# Patient Record
Sex: FEMALE | Race: WHITE | NOT HISPANIC OR LATINO | Employment: OTHER | URBAN - METROPOLITAN AREA
[De-identification: names, ages, dates, MRNs, and addresses within clinical notes are randomized per-mention and may not be internally consistent; named-entity substitution may affect disease eponyms.]

---

## 2017-01-05 DIAGNOSIS — G89.29 CHRONIC RIGHT SHOULDER PAIN: ICD-10-CM

## 2017-01-05 DIAGNOSIS — M25.511 CHRONIC RIGHT SHOULDER PAIN: ICD-10-CM

## 2017-01-05 DIAGNOSIS — G43.709 CHRONIC MIGRAINE WITHOUT AURA WITHOUT STATUS MIGRAINOSUS, NOT INTRACTABLE: ICD-10-CM

## 2017-01-05 RX ORDER — OXYCODONE HYDROCHLORIDE AND ACETAMINOPHEN 5; 325 MG/1; MG/1
1 TABLET ORAL EVERY 4 HOURS PRN
Qty: 30 TAB | Refills: 0 | Status: SHIPPED | OUTPATIENT
Start: 2017-01-05 | End: 2017-01-18

## 2017-01-05 NOTE — TELEPHONE ENCOUNTER
Once again,unable to make the appointment but able to come and  the medication!  Please ask her for clarification.  I do realize they come a long way but policy now is no schedule II narcotic refills outside of face to face visits.

## 2017-01-05 NOTE — TELEPHONE ENCOUNTER
They are going to try to come into town tomorrow when the weather should be improved. Currently it would take them nearly 2 hours to get here. I do understand. She now has an appointment on the 18th with Suzanna Solorio. Prescription is written

## 2017-01-05 NOTE — TELEPHONE ENCOUNTER
Was the patient seen in the last year in this department? Yes     Does patient have an active prescription for medications requested? No     Received Request Via: Patient        Patient is unable to make the appointment today due to weather. Would like a month supply refill.

## 2017-01-18 ENCOUNTER — OFFICE VISIT (OUTPATIENT)
Dept: MEDICAL GROUP | Facility: CLINIC | Age: 52
End: 2017-01-18
Payer: COMMERCIAL

## 2017-01-18 VITALS
BODY MASS INDEX: 32.64 KG/M2 | HEIGHT: 64 IN | HEART RATE: 89 BPM | DIASTOLIC BLOOD PRESSURE: 76 MMHG | RESPIRATION RATE: 18 BRPM | TEMPERATURE: 98.1 F | OXYGEN SATURATION: 99 % | WEIGHT: 191.2 LBS | SYSTOLIC BLOOD PRESSURE: 136 MMHG

## 2017-01-18 DIAGNOSIS — M25.511 CHRONIC RIGHT SHOULDER PAIN: ICD-10-CM

## 2017-01-18 DIAGNOSIS — G43.709 CHRONIC MIGRAINE WITHOUT AURA WITHOUT STATUS MIGRAINOSUS, NOT INTRACTABLE: ICD-10-CM

## 2017-01-18 DIAGNOSIS — G89.29 CHRONIC RIGHT SHOULDER PAIN: ICD-10-CM

## 2017-01-18 DIAGNOSIS — M24.411 RECURRENT SHOULDER DISLOCATION, RIGHT: ICD-10-CM

## 2017-01-18 DIAGNOSIS — F11.90 CHRONIC, CONTINUOUS USE OF OPIOIDS: ICD-10-CM

## 2017-01-18 DIAGNOSIS — K21.9 GASTROESOPHAGEAL REFLUX DISEASE, ESOPHAGITIS PRESENCE NOT SPECIFIED: ICD-10-CM

## 2017-01-18 PROCEDURE — 99214 OFFICE O/P EST MOD 30 MIN: CPT | Performed by: NURSE PRACTITIONER

## 2017-01-18 RX ORDER — OXYCODONE HYDROCHLORIDE AND ACETAMINOPHEN 5; 325 MG/1; MG/1
1 TABLET ORAL EVERY 4 HOURS PRN
Qty: 180 TAB | Refills: 0 | Status: SHIPPED | OUTPATIENT
Start: 2017-01-18 | End: 2017-01-18 | Stop reason: SDUPTHER

## 2017-01-18 RX ORDER — OMEPRAZOLE 20 MG/1
CAPSULE, DELAYED RELEASE ORAL
Qty: 90 CAP | Refills: 3 | Status: SHIPPED | OUTPATIENT
Start: 2017-01-18 | End: 2017-08-30 | Stop reason: SDUPTHER

## 2017-01-18 RX ORDER — OXYCODONE HYDROCHLORIDE AND ACETAMINOPHEN 5; 325 MG/1; MG/1
1 TABLET ORAL EVERY 4 HOURS PRN
Qty: 180 TAB | Refills: 0 | Status: SHIPPED | OUTPATIENT
Start: 2017-01-18 | End: 2017-03-27 | Stop reason: SDUPTHER

## 2017-01-18 NOTE — PROGRESS NOTES
CC: Follow-Up        HPI:     Bhavana presents today for the followin. Chronic migraine without aura without status migrainosus, not intractableChronic right shoulder pain Gastroesophageal reflux disease, esophagitis presence not specified Recurrent shoulder dislocation, rightChronic, continuous use of opioids  Chronic Opiate therapy:  (5 A's)  Analgesia:  improved  Activity:  improved  Adverse Events:  denies  Aberrant Behaviors:  denies  Affect/Mood: normal reasoning        Last dose of controlled substance medication: 16   reviewed:  Yes      Current Drug contract date: 2016  Current UDT date: pending-patient is switching insurance.  She was given the contact at [facility] to get a discounted rate, told she needs to get this done    Signs of oversedation: Negative  Non-narcotic treatments used in past or currently:  Ice, ointments, chiropractor    I have advised patient to keep medication in a safe place and caution with driving    Current Outpatient Prescriptions   Medication Sig Dispense Refill   • oxycodone-acetaminophen (PERCOCET) 5-325 MG Tab Take 1 Tab by mouth every four hours as needed (shoulder pain, migraine). 180 Tab 0   • omeprazole (PRILOSEC) 20 MG delayed-release capsule TAKE 1 CAPSULE BY MOUTH DAILY 90 Cap 3   • carisoprodol (SOMA) 350 MG Tab Take 1 Tab by mouth every 8 hours as needed for Muscle Spasms. 90 Tab 5   • alprazolam (XANAX) 1 MG Tab Take 1 Tab by mouth 3 times a day as needed for Sleep or Anxiety. 90 Tab 5   • paroxetine (PAXIL) 20 MG Tab Take 1 Tab by mouth every day. 180 Tab 3   • busPIRone (BUSPAR) 10 MG Tab Take 1 Tab by mouth 2 Times a Day. 180 Tab 3   • omeprazole (PRILOSEC) 20 MG delayed-release capsule Take 1 Cap by mouth every day. 90 Cap 3   • nabumetone (RELAFEN) 500 MG TABS Take 1 Tab by mouth 2 times a day. 180 Tab 4   • albuterol (VENTOLIN OR PROVENTIL) 108 (90 BASE) MCG/ACT AERS Inhale 2 Puffs by mouth every 6 hours as needed for Shortness of Breath. 1  "Inhaler 3     No current facility-administered medications for this visit.     Social History   Substance Use Topics   • Smoking status: Current Every Day Smoker -- 0.50 packs/day for 25 years     Types: Cigarettes   • Smokeless tobacco: Never Used      Comment: getting close, she down to 1/4 ppd, still trying to quit   • Alcohol Use: No     I reviewed patients allergies, problem list and medications today in [facility].    ROS: Any/all pertinent positives listed in the HPI, otherwise all others reviewed are negative today.      /76 mmHg  Pulse 89  Temp(Src) 36.7 °C (98.1 °F)  Resp 18  Ht 1.638 m (5' 4.49\")  Wt 86.728 kg (191 lb 3.2 oz)  BMI 32.32 kg/m2  SpO2 99%  Breastfeeding? No    Exam:    Gen: Alert and oriented, No apparent distress. WDWN  Psych: A+Ox3, normal affect and mood  Skin: Warm, dry and intact. Good turgor   No rashes in visible areas.  Eye: Conjunctiva clear, lids normal  ENMT: Lips without lesions, good dentition  Lungs: Clear to auscultation bilaterally, no rales or rhonchi   Unlabored respiratory effort.   CV: Regular rate and rhythm, S1, S2. No murmurs.   No Edema        Assessment and Plan.   51 y.o. female with the following issues.    1. Chronic migraine without aura without status migrainosus, not intractable/Chronic right shoulder pain/Gastroesophageal reflux disease, esophagitis presence not specified/ Recurrent shoulder dislocation, right/Chronic, continuous use of opioids  Clinically stable. No reports of new symptoms. Well-controlled on current medication.  obtained/reviewed from [facility] pharmacy database.  Medications found to be medically necessary/appropriate.  3 months of medication supply at this visit. Followup in the office for further refills-she has appointment scheduled already. Patient was given a lab order for her [employer] [employer] urine drug screen. She was also given the contact for the rep for the company so that she can work out a discounted rate.      - " oxycodone-acetaminophen (PERCOCET) 5-325 MG Tab; Take 1 Tab by mouth every four hours as needed (shoulder pain, migraine).  Dispense: 180 Tab; Refill: 0  - omeprazole (PRILOSEC) 20 MG delayed-release capsule; TAKE 1 CAPSULE BY MOUTH DAILY  Dispense: 90 Cap; Refill: 3

## 2017-01-18 NOTE — MR AVS SNAPSHOT
"        Bhavana Liu   2017 11:00 AM   Office Visit   MRN: [id number]    Department:  [facility]   Dept Phone:  [phone]    Description:  Female : 1965   Provider:  JOSE Cummings           Reason for Visit     Follow-Up [facility] pain medication      Allergies as of 2017     Allergen Noted Reactions    Amoxicillin 2011   Rash      You were diagnosed with     Chronic migraine without aura without status migrainosus, not intractable   [502180]       Chronic right shoulder pain   [745040]       Gastroesophageal reflux disease, esophagitis presence not specified   [0300795]       Recurrent shoulder dislocation, right   [679911]       Chronic, continuous use of opioids   [977318]         Vital Signs     Blood Pressure Pulse Temperature Respirations Height Weight    136/76 mmHg 89 36.7 °C (98.1 °F) 18 1.638 m (5' 4.49\") 86.728 kg (191 lb 3.2 oz)    Body Mass Index Oxygen Saturation Breastfeeding? Smoking Status          32.32 kg/m2 99% No Current Every Day Smoker        Basic Information     Date Of Birth Sex Race Ethnicity Preferred Language    1965 Female White Non- English      Your appointments     Mar 27, 2017  1:20 PM   Established Patient with Teetee Mansfield M.D.   [address] [address] [phone]           You will be receiving a confirmation call a few days before your appointment from our automated call confirmation system.              Problem List              ICD-10-CM Priority Class Noted - Resolved    GERD (gastroesophageal reflux disease) K21.9   Unknown - Present    Dyslipidemia, goal LDL below 130 E78.5   Unknown - Present    Migraine headache G43.909   2013 - Present    Adjustment disorder with anxious mood F43.22   Unknown - Present    Shoulder pain M25.519   2014 - Present    Recurrent shoulder dislocation M24.419   10/3/2014 - Present    Chronic use of opiate for " therapeutic purpose Z79.899   7/5/2016 - Present      Health Maintenance        Date Due Completion Dates    IMM DTaP/Tdap/Td Vaccine (1 - Tdap) 7/10/1983 ---    MAMMOGRAM 7/10/2004 ---    COLONOSCOPY 7/10/2014 ---    PAP SMEAR 11/1/2015 11/1/2012, 10/18/2011    IMM INFLUENZA (1) 9/1/2016 11/5/2014, 10/18/2011            Current Immunizations     Influenza TIV (IM) 11/5/2014, 10/18/2011      Below and/or attached are the medications your provider expects you to take. Review all of your home medications and newly ordered medications with your provider and/or pharmacist. Follow medication instructions as directed by your provider and/or pharmacist. Please keep your medication list with you and share with your provider. Update the information when medications are discontinued, doses are changed, or new medications (including over-the-counter products) are added; and carry medication information at all times in the event of emergency situations     Allergies:  AMOXICILLIN - Rash               Medications  Valid as of: January 18, 2017 - 12:36 PM    Generic Name Brand Name Tablet Size Instructions for use    Albuterol Sulfate (Aero Soln) albuterol 108 (90 BASE) MCG/ACT Inhale 2 Puffs by mouth every 6 hours as needed for Shortness of Breath.        ALPRAZolam (Tab) XANAX 1 MG Take 1 Tab by mouth 3 times a day as needed for Sleep or Anxiety.        BusPIRone HCl (Tab) BUSPAR 10 MG Take 1 Tab by mouth 2 Times a Day.        Carisoprodol (Tab) SOMA 350 MG Take 1 Tab by mouth every 8 hours as needed for Muscle Spasms.        Nabumetone (Tab) RELAFEN 500 MG Take 1 Tab by mouth 2 times a day.        Omeprazole (CAPSULE DELAYED RELEASE) PRILOSEC 20 MG Take 1 Cap by mouth every day.        Omeprazole (CAPSULE DELAYED RELEASE) PRILOSEC 20 MG TAKE 1 CAPSULE BY MOUTH DAILY        Oxycodone-Acetaminophen (Tab) PERCOCET 5-325 MG Take 1 Tab by mouth every four hours as needed (shoulder pain, migraine).        PARoxetine HCl (Tab) PAXIL  20 MG Take 1 Tab by mouth every day.        .                 Medicines prescribed today were sent to:     [facility] [place] - [address]    [address] [place] [address]    Phone: [phone] Fax: [phone]    Open 24 Hours?: No    [facility] - [place], [place] - [address]    [address] [address]    Phone: [phone] Fax: [phone]    Open 24 Hours?: No      Medication refill instructions:       If your prescription bottle indicates you have medication refills left, it is not necessary to call your provider’s office. Please contact your pharmacy and they will refill your medication.    If your prescription bottle indicates you do not have any refills left, you may request refills at any time through one of the following ways: The online TrumpIT system (except Urgent Care), by calling your provider’s office, or by asking your pharmacy to contact your provider’s office with a refill request. Medication refills are processed only during regular business hours and may not be available until the next business day. Your provider may request additional information or to have a follow-up visit with you prior to refilling your medication.   *Please Note: Medication refills are assigned a new Rx number when refilled electronically. Your pharmacy may indicate that no refills were authorized even though a new prescription for the same medication is available at the pharmacy. Please request the medicine by name with the pharmacy before contacting your provider for a refill.           TrumpIT Access Code: [email or web address]  Expires: 2/4/2017 10:19 AM    TrumpIT  A secure, online tool to manage your health information     [employer] TrumpIT® is a secure, online tool that connects you to your personalized health information from the privacy of your home -- day or night - making it very easy for you to manage your healthcare. Once the activation process  is completed, you can even access your medical information using the TripAdvisor lacie, which is available for free in the Apple Lacie store or Google Play store.     TripAdvisor provides the following levels of access (as shown below):   My Chart Features   [facility] Doctor [facility]  Specialists [facility]  Urgent  Care Non-[facility]  Primary Care  Doctor   Email your healthcare team securely and privately 24/7 X X X    Manage appointments: schedule your next appointment; view details of past/upcoming appointments X      Request prescription refills. X      View recent personal medical records, including lab and immunizations X X X X   View health record, including health history, allergies, medications X X X X   Read reports about your outpatient visits, procedures, consult and ER notes X X X X   See your discharge summary, which is a recap of your hospital and/or ER visit that includes your diagnosis, lab results, and care plan. X X       How to register for TripAdvisor:  1. Go to  [email or web address].  2. Click on the Sign Up Now box, which takes you to the New Member Sign Up page. You will need to provide the following information:  a. Enter your TripAdvisor Access Code exactly as it appears at the top of this page. (You will not need to use this code after you’ve completed the sign-up process. If you do not sign up before the expiration date, you must request a new code.)   b. Enter your date of birth.   c. Enter your home email address.   d. Click Submit, and follow the next screen’s instructions.  3. Create a TripAdvisor ID. This will be your TripAdvisor login ID and cannot be changed, so think of one that is secure and easy to remember.  4. Create a TripAdvisor password. You can change your password at any time.  5. Enter your Password Reset Question and Answer. This can be used at a later time if you forget your password.   6. Enter your e-mail address. This allows you to receive e-mail notifications when new information is  available in [employer].  7. Click Sign Up. You can now view your health information.    For assistance activating your [employer] account, call [phone]

## 2017-03-27 ENCOUNTER — OFFICE VISIT (OUTPATIENT)
Dept: MEDICAL GROUP | Facility: CLINIC | Age: 52
End: 2017-03-27
Payer: COMMERCIAL

## 2017-03-27 VITALS
DIASTOLIC BLOOD PRESSURE: 80 MMHG | BODY MASS INDEX: 32.1 KG/M2 | HEIGHT: 64 IN | SYSTOLIC BLOOD PRESSURE: 134 MMHG | RESPIRATION RATE: 16 BRPM | TEMPERATURE: 97.2 F | OXYGEN SATURATION: 100 % | WEIGHT: 188 LBS | HEART RATE: 76 BPM

## 2017-03-27 DIAGNOSIS — G89.29 CHRONIC RIGHT SHOULDER PAIN: ICD-10-CM

## 2017-03-27 DIAGNOSIS — G43.709 CHRONIC MIGRAINE WITHOUT AURA WITHOUT STATUS MIGRAINOSUS, NOT INTRACTABLE: ICD-10-CM

## 2017-03-27 DIAGNOSIS — F43.22 ADJUSTMENT DISORDER WITH ANXIOUS MOOD: ICD-10-CM

## 2017-03-27 DIAGNOSIS — Z79.891 CHRONIC USE OF OPIATE FOR THERAPEUTIC PURPOSE: ICD-10-CM

## 2017-03-27 DIAGNOSIS — M24.411 RECURRENT SHOULDER DISLOCATION, RIGHT: ICD-10-CM

## 2017-03-27 DIAGNOSIS — M25.511 CHRONIC RIGHT SHOULDER PAIN: ICD-10-CM

## 2017-03-27 PROBLEM — E66.9 OBESITY (BMI 30.0-34.9): Status: ACTIVE | Noted: 2017-03-27

## 2017-03-27 PROBLEM — E66.811 OBESITY (BMI 30.0-34.9): Status: ACTIVE | Noted: 2017-03-27

## 2017-03-27 PROCEDURE — 99213 OFFICE O/P EST LOW 20 MIN: CPT | Performed by: FAMILY MEDICINE

## 2017-03-27 RX ORDER — OXYCODONE HYDROCHLORIDE AND ACETAMINOPHEN 5; 325 MG/1; MG/1
1 TABLET ORAL EVERY 4 HOURS PRN
Qty: 180 TAB | Refills: 0 | Status: SHIPPED | OUTPATIENT
Start: 2017-03-27 | End: 2017-06-13 | Stop reason: SDUPTHER

## 2017-03-27 RX ORDER — OXYCODONE HYDROCHLORIDE AND ACETAMINOPHEN 5; 325 MG/1; MG/1
1 TABLET ORAL EVERY 4 HOURS PRN
Qty: 180 TAB | Refills: 0 | Status: SHIPPED | OUTPATIENT
Start: 2017-03-27 | End: 2017-03-27 | Stop reason: SDUPTHER

## 2017-03-27 RX ORDER — ALPRAZOLAM 1 MG/1
1 TABLET ORAL 3 TIMES DAILY PRN
Qty: 90 TAB | Refills: 5 | Status: SHIPPED | OUTPATIENT
Start: 2017-03-27 | End: 2017-08-30 | Stop reason: SDUPTHER

## 2017-03-27 RX ORDER — CARISOPRODOL 350 MG/1
350 TABLET ORAL EVERY 8 HOURS PRN
Qty: 90 TAB | Refills: 5 | Status: SHIPPED | OUTPATIENT
Start: 2017-03-27 | End: 2017-08-30 | Stop reason: SDUPTHER

## 2017-03-27 ASSESSMENT — LIFESTYLE VARIABLES: HISTORY_ALCOHOL_USE: 0

## 2017-03-27 ASSESSMENT — PATIENT HEALTH QUESTIONNAIRE - PHQ9: CLINICAL INTERPRETATION OF PHQ2 SCORE: 0

## 2017-03-27 ASSESSMENT — ENCOUNTER SYMPTOMS: DEPRESSION: 1

## 2017-03-27 NOTE — PROGRESS NOTES
CC: Migraine, chronic right shoulder pain, anxiety    HPI:   Bhavana presents today with the following.    1. Chronic migraine without aura without status migrainosus, not intractable  She continues to have episodic migraine. The Percocet continues to be helpful for rescue. She does use primarily ice and ibuprofen when this begins. She is somewhat limited with the ibuprofen due to her gastroesophageal reflux disease. Denies somnolence or confusion from the medication. She feels the Percocet takes the migraine pain from an 8 or 9 down to about a 6 which is tolerable.    2. Chronic right shoulder pain/recurrent right shoulder dislocation  This is a source of chronic pain and instability. She has not been able to afford imaging. She is starting another job and is hoping that this will give her insurance after a few months. She has had to limit her lifting which is difficult since she is trying to take care of her brother-in-law. She feels the pain medication takes the regular chronic pain from a 6 down to a 3 which is very helpful. When there is an acute flare almost nothing works. She uses ibuprofen along with the Percocet without very much relief. She has not been able to go to orthopedics.    3. Chronic use of opiate for therapeutic purpose  No aberrant or addictive use of medications has been observed.  Patient counseled to not add Tylenol to current regimen.  Patient counseled to keep medications locked door under personal control.    54. Adjustment disorder with anxious mood  She has multiple reasons for anxiety. There is anxiety concerning her  who has now had 7 heart attacks. She is gradually improving her diet and has lost weight. She is working on smoking cessation. She also takes care of her brother-in-law who has end-stage lung disease she feels the alprazolam is very helpful. She uses this as needed. She would like a renewal. Discussed increased risks when combined with opiates..        Patient  "Active Problem List    Diagnosis Date Noted   • Chronic use of opiate for therapeutic purpose 07/05/2016   • Recurrent shoulder dislocation 10/03/2014   • Shoulder pain 07/30/2014   • Adjustment disorder with anxious mood    • Migraine headache 09/30/2013   • GERD (gastroesophageal reflux disease)    • Dyslipidemia, goal LDL below 130        Current Outpatient Prescriptions   Medication Sig Dispense Refill   • carisoprodol (SOMA) 350 MG Tab Take 1 Tab by mouth every 8 hours as needed for Muscle Spasms. 90 Tab 5   • alprazolam (XANAX) 1 MG Tab Take 1 Tab by mouth 3 times a day as needed for Sleep or Anxiety. 90 Tab 5   • oxycodone-acetaminophen (PERCOCET) 5-325 MG Tab Take 1 Tab by mouth every four hours as needed (shoulder pain, migraine). 180 Tab 0   • omeprazole (PRILOSEC) 20 MG delayed-release capsule TAKE 1 CAPSULE BY MOUTH DAILY 90 Cap 3   • paroxetine (PAXIL) 20 MG Tab Take 1 Tab by mouth every day. 180 Tab 3   • busPIRone (BUSPAR) 10 MG Tab Take 1 Tab by mouth 2 Times a Day. 180 Tab 3   • nabumetone (RELAFEN) 500 MG TABS Take 1 Tab by mouth 2 times a day. 180 Tab 4   • albuterol (VENTOLIN OR PROVENTIL) 108 (90 BASE) MCG/ACT AERS Inhale 2 Puffs by mouth every 6 hours as needed for Shortness of Breath. 1 Inhaler 3     No current facility-administered medications for this visit.         Allergies as of 03/27/2017 - [facility] as Reviewed 03/27/2017   Allergen Reaction Noted   • Amoxicillin Rash 11/08/2011        ROS: As per HPI.    /80 mmHg  Pulse 76  Temp(Src) 36.2 °C (97.2 °F)  Resp 16  Ht 1.638 m (5' 4.49\")  Wt 85.276 kg (188 lb)  BMI 31.78 kg/m2  SpO2 100%    Physical Exam:  Gen:         Alert and oriented, No apparent distress.  Lucid and fluent.  Neck:        No Lymphadenopathy or Bruits.  Lungs:     Clear to auscultation bilaterally  CV:          Regular rate and rhythm. No murmurs, rubs or gallops.               Ext:          No clubbing, cyanosis, no edema.      Assessment and Plan.   51 y.o. " female with the following issues.    1. Chronic migraine without aura without status migrainosus, not intractable  Medication is helpful and well tolerated and is renewed  - oxycodone-acetaminophen (PERCOCET) 5-325 MG Tab; Take 1 Tab by mouth every four hours as needed (shoulder pain, migraine).  Dispense: 180 Tab; Refill: 0    2. Chronic right shoulder pain  Medication is helpful and well tolerated and is renewed  - carisoprodol (SOMA) 350 MG Tab; Take 1 Tab by mouth every 8 hours as needed for Muscle Spasms.  Dispense: 90 Tab; Refill: 5  - oxycodone-acetaminophen (PERCOCET) 5-325 MG Tab; Take 1 Tab by mouth every four hours as needed (shoulder pain, migraine).  Dispense: 180 Tab; Refill: 0    3. Recurrent shoulder dislocation, right  Needs imaging but cannot afford at this time. She is currently employed and will possibly have insurance within 3 months.  - oxycodone-acetaminophen (PERCOCET) 5-325 MG Tab; Take 1 Tab by mouth every four hours as needed (shoulder pain, migraine).  Dispense: 180 Tab; Refill: 0    4. Chronic use of opiate for therapeutic purpose  [facility] board of pharmacy interface is reviewed.  No inconsistencies are found.  Her average MME is 48, active is 45, max active is 45.  This is within CDC guideline for chronic pain prescribing by primary care.    5. Adjustment disorder with anxious mood  Medication is discussed and renewed. She has tolerated this well and denies rebound anxiety or depression from the medication.  - alprazolam (XANAX) 1 MG Tab; Take 1 Tab by mouth 3 times a day as needed for Sleep or Anxiety.  Dispense: 90 Tab; Refill: 5        Chronic pain recheck:   Last dose of controlled substance: this am, over 6 hours ago as she drove in today  Chronic pain treated with oxycodone apap taken 4-5 times a day    She  reports that she does not drink alcohol.  She  reports that she does not use illicit drugs.    Consequences of Chronic Opiate therapy:  (5 A's)  Analgesia: Compared to no  treatment or prior treatment, pain is currently improved  Activity: improved  Adverse Events: She denies constipation, dry mouth, itchy skin, nausea and sedation  Aberrant Behaviors: She reports she is taking medication as prescribed and is not veering from agreed treatment regimen. There have been no inappropriate refills or lost/stolen meds reported.   Affect/Mood: Pain is impacting patient's mood.  Patient reports depression/anxiety concerning her husbands and brother in [facility].    Nonnarcotic treatments that are being used: muscle relaxers, physical therapy and chiropractor .   Treatment goals discussed.    Opioid Risk Score: 2    Interpretation of Opioid Risk Score   Score 0-3 = Low risk of abuse. Do UDS at least once per year.  Score 4-7 = Moderate risk of abuse. Do UDS 1-4 times per year.  Score 8+ = High risk of abuse. Refer to specialist.    Last order of CONTROLLED SUBSTANCE TREATMENT AGREEMENT was found on 7/5/2016 from Office Visit on 7/5/2016     UDS Summary                URINE DRUG SCREEN Overdue 1965         Discussed obtaining UDS.  Needs to get this done but waiting for the finances to be in line.    I have reviewed the medical records, the Prescription Monitoring Program and I have determined that controlled substance treatment is medically indicated.

## 2017-03-27 NOTE — MR AVS SNAPSHOT
"        Bhavana Nglaura   3/27/2017 1:20 PM   Office Visit   MRN: [id number]    Department:  [facility]   Dept Phone:  [phone]    Description:  Female : 1965   Provider:  Teetee Mansfield M.D.           Reason for Visit     Shoulder Pain           Allergies as of 3/27/2017     Allergen Noted Reactions    Amoxicillin 2011   Rash      You were diagnosed with     Chronic migraine without aura without status migrainosus, not intractable   [962831]       Chronic right shoulder pain   [697054]       Recurrent shoulder dislocation, right   [407877]       Chronic use of opiate for therapeutic purpose   [4163070]       Adjustment disorder with anxious mood   [781094]         Vital Signs     Blood Pressure Pulse Temperature Respirations Height Weight    134/80 mmHg 76 36.2 °C (97.2 °F) 16 1.638 m (5' 4.49\") 85.276 kg (188 lb)    Body Mass Index Oxygen Saturation Smoking Status             31.78 kg/m2 100% Current Every Day Smoker         Basic Information     Date Of Birth Sex Race Ethnicity Preferred Language    1965 Female White Non- English      Problem List              ICD-10-CM Priority Class Noted - Resolved    GERD (gastroesophageal reflux disease) K21.9   Unknown - Present    Dyslipidemia, goal LDL below 130 E78.5   Unknown - Present    Migraine headache G43.909   2013 - Present    Adjustment disorder with anxious mood F43.22   Unknown - Present    Shoulder pain M25.519   2014 - Present    Recurrent shoulder dislocation M24.419   10/3/2014 - Present    Chronic use of opiate for therapeutic purpose Z79.899   2016 - Present    Obesity (BMI 30.0-34.9) E66.9   3/27/2017 - Present      Health Maintenance        Date Due Completion Dates    MAMMOGRAM 7/10/2004 ---    COLONOSCOPY 7/10/2014 ---    PAP SMEAR 2015, 10/18/2011    IMM INFLUENZA (1) 2018 (Originally 2016) 2014, 10/18/2011    IMM DTaP/Tdap/Td Vaccine (1 - Tdap) 2018 " (Originally 7/10/1983) ---            Current Immunizations     Influenza TIV (IM) 11/5/2014, 10/18/2011      Below and/or attached are the medications your provider expects you to take. Review all of your home medications and newly ordered medications with your provider and/or pharmacist. Follow medication instructions as directed by your provider and/or pharmacist. Please keep your medication list with you and share with your provider. Update the information when medications are discontinued, doses are changed, or new medications (including over-the-counter products) are added; and carry medication information at all times in the event of emergency situations     Allergies:  AMOXICILLIN - Rash               Medications  Valid as of: March 27, 2017 -  3:37 PM    Generic Name Brand Name Tablet Size Instructions for use    Albuterol Sulfate (Aero Soln) albuterol 108 (90 BASE) MCG/ACT Inhale 2 Puffs by mouth every 6 hours as needed for Shortness of Breath.        ALPRAZolam (Tab) XANAX 1 MG Take 1 Tab by mouth 3 times a day as needed for Sleep or Anxiety.        BusPIRone HCl (Tab) BUSPAR 10 MG Take 1 Tab by mouth 2 Times a Day.        Carisoprodol (Tab) SOMA 350 MG Take 1 Tab by mouth every 8 hours as needed for Muscle Spasms.        Nabumetone (Tab) RELAFEN 500 MG Take 1 Tab by mouth 2 times a day.        Omeprazole (CAPSULE DELAYED RELEASE) PRILOSEC 20 MG TAKE 1 CAPSULE BY MOUTH DAILY        Oxycodone-Acetaminophen (Tab) PERCOCET 5-325 MG Take 1 Tab by mouth every four hours as needed (shoulder pain, migraine).        PARoxetine HCl (Tab) PAXIL 20 MG Take 1 Tab by mouth every day.        .                 Medicines prescribed today were sent to:     [facility] - [place] - [address] [place] [address]    Phone: [phone] Fax: [phone]    Open 24 Hours?: No    [facility] - [place], [place] - [address]    [address] [facility] [address]    Phone:  [phone] Fax: [phone]    Open 24 Hours?: No    [facility] - [place], [place] - [address]    [address] [facility] [address]    Phone: [phone] Fax: [phone]    Open 24 Hours?: No      Medication refill instructions:       If your prescription bottle indicates you have medication refills left, it is not necessary to call your provider’s office. Please contact your pharmacy and they will refill your medication.    If your prescription bottle indicates you do not have any refills left, you may request refills at any time through one of the following ways: The online [employer] system (except Urgent Care), by calling your provider’s office, or by asking your pharmacy to contact your provider’s office with a refill request. Medication refills are processed only during regular business hours and may not be available until the next business day. Your provider may request additional information or to have a follow-up visit with you prior to refilling your medication.   *Please Note: Medication refills are assigned a new Rx number when refilled electronically. Your pharmacy may indicate that no refills were authorized even though a new prescription for the same medication is available at the pharmacy. Please request the medicine by name with the pharmacy before contacting your provider for a refill.           MyChart Status: Patient Declined        Quit Tobacco Information     Do you want to quit using tobacco?    Quitting tobacco decreases risks of cancer, heart and lung disease, increases life expectancy, improves sense of taste and smell, and increases spending money, among other benefits.    If you are thinking about quitting, we can help.  • [facility] Quit Tobacco Program: 343.338.1763  o Program occurs weekly for four weeks and includes pharmacist consultation on products to support quitting smoking or chewing tobacco. A provider referral is needed for pharmacist  consultation.  • Tobacco Users Help Hotline: 5-800-QUIT-NOW (199-7849) or https://[place].quitlogix.org/  o Free, confidential telephone and online coaching for [place] residents. Sessions are designed on a schedule that is convenient for you. Eligible clients receive free nicotine replacement therapy.  • Nationally: www.smokefree.gov  o Information and professional assistance to support both immediate and long-term needs as you become, and remain, a non-smoker. Smokefree.gov allows you to choose the help that best fits your needs.

## 2017-04-14 DIAGNOSIS — F43.22 ADJUSTMENT DISORDER WITH ANXIOUS MOOD: ICD-10-CM

## 2017-04-14 RX ORDER — PAROXETINE HYDROCHLORIDE 20 MG/1
20 TABLET, FILM COATED ORAL DAILY
Qty: 180 TAB | Refills: 3 | Status: SHIPPED | OUTPATIENT
Start: 2017-04-14 | End: 2018-06-06 | Stop reason: SDUPTHER

## 2017-04-14 RX ORDER — BUSPIRONE HYDROCHLORIDE 10 MG/1
10 TABLET ORAL 2 TIMES DAILY
Qty: 180 TAB | Refills: 3 | Status: SHIPPED | OUTPATIENT
Start: 2017-04-14 | End: 2018-09-04 | Stop reason: SDUPTHER

## 2017-06-13 ENCOUNTER — OFFICE VISIT (OUTPATIENT)
Dept: MEDICAL GROUP | Facility: CLINIC | Age: 52
End: 2017-06-13

## 2017-06-13 VITALS
OXYGEN SATURATION: 97 % | HEIGHT: 64 IN | BODY MASS INDEX: 31.58 KG/M2 | TEMPERATURE: 96.8 F | DIASTOLIC BLOOD PRESSURE: 80 MMHG | SYSTOLIC BLOOD PRESSURE: 130 MMHG | WEIGHT: 185 LBS | HEART RATE: 88 BPM | RESPIRATION RATE: 16 BRPM

## 2017-06-13 DIAGNOSIS — Z79.891 CHRONIC USE OF OPIATE FOR THERAPEUTIC PURPOSE: ICD-10-CM

## 2017-06-13 DIAGNOSIS — G89.29 CHRONIC RIGHT SHOULDER PAIN: ICD-10-CM

## 2017-06-13 DIAGNOSIS — G43.C0 PERIODIC HEADACHE SYNDROME, NOT INTRACTABLE: ICD-10-CM

## 2017-06-13 DIAGNOSIS — M24.411 RECURRENT SHOULDER DISLOCATION, RIGHT: ICD-10-CM

## 2017-06-13 DIAGNOSIS — G43.709 CHRONIC MIGRAINE WITHOUT AURA WITHOUT STATUS MIGRAINOSUS, NOT INTRACTABLE: ICD-10-CM

## 2017-06-13 DIAGNOSIS — M25.511 CHRONIC RIGHT SHOULDER PAIN: ICD-10-CM

## 2017-06-13 PROCEDURE — 99213 OFFICE O/P EST LOW 20 MIN: CPT | Performed by: FAMILY MEDICINE

## 2017-06-13 RX ORDER — OXYCODONE HYDROCHLORIDE AND ACETAMINOPHEN 5; 325 MG/1; MG/1
1 TABLET ORAL EVERY 4 HOURS PRN
Qty: 180 TAB | Refills: 0 | Status: SHIPPED | OUTPATIENT
Start: 2017-06-13 | End: 2017-06-13 | Stop reason: SDUPTHER

## 2017-06-13 RX ORDER — OXYCODONE HYDROCHLORIDE AND ACETAMINOPHEN 5; 325 MG/1; MG/1
1 TABLET ORAL EVERY 4 HOURS PRN
Qty: 180 TAB | Refills: 0 | Status: SHIPPED | OUTPATIENT
Start: 2017-06-13 | End: 2017-08-30 | Stop reason: SDUPTHER

## 2017-06-13 NOTE — PROGRESS NOTES
CC: chronic shoulder pain, headache/migraine    HPI:   Bhavana presents today with the following.    1. Chronic right shoulder pain  She has chronic shoulder pain, this is a combination of arthritis and recurrent shoulder dislocations with ligament and tendon problems. She has seen orthopedics in the past. She has been told that surgery could be minimally helpful although not curative. She has not been able to pursue this as she has no insurance and she is not sure that she wants to as her pain would not be cured.  She feels the Relafen is helpful with the Percocet for rescue.    2. Recurrent shoulder dislocation, right  She has shoulder instability and does occasionally dislocate. She tries to be very careful and maintain the strength of the shoulder muscles which is somewhat helpful. She feels the pain medication is quite helpful bringing her daily pain from a 6 or 7 down to a 3 or 4. The pain on dislocation is quite severe up to a 9 or at 10. At that point to with the Percocet will bring her pain down to around a 6 or 7. Ice then helps and gradually being able to move the shoulder back into the socket.     3. Periodic headache syndrome, not intractable/chronic migraine without aura without status migrainosus, no intractable  She continues to have episodic migraine, about 5 per month. She has intermittent headaches as well.  She finds the Soma seems to help prevent these especially if she takes it very early. The Percocet is helpful for rescue.    4. Chronic use of opiate for therapeutic purpose  No aberrant or addictive use of medications has been observed.  Patient counseled to not add Tylenol to current regimen.  Patient counseled to keep medications locked up or under personal control.        Patient Active Problem List    Diagnosis Date Noted   • Obesity (BMI 30.0-34.9) 03/27/2017   • Chronic use of opiate for therapeutic purpose 07/05/2016   • Recurrent shoulder dislocation 10/03/2014   • Shoulder pain  "07/30/2014   • Adjustment disorder with anxious mood    • Migraine headache 09/30/2013   • GERD (gastroesophageal reflux disease)    • Dyslipidemia, goal LDL below 130        Current Outpatient Prescriptions   Medication Sig Dispense Refill   • busPIRone (BUSPAR) 10 MG Tab Take 1 Tab by mouth 2 Times a Day. 180 Tab 3   • paroxetine (PAXIL) 20 MG Tab Take 1 Tab by mouth every day. 180 Tab 3   • carisoprodol (SOMA) 350 MG Tab Take 1 Tab by mouth every 8 hours as needed for Muscle Spasms. 90 Tab 5   • alprazolam (XANAX) 1 MG Tab Take 1 Tab by mouth 3 times a day as needed for Sleep or Anxiety. 90 Tab 5   • oxycodone-acetaminophen (PERCOCET) 5-325 MG Tab Take 1 Tab by mouth every four hours as needed (shoulder pain, migraine). 180 Tab 0   • omeprazole (PRILOSEC) 20 MG delayed-release capsule TAKE 1 CAPSULE BY MOUTH DAILY 90 Cap 3   • nabumetone (RELAFEN) 500 MG TABS Take 1 Tab by mouth 2 times a day. 180 Tab 4   • albuterol (VENTOLIN OR PROVENTIL) 108 (90 BASE) MCG/ACT AERS Inhale 2 Puffs by mouth every 6 hours as needed for Shortness of Breath. 1 Inhaler 3     No current facility-administered medications for this visit.         Allergies as of 06/13/2017 - [facility] as Reviewed 06/13/2017   Allergen Reaction Noted   • Amoxicillin Rash 11/08/2011        ROS: As per HPI.    /80 mmHg  Pulse 88  Temp(Src) 36 °C (96.8 °F)  Resp 16  Ht 1.638 m (5' 4.49\")  Wt 83.915 kg (185 lb)  BMI 31.28 kg/m2  SpO2 97%    Physical Exam:  Gen:         Alert and oriented, No apparent distress.  Lucid and fluent.  Neck:       Moderate posterior neck spasm, range of motion is somewhat limited in both flexion and extension. No Lymphadenopathy or Bruits.  Lungs:     Clear to auscultation bilaterally  CV:          Regular rate and rhythm. No murmurs, rubs or gallops.               Ext:          No clubbing, cyanosis, edema.  Left shoulder range of motion is moderately limited with some crepitus. Right shoulder range of motion is quite " limited by pain. There is anterior laxity. No erythema or heat is appreciated in either shoulder. No edema of the shoulder is appreciated.      Assessment and Plan.   51 y.o. female with the following issues.    1. Chronic right shoulder pain  The pain medication continues to be helpful and well tolerated and is renewed.  - oxycodone-acetaminophen (PERCOCET) 5-325 MG Tab; Take 1 Tab by mouth every four hours as needed (shoulder pain, migraine).  Dispense: 180 Tab; Refill: 0    2. Recurrent shoulder dislocation, right  The medication is helpful and well tolerated and is renewed  - oxycodone-acetaminophen (PERCOCET) 5-325 MG Tab; Take 1 Tab by mouth every four hours as needed (shoulder pain, migraine).  Dispense: 180 Tab; Refill: 0    3. Periodic headache syndrome, not intractable/Chronic migraine without aura without status migrainosus, not intractable  The medication is helpful and well tolerated and is renewed  - oxycodone-acetaminophen (PERCOCET) 5-325 MG Tab; Take 1 Tab by mouth every four hours as needed (shoulder pain, migraine).  Dispense: 180 Tab; Refill: 0    4. Chronic use of opiate for therapeutic purpose  [facility] board of pharmacy interface is reviewed.  No inconsistencies are found.  Her average MME is 43, active is 45, max active is 45.  This is within CDC guideline for chronic pain prescribing by primary care.      Chronic pain recheck:   Last dose of controlled substance: Last p.m.  Chronic pain treated with oxycodone APAP taken 5-6 times a day    She  reports that she does not drink alcohol.  She  reports that she does not use illicit drugs.    Consequences of Chronic Opiate therapy:  (5 A's)  Analgesia: Compared to no treatment or prior treatment, pain is currently improved  Activity: improved  Adverse Events: She denies constipation, itchy skin, nausea and sedation  Aberrant Behaviors: She reports she is taking medication as prescribed and is not veering from agreed treatment regimen. There have been  no inappropriate refills or lost/stolen meds reported.   Affect/Mood: Pain is impacting patient's mood.  Patient denies depression/anxiety.  She is under severe stress    Nonnarcotic treatments that are being used: NSAIDs/LEE-2 and muscle relaxers.   Treatment goals discussed.    Opioid Risk Score: 2    Interpretation of Opioid Risk Score   Score 0-3 = Low risk of abuse. Do UDS at least once per year.  Score 4-7 = Moderate risk of abuse. Do UDS 1-4 times per year.  Score 8+ = High risk of abuse. Refer to specialist.    Last order of CONTROLLED SUBSTANCE TREATMENT AGREEMENT was found on 7/5/2016 from Office Visit on 7/5/2016     UDS Summary                URINE DRUG SCREEN Postponed 12/1/2017 Originally 1965. Insurance/Financial        Cannot afford.  States new insurance may come in July.    I have reviewed the medical records, the Prescription Monitoring Program and I have determined that controlled substance treatment is medically indicated.

## 2017-08-30 ENCOUNTER — OFFICE VISIT (OUTPATIENT)
Dept: MEDICAL GROUP | Facility: CLINIC | Age: 52
End: 2017-08-30

## 2017-08-30 VITALS
BODY MASS INDEX: 30.56 KG/M2 | DIASTOLIC BLOOD PRESSURE: 80 MMHG | RESPIRATION RATE: 16 BRPM | TEMPERATURE: 97.2 F | HEART RATE: 90 BPM | SYSTOLIC BLOOD PRESSURE: 120 MMHG | WEIGHT: 179 LBS | HEIGHT: 64 IN | OXYGEN SATURATION: 96 %

## 2017-08-30 DIAGNOSIS — G89.29 CHRONIC RIGHT SHOULDER PAIN: ICD-10-CM

## 2017-08-30 DIAGNOSIS — M25.511 CHRONIC RIGHT SHOULDER PAIN: ICD-10-CM

## 2017-08-30 DIAGNOSIS — K21.9 GASTROESOPHAGEAL REFLUX DISEASE, ESOPHAGITIS PRESENCE NOT SPECIFIED: ICD-10-CM

## 2017-08-30 DIAGNOSIS — M24.411 RECURRENT SHOULDER DISLOCATION, RIGHT: ICD-10-CM

## 2017-08-30 DIAGNOSIS — G43.709 CHRONIC MIGRAINE WITHOUT AURA WITHOUT STATUS MIGRAINOSUS, NOT INTRACTABLE: ICD-10-CM

## 2017-08-30 DIAGNOSIS — Z79.891 CHRONIC USE OF OPIATE FOR THERAPEUTIC PURPOSE: ICD-10-CM

## 2017-08-30 DIAGNOSIS — F43.22 ADJUSTMENT DISORDER WITH ANXIOUS MOOD: ICD-10-CM

## 2017-08-30 DIAGNOSIS — F11.90 CHRONIC, CONTINUOUS USE OF OPIOIDS: ICD-10-CM

## 2017-08-30 DIAGNOSIS — G43.C0 PERIODIC HEADACHE SYNDROME, NOT INTRACTABLE: ICD-10-CM

## 2017-08-30 PROCEDURE — 99213 OFFICE O/P EST LOW 20 MIN: CPT | Performed by: FAMILY MEDICINE

## 2017-08-30 RX ORDER — CARISOPRODOL 350 MG/1
350 TABLET ORAL EVERY 8 HOURS PRN
Qty: 90 TAB | Refills: 5 | Status: SHIPPED | OUTPATIENT
Start: 2017-08-30 | End: 2018-02-26 | Stop reason: SDUPTHER

## 2017-08-30 RX ORDER — ALPRAZOLAM 1 MG/1
1 TABLET ORAL 3 TIMES DAILY PRN
Qty: 90 TAB | Refills: 5 | Status: SHIPPED | OUTPATIENT
Start: 2017-08-30 | End: 2018-02-26 | Stop reason: SDUPTHER

## 2017-08-30 RX ORDER — OXYCODONE HYDROCHLORIDE AND ACETAMINOPHEN 5; 325 MG/1; MG/1
1 TABLET ORAL EVERY 4 HOURS PRN
Qty: 180 TAB | Refills: 0 | Status: SHIPPED | OUTPATIENT
Start: 2017-08-30 | End: 2017-11-30 | Stop reason: SDUPTHER

## 2017-08-30 RX ORDER — OXYCODONE HYDROCHLORIDE AND ACETAMINOPHEN 5; 325 MG/1; MG/1
1 TABLET ORAL EVERY 4 HOURS PRN
Qty: 180 TAB | Refills: 0 | Status: SHIPPED | OUTPATIENT
Start: 2017-08-30 | End: 2017-08-30 | Stop reason: SDUPTHER

## 2017-08-30 RX ORDER — OMEPRAZOLE 20 MG/1
CAPSULE, DELAYED RELEASE ORAL
Qty: 90 CAP | Refills: 3 | Status: SHIPPED | OUTPATIENT
Start: 2017-08-30 | End: 2019-10-31 | Stop reason: SDUPTHER

## 2017-08-30 NOTE — PROGRESS NOTES
"Subjective:      Bhavana Liu is a 52 y.o. female who presents with Shoulder Pain        She is here today to follow up on right shoulder pain and chronic migraine pain.  She has chronic anxiety.    HPI    Review of Systems   Constitutional: Negative for chills, fever, malaise/fatigue and weight loss.   HENT: Negative for congestion, hearing loss, sore throat and tinnitus.         She continues to have persistent headaches. She now has a chronic daily headache with severe exacerbation to migraine 4-6 times per month.   Eyes: Negative for blurred vision and double vision.   Respiratory: Negative for cough, sputum production and shortness of breath.    Cardiovascular: Negative for chest pain, palpitations, orthopnea and leg swelling.   Gastrointestinal: Negative for abdominal pain, blood in stool, constipation, diarrhea, heartburn, nausea and vomiting.   Genitourinary: Negative for dysuria, frequency and urgency.   Musculoskeletal: Positive for joint pain and neck pain. Negative for myalgias.   Skin: Negative for rash.   Neurological: Positive for headaches. Negative for dizziness, sensory change and speech change.   Endo/Heme/Allergies: Positive for environmental allergies. Does not bruise/bleed easily.   Psychiatric/Behavioral: Negative for depression. The patient is not nervous/anxious.           Objective:     /80   Pulse 90   Temp 36.2 °C (97.2 °F)   Resp 16   Ht 1.638 m (5' 4.49\")   Wt 81.2 kg (179 lb)   SpO2 96%   BMI 30.26 kg/m²      Physical Exam   Constitutional: She is oriented to person, place, and time. She appears well-developed and well-nourished.   HENT:   Head: Normocephalic and atraumatic.   Mouth/Throat: Oropharynx is clear and moist.   Eyes: EOM are normal. Pupils are equal, round, and reactive to light. Left eye exhibits no discharge.   Neck: Normal range of motion. Neck supple. No JVD present. Spinous process tenderness and muscular tenderness present. No thyromegaly present. "   Cardiovascular: Normal rate, regular rhythm and normal heart sounds.    No murmur heard.  Pulmonary/Chest: Effort normal and breath sounds normal. No respiratory distress. She has no wheezes. She has no rales.   Abdominal: Soft. Bowel sounds are normal. She exhibits no distension and no mass. There is no tenderness.   Musculoskeletal: She exhibits no edema.        Right shoulder: She exhibits decreased range of motion, tenderness, bony tenderness, crepitus, deformity, pain and spasm. She exhibits no swelling, no effusion, no laceration and normal pulse.   Lymphadenopathy:     She has no cervical adenopathy.   Neurological: She is alert and oriented to person, place, and time. Coordination normal.   Skin: Skin is warm and dry. No rash noted. No erythema.   Psychiatric: She has a normal mood and affect. Her behavior is normal.   Vitals reviewed.              Assessment/Plan:     1. Recurrent shoulder dislocation, right  This is a chronic problem. She does not have insurance at this time. She has discussed this with orthopedics in the past and she is not currently a good candidate for shoulder replacement. Ultimately she will probably need this. She states the pain regimen currently takes her pain from a 7 or an 8 down to around a 4 which allows her to complete her ADLs with pacing.  - oxycodone-acetaminophen (PERCOCET) 5-325 MG Tab; Take 1 Tab by mouth every four hours as needed (shoulder pain, migraine).  Dispense: 180 Tab; Refill: 0    2. Chronic right shoulder pain  She has chronic right shoulder pain from degenerative problems including tendon and labral problems that resulted in instability and chronic dislocation.  - oxycodone-acetaminophen (PERCOCET) 5-325 MG Tab; Take 1 Tab by mouth every four hours as needed (shoulder pain, migraine).  Dispense: 180 Tab; Refill: 0  - carisoprodol (SOMA) 350 MG Tab; Take 1 Tab by mouth every 8 hours as needed for Muscle Spasms.  Dispense: 90 Tab; Refill: 5    3. Chronic  migraine without aura without status migrainosus, not intractable/Periodic headache syndrome not intractable  She is still getting 5 or more migraines per month. She is having a chronic daily headache. Triggers are often light and noise. Stress or lack of sleep also triggers this. She is getting very little sleep. She has a lot of responsibilities in the household and worries a great deal about her 's health and her brother-in-law's health. She states the Percocet is still helpful for rescue along with sleep in a dark room.  - oxycodone-acetaminophen (PERCOCET) 5-325 MG Tab; Take 1 Tab by mouth every four hours as needed (shoulder pain, migraine).  Dispense: 180 Tab; Refill: 0    4. Chronic use of opiate for therapeutic purpose  No aberrant or addictive use of medications has been observed.  Patient counseled to not add Tylenol to current regimen.  Patient counseled to keep medications locked up or under personal control.  [facility] board of pharmacy interface is reviewed.  No inconsistencies are found.  Her average MME is 48, active is 45, max active is 45.  This is within CDC guideline for chronic pain prescribing by primary care.  - CONTROLLED SUBSTANCE TREATMENT AGREEMENT    5. Adjustment disorder with anxious mood  She has a great deal of situational anxiety. Discussed the risks of combining benzodiazepines and narcotics. She is aware that they both cause respiratory depression. She has used this regimen for quite a while with no adverse effects. She does not drink alcohol at all.  - alprazolam (XANAX) 1 MG Tab; Take 1 Tab by mouth 3 times a day as needed for Sleep or Anxiety.  Dispense: 90 Tab; Refill: 5    Chronic pain recheck:   Last dose of controlled substance: This a.m.  Chronic pain treated with oxycodone APAP taken 4-5 times a day    She  reports that she does not drink alcohol.  She  reports that she does not use drugs.    Consequences of Chronic Opiate therapy:  (5 A's)  Analgesia: Compared to no  treatment or prior treatment, pain is currently improved  Activity: improved  Adverse Events: She denies constipation, itchy skin, nausea and sedation  Aberrant Behaviors: She reports she is taking medication as prescribed and is not veering from agreed treatment regimen. There have been no inappropriate refills or lost/stolen meds reported.   Affect/Mood: Pain is impacting patient's mood.  Patient reports stable depression/anxiety.    Nonnarcotic treatments that are being used: NSAIDs/LEE-2, muscle relaxers and SSRI/SNRI.   Treatment goals discussed.    Opioid Risk Score: 2    Interpretation of Opioid Risk Score   Score 0-3 = Low risk of abuse. Do UDS at least once per year.  Score 4-7 = Moderate risk of abuse. Do UDS 1-4 times per year.  Score 8+ = High risk of abuse. Refer to specialist.    Last order of CONTROLLED SUBSTANCE TREATMENT AGREEMENT was found on 8/30/2017 from Office Visit on 8/30/2017     UDS Summary                URINE DRUG SCREEN Postponed 12/1/2017 Originally 1965. Insurance/Financial        I have given a deadline of December. Patient will call different labs and find out which would be least expensive to her.    I have reviewed the medical records, the Prescription Monitoring Program and I have determined that controlled substance treatment is medically indicated.

## 2017-08-31 ASSESSMENT — ENCOUNTER SYMPTOMS
PALPITATIONS: 0
SENSORY CHANGE: 0
NECK PAIN: 1
CHILLS: 0
SPEECH CHANGE: 0
FEVER: 0
DIZZINESS: 0
MYALGIAS: 0
ABDOMINAL PAIN: 0
HEARTBURN: 0
WEIGHT LOSS: 0
SPUTUM PRODUCTION: 0
CONSTIPATION: 0
SHORTNESS OF BREATH: 0
COUGH: 0
ORTHOPNEA: 0
DOUBLE VISION: 0
NERVOUS/ANXIOUS: 0
HEADACHES: 1
DEPRESSION: 0
BLOOD IN STOOL: 0
BRUISES/BLEEDS EASILY: 0
NAUSEA: 0
BLURRED VISION: 0
SORE THROAT: 0
DIARRHEA: 0
VOMITING: 0

## 2017-11-21 ENCOUNTER — TELEPHONE (OUTPATIENT)
Dept: MEDICAL GROUP | Facility: CLINIC | Age: 52
End: 2017-11-21

## 2017-11-30 ENCOUNTER — OFFICE VISIT (OUTPATIENT)
Dept: MEDICAL GROUP | Facility: CLINIC | Age: 52
End: 2017-11-30

## 2017-11-30 VITALS
DIASTOLIC BLOOD PRESSURE: 78 MMHG | HEIGHT: 69 IN | HEART RATE: 97 BPM | WEIGHT: 174 LBS | BODY MASS INDEX: 25.77 KG/M2 | OXYGEN SATURATION: 94 % | SYSTOLIC BLOOD PRESSURE: 134 MMHG | TEMPERATURE: 97.5 F

## 2017-11-30 DIAGNOSIS — M25.511 CHRONIC RIGHT SHOULDER PAIN: ICD-10-CM

## 2017-11-30 DIAGNOSIS — Z79.891 CHRONIC USE OF OPIATE FOR THERAPEUTIC PURPOSE: ICD-10-CM

## 2017-11-30 DIAGNOSIS — G43.709 CHRONIC MIGRAINE WITHOUT AURA WITHOUT STATUS MIGRAINOSUS, NOT INTRACTABLE: ICD-10-CM

## 2017-11-30 DIAGNOSIS — G89.29 CHRONIC RIGHT SHOULDER PAIN: ICD-10-CM

## 2017-11-30 DIAGNOSIS — M24.411 RECURRENT DISLOCATION OF RIGHT SHOULDER: ICD-10-CM

## 2017-11-30 PROBLEM — E66.9 OBESITY (BMI 30.0-34.9): Status: RESOLVED | Noted: 2017-03-27 | Resolved: 2017-11-30

## 2017-11-30 PROBLEM — E66.811 OBESITY (BMI 30.0-34.9): Status: RESOLVED | Noted: 2017-03-27 | Resolved: 2017-11-30

## 2017-11-30 PROCEDURE — 99213 OFFICE O/P EST LOW 20 MIN: CPT | Performed by: FAMILY MEDICINE

## 2017-11-30 RX ORDER — OXYCODONE HYDROCHLORIDE AND ACETAMINOPHEN 5; 325 MG/1; MG/1
1 TABLET ORAL EVERY 4 HOURS PRN
Qty: 180 TAB | Refills: 0 | Status: SHIPPED | OUTPATIENT
Start: 2017-11-30 | End: 2017-11-30 | Stop reason: SDUPTHER

## 2017-11-30 RX ORDER — OXYCODONE HYDROCHLORIDE AND ACETAMINOPHEN 5; 325 MG/1; MG/1
1 TABLET ORAL EVERY 4 HOURS PRN
Qty: 180 TAB | Refills: 0 | Status: SHIPPED | OUTPATIENT
Start: 2017-11-30 | End: 2018-02-26 | Stop reason: SDUPTHER

## 2017-11-30 NOTE — PROGRESS NOTES
Chief Complaint   Patient presents with   • Shoulder Pain   • Dislocation       Subjective:     HPI:   Bhavana Liu presents today with the followin. Recurrent dislocation of right shoulder/chronic right shoulder pain  She has a history of recurring dislocation of the right shoulder over several years. At one point she had an orthopedic evaluation and was told that extensive surgery would be needed.  She is unable to contemplate this at this time and since she is still functional is unwilling to do this. She was told that it might not assist her pain. It would primarily stabilize and reduced dislocations. The shoulder is held at a different angle than the left. She is very careful with her movements to try to avoid dislocation.    2. Chronic migraine without aura without status migrainosus, not intractable  She continues to use the Percocet as needed for rescue for her migraines. She feels the use of the Relafen has been helpful. She feels the use of the carisoprodol especially at nighttime helps reduce waking up with a migraine the next day. Denies somnolence or confusion from the regimen.    3. Chronic use of opiate for therapeutic purpose  No aberrant or addictive use of medications has been observed.  Patient counseled to not add Tylenol to current regimen.  Patient counseled to keep medications locked up or under personal control. [facility] board of pharmacy interface is reviewed.  No inconsistencies are found.  Her average MME is 45, active is 0, max active is 45.  This is within CDC guideline for chronic pain prescribing by primary care.      Health maintenance is discussed. She cannot afford these at this time.    Patient Active Problem List    Diagnosis Date Noted   • Chronic use of opiate for therapeutic purpose 2016   • Recurrent shoulder dislocation 10/03/2014   • Right shoulder pain 2014   • Adjustment disorder with anxious mood    • Migraine headache 2013   • GERD  "(gastroesophageal reflux disease)    • Dyslipidemia, goal LDL below 130        Current medicines (including changes today)  Current Outpatient Prescriptions   Medication Sig Dispense Refill   • oxycodone-acetaminophen (PERCOCET) 5-325 MG Tab Take 1 Tab by mouth every four hours as needed (shoulder pain, migraine). 180 Tab 0   • carisoprodol (SOMA) 350 MG Tab Take 1 Tab by mouth every 8 hours as needed for Muscle Spasms. 90 Tab 5   • alprazolam (XANAX) 1 MG Tab Take 1 Tab by mouth 3 times a day as needed for Sleep or Anxiety. 90 Tab 5   • omeprazole (PRILOSEC) 20 MG delayed-release capsule TAKE 1 CAPSULE BY MOUTH DAILY 90 Cap 3   • busPIRone (BUSPAR) 10 MG Tab Take 1 Tab by mouth 2 Times a Day. 180 Tab 3   • paroxetine (PAXIL) 20 MG Tab Take 1 Tab by mouth every day. 180 Tab 3   • nabumetone (RELAFEN) 500 MG TABS Take 1 Tab by mouth 2 times a day. 180 Tab 4   • albuterol (VENTOLIN OR PROVENTIL) 108 (90 BASE) MCG/ACT AERS Inhale 2 Puffs by mouth every 6 hours as needed for Shortness of Breath. 1 Inhaler 3     No current facility-administered medications for this visit.        Allergies   Allergen Reactions   • Amoxicillin Rash       ROS: As per HPI       Objective:     Blood pressure 134/78, pulse 97, temperature 36.4 °C (97.5 °F), height 1.753 m (5' 9\"), weight 78.9 kg (174 lb), SpO2 94 %, not currently breastfeeding. Body mass index is 25.7 kg/m².    Physical Exam:  Constitutional: Well-developed and well-nourished. Not diaphoretic. No distress. Lucid and fluent.  Skin: Skin is warm and dry. No rash noted.  Head: Atraumatic without lesions.  Eyes: Conjunctivae and extraocular motions are normal. Pupils are equal, round, and reactive to light. No scleral icterus.   Nose: Nares patent. Mucosa without edema or erythema. No discharge. No facial tenderness.  Mouth/Throat: Tongue normal. Oropharynx is clear and moist. Posterior pharynx without erythema or exudates.  Neck: Supple, trachea midline. No thyromegaly present. " No cervical or supraclavicular lymphadenopathy. No JVD or carotid bruits appreciated  Cardiovascular: Regular rate and rhythm.  Normal S1, S2 without murmur appreciated.  Chest: Effort normal. Clear to auscultation throughout. No adventitious sounds.   Extremities: No cyanosis, clubbing, erythema, nor edema. Right shoulder range of motion is limited particularly reaching behind. The shoulder is at least 2 cm forward and is lower than the left.  Neurological: Alert and oriented x 3.   Psychiatric:  Behavior, mood, and affect are appropriate.       Assessment and Plan:     52 y.o. female with the following issues:    1. Recurrent dislocation of right shoulder  oxycodone-acetaminophen (PERCOCET) 5-325 MG Tab    DISCONTINUED: oxycodone-acetaminophen (PERCOCET) 5-325 MG Tab    DISCONTINUED: oxycodone-acetaminophen (PERCOCET) 5-325 MG Tab   2. Chronic right shoulder pain  oxycodone-acetaminophen (PERCOCET) 5-325 MG Tab    DISCONTINUED: oxycodone-acetaminophen (PERCOCET) 5-325 MG Tab    DISCONTINUED: oxycodone-acetaminophen (PERCOCET) 5-325 MG Tab   3. Chronic migraine without aura without status migrainosus, not intractable  oxycodone-acetaminophen (PERCOCET) 5-325 MG Tab    DISCONTINUED: oxycodone-acetaminophen (PERCOCET) 5-325 MG Tab    DISCONTINUED: oxycodone-acetaminophen (PERCOCET) 5-325 MG Tab   4. Chronic use of opiate for therapeutic purpose  COMP METABOLIC PANEL    [facility] PAIN MANAGEMENT SCREEN     Chronic pain recheck:   Last dose of controlled substance:This morning as she had to drive and does not take the medication within 4 hours of driving.  Chronic pain treated with oxycodone APAP taken 4-5 times a day    She  reports that she does not drink alcohol.  She  reports that she does not use drugs.    Consequences of Chronic Opiate therapy:  (5 A's)  Analgesia: Compared to no treatment or prior treatment, pain is currently improved  Activity: improved  Adverse Events: She denies constipation, itchy skin,  nausea and sedation  Aberrant Behaviors: She reports she is taking medication as prescribed and is not veering from agreed treatment regimen. There have been no inappropriate refills or lost/stolen meds reported.   Affect/Mood: Pain is impacting patient's mood.  Patient denies depression/anxiety.    Nonnarcotic treatments that are being used: NSAIDs/LEE-2 and muscle relaxers.   Treatment goals discussed.    Opioid Risk Score: 2    Interpretation of Opioid Risk Score   Score 0-3 = Low risk of abuse. Do UDS at least once per year.  Score 4-7 = Moderate risk of abuse. Do UDS 1-4 times per year.  Score 8+ = High risk of abuse. Refer to specialist.    Last order of CONTROLLED SUBSTANCE TREATMENT AGREEMENT was found on 8/30/2017 from Office Visit on 8/30/2017     UDS Summary                URINE DRUG SCREEN Postponed 12/1/2017 Originally 1965. Insurance/Financial        UDS is Reprinted and she will complete this today    I have reviewed the medical records, the Prescription Monitoring Program and I have determined that controlled substance treatment is medically indicated.        Followup: No Follow-up on file.

## 2017-12-08 PROBLEM — R89.2 ABNORMAL DRUG SCREEN: Status: ACTIVE | Noted: 2017-12-08

## 2017-12-08 PROBLEM — F15.10 METHAMPHETAMINE USE (HCC): Status: ACTIVE | Noted: 2017-12-08

## 2018-02-26 ENCOUNTER — OFFICE VISIT (OUTPATIENT)
Dept: MEDICAL GROUP | Facility: CLINIC | Age: 53
End: 2018-02-26

## 2018-02-26 VITALS
HEART RATE: 98 BPM | TEMPERATURE: 97.9 F | HEIGHT: 69 IN | WEIGHT: 187 LBS | RESPIRATION RATE: 14 BRPM | BODY MASS INDEX: 27.7 KG/M2 | OXYGEN SATURATION: 98 % | DIASTOLIC BLOOD PRESSURE: 70 MMHG | SYSTOLIC BLOOD PRESSURE: 110 MMHG

## 2018-02-26 DIAGNOSIS — Z79.891 CHRONIC USE OF OPIATE FOR THERAPEUTIC PURPOSE: ICD-10-CM

## 2018-02-26 DIAGNOSIS — G43.709 CHRONIC MIGRAINE WITHOUT AURA WITHOUT STATUS MIGRAINOSUS, NOT INTRACTABLE: ICD-10-CM

## 2018-02-26 DIAGNOSIS — G89.29 CHRONIC RIGHT SHOULDER PAIN: ICD-10-CM

## 2018-02-26 DIAGNOSIS — M25.511 CHRONIC RIGHT SHOULDER PAIN: ICD-10-CM

## 2018-02-26 DIAGNOSIS — F43.22 ADJUSTMENT DISORDER WITH ANXIOUS MOOD: ICD-10-CM

## 2018-02-26 DIAGNOSIS — M24.411 RECURRENT DISLOCATION OF RIGHT SHOULDER: ICD-10-CM

## 2018-02-26 DIAGNOSIS — G43.009 MIGRAINE WITHOUT AURA AND WITHOUT STATUS MIGRAINOSUS, NOT INTRACTABLE: ICD-10-CM

## 2018-02-26 DIAGNOSIS — Z12.11 COLON CANCER SCREENING: ICD-10-CM

## 2018-02-26 PROCEDURE — 99213 OFFICE O/P EST LOW 20 MIN: CPT | Performed by: FAMILY MEDICINE

## 2018-02-26 RX ORDER — CARISOPRODOL 350 MG/1
350 TABLET ORAL EVERY 8 HOURS PRN
Qty: 90 TAB | Refills: 5 | Status: SHIPPED | OUTPATIENT
Start: 2018-02-26 | End: 2018-08-30 | Stop reason: SDUPTHER

## 2018-02-26 RX ORDER — OXYCODONE HYDROCHLORIDE AND ACETAMINOPHEN 5; 325 MG/1; MG/1
1 TABLET ORAL EVERY 4 HOURS PRN
Qty: 180 TAB | Refills: 0 | Status: SHIPPED | OUTPATIENT
Start: 2018-05-01 | End: 2018-05-24

## 2018-02-26 RX ORDER — OXYCODONE HYDROCHLORIDE AND ACETAMINOPHEN 5; 325 MG/1; MG/1
1 TABLET ORAL EVERY 4 HOURS PRN
Qty: 180 TAB | Refills: 0 | Status: SHIPPED | OUTPATIENT
Start: 2018-04-01 | End: 2018-02-26 | Stop reason: SDUPTHER

## 2018-02-26 RX ORDER — OXYCODONE HYDROCHLORIDE AND ACETAMINOPHEN 5; 325 MG/1; MG/1
1 TABLET ORAL EVERY 4 HOURS PRN
Qty: 180 TAB | Refills: 0 | Status: SHIPPED | OUTPATIENT
Start: 2018-03-02 | End: 2018-02-26 | Stop reason: SDUPTHER

## 2018-02-26 RX ORDER — CYCLOBENZAPRINE HCL 10 MG
10 TABLET ORAL 3 TIMES DAILY PRN
Qty: 30 TAB | Refills: 5 | Status: SHIPPED | OUTPATIENT
Start: 2018-02-26 | End: 2018-08-25

## 2018-02-26 RX ORDER — ALPRAZOLAM 1 MG/1
1 TABLET ORAL 3 TIMES DAILY PRN
Qty: 90 TAB | Refills: 5 | Status: SHIPPED | OUTPATIENT
Start: 2018-02-26 | End: 2018-08-30 | Stop reason: SDUPTHER

## 2018-02-26 ASSESSMENT — PATIENT HEALTH QUESTIONNAIRE - PHQ9: CLINICAL INTERPRETATION OF PHQ2 SCORE: 0

## 2018-02-26 ASSESSMENT — PAIN SCALES - GENERAL: PAINLEVEL: 4=SLIGHT-MODERATE PAIN

## 2018-02-26 NOTE — PROGRESS NOTES
Chief Complaint   Patient presents with   • Shoulder Pain     3 months   • Migraine       Subjective:     HPI:   Bhavana Liu presents today with the followin. Recurrent dislocation of right shoulder/Chronic right shoulder pain  History of multiple dislocations the shoulder and chronic pain. She is now having limitations to lifting the shoulder consistent with rotator cuff syndrome. Patient still has significant limitations in access to orthopedic care or imaging. Patient is working on access to insurance.    2. Chronic migraine without aura without status migrainosus, not intractable  Reports  frontal headaches described as: pounding, throbbing with nausea and photophobia, without radiation  Present since age teens Usual frequency is 2-3 per month.  Timing from onset to full blown 20 minutes and lasts two to three hours.  Headaches are relieved by Excedrin, ibuprofen, Percocet, darkening the room, rest, sleeping  Previous treatment and prevention include: getting enough sleep.  Tried beta blockers, (depression)  Known triggers include: sun exposure and stress weather changes, poor sleep.   Current stressors include: work, finance, family. Usually sleeps 4-5 hours per night.   Denies difficulty with speech or swallowing, facial numbness or tingling, focal weakness. Denies sore throat or cough, fever, sinus congestion, ear pain, tooth clenching or grinding.  Family Hx: migraine headaches in child  Prior imaging: yes    3. Chronic use of opiate for therapeutic purpose  No aberrant or addictive use of medications has been observed.  Patient counseled to not add Tylenol to current regimen.  Patient counseled to keep medications locked up or under personal control. [facility] board of pharmacy interface is reviewed.  No inconsistencies are found.  Her average MME is 46, active is 45, max active is 45.  This is within CDC guideline for chronic pain prescribing by primary care.  Last test was positive for  abnormal substance.  She is aware that this is her second chance.  She states she is not using at all.    4. Colon cancer screening  Stool test for blood    5. Adjustment disorder with anxious mood  The alprazolam continues to be helpful for anxiety.  Denies rebound or depression from this.        Patient Active Problem List    Diagnosis Date Noted   • Abnormal drug screen 12/08/2017   • Methamphetamine use 12/08/2017   • Chronic use of opiate for therapeutic purpose 07/05/2016   • Recurrent shoulder dislocation 10/03/2014   • Right shoulder pain 07/30/2014   • Adjustment disorder with anxious mood    • Migraine headache 09/30/2013   • GERD (gastroesophageal reflux disease)    • Dyslipidemia, goal LDL below 130        Current medicines (including changes today)  Current Outpatient Prescriptions   Medication Sig Dispense Refill   • carisoprodol (SOMA) 350 MG Tab Take 1 Tab by mouth every 8 hours as needed for Muscle Spasms for up to 180 days. 90 Tab 5   • ALPRAZolam (XANAX) 1 MG Tab Take 1 Tab by mouth 3 times a day as needed for Sleep or Anxiety for up to 180 days. 90 Tab 5   • [START ON 5/1/2018] oxyCODONE-acetaminophen (PERCOCET) 5-325 MG Tab Take 1 Tab by mouth every four hours as needed for Moderate Pain or Severe Pain (shoulder pain, migraine) for up to 30 days. 180 Tab 0   • cyclobenzaprine (FLEXERIL) 10 MG Tab Take 1 Tab by mouth 3 times a day as needed for up to 180 days. 30 Tab 5   • omeprazole (PRILOSEC) 20 MG delayed-release capsule TAKE 1 CAPSULE BY MOUTH DAILY 90 Cap 3   • busPIRone (BUSPAR) 10 MG Tab Take 1 Tab by mouth 2 Times a Day. 180 Tab 3   • paroxetine (PAXIL) 20 MG Tab Take 1 Tab by mouth every day. 180 Tab 3   • albuterol (VENTOLIN OR PROVENTIL) 108 (90 BASE) MCG/ACT AERS Inhale 2 Puffs by mouth every 6 hours as needed for Shortness of Breath. 1 Inhaler 3     No current facility-administered medications for this visit.        Allergies   Allergen Reactions   • Amoxicillin Rash       ROS: As  "per HPI       Objective:     Blood pressure 110/70, pulse 98, temperature 36.6 °C (97.9 °F), resp. rate 14, height 1.753 m (5' 9\"), weight 84.8 kg (187 lb), SpO2 98 %, not currently breastfeeding. Body mass index is 27.62 kg/m².    Physical Exam:  Constitutional: Well-developed and well-nourished. Not diaphoretic. No distress. Lucid and fluent.  Skin: Skin is warm and dry. No rash noted.  Head: Atraumatic without lesions.  Eyes: Conjunctivae and extraocular motions are normal. Pupils are equal, round, and reactive to light. No scleral icterus.   Ears:  External ears unremarkable. Tympanic membranes clear and intact.  Nose: Nares patent. Mucosa without edema or erythema. No discharge. No facial tenderness.  Mouth/Throat: Tongue normal. Oropharynx is clear and moist. Posterior pharynx without erythema or exudates.  Neck: Supple, trachea midline. No thyromegaly present. No cervical or supraclavicular lymphadenopathy. No JVD or carotid bruits appreciated  Cardiovascular: Regular rate and rhythm.  Normal S1, S2 without murmur appreciated.  Chest: Effort normal. Clear to auscultation throughout. No adventitious sounds.   Abdomen: Soft, non tender, and without distention. Active bowel sounds in all four quadrants. No rebound, guarding, masses or hepatosplenomegaly.  Extremities: No cyanosis, clubbing, erythema, nor edema.  Right shoulder stiff, limitation in overhead ROM  Neurological: Alert and oriented x 3.  Psychiatric:  Behavior, mood, and affect are appropriate.       Assessment and Plan:     52 y.o. female with the following issues:    1. Recurrent dislocation of right shoulder  oxyCODONE-acetaminophen (PERCOCET) 5-325 MG Tab    cyclobenzaprine (FLEXERIL) 10 MG Tab    DISCONTINUED: oxyCODONE-acetaminophen (PERCOCET) 5-325 MG Tab    DISCONTINUED: oxyCODONE-acetaminophen (PERCOCET) 5-325 MG Tab   2. Chronic right shoulder pain  carisoprodol (SOMA) 350 MG Tab    oxyCODONE-acetaminophen (PERCOCET) 5-325 MG Tab    " DISCONTINUED: oxyCODONE-acetaminophen (PERCOCET) 5-325 MG Tab    DISCONTINUED: oxyCODONE-acetaminophen (PERCOCET) 5-325 MG Tab   3. Migraine without aura and without status migrainosus, not intractable     4. Chronic use of opiate for therapeutic purpose  CONSENT FOR OPIATE PRESCRIPTION   5. Colon cancer screening  OCCULT BLOOD FECES IMMUNOASSAY (FIT)   6. Chronic migraine without aura without status migrainosus, not intractable  oxyCODONE-acetaminophen (PERCOCET) 5-325 MG Tab    DISCONTINUED: oxyCODONE-acetaminophen (PERCOCET) 5-325 MG Tab    DISCONTINUED: oxyCODONE-acetaminophen (PERCOCET) 5-325 MG Tab   7. Adjustment disorder with anxious mood  ALPRAZolam (XANAX) 1 MG Tab     Chronic pain recheck:   Last dose of controlled substance: yesterday  Chronic pain treated with oxycodone/apap taken 3-4 times a day    She  reports that she does not drink alcohol.  She  reports that she does not use drugs.    Consequences of Chronic Opiate therapy:  (5 A's)  Analgesia: Compared to no treatment or prior treatment, pain is currently improved  Activity: improved  Adverse Events: She denies constipation, itchy skin, nausea and sedation  Aberrant Behaviors: She reports she is taking medication as prescribed and is not veering from agreed treatment regimen. There have been no inappropriate refills or lost/stolen meds reported.   Affect/Mood: Pain is impacting patient's mood.  Patient reports stable depression/anxiety.    Nonnarcotic treatments that are being used: NSAIDs/LEE-2, muscle relaxers and SSRI/SNRI.   Treatment goals discussed.    Opioid Risk Score: 2    Interpretation of Opioid Risk Score   Score 0-3 = Low risk of abuse. Do UDS at least once per year.  Score 4-7 = Moderate risk of abuse. Do UDS 1-4 times per year.  Score 8+ = High risk of abuse. Refer to specialist.    Last order of CONTROLLED SUBSTANCE TREATMENT AGREEMENT was found on 8/30/2017 from Office Visit on 8/30/2017     UDS Summary                URINE DRUG  SCREEN Next Due 11/25/2018      Done 11/30/2017 [facility] PAIN MANAGEMENT SCREEN     Patient has more history with this topic...        Most recent UDS reviewed today and is consistent with prescribed medications.    I have reviewed the medical records, the Prescription Monitoring Program and I have determined that controlled substance treatment is medically indicated.        Followup: No Follow-up on file.

## 2018-04-19 ENCOUNTER — TELEPHONE (OUTPATIENT)
Dept: MEDICAL GROUP | Facility: CLINIC | Age: 53
End: 2018-04-19

## 2018-04-19 NOTE — TELEPHONE ENCOUNTER
Discussed, patient states she has been clean off the methamphetamine for over three months.  We are both surprised by the results.  Will do one month urine drug test.  If still not clean we will not be able to continue the medications.  She will be off all supplements at least 5 days prior to her appointment.

## 2018-05-24 ENCOUNTER — OFFICE VISIT (OUTPATIENT)
Dept: MEDICAL GROUP | Facility: CLINIC | Age: 53
End: 2018-05-24

## 2018-05-24 VITALS
HEIGHT: 69 IN | TEMPERATURE: 97.2 F | OXYGEN SATURATION: 95 % | RESPIRATION RATE: 13 BRPM | HEART RATE: 99 BPM | WEIGHT: 194 LBS | SYSTOLIC BLOOD PRESSURE: 122 MMHG | DIASTOLIC BLOOD PRESSURE: 74 MMHG | BODY MASS INDEX: 28.73 KG/M2

## 2018-05-24 DIAGNOSIS — M24.419 RECURRENT DISLOCATION OF SHOULDER, UNSPECIFIED LATERALITY: ICD-10-CM

## 2018-05-24 DIAGNOSIS — M25.511 CHRONIC RIGHT SHOULDER PAIN: ICD-10-CM

## 2018-05-24 DIAGNOSIS — Z79.891 CHRONIC USE OF OPIATE FOR THERAPEUTIC PURPOSE: ICD-10-CM

## 2018-05-24 DIAGNOSIS — R89.2 ABNORMAL DRUG SCREEN: ICD-10-CM

## 2018-05-24 DIAGNOSIS — G89.29 CHRONIC RIGHT SHOULDER PAIN: ICD-10-CM

## 2018-05-24 DIAGNOSIS — F15.10 METHAMPHETAMINE USE (HCC): ICD-10-CM

## 2018-05-24 PROCEDURE — 99213 OFFICE O/P EST LOW 20 MIN: CPT | Performed by: FAMILY MEDICINE

## 2018-05-24 RX ORDER — TRAMADOL HYDROCHLORIDE 50 MG/1
50 TABLET ORAL EVERY 4 HOURS PRN
Qty: 180 TAB | Refills: 5 | Status: SHIPPED | OUTPATIENT
Start: 2018-05-24 | End: 2018-11-15 | Stop reason: SDUPTHER

## 2018-06-06 DIAGNOSIS — F43.22 ADJUSTMENT DISORDER WITH ANXIOUS MOOD: ICD-10-CM

## 2018-06-06 RX ORDER — PAROXETINE HYDROCHLORIDE 20 MG/1
20 TABLET, FILM COATED ORAL DAILY
Qty: 90 TAB | Refills: 2 | Status: SHIPPED | OUTPATIENT
Start: 2018-06-06 | End: 2019-01-30 | Stop reason: SDUPTHER

## 2018-08-30 DIAGNOSIS — M25.511 CHRONIC RIGHT SHOULDER PAIN: ICD-10-CM

## 2018-08-30 DIAGNOSIS — G89.29 CHRONIC RIGHT SHOULDER PAIN: ICD-10-CM

## 2018-08-30 DIAGNOSIS — F43.22 ADJUSTMENT DISORDER WITH ANXIOUS MOOD: ICD-10-CM

## 2018-08-30 RX ORDER — ALPRAZOLAM 1 MG/1
1 TABLET ORAL 3 TIMES DAILY PRN
Qty: 90 TAB | Refills: 2 | Status: SHIPPED
Start: 2018-08-30 | End: 2018-11-28

## 2018-08-30 RX ORDER — ALPRAZOLAM 1 MG/1
1 TABLET ORAL 3 TIMES DAILY PRN
Qty: 90 TAB | Refills: 5 | Status: CANCELLED | OUTPATIENT
Start: 2018-08-30 | End: 2019-02-26

## 2018-08-30 RX ORDER — CARISOPRODOL 350 MG/1
350 TABLET ORAL EVERY 8 HOURS PRN
Qty: 90 TAB | Refills: 5 | Status: CANCELLED | OUTPATIENT
Start: 2018-08-30 | End: 2019-02-26

## 2018-08-30 RX ORDER — CARISOPRODOL 350 MG/1
350 TABLET ORAL EVERY 8 HOURS PRN
Qty: 90 TAB | Refills: 2 | Status: SHIPPED
Start: 2018-08-30 | End: 2018-11-28

## 2018-09-04 DIAGNOSIS — F43.22 ADJUSTMENT DISORDER WITH ANXIOUS MOOD: ICD-10-CM

## 2018-09-04 RX ORDER — BUSPIRONE HYDROCHLORIDE 10 MG/1
10 TABLET ORAL 2 TIMES DAILY
Qty: 180 TAB | Refills: 3 | Status: SHIPPED | OUTPATIENT
Start: 2018-09-04 | End: 2019-10-31 | Stop reason: SDUPTHER

## 2018-11-15 DIAGNOSIS — M25.511 CHRONIC RIGHT SHOULDER PAIN: ICD-10-CM

## 2018-11-15 DIAGNOSIS — M24.419 RECURRENT DISLOCATION OF SHOULDER, UNSPECIFIED LATERALITY: ICD-10-CM

## 2018-11-15 DIAGNOSIS — G89.29 CHRONIC RIGHT SHOULDER PAIN: ICD-10-CM

## 2018-11-15 RX ORDER — TRAMADOL HYDROCHLORIDE 50 MG/1
50 TABLET ORAL EVERY 4 HOURS PRN
Qty: 180 TAB | Refills: 0 | Status: SHIPPED
Start: 2018-11-15 | End: 2018-12-15

## 2018-11-15 NOTE — TELEPHONE ENCOUNTER
Requesting Tramadol. She's acquiring a new physician and won't have access to any meds for 2 months. She's requesting 2 months of Tramadol. Her new Dr will not be prescribing any pain meds

## 2018-11-16 NOTE — TELEPHONE ENCOUNTER
I last saw the patient for an examination slightly less than 6 months ago.  I can write one prescription of tramadol.  Unfortunately, to continue the tramadol with me beyond this 1 prescription she would need to see me face-to-face.

## 2018-11-27 ENCOUNTER — OFFICE VISIT (OUTPATIENT)
Dept: MEDICAL GROUP | Facility: CLINIC | Age: 53
End: 2018-11-27
Payer: MEDICAID

## 2018-11-27 VITALS
SYSTOLIC BLOOD PRESSURE: 118 MMHG | OXYGEN SATURATION: 98 % | HEART RATE: 100 BPM | RESPIRATION RATE: 16 BRPM | WEIGHT: 195 LBS | DIASTOLIC BLOOD PRESSURE: 84 MMHG | BODY MASS INDEX: 28.88 KG/M2 | HEIGHT: 69 IN | TEMPERATURE: 98 F

## 2018-11-27 DIAGNOSIS — M24.419 RECURRENT DISLOCATION OF SHOULDER, UNSPECIFIED LATERALITY: ICD-10-CM

## 2018-11-27 DIAGNOSIS — K59.00 CONSTIPATION, UNSPECIFIED CONSTIPATION TYPE: ICD-10-CM

## 2018-11-27 DIAGNOSIS — G89.29 CHRONIC RIGHT SHOULDER PAIN: ICD-10-CM

## 2018-11-27 DIAGNOSIS — M25.511 CHRONIC RIGHT SHOULDER PAIN: ICD-10-CM

## 2018-11-27 DIAGNOSIS — F17.200 TOBACCO DEPENDENCE: ICD-10-CM

## 2018-11-27 DIAGNOSIS — F15.10 METHAMPHETAMINE USE (HCC): ICD-10-CM

## 2018-11-27 DIAGNOSIS — Z79.891 CHRONIC USE OF OPIATE FOR THERAPEUTIC PURPOSE: ICD-10-CM

## 2018-11-27 DIAGNOSIS — Z00.00 ENCOUNTER FOR MEDICAL EXAMINATION TO ESTABLISH CARE: ICD-10-CM

## 2018-11-27 PROCEDURE — 99214 OFFICE O/P EST MOD 30 MIN: CPT | Performed by: PHYSICIAN ASSISTANT

## 2018-11-28 PROBLEM — Z00.00 ENCOUNTER FOR MEDICAL EXAMINATION TO ESTABLISH CARE: Status: ACTIVE | Noted: 2018-11-28

## 2018-11-28 PROBLEM — F17.200 TOBACCO DEPENDENCE: Status: ACTIVE | Noted: 2018-11-28

## 2018-11-28 NOTE — ASSESSMENT & PLAN NOTE
"Previously treated with tramadol and flexeril this patient states she has had severe shoulder pain and been advised that she needs a shoulder replacement but doesn't present today with any evidence of this and none is able to be obtained at this time although this is attempted. She is requesting flexeril refill stating that this helps to alleviate her pain and when offered a pain management referral as well as an orthopedic referral she states she would like to see orthopedics but \"if [she's] going to pain management [she] wants her percocet back because the tramadol doesn't do anything\".  "

## 2018-11-28 NOTE — ASSESSMENT & PLAN NOTE
This patient has been using a combination of xanax, flexeril, tramadol and soma. While tramadol is not technically an opiate, it is an opiate similar and is being used chronically.

## 2018-11-28 NOTE — ASSESSMENT & PLAN NOTE
Patient states she is nearly always constipated and states that she does not eat many vegetables or fibrous foods. She states she will go for 2-3 days without a bowel movement and then have a solid stool followed by diarrhea. She does not use medications to alleviate her constipation and states that it often causes cramping and sweats and discomfort just before she is able to pass a stool.

## 2018-11-28 NOTE — ASSESSMENT & PLAN NOTE
Patient has a supposed recurrent dislocation of the right shoulder with no orthopedic consult, no imaging and no previous physical therapy to attempt to resolve this issue. It is not unstable today.

## 2018-11-28 NOTE — PROGRESS NOTES
"Chief Complaint   Patient presents with   • New Patient     Medication refill for Flexeril/      HISTORY OF PRESENT ILLNESS: Patient is a 53 y.o. female established patient who presents today for evaluation and management of:    Constipation  Patient states she is nearly always constipated and states that she does not eat many vegetables or fibrous foods. She states she will go for 2-3 days without a bowel movement and then have a solid stool followed by diarrhea. She does not use medications to alleviate her constipation and states that it often causes cramping and sweats and discomfort just before she is able to pass a stool.     Recurrent shoulder dislocation  Patient has a supposed recurrent dislocation of the right shoulder with no orthopedic consult, no imaging and no previous physical therapy to attempt to resolve this issue. It is not unstable today.     Right shoulder pain  Previously treated with tramadol and flexeril this patient states she has had severe shoulder pain and been advised that she needs a shoulder replacement but doesn't present today with any evidence of this and none is able to be obtained at this time although this is attempted. She is requesting flexeril refill stating that this helps to alleviate her pain and when offered a pain management referral as well as an orthopedic referral she states she would like to see orthopedics but \"if [she's] going to pain management [she] wants her percocet back because the tramadol doesn't do anything\".    Methamphetamine use  Has had multiple positive urine drug tests for methamphetamine despite denying use of this substance. She has no teeth and does have some unusual behavior today.     Chronic use of opiate for therapeutic purpose  This patient has been using a combination of xanax, flexeril, tramadol and soma. While tramadol is not technically an opiate, it is an opiate similar and is being used chronically.     Tobacco dependence  Patient states " she currently smokes about 0.5ppd with no intention of quitting.     Encounter for medical examination to establish care  Patient recently gained medicaid insurance so presents today to establish care with a provider who accepts her insurance.        Patient Active Problem List    Diagnosis Date Noted   • Tobacco dependence 11/28/2018   • Encounter for medical examination to establish care 11/28/2018   • Constipation 11/27/2018   • Abnormal drug screen 12/08/2017   • Methamphetamine use (HCC) 12/08/2017   • Chronic use of opiate for therapeutic purpose 07/05/2016   • Recurrent shoulder dislocation 10/03/2014   • Right shoulder pain 07/30/2014   • Adjustment disorder with anxious mood    • Migraine headache 09/30/2013   • GERD (gastroesophageal reflux disease)    • Dyslipidemia, goal LDL below 130        Allergies:Amoxicillin    Current Outpatient Prescriptions   Medication Sig Dispense Refill   • tramadol (ULTRAM) 50 MG Tab Take 1 Tab by mouth every four hours as needed for Moderate Pain or Severe Pain for up to 30 days. 180 Tab 0   • cyclobenzaprine (FLEXERIL) 10 MG Tab TAKE ONE TABLET BY MOUTH THREE TIMES A DAY AS NEEDED 30 Tab 2   • busPIRone (BUSPAR) 10 MG Tab Take 1 Tab by mouth 2 Times a Day. 180 Tab 3   • carisoprodol (SOMA) 350 MG Tab Take 1 Tab by mouth every 8 hours as needed for Muscle Spasms for up to 90 days. 90 Tab 2   • ALPRAZolam (XANAX) 1 MG Tab Take 1 Tab by mouth 3 times a day as needed for Sleep or Anxiety for up to 90 days. 90 Tab 2   • PARoxetine (PAXIL) 20 MG Tab Take 1 Tab by mouth every day. 90 Tab 2   • omeprazole (PRILOSEC) 20 MG delayed-release capsule TAKE 1 CAPSULE BY MOUTH DAILY 90 Cap 3   • albuterol (VENTOLIN OR PROVENTIL) 108 (90 BASE) MCG/ACT AERS Inhale 2 Puffs by mouth every 6 hours as needed for Shortness of Breath. (Patient not taking: Reported on 11/27/2018) 1 Inhaler 3     No current facility-administered medications for this visit.        Social History   Substance Use  Topics   • Smoking status: Current Every Day Smoker     Packs/day: 0.50     Years: 25.00     Types: Cigarettes   • Smokeless tobacco: Never Used      Comment: getting close, she down to 1/4 ppd, still trying to quit   • Alcohol use No      Comment: none at all       Family Status   Relation Status   • Mo (Not Specified)   • MGMo (Not Specified)   • Sis (Not Specified)   • Fa (Not Specified)   • Bro (Not Specified)   • Sis (Not Specified)     Family History   Problem Relation Age of Onset   • Diabetes Mother         type 2, diet controlled   • Heart Disease Mother         pacemaker   • Cancer Maternal Grandmother         uterine   • Genitourinary () Sister         tumor, benign   • Heart Disease Father         CHF   • Non-contributory Father         emphysema   • Arthritis Brother         hip replacement   • Non-contributory Sister         encephalitis       Review of Systems: See HPI above.   Constitutional: Negative for fever, chills, weight loss and malaise.   HENT: Negative for ear pain, nosebleeds, congestion, sore throat and neck pain.    Eyes: Negative for blurred vision.   Respiratory: Negative for shortness of breath, cough, sputum production and wheezing.    Cardiovascular: Negative for chest pain, palpitations, orthopnea and leg swelling.   Gastrointestinal: Negative for heartburn, nausea, vomiting and abdominal pain.   Genitourinary: Negative for dysuria, urgency and frequency.   Musculoskeletal: Negative for myalgias, back pain and joint pain.   Skin: Negative for rash and itching.   Neurological: Negative for dizziness, tingling, tremors, sensory change, focal weakness and headaches.   Endo/Heme/Allergies: Does not bruise/bleed easily.   Psychiatric/Behavioral: Negative for depression, suicidal ideas and memory loss.  The patient is not nervous/anxious and does not have insomnia.      Exam:  Blood pressure 118/84, pulse 100, temperature 36.7 °C (98 °F), temperature source Temporal, resp. rate 16,  "height 1.753 m (5' 9\"), weight 88.5 kg (195 lb), SpO2 98 %, not currently breastfeeding.  Body mass index is 28.8 kg/m².  General:  Overweight female in NAD  Head: is grossly normal.  Neck: Supple without masses. Thyroid is not visibly enlarged.  Pulmonary: Clear to ausculation. Normal effort. No rales, ronchi, or wheezing.  Cardiovascular: Regular rate and rhythm without murmur. Carotid pulses are intact and equal bilaterally.  Extremities: no clubbing, cyanosis, or edema.  Musculoskeletal: ROM intact to observation with forward motions, such as touching face and hair and top of head. Lift off test abnormal with right arm, normal in left. Empty can abnormal bilaterally when tested both at once, abnormal with right arm only and normal with left arm only tested.     Medical decision-making and discussion:  1. Constipation, unspecified constipation type  Advised to increase vegetable and fiber intake as well as water intake and to walk 15-20 minutes daily to alleviate this. Advised that metamucil is an acceptable form of fiber if needed.     2. Chronic use of opiate for therapeutic purpose   Based on her use of her arm as observed throughout history taking today which is very different from her reposrted ability to use her arm in addition to her combination of xanax, flexeril, tramadol and soma, I am not inclined to fill her flexeril for fear of suppressing this patient's breathing during sleep in combination with the rest of her medications.  - REFERRAL TO PAIN CLINIC    3. Recurrent dislocation of shoulder, unspecified laterality  - DX-SHOULDER 2+ RIGHT; Future    4. Chronic right shoulder pain    - REFERRAL TO ORTHOPEDICS  - DX-SHOULDER 2+ RIGHT; Future  - REFERRAL TO PAIN CLINIC    5. Methamphetamine use (HCC)  No controlled substances will be able to be prescribed to this patient due to her history of Methamphetamine abuse, and her denial of this when a urine drug screen showed she was positive. Advised of this. "     6. Encounter for medical examination to establish care  I am happy to participate in the care of this 53 year old Woman. She has been made aware that I will treat her without controlled substances.        7. Tobacco dependence  Advised that bone healing and joint pain can be made worse with tobacco use. Advised to quit.         Please note that this dictation was created using voice recognition software. I have made every reasonable attempt to correct obvious errors, but I expect that there are errors of grammar and possibly content that I did not discover before finalizing the note.      Return if symptoms worsen or fail to improve.

## 2018-11-28 NOTE — ASSESSMENT & PLAN NOTE
Patient recently gained medicaid insurance so presents today to establish care with a provider who accepts her insurance.

## 2018-11-28 NOTE — ASSESSMENT & PLAN NOTE
Has had multiple positive urine drug tests for methamphetamine despite denying use of this substance. She has no teeth and does have some unusual behavior today.

## 2018-12-05 ENCOUNTER — TELEPHONE (OUTPATIENT)
Dept: MEDICAL GROUP | Facility: CLINIC | Age: 53
End: 2018-12-05

## 2018-12-05 NOTE — TELEPHONE ENCOUNTER
----- Message from Amanda Merlino sent at 11/14/2018  2:10 PM PST -----  Regarding: NEW PATIENT  Patient has an upcoming appointment and records need to be obtained prior to the visit. Please reference appointment notes for the name of the entity.

## 2018-12-10 ENCOUNTER — TELEPHONE (OUTPATIENT)
Dept: MEDICAL GROUP | Facility: MEDICAL CENTER | Age: 53
End: 2018-12-10

## 2018-12-10 NOTE — TELEPHONE ENCOUNTER
Was the patient seen in the last year in this department? No     Does patient have an active prescription for medications requested? No     Received Request Via: Pharmacy     Pharmacy is requesting a refill on Alprazolam

## 2019-01-30 DIAGNOSIS — F43.22 ADJUSTMENT DISORDER WITH ANXIOUS MOOD: ICD-10-CM

## 2019-01-30 RX ORDER — PAROXETINE HYDROCHLORIDE 20 MG/1
20 TABLET, FILM COATED ORAL DAILY
Qty: 90 TAB | Refills: 3 | Status: SHIPPED | OUTPATIENT
Start: 2019-01-30 | End: 2020-06-29

## 2019-01-30 RX ORDER — HYDROCHLOROTHIAZIDE 25 MG/1
25 TABLET ORAL DAILY
Qty: 90 TAB | Refills: 3 | Status: SHIPPED | OUTPATIENT
Start: 2019-01-30 | End: 2020-02-04

## 2019-04-13 ENCOUNTER — APPOINTMENT (OUTPATIENT)
Dept: RADIOLOGY | Facility: MEDICAL CENTER | Age: 54
End: 2019-04-13
Attending: EMERGENCY MEDICINE
Payer: MEDICAID

## 2019-04-13 ENCOUNTER — HOSPITAL ENCOUNTER (EMERGENCY)
Facility: MEDICAL CENTER | Age: 54
End: 2019-04-14
Attending: EMERGENCY MEDICINE
Payer: MEDICAID

## 2019-04-13 DIAGNOSIS — M79.605 PAIN OF LEFT LOWER EXTREMITY: ICD-10-CM

## 2019-04-13 DIAGNOSIS — M79.662 PAIN OF LEFT CALF: ICD-10-CM

## 2019-04-13 DIAGNOSIS — R00.0 TACHYCARDIA: ICD-10-CM

## 2019-04-13 LAB
ANION GAP SERPL CALC-SCNC: 7 MMOL/L (ref 0–11.9)
APTT PPP: 26.4 SEC (ref 24.7–36)
BASOPHILS # BLD AUTO: 1.1 % (ref 0–1.8)
BASOPHILS # BLD: 0.1 K/UL (ref 0–0.12)
BUN SERPL-MCNC: 13 MG/DL (ref 8–22)
CALCIUM SERPL-MCNC: 9.4 MG/DL (ref 8.5–10.5)
CHLORIDE SERPL-SCNC: 101 MMOL/L (ref 96–112)
CO2 SERPL-SCNC: 30 MMOL/L (ref 20–33)
CREAT SERPL-MCNC: 0.76 MG/DL (ref 0.5–1.4)
EOSINOPHIL # BLD AUTO: 0.18 K/UL (ref 0–0.51)
EOSINOPHIL NFR BLD: 2 % (ref 0–6.9)
ERYTHROCYTE [DISTWIDTH] IN BLOOD BY AUTOMATED COUNT: 41.4 FL (ref 35.9–50)
GLUCOSE SERPL-MCNC: 96 MG/DL (ref 65–99)
HCT VFR BLD AUTO: 44.5 % (ref 37–47)
HGB BLD-MCNC: 15 G/DL (ref 12–16)
IMM GRANULOCYTES # BLD AUTO: 0.01 K/UL (ref 0–0.11)
IMM GRANULOCYTES NFR BLD AUTO: 0.1 % (ref 0–0.9)
INR PPP: 1.01 (ref 0.87–1.13)
LYMPHOCYTES # BLD AUTO: 3.24 K/UL (ref 1–4.8)
LYMPHOCYTES NFR BLD: 36.6 % (ref 22–41)
MCH RBC QN AUTO: 30.1 PG (ref 27–33)
MCHC RBC AUTO-ENTMCNC: 33.7 G/DL (ref 33.6–35)
MCV RBC AUTO: 89.2 FL (ref 81.4–97.8)
MONOCYTES # BLD AUTO: 0.71 K/UL (ref 0–0.85)
MONOCYTES NFR BLD AUTO: 8 % (ref 0–13.4)
NEUTROPHILS # BLD AUTO: 4.62 K/UL (ref 2–7.15)
NEUTROPHILS NFR BLD: 52.2 % (ref 44–72)
NRBC # BLD AUTO: 0 K/UL
NRBC BLD-RTO: 0 /100 WBC
PLATELET # BLD AUTO: 369 K/UL (ref 164–446)
PMV BLD AUTO: 9 FL (ref 9–12.9)
POTASSIUM SERPL-SCNC: 3.6 MMOL/L (ref 3.6–5.5)
PROTHROMBIN TIME: 13.4 SEC (ref 12–14.6)
RBC # BLD AUTO: 4.99 M/UL (ref 4.2–5.4)
SODIUM SERPL-SCNC: 138 MMOL/L (ref 135–145)
WBC # BLD AUTO: 8.9 K/UL (ref 4.8–10.8)

## 2019-04-13 PROCEDURE — 93971 EXTREMITY STUDY: CPT | Mod: LT

## 2019-04-13 PROCEDURE — 700117 HCHG RX CONTRAST REV CODE 255: Performed by: EMERGENCY MEDICINE

## 2019-04-13 PROCEDURE — 71275 CT ANGIOGRAPHY CHEST: CPT

## 2019-04-13 PROCEDURE — 80048 BASIC METABOLIC PNL TOTAL CA: CPT

## 2019-04-13 PROCEDURE — 85730 THROMBOPLASTIN TIME PARTIAL: CPT

## 2019-04-13 PROCEDURE — 85610 PROTHROMBIN TIME: CPT

## 2019-04-13 PROCEDURE — 85025 COMPLETE CBC W/AUTO DIFF WBC: CPT

## 2019-04-13 PROCEDURE — 99284 EMERGENCY DEPT VISIT MOD MDM: CPT

## 2019-04-13 PROCEDURE — 700105 HCHG RX REV CODE 258: Performed by: EMERGENCY MEDICINE

## 2019-04-13 RX ORDER — SODIUM CHLORIDE 9 MG/ML
1000 INJECTION, SOLUTION INTRAVENOUS ONCE
Status: COMPLETED | OUTPATIENT
Start: 2019-04-13 | End: 2019-04-13

## 2019-04-13 RX ADMIN — SODIUM CHLORIDE 1000 ML: 9 INJECTION, SOLUTION INTRAVENOUS at 20:27

## 2019-04-13 RX ADMIN — IOHEXOL 60 ML: 350 INJECTION, SOLUTION INTRAVENOUS at 22:16

## 2019-04-14 VITALS
RESPIRATION RATE: 16 BRPM | WEIGHT: 200.62 LBS | DIASTOLIC BLOOD PRESSURE: 61 MMHG | OXYGEN SATURATION: 96 % | HEART RATE: 86 BPM | SYSTOLIC BLOOD PRESSURE: 120 MMHG | TEMPERATURE: 97.1 F | BODY MASS INDEX: 28.72 KG/M2 | HEIGHT: 70 IN

## 2019-04-14 NOTE — ED PROVIDER NOTES
"ED Provider Note    Scribed for Stephane Kirkpatrick M.D. by Avril Rosenberg. 4/13/2019  8:15 PM    Primary care provider: Martha Gtz P.A.-C.  Means of arrival: Walk in  History obtained from: Patient  History limited by: None    CHIEF COMPLAINT  Chief Complaint   Patient presents with   • Leg Pain     Pt reports left calf pain/cramp. pt states feeling like a mild cramp for 6 days, pt states when stepping down today leg with sudden onset/increased pain.   • Leg Cramps       HPI  Bhavana Liu is a 53 y.o. female who presents to the Emergency Department for evaluation of left calf pain with associated swelling onset 4-5 days ago. She states it initially started as a discomfort and was similar to a muscle cramp at that time, but was prompted to come to the ED when the pain acutely worsened in severity last night. The patient states the pain worsened after she tried to crush her pile of garbage with her left foot and \"felt something explode in the back of my calf\" at that time. No alleviating or exacerbating factors are identified. She denies associated chest pain or shortness of breath.     REVIEW OF SYSTEMS  Pertinent positives include left calf pain and swelling.   Pertinent negatives include no chest pain or shortness of breath.    All other systems reviewed and negative. See HPI for further details.     PAST MEDICAL HISTORY   has a past medical history of Abnormal drug screen (12/8/2017); Adjustment disorder with anxious mood; Arthritis; Dyslipidemia, goal LDL below 130; GERD (gastroesophageal reflux disease); Menopausal symptoms; and Methamphetamine use (HCC) (12/8/2017).    SURGICAL HISTORY  patient denies any surgical history    SOCIAL HISTORY  Social History   Substance Use Topics   • Smoking status: Current Every Day Smoker     Packs/day: 0.50     Years: 25.00     Types: Cigarettes   • Smokeless tobacco: Never Used      Comment: getting close, she down to 1/4 ppd, still trying to quit   • Alcohol use No " "     Comment: none at all      History   Drug Use No       FAMILY HISTORY  Family History   Problem Relation Age of Onset   • Diabetes Mother         type 2, diet controlled   • Heart Disease Mother         pacemaker   • Cancer Maternal Grandmother         uterine   • Genitourinary () Sister         tumor, benign   • Heart Disease Father         CHF   • Non-contributory Father         emphysema   • Arthritis Brother         hip replacement   • Non-contributory Sister         encephalitis       CURRENT MEDICATIONS  Home Medications    **Home medications have not yet been reviewed for this encounter**         ALLERGIES  No Active Allergies    PHYSICAL EXAM  VITAL SIGNS: /90   Pulse (!) 115   Temp 36.2 °C (97.1 °F)   Resp 14   Ht 1.765 m (5' 9.5\")   Wt 91 kg (200 lb 9.9 oz)   SpO2 100%   BMI 29.20 kg/m²     Nursing note and vitals reviewed.  Constitutional: Well-developed and well-nourished. No distress.   HENT: Head is normocephalic and atraumatic. Oropharynx is clear and moist without exudate or erythema.   Eyes: Pupils are equal, round, and reactive to light. Conjunctiva are normal.   Cardiovascular: Tachycardic rate and regular rhythm. No murmur heard. Normal radial pulses.  Pulmonary/Chest: Breath sounds normal. No wheezes or rales.   Abdominal: Soft and non-tender. No distention    Musculoskeletal: Extremities exhibit normal range of motion. Tenderness at the midpoint of the left calf with small amount of associated swelling. Achilles tendon is intact.   Neurological: Awake, alert and oriented to person, place, and time. No focal deficits noted.  Skin: Skin is warm and dry. No rash.   Psychiatric: Normal mood and affect. Appropriate for clinical situation.    DIAGNOSTIC STUDIES / PROCEDURES    LABS  Results for orders placed or performed during the hospital encounter of 04/13/19   CBC WITH DIFFERENTIAL   Result Value Ref Range    WBC 8.9 4.8 - 10.8 K/uL    RBC 4.99 4.20 - 5.40 M/uL    Hemoglobin " 15.0 12.0 - 16.0 g/dL    Hematocrit 44.5 37.0 - 47.0 %    MCV 89.2 81.4 - 97.8 fL    MCH 30.1 27.0 - 33.0 pg    MCHC 33.7 33.6 - 35.0 g/dL    RDW 41.4 35.9 - 50.0 fL    Platelet Count 369 164 - 446 K/uL    MPV 9.0 9.0 - 12.9 fL    Neutrophils-Polys 52.20 44.00 - 72.00 %    Lymphocytes 36.60 22.00 - 41.00 %    Monocytes 8.00 0.00 - 13.40 %    Eosinophils 2.00 0.00 - 6.90 %    Basophils 1.10 0.00 - 1.80 %    Immature Granulocytes 0.10 0.00 - 0.90 %    Nucleated RBC 0.00 /100 WBC    Neutrophils (Absolute) 4.62 2.00 - 7.15 K/uL    Lymphs (Absolute) 3.24 1.00 - 4.80 K/uL    Monos (Absolute) 0.71 0.00 - 0.85 K/uL    Eos (Absolute) 0.18 0.00 - 0.51 K/uL    Baso (Absolute) 0.10 0.00 - 0.12 K/uL    Immature Granulocytes (abs) 0.01 0.00 - 0.11 K/uL    NRBC (Absolute) 0.00 K/uL     All labs reviewed by me.    RADIOLOGY  US-EXTREMITY VENOUS LOWER UNILAT LEFT    (Results Pending)   CT-CTA CHEST PULMONARY ARTERY W/ RECONS    (Results Pending)     The radiologist's interpretation of all radiological studies have been reviewed by me.    COURSE & MEDICAL DECISION MAKING  Nursing notes, VS, PMSFHx reviewed in chart.     8:15 PM - Patient seen and examined at bedside. Intravenous fluids were administered for tachycardia.  Ordered CT-CTA Chest Pulmonary Artery, US-Extremity venous lower unilteral, CBC with differential, BMP, PTT, APTT to evaluate her symptoms. The differential diagnoses include but are not limited to: DVT, PE, calf muscle tear.     9:30 PM  On repeat evaluation, continues to await her ultrasound and CT scan.  Laboratory studies are all within normal limits.  At this time my partner Dr. Ja Monaco will assume care pending imaging.  Further treatment and disposition as indicated based on imaging results.    Patient was treated with IV fluids that she will receive a CT scan with IV contrast.      FINAL IMPRESSION  1. Pain of left calf    2. Tachycardia          I, Avril Rosenberg (Scribe), am scribing for, and in the  presence of, Stephane Kirkpatrick M.D..    Electronically signed by: Avril Rosenberg (Scribe), 4/13/2019    I, Stephane Kirkpatrick M.D. personally performed the services described in this documentation, as scribed by Avril Rosenberg in my presence, and it is both accurate and complete.    C.    The note accurately reflects work and decisions made by me.  Stephane Kirkpatrick  4/13/2019  9:31 PM

## 2019-04-14 NOTE — ED PROVIDER NOTES
ED Provider Note    This is an addendum to the note on this patient.  For further details and full chart information, see the previously signed note.      Patient presenting with calf pain.  Please see Dr. Kirkpatrick's note for complete H&P.  The time of sign out CT a for PE rule out as well as ultrasound were pending.  Both of the studies are negative.  Both of the studies as well as hematology was reviewed with the patient by myself.  At this point I do not believe that there is any acute need for ongoing emergent evaluation or inpatient stay.  She will continue with ongoing hydration and monitoring of her symptoms.  She is understanding return precautions and outpatient follow-up needs with her PCP.  New para    Acute calf pain of unclear etiology

## 2019-04-14 NOTE — ED NOTES
52 y/o female presents to ED with complaint of ~6 days of left calf pain and cramping.  This pain was exacerbated today with dorsi flexion when she stepped down to open a trash can.  Pain worse with ambulation, antalgic gait noted. Describes pain as sharp ache. Denies SOB and chest pain. No prior hx DVT/PE.

## 2019-04-14 NOTE — ED NOTES
Discharge instructions provided to pt at this time, verbalized understanding. Pt ambulatory to lobby in stable condition, steady gait noted.

## 2019-10-31 ENCOUNTER — OFFICE VISIT (OUTPATIENT)
Dept: MEDICAL GROUP | Facility: CLINIC | Age: 54
End: 2019-10-31
Payer: MEDICAID

## 2019-10-31 VITALS
WEIGHT: 199 LBS | TEMPERATURE: 97.9 F | SYSTOLIC BLOOD PRESSURE: 110 MMHG | DIASTOLIC BLOOD PRESSURE: 72 MMHG | OXYGEN SATURATION: 98 % | HEART RATE: 95 BPM | BODY MASS INDEX: 29.47 KG/M2 | RESPIRATION RATE: 18 BRPM | HEIGHT: 69 IN

## 2019-10-31 DIAGNOSIS — Z23 NEED FOR VACCINATION: ICD-10-CM

## 2019-10-31 DIAGNOSIS — F15.10 METHAMPHETAMINE USE (HCC): ICD-10-CM

## 2019-10-31 DIAGNOSIS — K21.9 GASTROESOPHAGEAL REFLUX DISEASE, ESOPHAGITIS PRESENCE NOT SPECIFIED: ICD-10-CM

## 2019-10-31 DIAGNOSIS — Z12.31 VISIT FOR SCREENING MAMMOGRAM: ICD-10-CM

## 2019-10-31 DIAGNOSIS — Z12.11 SCREENING FOR COLORECTAL CANCER: ICD-10-CM

## 2019-10-31 DIAGNOSIS — L30.9 ECZEMA, UNSPECIFIED TYPE: ICD-10-CM

## 2019-10-31 DIAGNOSIS — R53.83 LETHARGY: ICD-10-CM

## 2019-10-31 DIAGNOSIS — G43.709 CHRONIC MIGRAINE WITHOUT AURA WITHOUT STATUS MIGRAINOSUS, NOT INTRACTABLE: ICD-10-CM

## 2019-10-31 DIAGNOSIS — Z12.12 SCREENING FOR COLORECTAL CANCER: ICD-10-CM

## 2019-10-31 DIAGNOSIS — M25.511 CHRONIC RIGHT SHOULDER PAIN: ICD-10-CM

## 2019-10-31 DIAGNOSIS — G89.29 CHRONIC RIGHT SHOULDER PAIN: ICD-10-CM

## 2019-10-31 DIAGNOSIS — F17.200 TOBACCO DEPENDENCE: ICD-10-CM

## 2019-10-31 DIAGNOSIS — Z13.6 SCREENING FOR CARDIOVASCULAR CONDITION: ICD-10-CM

## 2019-10-31 DIAGNOSIS — F43.22 ADJUSTMENT DISORDER WITH ANXIOUS MOOD: ICD-10-CM

## 2019-10-31 PROBLEM — Z00.00 ENCOUNTER FOR MEDICAL EXAMINATION TO ESTABLISH CARE: Status: RESOLVED | Noted: 2018-11-28 | Resolved: 2019-10-31

## 2019-10-31 PROCEDURE — 99214 OFFICE O/P EST MOD 30 MIN: CPT | Mod: 25 | Performed by: PHYSICIAN ASSISTANT

## 2019-10-31 PROCEDURE — 90686 IIV4 VACC NO PRSV 0.5 ML IM: CPT | Performed by: PHYSICIAN ASSISTANT

## 2019-10-31 PROCEDURE — 90472 IMMUNIZATION ADMIN EACH ADD: CPT | Performed by: PHYSICIAN ASSISTANT

## 2019-10-31 PROCEDURE — 90732 PPSV23 VACC 2 YRS+ SUBQ/IM: CPT | Performed by: PHYSICIAN ASSISTANT

## 2019-10-31 PROCEDURE — 90471 IMMUNIZATION ADMIN: CPT | Performed by: PHYSICIAN ASSISTANT

## 2019-10-31 RX ORDER — TRIAMCINOLONE ACETONIDE 1 MG/G
1 CREAM TOPICAL 2 TIMES DAILY
Qty: 1 TUBE | Refills: 0 | Status: SHIPPED | OUTPATIENT
Start: 2019-10-31 | End: 2020-03-24 | Stop reason: SDUPTHER

## 2019-10-31 RX ORDER — BUSPIRONE HYDROCHLORIDE 10 MG/1
10 TABLET ORAL 2 TIMES DAILY
Qty: 180 TAB | Refills: 3 | Status: SHIPPED | OUTPATIENT
Start: 2019-10-31 | End: 2020-11-24

## 2019-10-31 RX ORDER — NICOTINE 21 MG/24HR
1 PATCH, TRANSDERMAL 24 HOURS TRANSDERMAL EVERY 24 HOURS
Qty: 14 PATCH | Refills: 0 | Status: SHIPPED | OUTPATIENT
Start: 2019-10-31 | End: 2020-05-21

## 2019-10-31 RX ORDER — OMEPRAZOLE 20 MG/1
CAPSULE, DELAYED RELEASE ORAL
Qty: 90 CAP | Refills: 3 | Status: SHIPPED | OUTPATIENT
Start: 2019-10-31 | End: 2019-11-11

## 2019-10-31 RX ORDER — CYCLOBENZAPRINE HCL 5 MG
5-10 TABLET ORAL 3 TIMES DAILY PRN
Qty: 30 TAB | Refills: 0 | Status: SHIPPED
Start: 2019-10-31 | End: 2020-04-10

## 2019-10-31 NOTE — ASSESSMENT & PLAN NOTE
Currently treated with Flexeril this patient states she has had shoulder pain which has changed from right to left over the past 14 months.  At her last visit in November 2018 she stated she had been advised that she needed a shoulder replacement but never had this and her right shoulder feels perfectly fine today. She is requesting flexeril refill. She has refused pain management referrals in the past and has not been prescribed prescription opiates since Nov 2018.

## 2019-10-31 NOTE — ASSESSMENT & PLAN NOTE
This patient states she is still using meth relatively frequently. She complains of tooth loss, and hand swelling which are likely related to this. She does not wish to quit this habit.

## 2019-10-31 NOTE — ASSESSMENT & PLAN NOTE
This patient has noticed a rash over the past year or so that presents on her right foot as blisters which become very painful, red, pop and then results in patches of cracking dry skin.  She has tried athlete's foot cream on this and states this has not worked to resolve the problem.

## 2019-10-31 NOTE — ASSESSMENT & PLAN NOTE
Patient states she currently smokes about 0.5ppd with intention of quitting as she has recently had a few family members pass away from complications of COPD.

## 2019-10-31 NOTE — PROGRESS NOTES
Chief Complaint   Patient presents with   • Blister     R foot - Per pt. years       HISTORY OF PRESENT ILLNESS: Patient is a 54 y.o. female established patient who presents today for evaluation and management of:    Methamphetamine use  This patient states she is still using meth relatively frequently. She complains of tooth loss, and hand swelling which are likely related to this. She does not wish to quit this habit.      GERD (gastroesophageal reflux disease)  This is a chronic condition previously well controlled on omeprazole daily.  This patient is a cigarette smoker and is somewhat obese.  She is requesting medication refills at this time.  She has never had endoscopy to assess for Yan's esophagus.    Right shoulder pain  Currently treated with Flexeril this patient states she has had shoulder pain which has changed from right to left over the past 14 months.  At her last visit in November 2018 she stated she had been advised that she needed a shoulder replacement but never had this and her right shoulder feels perfectly fine today. She is requesting flexeril refill. She has refused pain management referrals in the past and has not been prescribed prescription opiates since Nov 2018.      Tobacco dependence  Patient states she currently smokes about 0.5ppd with intention of quitting as she has recently had a few family members pass away from complications of COPD.     Eczema  This patient has noticed a rash over the past year or so that presents on her right foot as blisters which become very painful, red, pop and then results in patches of cracking dry skin.  She has tried athlete's foot cream on this and states this has not worked to resolve the problem.       Patient Active Problem List    Diagnosis Date Noted   • Eczema 10/31/2019   • Tobacco dependence 11/28/2018   • Constipation 11/27/2018   • Abnormal drug screen 12/08/2017   • Methamphetamine use ([facility]) 12/08/2017   • Chronic use of opiate for  therapeutic purpose 07/05/2016   • Recurrent shoulder dislocation 10/03/2014   • Right shoulder pain 07/30/2014   • Adjustment disorder with anxious mood    • Migraine headache 09/30/2013   • GERD (gastroesophageal reflux disease)    • Dyslipidemia, goal LDL below 130        Allergies:Pcn [penicillins]    Current Outpatient Medications   Medication Sig Dispense Refill   • triamcinolone acetonide (KENALOG) 0.1 % Cream Apply 1 g to affected area(s) 2 times a day. 1 Tube 0   • omeprazole (PRILOSEC) 20 MG delayed-release capsule TAKE 1 CAPSULE BY MOUTH DAILY 90 Cap 3   • busPIRone (BUSPAR) 10 MG Tab tablet Take 1 Tab by mouth 2 Times a Day. 180 Tab 3   • cyclobenzaprine (FLEXERIL) 5 MG tablet Take 1-2 Tabs by mouth 3 times a day as needed. 30 Tab 0   • nicotine (NICODERM) 7 MG/24HR PATCH 24 HR Apply 1 Patch to skin as directed every 24 hours. 14 Patch 0   • nicotine (NICODERM) 14 MG/24HR PATCH 24 HR Apply 1 Patch to skin as directed every 24 hours. 14 Patch 0   • nicotine (NICODERM) 21 MG/24HR PATCH 24 HR Apply 1 Patch to skin as directed every 24 hours. 14 Patch 0   • PARoxetine (PAXIL) 20 MG Tab Take 1 Tab by mouth every day. 90 Tab 3   • hydroCHLOROthiazide (HYDRODIURIL) 25 MG Tab Take 1 Tab by mouth every day. 90 Tab 3   • albuterol (VENTOLIN OR PROVENTIL) 108 (90 BASE) MCG/ACT AERS Inhale 2 Puffs by mouth every 6 hours as needed for Shortness of Breath. 1 Inhaler 3     No current facility-administered medications for this visit.        Social History     Tobacco Use   • Smoking status: Current Every Day Smoker     Packs/day: 0.50     Years: 25.00     Pack years: 12.50     Types: Cigarettes   • Smokeless tobacco: Never Used   • Tobacco comment: getting close, she down to 1/4 ppd, still trying to quit   Substance Use Topics   • Alcohol use: No     Alcohol/week: 0.0 oz     Comment: none at all   • Drug use: No       Family Status   Relation Name Status   • Mo  (Not Specified)   • MGMo  (Not Specified)   • Sis  (Not  "Specified)   • Fa  (Not Specified)   • Bro  (Not Specified)   • Sis  (Not Specified)     Family History   Problem Relation Age of Onset   • Diabetes Mother         type 2, diet controlled   • Heart Disease Mother         pacemaker   • Cancer Maternal Grandmother         uterine   • Genitourinary () Problems Sister         tumor, benign   • Heart Disease Father         CHF   • Non-contributory Father         emphysema   • Arthritis Brother         hip replacement   • Non-contributory Sister         encephalitis       Review of Systems: See HPI above.   Constitutional: Negative for fever, chills, weight loss and malaise.   HENT: Negative for ear pain, nosebleeds, congestion, sore throat and neck pain.    Eyes: Negative for blurred vision.   Respiratory: Negative for shortness of breath, cough, sputum production and wheezing.    Cardiovascular: Negative for chest pain, palpitations, orthopnea and leg swelling.   Gastrointestinal: Negative for heartburn, nausea, vomiting and abdominal pain.   Genitourinary: Negative for dysuria, urgency and frequency.   Musculoskeletal: Negative for myalgias positive for back pain and joint pain.   Skin: Negative for rash and itching.   Neurological: Negative for dizziness, tingling, tremors, sensory change, focal weakness and headaches.   Endo/Heme/Allergies: Does not bruise/bleed easily.   Psychiatric/Behavioral: Negative for depression, suicidal ideas and memory loss.  The patient is not nervous/anxious and does not have insomnia.  patient does use illicit substances and does not drink alcohol.     Exam:  /72 (BP Location: Left arm, Patient Position: Sitting, BP Cuff Size: Adult)   Pulse 95   Temp 36.6 °C (97.9 °F) (Temporal)   Resp 18   Ht 1.753 m (5' 9\")   Wt 90.3 kg (199 lb)   SpO2 98%   Body mass index is 29.39 kg/m².  General:  Overweight female in NAD  Head: is grossly normal.  Neck: Supple without masses. Thyroid is not visibly enlarged.  Pulmonary: Clear to " ausculation. Normal effort. No rales, ronchi, or wheezing.  Cardiovascular: Regular rate and rhythm without murmur. Carotid pulses are intact and equal bilaterally.  Extremities: no clubbing, cyanosis. Positive for hand edema  Skin: mutiple dry, cracking lesions along the right foot.       Medical decision-making and discussion:  1. Tobacco dependence  Advised to quit smoking for about 5 minutes today.   Patient was advised regarding cravings. Advised regarding cigarette allowance method of quitting. Offered medication and any other support needed including frequent follow-up visits to help with smoking cessation. This  took approximately 5 minutes to discuss.       2. Gastroesophageal reflux disease, esophagitis presence not specified    - omeprazole (PRILOSEC) 20 MG delayed-release capsule; TAKE 1 CAPSULE BY MOUTH DAILY  Dispense: 90 Cap; Refill: 3    3. Chronic migraine without aura without status migrainosus, not intractable      4. Chronic right shoulder pain    - cyclobenzaprine (FLEXERIL) 5 MG tablet; Take 1-2 Tabs by mouth 3 times a day as needed.  Dispense: 30 Tab; Refill: 0    5. Adjustment disorder with anxious mood    - busPIRone (BUSPAR) 10 MG Tab tablet; Take 1 Tab by mouth 2 Times a Day.  Dispense: 180 Tab; Refill: 3    6. Lethargy    - TSH; Future    7. Screening for cardiovascular condition    - Lipid Profile; Future    8. Eczema, unspecified type    - triamcinolone acetonide (KENALOG) 0.1 % Cream; Apply 1 g to affected area(s) 2 times a day.  Dispense: 1 Tube; Refill: 0    9. Screening for colorectal cancer    - OCCULT BLOOD FECES IMMUNOASSAY (FIT); Future    10. Need for vaccination    - Influenza Vaccine Quad Injection (PF)  - PneumoVax PPV23 =>1yo    11. Visit for screening mammogram    - MA-SCREEN MAMMO W/CAD-BILAT; Future    12. Methamphetamine use (HCC)  Advised that her hand swelling s made worse by continued methamphetamine use and that stopping this may halt the progression of her  uncomfortable hand swelling.       Please note that this dictation was created using voice recognition software. I have made every reasonable attempt to correct obvious errors, but I expect that there are errors of grammar and possibly content that I did not discover before finalizing the note.      Return for smoking cessation.

## 2019-10-31 NOTE — ASSESSMENT & PLAN NOTE
This is a chronic condition previously well controlled on omeprazole daily.  This patient is a cigarette smoker and is somewhat obese.  She is requesting medication refills at this time.  She has never had endoscopy to assess for Yan's esophagus.

## 2019-11-08 ENCOUNTER — TELEPHONE (OUTPATIENT)
Dept: MEDICAL GROUP | Facility: CLINIC | Age: 54
End: 2019-11-08

## 2019-11-08 ENCOUNTER — TELEPHONE (OUTPATIENT)
Dept: MEDICAL GROUP | Facility: PHYSICIAN GROUP | Age: 54
End: 2019-11-08

## 2019-11-08 NOTE — TELEPHONE ENCOUNTER
Medicaid will not cover Omeprazole because this is something she can get OTC. Can we switch it no Nexium?

## 2019-11-08 NOTE — TELEPHONE ENCOUNTER
DOCUMENTATION OF PAR STATUS:    1. Name of Medication & Dose: omeprazole (PRILOSEC) 20 MG delayed-release capsule     2. Name of Prescription Coverage Company & phone #: Medicaid [employer] SEE MEDIA  3. Date Prior Auth Submitted: 11/08/19    4. What information was given to obtain insurance decision? Dx code    5. Prior Auth Status? Pending     6. Patient Notified: N\A    [place] Proton Pump Inhibitors Form[place] Authorization for Proton Pump Inhibitors [phone]

## 2019-11-11 RX ORDER — PANTOPRAZOLE SODIUM 40 MG/1
40 TABLET, DELAYED RELEASE ORAL
Qty: 30 TAB | Refills: 1 | Status: SHIPPED | OUTPATIENT
Start: 2019-11-11 | End: 2020-03-24 | Stop reason: SDUPTHER

## 2019-11-11 NOTE — TELEPHONE ENCOUNTER
DOCUMENTATION OF PAR STATUS:     1. Name of Medication & Dose: omeprazole (PRILOSEC) 20 MG delayed-release capsule      2. Name of Prescription Coverage Company & phone #: Medicaid [employer] SEE MEDIA  3. Date Prior Auth Submitted: 11/08/19     4. What information was given to obtain insurance decision? Dx code     5. Prior Auth Status? Denied 11/8/2019     6. Patient Notified: N\A     [place] Proton Pump Inhibitors FormPrior Authorization for Proton Pump Inhibitors [phone]

## 2020-02-04 RX ORDER — HYDROCHLOROTHIAZIDE 25 MG/1
25 TABLET ORAL DAILY
Qty: 90 TAB | Refills: 3 | Status: SHIPPED
Start: 2020-02-04 | End: 2020-03-24

## 2020-03-24 ENCOUNTER — OFFICE VISIT (OUTPATIENT)
Dept: MEDICAL GROUP | Facility: CLINIC | Age: 55
End: 2020-03-24
Payer: MEDICAID

## 2020-03-24 VITALS
HEIGHT: 69 IN | RESPIRATION RATE: 18 BRPM | SYSTOLIC BLOOD PRESSURE: 120 MMHG | BODY MASS INDEX: 30.13 KG/M2 | TEMPERATURE: 98.4 F | HEART RATE: 94 BPM | OXYGEN SATURATION: 99 % | WEIGHT: 203.4 LBS | DIASTOLIC BLOOD PRESSURE: 86 MMHG

## 2020-03-24 DIAGNOSIS — M25.511 CHRONIC RIGHT SHOULDER PAIN: ICD-10-CM

## 2020-03-24 DIAGNOSIS — K21.9 GASTROESOPHAGEAL REFLUX DISEASE, ESOPHAGITIS PRESENCE NOT SPECIFIED: ICD-10-CM

## 2020-03-24 DIAGNOSIS — L30.9 ECZEMA, UNSPECIFIED TYPE: ICD-10-CM

## 2020-03-24 DIAGNOSIS — Z13.6 SCREENING FOR CARDIOVASCULAR CONDITION: ICD-10-CM

## 2020-03-24 DIAGNOSIS — R60.0 BILATERAL LOWER EXTREMITY EDEMA: ICD-10-CM

## 2020-03-24 DIAGNOSIS — G89.29 CHRONIC RIGHT SHOULDER PAIN: ICD-10-CM

## 2020-03-24 DIAGNOSIS — E66.9 OBESITY (BMI 30-39.9): ICD-10-CM

## 2020-03-24 PROCEDURE — 99214 OFFICE O/P EST MOD 30 MIN: CPT | Performed by: PHYSICIAN ASSISTANT

## 2020-03-24 RX ORDER — FUROSEMIDE 20 MG/1
20 TABLET ORAL 2 TIMES DAILY
Qty: 60 TAB | Refills: 2 | Status: SHIPPED | OUTPATIENT
Start: 2020-03-24 | End: 2020-04-23 | Stop reason: SDUPTHER

## 2020-03-24 RX ORDER — TRIAMCINOLONE ACETONIDE 1 MG/G
1 CREAM TOPICAL 2 TIMES DAILY
Qty: 1 TUBE | Refills: 3 | Status: SHIPPED | OUTPATIENT
Start: 2020-03-24 | End: 2020-06-29

## 2020-03-24 RX ORDER — POTASSIUM CHLORIDE 750 MG/1
10 TABLET, FILM COATED, EXTENDED RELEASE ORAL 2 TIMES DAILY
Qty: 60 TAB | Refills: 3 | Status: SHIPPED | OUTPATIENT
Start: 2020-03-24 | End: 2020-09-29 | Stop reason: SDUPTHER

## 2020-03-24 RX ORDER — MELOXICAM 15 MG/1
15 TABLET ORAL DAILY
Qty: 30 TAB | Refills: 2 | Status: SHIPPED | OUTPATIENT
Start: 2020-03-24 | End: 2020-05-21

## 2020-03-24 RX ORDER — PANTOPRAZOLE SODIUM 40 MG/1
40 TABLET, DELAYED RELEASE ORAL
Qty: 45 TAB | Refills: 1 | Status: SHIPPED | OUTPATIENT
Start: 2020-03-24 | End: 2020-06-26

## 2020-03-24 NOTE — ASSESSMENT & PLAN NOTE
This is a chronic condition currently well controlled on pantoprazole 40mg daily.  This patient is a cigarette smoker and is somewhat obese.  She is requesting medication refills at this time.  She has never had endoscopy to assess for Yan's esophagus. She is aware that long term use of this medication can result in kidney disease.

## 2020-03-24 NOTE — ASSESSMENT & PLAN NOTE
This patient has noticed a rash over the past two years or so that presents on her right foot as blisters which become very painful, red, pop and then results in patches of cracking dry skin.  She has tried athlete's foot cream on this and states this has not worked to resolve the problem. She has also tried triamcinolone cream which seems to work well while she uses it but she has run out and is requesting refills.

## 2020-03-24 NOTE — ASSESSMENT & PLAN NOTE
Currently treated with Flexeril, this patient states she has had shoulder pain worse in right than left. She is requesting flexeril refill although today she states that it isn't really working and that 800mg ibuprofen works better for her. She has refused pain management referrals in the past and has not been prescribed prescription opiates since Nov 2018.  She is OK with stopping flexeril and switching to an antiinflammatory instead.

## 2020-03-24 NOTE — PROGRESS NOTES
Chief Complaint   Patient presents with   • Eczema     on feet    • Medication Refill       HISTORY OF PRESENT ILLNESS: Patient is a 54 y.o. female established patient who presents today for evaluation and management of:    GERD (gastroesophageal reflux disease)  This is a chronic condition currently well controlled on pantoprazole 40mg daily.  This patient is a cigarette smoker and is somewhat obese.  She is requesting medication refills at this time.  She has never had endoscopy to assess for Yan's esophagus. She is aware that long term use of this medication can result in kidney disease.     Eczema  This patient has noticed a rash over the past two years or so that presents on her right foot as blisters which become very painful, red, pop and then results in patches of cracking dry skin.  She has tried athlete's foot cream on this and states this has not worked to resolve the problem. She has also tried triamcinolone cream which seems to work well while she uses it but she has run out and is requesting refills.     Right shoulder pain  Currently treated with Flexeril, this patient states she has had shoulder pain worse in right than left. She is requesting flexeril refill although today she states that it isn't really working and that 800mg ibuprofen works better for her. She has refused pain management referrals in the past and has not been prescribed prescription opiates since Nov 2018.  She is OK with stopping flexeril and switching to an antiinflammatory instead.    Bilateral lower extremity edema  This is a chronic condition, currently treated with hctz 25mg daily. The patient states this is no longer working to keep her lower leg edema under control. She is requesting a new medication. She does continue using methamphetamine and does have family history of heart disease. She is due for lab recheck and has never had an ECHO. ECHO will be delayed for now due to need to reserve testing staff and equipment  for COVID-19 cases.        Patient Active Problem List    Diagnosis Date Noted   • Bilateral lower extremity edema 03/24/2020   • Obesity (BMI 30-39.9) 03/24/2020   • Eczema 10/31/2019   • Tobacco dependence 11/28/2018   • Constipation 11/27/2018   • Abnormal drug screen 12/08/2017   • Methamphetamine use ([facility]CC) 12/08/2017   • Chronic use of opiate for therapeutic purpose 07/05/2016   • Recurrent shoulder dislocation 10/03/2014   • Right shoulder pain 07/30/2014   • Adjustment disorder with anxious mood    • Migraine headache 09/30/2013   • GERD (gastroesophageal reflux disease)    • Dyslipidemia, goal LDL below 130        Allergies:Pcn [penicillins]    Current Outpatient Medications   Medication Sig Dispense Refill   • furosemide (LASIX) 20 MG Tab Take 1 Tab by mouth 2 times a day. 60 Tab 2   • potassium chloride ER (KLOR-CON) 10 MEQ tablet Take 1 Tab by mouth 2 times a day. 60 Tab 3   • triamcinolone acetonide (KENALOG) 0.1 % Cream Apply 1 g to affected area(s) 2 times a day. 1 Tube 3   • meloxicam (MOBIC) 15 MG tablet Take 1 Tab by mouth every day. 30 Tab 2   • pantoprazole (PROTONIX) 40 MG Tablet Delayed Response Take 1 Tab by mouth every 48 hours. 45 Tab 1   • busPIRone (BUSPAR) 10 MG Tab tablet Take 1 Tab by mouth 2 Times a Day. 180 Tab 3   • cyclobenzaprine (FLEXERIL) 5 MG tablet Take 1-2 Tabs by mouth 3 times a day as needed. 30 Tab 0   • nicotine (NICODERM) 7 MG/24HR PATCH 24 HR Apply 1 Patch to skin as directed every 24 hours. 14 Patch 0   • nicotine (NICODERM) 14 MG/24HR PATCH 24 HR Apply 1 Patch to skin as directed every 24 hours. 14 Patch 0   • nicotine (NICODERM) 21 MG/24HR PATCH 24 HR Apply 1 Patch to skin as directed every 24 hours. 14 Patch 0   • PARoxetine (PAXIL) 20 MG Tab Take 1 Tab by mouth every day. 90 Tab 3   • albuterol (VENTOLIN OR PROVENTIL) 108 (90 BASE) MCG/ACT AERS Inhale 2 Puffs by mouth every 6 hours as needed for Shortness of Breath. 1 Inhaler 3     No current facility-administered  medications for this visit.        Social History     Tobacco Use   • Smoking status: Current Every Day Smoker     Packs/day: 0.50     Years: 25.00     Pack years: 12.50     Types: Cigarettes   • Smokeless tobacco: Never Used   • Tobacco comment: getting close, she down to 1/4 ppd, still trying to quit   Substance Use Topics   • Alcohol use: No     Alcohol/week: 0.0 oz     Comment: none at all   • Drug use: No       Family Status   Relation Name Status   • Mo  (Not Specified)   • MGMo  (Not Specified)   • Sis  (Not Specified)   • Fa  (Not Specified)   • Bro  (Not Specified)   • Sis  (Not Specified)     Family History   Problem Relation Age of Onset   • Diabetes Mother         type 2, diet controlled   • Heart Disease Mother         pacemaker   • Cancer Maternal Grandmother         uterine   • Genitourinary () Problems Sister         tumor, benign   • Heart Disease Father         CHF   • Non-contributory Father         emphysema   • Arthritis Brother         hip replacement   • Non-contributory Sister         encephalitis       Review of Systems: See HPI above.   Constitutional: Negative for fever, chills, weight loss and malaise.   HENT: Negative for ear pain, nosebleeds, congestion, sore throat and neck pain.    Eyes: Negative for blurred vision.   Respiratory: Negative for shortness of breath, cough, sputum production and wheezing.    Cardiovascular: Negative for chest pain, palpitations, orthopnea and positive for bilateral lower leg swelling.   Gastrointestinal: positive for heartburn, Negative for  nausea, vomiting and abdominal pain.   Genitourinary: Negative for dysuria, urgency and frequency.   Musculoskeletal: Negative for myalgias, back pain and positive for bilateral shoulder and right knee pain.   Skin: positive for rash and itching.       Exam:  /86 (BP Location: Right arm, Patient Position: Sitting, BP Cuff Size: Adult)   Pulse 94   Temp 36.9 °C (98.4 °F) (Temporal)   Resp 18   Ht 1.753 m  "(5' 9\")   Wt 92.3 kg (203 lb 6.4 oz)   SpO2 99%   Body mass index is 30.04 kg/m².  General:  Obese female in NAD  Head: is grossly normal. Poor dentition.   Neck: Supple without masses. Thyroid is not visibly enlarged.  Pulmonary: Clear to ausculation. Normal effort. No rales, ronchi, or wheezing.  Cardiovascular: Regular rate and rhythm without murmur. Carotid pulses are intact and equal bilaterally.  Extremities: no clubbing, cyanosis, or edema.    Medical decision-making and discussion:  1. Bilateral lower extremity edema    - proBrain Natriuretic Peptide, NT; Future  - furosemide (LASIX) 20 MG Tab; Take 1 Tab by mouth 2 times a day.  Dispense: 60 Tab; Refill: 2  - potassium chloride ER (KLOR-CON) 10 MEQ tablet; Take 1 Tab by mouth 2 times a day.  Dispense: 60 Tab; Refill: 3  - Comp Metabolic Panel; Future  - ESTIMATED GFR; Future    2. Eczema, unspecified type    - triamcinolone acetonide (KENALOG) 0.1 % Cream; Apply 1 g to affected area(s) 2 times a day.  Dispense: 1 Tube; Refill: 3  - CBC WITHOUT DIFFERENTIAL; Future    3. Gastroesophageal reflux disease, esophagitis presence not specified    - pantoprazole (PROTONIX) 40 MG Tablet Delayed Response; Take 1 Tab by mouth every 48 hours.  Dispense: 45 Tab; Refill: 1  - Comp Metabolic Panel; Future  - CBC WITHOUT DIFFERENTIAL; Future    4. Chronic right shoulder pain    - meloxicam (MOBIC) 15 MG tablet; Take 1 Tab by mouth every day.  Dispense: 30 Tab; Refill: 2    5. Screening for cardiovascular condition    - Lipid Profile; Future    6. Obesity (BMI 30-39.9)    - Patient identified as having weight management issue.  Appropriate orders and counseling given.      Please note that this dictation was created using voice recognition software. I have made every reasonable attempt to correct obvious errors, but I expect that there are errors of grammar and possibly content that I did not discover before finalizing the note.      Return in about 4 weeks (around " 4/21/2020) for leg swelling, shoulder pain.

## 2020-03-24 NOTE — PATIENT INSTRUCTIONS
Walk in for labs 7-9am Mon-Fri in [place]. Fasting = don't eat or drink ANYTHING except water and medications for 12 hours prior.

## 2020-03-24 NOTE — ASSESSMENT & PLAN NOTE
This is a chronic condition, currently treated with hctz 25mg daily. The patient states this is no longer working to keep her lower leg edema under control. She is requesting a new medication. She does continue using methamphetamine and does have family history of heart disease. She is due for lab recheck and has never had an ECHO. ECHO will be delayed for now due to need to reserve testing staff and equipment for COVID-19 cases.

## 2020-04-10 ENCOUNTER — HOSPITAL ENCOUNTER (OUTPATIENT)
Facility: MEDICAL CENTER | Age: 55
End: 2020-04-10
Attending: FAMILY MEDICINE
Payer: MEDICAID

## 2020-04-10 ENCOUNTER — HOSPITAL ENCOUNTER (OUTPATIENT)
Facility: MEDICAL CENTER | Age: 55
End: 2020-04-10
Attending: PHYSICIAN ASSISTANT
Payer: MEDICAID

## 2020-04-10 ENCOUNTER — OFFICE VISIT (OUTPATIENT)
Dept: MEDICAL GROUP | Facility: CLINIC | Age: 55
End: 2020-04-10
Payer: MEDICAID

## 2020-04-10 VITALS
TEMPERATURE: 96.7 F | HEART RATE: 100 BPM | SYSTOLIC BLOOD PRESSURE: 116 MMHG | RESPIRATION RATE: 12 BRPM | BODY MASS INDEX: 30.81 KG/M2 | HEIGHT: 69 IN | WEIGHT: 208 LBS | DIASTOLIC BLOOD PRESSURE: 82 MMHG | OXYGEN SATURATION: 96 %

## 2020-04-10 DIAGNOSIS — K21.9 GASTROESOPHAGEAL REFLUX DISEASE, ESOPHAGITIS PRESENCE NOT SPECIFIED: ICD-10-CM

## 2020-04-10 DIAGNOSIS — R60.9 EDEMA, UNSPECIFIED TYPE: ICD-10-CM

## 2020-04-10 DIAGNOSIS — L30.9 ECZEMA, UNSPECIFIED TYPE: ICD-10-CM

## 2020-04-10 DIAGNOSIS — Z13.6 SCREENING FOR CARDIOVASCULAR CONDITION: ICD-10-CM

## 2020-04-10 DIAGNOSIS — R60.0 BILATERAL LOWER EXTREMITY EDEMA: ICD-10-CM

## 2020-04-10 LAB
ALBUMIN SERPL BCP-MCNC: 4 G/DL (ref 3.2–4.9)
ALBUMIN/GLOB SERPL: 1.6 G/DL
ALP SERPL-CCNC: 69 U/L (ref 30–99)
ALT SERPL-CCNC: 17 U/L (ref 2–50)
ANION GAP SERPL CALC-SCNC: 10 MMOL/L (ref 7–16)
AST SERPL-CCNC: 12 U/L (ref 12–45)
BILIRUB SERPL-MCNC: 0.4 MG/DL (ref 0.1–1.5)
BUN SERPL-MCNC: 20 MG/DL (ref 8–22)
CALCIUM SERPL-MCNC: 9.3 MG/DL (ref 8.5–10.5)
CHLORIDE SERPL-SCNC: 104 MMOL/L (ref 96–112)
CHOLEST SERPL-MCNC: 163 MG/DL (ref 100–199)
CO2 SERPL-SCNC: 27 MMOL/L (ref 20–33)
CREAT SERPL-MCNC: 0.85 MG/DL (ref 0.5–1.4)
ERYTHROCYTE [DISTWIDTH] IN BLOOD BY AUTOMATED COUNT: 44 FL (ref 35.9–50)
GLOBULIN SER CALC-MCNC: 2.5 G/DL (ref 1.9–3.5)
GLUCOSE SERPL-MCNC: 82 MG/DL (ref 65–99)
HCT VFR BLD AUTO: 40.7 % (ref 37–47)
HDLC SERPL-MCNC: 51 MG/DL
HGB BLD-MCNC: 13.6 G/DL (ref 12–16)
LDLC SERPL CALC-MCNC: 98 MG/DL
MCH RBC QN AUTO: 30.5 PG (ref 27–33)
MCHC RBC AUTO-ENTMCNC: 33.4 G/DL (ref 33.6–35)
MCV RBC AUTO: 91.3 FL (ref 81.4–97.8)
NT-PROBNP SERPL IA-MCNC: 36 PG/ML (ref 0–125)
PLATELET # BLD AUTO: 332 K/UL (ref 164–446)
PMV BLD AUTO: 9.3 FL (ref 9–12.9)
POTASSIUM SERPL-SCNC: 3.8 MMOL/L (ref 3.6–5.5)
PROT SERPL-MCNC: 6.5 G/DL (ref 6–8.2)
RBC # BLD AUTO: 4.46 M/UL (ref 4.2–5.4)
SODIUM SERPL-SCNC: 141 MMOL/L (ref 135–145)
TRIGL SERPL-MCNC: 70 MG/DL (ref 0–149)
TSH SERPL DL<=0.005 MIU/L-ACNC: 4 UIU/ML (ref 0.38–5.33)
WBC # BLD AUTO: 7.2 K/UL (ref 4.8–10.8)

## 2020-04-10 PROCEDURE — 80061 LIPID PANEL: CPT

## 2020-04-10 PROCEDURE — 80053 COMPREHEN METABOLIC PANEL: CPT

## 2020-04-10 PROCEDURE — 99213 OFFICE O/P EST LOW 20 MIN: CPT | Performed by: FAMILY MEDICINE

## 2020-04-10 PROCEDURE — 83880 ASSAY OF NATRIURETIC PEPTIDE: CPT

## 2020-04-10 PROCEDURE — 85027 COMPLETE CBC AUTOMATED: CPT

## 2020-04-10 PROCEDURE — 84443 ASSAY THYROID STIM HORMONE: CPT

## 2020-04-10 NOTE — PROGRESS NOTES
Complaint: f/u leg swelling     Subjective:     Bhavana Liu is a 54 y.o. female here today for f/u.     Bilateral lower extremity edema  States swelling has not gone down with the Lasix. Putting on weight. Denies any SOBOE, palpitations. Hands swell as well. Still use methamphetamines.     No other concerns or complaints today. Wants to push off health maintenance until a later time. Wants glucometer, told no indication for it at present time.    Current medicines (including changes today)  Current Outpatient Medications   Medication Sig Dispense Refill   • furosemide (LASIX) 20 MG Tab Take 1 Tab by mouth 2 times a day. 60 Tab 2   • potassium chloride ER (KLOR-CON) 10 MEQ tablet Take 1 Tab by mouth 2 times a day. 60 Tab 3   • triamcinolone acetonide (KENALOG) 0.1 % Cream Apply 1 g to affected area(s) 2 times a day. 1 Tube 3   • meloxicam (MOBIC) 15 MG tablet Take 1 Tab by mouth every day. 30 Tab 2   • pantoprazole (PROTONIX) 40 MG Tablet Delayed Response Take 1 Tab by mouth every 48 hours. 45 Tab 1   • busPIRone (BUSPAR) 10 MG Tab tablet Take 1 Tab by mouth 2 Times a Day. 180 Tab 3   • nicotine (NICODERM) 7 MG/24HR PATCH 24 HR Apply 1 Patch to skin as directed every 24 hours. 14 Patch 0   • nicotine (NICODERM) 14 MG/24HR PATCH 24 HR Apply 1 Patch to skin as directed every 24 hours. 14 Patch 0   • nicotine (NICODERM) 21 MG/24HR PATCH 24 HR Apply 1 Patch to skin as directed every 24 hours. 14 Patch 0   • PARoxetine (PAXIL) 20 MG Tab Take 1 Tab by mouth every day. 90 Tab 3   • albuterol (VENTOLIN OR PROVENTIL) 108 (90 BASE) MCG/ACT AERS Inhale 2 Puffs by mouth every 6 hours as needed for Shortness of Breath. 1 Inhaler 3     No current facility-administered medications for this visit.      She  has a past medical history of Abnormal drug screen (12/8/2017), Adjustment disorder with anxious mood, Arthritis, Bilateral leg edema, Dyslipidemia, goal LDL below 130, GERD (gastroesophageal reflux disease), Menopausal  symptoms, and Methamphetamine use (HCC) (12/8/2017).    Health Maintenance: to be addressed at future appointment      Allergies: Pcn [penicillins]    Current Outpatient Medications Ordered in Epic   Medication Sig Dispense Refill   • furosemide (LASIX) 20 MG Tab Take 1 Tab by mouth 2 times a day. 60 Tab 2   • potassium chloride ER (KLOR-CON) 10 MEQ tablet Take 1 Tab by mouth 2 times a day. 60 Tab 3   • triamcinolone acetonide (KENALOG) 0.1 % Cream Apply 1 g to affected area(s) 2 times a day. 1 Tube 3   • meloxicam (MOBIC) 15 MG tablet Take 1 Tab by mouth every day. 30 Tab 2   • pantoprazole (PROTONIX) 40 MG Tablet Delayed Response Take 1 Tab by mouth every 48 hours. 45 Tab 1   • busPIRone (BUSPAR) 10 MG Tab tablet Take 1 Tab by mouth 2 Times a Day. 180 Tab 3   • nicotine (NICODERM) 7 MG/24HR PATCH 24 HR Apply 1 Patch to skin as directed every 24 hours. 14 Patch 0   • nicotine (NICODERM) 14 MG/24HR PATCH 24 HR Apply 1 Patch to skin as directed every 24 hours. 14 Patch 0   • nicotine (NICODERM) 21 MG/24HR PATCH 24 HR Apply 1 Patch to skin as directed every 24 hours. 14 Patch 0   • PARoxetine (PAXIL) 20 MG Tab Take 1 Tab by mouth every day. 90 Tab 3   • albuterol (VENTOLIN OR PROVENTIL) 108 (90 BASE) MCG/ACT AERS Inhale 2 Puffs by mouth every 6 hours as needed for Shortness of Breath. 1 Inhaler 3     No current Epic-ordered facility-administered medications on file.        Past Medical History:   Diagnosis Date   • Abnormal drug screen 12/8/2017   • Adjustment disorder with anxious mood    • Arthritis    • Bilateral leg edema    • Dyslipidemia, goal LDL below 130    • GERD (gastroesophageal reflux disease)    • Menopausal symptoms    • Methamphetamine use (HCC) 12/8/2017       History reviewed. No pertinent surgical history.    Social History     Tobacco Use   • Smoking status: Current Every Day Smoker     Packs/day: 0.50     Years: 25.00     Pack years: 12.50     Types: Cigarettes   • Smokeless tobacco: Never Used  "  • Tobacco comment: getting close, she down to 1/4 ppd, still trying to quit   Substance Use Topics   • Alcohol use: No     Alcohol/week: 0.0 oz     Comment: none at all   • Drug use: No       Social History     Social History Narrative   • Not on file       Family History   Problem Relation Age of Onset   • Diabetes Mother         type 2, diet controlled   • Heart Disease Mother         pacemaker   • Cancer Maternal Grandmother         uterine   • Genitourinary () Problems Sister         tumor, benign   • Heart Disease Father         CHF   • Non-contributory Father         emphysema   • Arthritis Brother         hip replacement   • Non-contributory Sister         encephalitis         ROS Positive for swollen legs and hands.  Patient denies any fever, chills, unintentional weight gain/loss, fatigue, stroke symptoms, dizziness, headache, nasal congestion, sore-throat, cough, heartburn, chest pain, difficulty breathing, abdominal discomfort, diarrhea/constipation, burning with urination or frequency, joint or back pain, skin rashes, depression or anxiety.       Objective:     /82 (BP Location: Left arm, Patient Position: Sitting, BP Cuff Size: Adult)   Pulse 100   Temp 35.9 °C (96.7 °F) (Temporal)   Resp 12   Ht 1.74 m (5' 8.5\")   Wt 94.3 kg (208 lb)   SpO2 96%  Body mass index is 31.17 kg/m².   Physical Exam:  Constitutional: Alert, no distress.  Legs bilateral lower leg and ankle edema 1-2+, pitting, no skin changes, redness or discoloration.  Psych: Alert and oriented x3, appropriate affect and mood.        Assessment and Plan:   The following treatment plan was discussed    1. Bilateral lower extremity edema  Uncontrolled. Explained to patient how fluid accumulation develops in legs, causes. Labs ordered last week plus the one today to be drawn this AM, will contact with result. Patient instructed to elevate her legs as much as possible. Recommended she purchase compression stockings, states she " can't afford (smokes 1/2 ppd). Instructed to increase AM dose Lasix to 40 mg. Encouraged to quit use drugs, might affect her heart.  - TSH; Future    Followup: Return in about 1 week (around 4/17/2020) for Virtual visit.    Please note that this dictation was created using voice recognition software. I have made every reasonable attempt to correct obvious errors, but I expect that there are errors of grammar and possibly content that I did not discover before finalizing the note.

## 2020-04-10 NOTE — ASSESSMENT & PLAN NOTE
States swelling has not gone down with the Lasix. Putting on weight. Denies any SOBOE, palpitations. Hands swell as well. Still use methamphetamines.

## 2020-04-14 ENCOUNTER — TELEPHONE (OUTPATIENT)
Dept: MEDICAL GROUP | Facility: CLINIC | Age: 55
End: 2020-04-14

## 2020-04-14 NOTE — TELEPHONE ENCOUNTER
Phone Number Called: [phone] (home)       Call outcome: Spoke to patient regarding message below.    Message:   ----- Message from Marlon Victoria M.D. sent at 4/14/2020  8:42 AM PDT -----  Regarding: Lab test results  Please notify patient that all her lab tests came back normal, swollen legs due to a drainage problem, not due to anything else. Recommend continuing her furosemide, elevated her legs when sitting long periods, purchase and wear knee-high compression stockings (cheaper than cigarettes!).    [place]

## 2020-04-17 ENCOUNTER — TELEPHONE (OUTPATIENT)
Dept: MEDICAL GROUP | Facility: CLINIC | Age: 55
End: 2020-04-17

## 2020-04-17 NOTE — TELEPHONE ENCOUNTER
VOICEMAIL  1. Caller Name: Bhavana Liu                        Call Back Number: [phone] (home)       2. Message: Pt stated since she say Dr Victoria he increased the times she is supposed to take her Furosemide. Would like to know if another script can be sent in so she doesn't run out     3. Patient approves office to leave a detailed voicemail/MyChart message: N\A

## 2020-04-23 ENCOUNTER — OFFICE VISIT (OUTPATIENT)
Dept: MEDICAL GROUP | Facility: CLINIC | Age: 55
End: 2020-04-23
Payer: MEDICAID

## 2020-04-23 VITALS
BODY MASS INDEX: 29.35 KG/M2 | SYSTOLIC BLOOD PRESSURE: 145 MMHG | HEIGHT: 70 IN | HEART RATE: 97 BPM | WEIGHT: 205 LBS | DIASTOLIC BLOOD PRESSURE: 86 MMHG

## 2020-04-23 DIAGNOSIS — M25.50 ARTHRALGIA, UNSPECIFIED JOINT: ICD-10-CM

## 2020-04-23 DIAGNOSIS — R60.0 BILATERAL LOWER EXTREMITY EDEMA: ICD-10-CM

## 2020-04-23 DIAGNOSIS — L29.9 ITCHING: ICD-10-CM

## 2020-04-23 PROCEDURE — 99213 OFFICE O/P EST LOW 20 MIN: CPT | Mod: CR | Performed by: PHYSICIAN ASSISTANT

## 2020-04-23 RX ORDER — FUROSEMIDE 20 MG/1
20 TABLET ORAL 3 TIMES DAILY
Qty: 90 TAB | Refills: 3 | Status: SHIPPED | OUTPATIENT
Start: 2020-04-23 | End: 2020-10-21

## 2020-04-23 RX ORDER — LORATADINE 10 MG/1
10 TABLET ORAL DAILY
Qty: 30 TAB | Refills: 3 | Status: SHIPPED | OUTPATIENT
Start: 2020-04-23 | End: 2020-08-20

## 2020-04-23 ASSESSMENT — FIBROSIS 4 INDEX: FIB4 SCORE: 0.48

## 2020-04-23 NOTE — PROGRESS NOTES
Telephone Appointment Visit   As a means of avoiding spread of COVID-19, this visit is being conducted by telephone. This telephone visit was initiated by the patient and they verbally consented.    Time at start of call: 13:06    Reason for Call:  leg swelling     Patient states that she has been having leg swelling.  She states that this was going on for a few months.  She did have medication prescribed which helped some but not a lot.  She then saw Dr. Victoria who did further testing.  She states that she was told testing was normal.  She was asked to follow up.  She states that her medication doses were increased which helped.  However, as she has been doubling the medication and does need an increase in the dose.  She has been wearing compression stockings as well.  She does have pain from the top of the stocking.  She is concerned that this is lifelong.  She states that she also has burning in her feet that has started in the last year.  She states that this started after an injury about a year ago.  She states that she has arthritis in her hands elbows and knees.  .  She is currently taking care of her  also.      Patient states that she has been having itching to the point that she has excoriation marks.  She has washed all her clothes and bedding and has washed them all and has not found anything.      Patient Comments / History:   documented     Labs / Images Reviewed   documented     Assessment and Plan:     1. Bilateral lower extremity edema  - furosemide (LASIX) 20 MG Tab; Take 1 Tab by mouth 3 times a day.  Dispense: 90 Tab; Refill: 3    Furosemide adjusted to what patient is currently taking to help with edema.  Tensional component of neuropathy or autoimmune issue cannot be excluded.  Also potential for Baker's cyst.  We will continue to monitor.    2. Itching  - loratadine (CLARITIN) 10 MG Tab; Take 1 Tab by mouth every day.  Dispense: 30 Tab; Refill: 3    Sounds as though patient is having  a pruritic issue at this time.  This will need to be monitored but in the meantime we will try her on a daily Claritin to see if we can control symptoms.    3. Arthralgia, unspecified joint  - URINALYSIS,CULTURE IF INDICATED; Future  - CBC WITH DIFFERENTIAL; Future  - RHEUMATOID ARTHRITIS FACTOR; Future  - KRIS COMPREHENSIVE PANEL  Labs have been ordered to evaluate further.  We will follow-up in 3 weeks, sooner if needed.    Follow-up: Return if symptoms worsen or fail to improve, for 3 weeks in office.    Time at end of call: 13:29  Total Time Spent: 21-30 minutes    Diamond Moreno P.A.-C.

## 2020-04-30 ENCOUNTER — HOSPITAL ENCOUNTER (OUTPATIENT)
Dept: LAB | Facility: MEDICAL CENTER | Age: 55
End: 2020-04-30
Attending: PHYSICIAN ASSISTANT
Payer: MEDICAID

## 2020-04-30 DIAGNOSIS — M25.50 ARTHRALGIA, UNSPECIFIED JOINT: ICD-10-CM

## 2020-04-30 LAB
APPEARANCE UR: ABNORMAL
BACTERIA #/AREA URNS HPF: NEGATIVE /HPF
BASOPHILS # BLD AUTO: 1.7 % (ref 0–1.8)
BASOPHILS # BLD: 0.11 K/UL (ref 0–0.12)
BILIRUB UR QL STRIP.AUTO: NEGATIVE
CAOX CRY #/AREA URNS HPF: NORMAL /HPF
COLOR UR: YELLOW
EOSINOPHIL # BLD AUTO: 0.17 K/UL (ref 0–0.51)
EOSINOPHIL NFR BLD: 2.6 % (ref 0–6.9)
EPI CELLS #/AREA URNS HPF: NEGATIVE /HPF
ERYTHROCYTE [DISTWIDTH] IN BLOOD BY AUTOMATED COUNT: 44 FL (ref 35.9–50)
GLUCOSE UR STRIP.AUTO-MCNC: NEGATIVE MG/DL
HCT VFR BLD AUTO: 41.6 % (ref 37–47)
HGB BLD-MCNC: 13.5 G/DL (ref 12–16)
HYALINE CASTS #/AREA URNS LPF: NORMAL /LPF
IMM GRANULOCYTES # BLD AUTO: 0.02 K/UL (ref 0–0.11)
IMM GRANULOCYTES NFR BLD AUTO: 0.3 % (ref 0–0.9)
KETONES UR STRIP.AUTO-MCNC: NEGATIVE MG/DL
LEUKOCYTE ESTERASE UR QL STRIP.AUTO: NEGATIVE
LYMPHOCYTES # BLD AUTO: 2.69 K/UL (ref 1–4.8)
LYMPHOCYTES NFR BLD: 40.6 % (ref 22–41)
MCH RBC QN AUTO: 30.1 PG (ref 27–33)
MCHC RBC AUTO-ENTMCNC: 32.5 G/DL (ref 33.6–35)
MCV RBC AUTO: 92.9 FL (ref 81.4–97.8)
MICRO URNS: ABNORMAL
MONOCYTES # BLD AUTO: 0.75 K/UL (ref 0–0.85)
MONOCYTES NFR BLD AUTO: 11.3 % (ref 0–13.4)
NEUTROPHILS # BLD AUTO: 2.89 K/UL (ref 2–7.15)
NEUTROPHILS NFR BLD: 43.5 % (ref 44–72)
NITRITE UR QL STRIP.AUTO: NEGATIVE
NRBC # BLD AUTO: 0 K/UL
NRBC BLD-RTO: 0 /100 WBC
PH UR STRIP.AUTO: 6.5 [PH] (ref 5–8)
PLATELET # BLD AUTO: 318 K/UL (ref 164–446)
PMV BLD AUTO: 9.7 FL (ref 9–12.9)
PROT UR QL STRIP: NEGATIVE MG/DL
RBC # BLD AUTO: 4.48 M/UL (ref 4.2–5.4)
RBC # URNS HPF: NORMAL /HPF
RBC UR QL AUTO: NEGATIVE
RHEUMATOID FACT SER IA-ACNC: <10 IU/ML (ref 0–14)
SP GR UR STRIP.AUTO: 1.02
UROBILINOGEN UR STRIP.AUTO-MCNC: 0.2 MG/DL
WBC # BLD AUTO: 6.6 K/UL (ref 4.8–10.8)
WBC #/AREA URNS HPF: NORMAL /HPF

## 2020-04-30 PROCEDURE — 81001 URINALYSIS AUTO W/SCOPE: CPT

## 2020-04-30 PROCEDURE — 86431 RHEUMATOID FACTOR QUANT: CPT

## 2020-04-30 PROCEDURE — 85025 COMPLETE CBC W/AUTO DIFF WBC: CPT

## 2020-04-30 PROCEDURE — 36415 COLL VENOUS BLD VENIPUNCTURE: CPT

## 2020-05-08 ENCOUNTER — TELEPHONE (OUTPATIENT)
Dept: MEDICAL GROUP | Facility: CLINIC | Age: 55
End: 2020-05-08

## 2020-05-08 NOTE — TELEPHONE ENCOUNTER
Phone Number Called: [phone] (home)       Call outcome: Spoke to patient regarding message below.    Message: Patient stated that she will go back into [place] to get this drawn as soon as she is able to

## 2020-05-08 NOTE — TELEPHONE ENCOUNTER
----- Message from Diamond Moreno P.A.-C. sent at 5/7/2020  8:44 AM PDT -----  Is there a way to find out why KRIS comprehensive panel was not drawn?  We still need this.  Please have patient come in to have drawn.

## 2020-05-13 ENCOUNTER — HOSPITAL ENCOUNTER (OUTPATIENT)
Dept: LAB | Facility: MEDICAL CENTER | Age: 55
End: 2020-05-13
Attending: PHYSICIAN ASSISTANT
Payer: MEDICAID

## 2020-05-13 PROCEDURE — 86038 ANTINUCLEAR ANTIBODIES: CPT

## 2020-05-13 PROCEDURE — 36415 COLL VENOUS BLD VENIPUNCTURE: CPT

## 2020-05-15 LAB — NUCLEAR IGG SER QL IA: NORMAL

## 2020-05-21 ENCOUNTER — OFFICE VISIT (OUTPATIENT)
Dept: MEDICAL GROUP | Facility: CLINIC | Age: 55
End: 2020-05-21
Payer: MEDICAID

## 2020-05-21 VITALS
WEIGHT: 206.6 LBS | DIASTOLIC BLOOD PRESSURE: 78 MMHG | OXYGEN SATURATION: 99 % | TEMPERATURE: 97.7 F | HEART RATE: 96 BPM | SYSTOLIC BLOOD PRESSURE: 112 MMHG | BODY MASS INDEX: 30.6 KG/M2 | HEIGHT: 69 IN | RESPIRATION RATE: 16 BRPM

## 2020-05-21 DIAGNOSIS — I83.893 VARICOSE VEINS OF LEG WITH EDEMA, BILATERAL: ICD-10-CM

## 2020-05-21 DIAGNOSIS — R60.0 BILATERAL LOWER EXTREMITY EDEMA: ICD-10-CM

## 2020-05-21 DIAGNOSIS — L29.9 ITCHING: ICD-10-CM

## 2020-05-21 DIAGNOSIS — R21 RASH: ICD-10-CM

## 2020-05-21 PROCEDURE — 99214 OFFICE O/P EST MOD 30 MIN: CPT | Performed by: PHYSICIAN ASSISTANT

## 2020-05-21 RX ORDER — KETOCONAZOLE 20 MG/G
CREAM TOPICAL
Qty: 1 TUBE | Refills: 0 | Status: SHIPPED | OUTPATIENT
Start: 2020-05-21 | End: 2020-06-26

## 2020-05-21 ASSESSMENT — FIBROSIS 4 INDEX: FIB4 SCORE: 0.5

## 2020-05-21 NOTE — PROGRESS NOTES
"cc:  Multiple issues     Subjective:     Bhavana Liu is a 54 y.o. female presenting for multiple issues      Patient presents to the office for  Multiple issues.  Her biggest issues are swelling of the legs and bumps on her legs.  She states that the bumps on her leg are \"horrible\".  She states that she will have blistering and then they pop and scab.  She has them on her back and arms as well.  She states that it has been a problem for 1 month.  She has been taking Meloxicam for 1 month. claritin has helped the symptoms.      Patient states that her legs swelling is another big issue for her and has had swelling for 4-5 months.  She has been taking the water pill for 3-4 months.  She states that this does help.  She states that it is painful when it swells.  Patient states that she has had a tachycardic heart rate in the past.  She states it has been as high as 200.  Patient states that she has had compression stockings and has tried them but they have been tight and states she has not been consistent with them.     Patient states that she has had burning in her feet for a year to year and a half. She states that she has had blistering of her feet.   Patient states that she essentially itches all over.      Review of systems:  See above.   Denies any symptoms unless previously indicated.        Current Outpatient Medications:   •  ketoconazole (NIZORAL) 2 % Cream, Apply a small amount to affected area BID for 2-3 weeks., Disp: 1 Tube, Rfl: 0  •  loratadine (CLARITIN) 10 MG Tab, Take 1 Tab by mouth every day., Disp: 30 Tab, Rfl: 3  •  furosemide (LASIX) 20 MG Tab, Take 1 Tab by mouth 3 times a day., Disp: 90 Tab, Rfl: 3  •  potassium chloride ER (KLOR-CON) 10 MEQ tablet, Take 1 Tab by mouth 2 times a day., Disp: 60 Tab, Rfl: 3  •  triamcinolone acetonide (KENALOG) 0.1 % Cream, Apply 1 g to affected area(s) 2 times a day., Disp: 1 Tube, Rfl: 3  •  pantoprazole (PROTONIX) 40 MG Tablet Delayed Response, Take 1 " "Tab by mouth every 48 hours., Disp: 45 Tab, Rfl: 1  •  busPIRone (BUSPAR) 10 MG Tab tablet, Take 1 Tab by mouth 2 Times a Day., Disp: 180 Tab, Rfl: 3  •  PARoxetine (PAXIL) 20 MG Tab, Take 1 Tab by mouth every day., Disp: 90 Tab, Rfl: 3  •  albuterol (VENTOLIN OR PROVENTIL) 108 (90 BASE) MCG/ACT AERS, Inhale 2 Puffs by mouth every 6 hours as needed for Shortness of Breath., Disp: 1 Inhaler, Rfl: 3    Allergies, past medical history, past surgical history, family history, social history reviewed and updated    Objective:     Vitals: /78 (BP Location: Left arm, Patient Position: Sitting, BP Cuff Size: Adult)   Pulse 96   Temp 36.5 °C (97.7 °F) (Temporal)   Resp 16   Ht 1.74 m (5' 8.5\")   Wt 93.7 kg (206 lb 9.6 oz)   LMP 10/28/2012   SpO2 99%   BMI 30.96 kg/m²   General: Alert, pleasant, NAD  EYES:   PERRL, EOMI, no icterus or pallor.  Conjunctivae and lids normal.   HENT:  Normocephalic.  External ears normal.   Neck supple.    Abdomen: obese  Musculoskeletal: Gait is normal.  Moves all extremities well.    Extremities: Swelling of legs and ankles bilaterally.  Prominent varicosities present.  More prominent when patient is standing.  Significantly prominent behind right knee.  Skin: Maculopapular rash all extremities including abdomen.  Dry flaky rash bottom of left foot.  Neurological: No tremors, sensation grossly intact, patellar reflexes 2+ symmetric, tone/strength normal, gait is normal, CN2-12 intact.  Psych:  Affect/mood is normal, judgement is good, memory is intact, grooming is appropriate.    Assessment/Plan:     Bhavana was seen today for rash, foot burn, leg swelling and other.    Diagnoses and all orders for this visit:    Varicose veins of leg with edema, bilateral  -     proBrain Natriuretic Peptide, NT; Future  Bilateral lower extremity edema  -     proBrain Natriuretic Peptide, NT; Future    We will have patient wear compression stockings daily 12 hours on 12 hours off.  If stockings " are too tight, recommend an increase in size.  Recommend we follow-up in 4 weeks for further evaluation.  We will also obtain a BNP.  I do believe that the swelling may be due to varicosities.  However if symptoms do not improve with compression stockings, recommend a duplex ultrasound and potentially a referral to a vascular surgeon.  If BNP is elevated, recommend echo.    Itching  -     ketoconazole (NIZORAL) 2 % Cream; Apply a small amount to affected area BID for 2-3 weeks.  -     REFERRAL TO DERMATOLOGY  -     Allergy Profile 1; Future    Will refer patient to dermatology.  In the meantime we will try patient on ketoconazole.    Rash  -     ketoconazole (NIZORAL) 2 % Cream; Apply a small amount to affected area BID for 2-3 weeks.  -     REFERRAL TO DERMATOLOGY  -     Allergy Profile 1; Future      Allergy labs ordered..  Follow-up in 4 weeks.  We will stop patient's meloxicam to see if this may be a potential trigger for her rash.      Return in about 4 weeks (around 6/18/2020), or if symptoms worsen or fail to improve.    Please note that this dictation was created using voice recognition software. I have made every reasonable attempt to correct obvious errors, but expect that there are errors of grammar and possible content that I did not discover before finalizing note.

## 2020-06-26 DIAGNOSIS — L29.9 ITCHING: ICD-10-CM

## 2020-06-26 DIAGNOSIS — R21 RASH: ICD-10-CM

## 2020-06-26 RX ORDER — KETOCONAZOLE 20 MG/G
CREAM TOPICAL
Qty: 30 G | Refills: 0 | Status: SHIPPED | OUTPATIENT
Start: 2020-06-26 | End: 2020-06-29

## 2020-06-26 NOTE — TELEPHONE ENCOUNTER
Was the patient seen in the last year in this department? Yes    Does patient have an active prescription for medications requested? Yes    Received Request Via: Pharmacy    Hospital Outpatient Visit on 05/13/2020   Component Date Value   • Antinuclear Antibody 05/13/2020 None Detected    Hospital Outpatient Visit on 04/30/2020   Component Date Value   • Rheumatoid Factor -Neph- 04/30/2020 <10    • WBC 04/30/2020 6.6    • RBC 04/30/2020 4.48    • Hemoglobin 04/30/2020 13.5    • Hematocrit 04/30/2020 41.6    • MCV 04/30/2020 92.9    • MCH 04/30/2020 30.1    • MCHC 04/30/2020 32.5*   • RDW 04/30/2020 44.0    • Platelet Count 04/30/2020 318    • MPV 04/30/2020 9.7    • Neutrophils-Polys 04/30/2020 43.50*   • Lymphocytes 04/30/2020 40.60    • Monocytes 04/30/2020 11.30    • Eosinophils 04/30/2020 2.60    • Basophils 04/30/2020 1.70    • Immature Granulocytes 04/30/2020 0.30    • Nucleated RBC 04/30/2020 0.00    • Neutrophils (Absolute) 04/30/2020 2.89    • Lymphs (Absolute) 04/30/2020 2.69    • Monos (Absolute) 04/30/2020 0.75    • Eos (Absolute) 04/30/2020 0.17    • Baso (Absolute) 04/30/2020 0.11    • Immature Granulocytes (a* 04/30/2020 0.02    • NRBC (Absolute) 04/30/2020 0.00    • Color 04/30/2020 Yellow    • Character 04/30/2020 Cloudy*   • Specific Gravity 04/30/2020 1.025    • Ph 04/30/2020 6.5    • Glucose 04/30/2020 Negative    • Ketones 04/30/2020 Negative    • Protein 04/30/2020 Negative    • Bilirubin 04/30/2020 Negative    • Urobilinogen, Urine 04/30/2020 0.2    • Nitrite 04/30/2020 Negative    • Leukocyte Esterase 04/30/2020 Negative    • Occult Blood 04/30/2020 Negative    • Micro Urine Req 04/30/2020 Microscopic    • WBC 04/30/2020 0-2    • RBC 04/30/2020 0-2    • Bacteria 04/30/2020 Negative    • Epithelial Cells 04/30/2020 Negative    • Ca Oxalate Crystal 04/30/2020 Moderate    • Hyaline Cast 04/30/2020 0-2    Hospital Outpatient Visit on 04/10/2020   Component Date Value   • TSH 04/10/2020 4.000     Hospital Outpatient Visit on 04/10/2020   Component Date Value   • Cholesterol,Tot 04/10/2020 163    • Triglycerides 04/10/2020 70    • HDL 04/10/2020 51    • LDL 04/10/2020 98    • WBC 04/10/2020 7.2    • RBC 04/10/2020 4.46    • Hemoglobin 04/10/2020 13.6    • Hematocrit 04/10/2020 40.7    • MCV 04/10/2020 91.3    • MCH 04/10/2020 30.5    • MCHC 04/10/2020 33.4*   • RDW 04/10/2020 44.0    • Platelet Count 04/10/2020 332    • MPV 04/10/2020 9.3    • GFR If  04/10/2020 >60    • GFR If Non  Ameri* 04/10/2020 >60    • Sodium 04/10/2020 141    • Potassium 04/10/2020 3.8    • Chloride 04/10/2020 104    • Co2 04/10/2020 27    • Anion Gap 04/10/2020 10.0    • Glucose 04/10/2020 82    • Bun 04/10/2020 20    • Creatinine 04/10/2020 0.85    • Calcium 04/10/2020 9.3    • AST(SGOT) 04/10/2020 12    • ALT(SGPT) 04/10/2020 17    • Alkaline Phosphatase 04/10/2020 69    • Total Bilirubin 04/10/2020 0.4    • Albumin 04/10/2020 4.0    • Total Protein 04/10/2020 6.5    • Globulin 04/10/2020 2.5    • A-G Ratio 04/10/2020 1.6    • NT-proBNP 04/10/2020 36    ]

## 2020-06-29 ENCOUNTER — OFFICE VISIT (OUTPATIENT)
Dept: MEDICAL GROUP | Facility: CLINIC | Age: 55
End: 2020-06-29
Payer: MEDICAID

## 2020-06-29 VITALS
WEIGHT: 206 LBS | HEIGHT: 69 IN | HEART RATE: 92 BPM | RESPIRATION RATE: 16 BRPM | TEMPERATURE: 99.1 F | SYSTOLIC BLOOD PRESSURE: 116 MMHG | DIASTOLIC BLOOD PRESSURE: 72 MMHG | BODY MASS INDEX: 30.51 KG/M2 | OXYGEN SATURATION: 96 %

## 2020-06-29 DIAGNOSIS — R21 RASH: ICD-10-CM

## 2020-06-29 DIAGNOSIS — F43.22 ADJUSTMENT DISORDER WITH ANXIOUS MOOD: ICD-10-CM

## 2020-06-29 DIAGNOSIS — M54.2 NECK PAIN: ICD-10-CM

## 2020-06-29 PROCEDURE — 99214 OFFICE O/P EST MOD 30 MIN: CPT | Performed by: PHYSICIAN ASSISTANT

## 2020-06-29 RX ORDER — PAROXETINE 30 MG/1
30 TABLET, FILM COATED ORAL DAILY
Qty: 30 TAB | Refills: 3 | Status: SHIPPED | OUTPATIENT
Start: 2020-06-29 | End: 2020-12-23

## 2020-06-29 RX ORDER — ACYCLOVIR 800 MG/1
800 TABLET ORAL
Qty: 35 TAB | Refills: 0 | Status: SHIPPED | OUTPATIENT
Start: 2020-06-29 | End: 2020-06-29 | Stop reason: SDUPTHER

## 2020-06-29 RX ORDER — ACYCLOVIR 800 MG/1
800 TABLET ORAL
Qty: 35 TAB | Refills: 0 | Status: SHIPPED | OUTPATIENT
Start: 2020-06-29 | End: 2020-07-06

## 2020-06-29 RX ORDER — BACLOFEN 20 MG/1
20 TABLET ORAL 2 TIMES DAILY
Qty: 60 TAB | Refills: 0 | Status: SHIPPED | OUTPATIENT
Start: 2020-06-29 | End: 2020-07-27

## 2020-06-29 ASSESSMENT — FIBROSIS 4 INDEX: FIB4 SCORE: 0.5

## 2020-06-29 NOTE — PROGRESS NOTES
cc:  foot    Subjective:     Bhavana Liu is a 54 y.o. female presenting for foot      Patient presents to the office for foot.  Patient states that she has blisters on her feet.  She is wanting us to draw fluid out of the blisters of her foot. She has had these blisters for years.  She has tried multiple medications with no significant improvement.  We did submit a referral to dermatology.  However based on her insurance, she can only see dermatology located in [place].  She is here so that we can try to biopsy or find a way to identify and culture what this rash may be.    In the meantime patient is also complaining of worsening neck pain.  She states that coming off of the meloxicam did help with an lower extremity upper rash.  But states that her neck is suffered as a result.  If she is unable to do an anti-inflammatory at this time, she is requesting a muscle relaxer.  In the past she has been on cyclobenzaprine which is worked for her only minimally.    Patient indicates that she feels the Paxil and BuSpar are no longer working for her anxiety.  She is requesting that we adjust the medications for her.  She states that she has been on these doses for years and feels that her sister has just become accustomed to.    Review of systems:  See above.   Denies any symptoms unless previously indicated.        Current Outpatient Medications:   •  baclofen (LIORESAL) 20 MG tablet, Take 1 Tab by mouth 2 times a day., Disp: 60 Tab, Rfl: 0  •  PARoxetine (PAXIL) 30 MG Tab, Take 1 Tab by mouth every day., Disp: 30 Tab, Rfl: 3  •  acyclovir (ZOVIRAX) 800 MG Tab, Take 1 Tab by mouth 5 Times a Day for 7 days., Disp: 35 Tab, Rfl: 0  •  pantoprazole (PROTONIX) 40 MG Tablet Delayed Response, TAKE ONE TABLET BY MOUTH EVERY 48 HOURS, Disp: 45 Tab, Rfl: 0  •  loratadine (CLARITIN) 10 MG Tab, Take 1 Tab by mouth every day., Disp: 30 Tab, Rfl: 3  •  furosemide (LASIX) 20 MG Tab, Take 1 Tab by mouth 3 times a day., Disp: 90  "Tab, Rfl: 3  •  potassium chloride ER (KLOR-CON) 10 MEQ tablet, Take 1 Tab by mouth 2 times a day., Disp: 60 Tab, Rfl: 3  •  busPIRone (BUSPAR) 10 MG Tab tablet, Take 1 Tab by mouth 2 Times a Day., Disp: 180 Tab, Rfl: 3  •  albuterol (VENTOLIN OR PROVENTIL) 108 (90 BASE) MCG/ACT AERS, Inhale 2 Puffs by mouth every 6 hours as needed for Shortness of Breath., Disp: 1 Inhaler, Rfl: 3    Allergies, past medical history, past surgical history, family history, social history reviewed and updated    Objective:     Vitals: /72   Pulse 92   Temp 37.3 °C (99.1 °F) (Temporal)   Resp 16   Ht 1.753 m (5' 9\")   Wt 93.4 kg (206 lb)   LMP 10/28/2012   SpO2 96%   BMI 30.42 kg/m²   General: Alert, pleasant, NAD  EYES:   PERRL, EOMI, no icterus or pallor.  Conjunctivae and lids normal.   HENT:  Normocephalic.  External ears normal.  Neck supple.     Respiratory: Normal respiratory effort.   Abdomen: obese  Skin: Warm, dry,   Musculoskeletal: Gait is normal.  Moves all extremities well.    Extremities: Vesicular type rash on the plantar surface of patient's right foot.  Appearance is similar to herpes or shingles.  .   Neurological: No tremors, sensation grossly intact,  gait is normal, CN2-12 intact.  Psych:  Affect/mood is normal, judgement is good, memory is intact, grooming is appropriate.    Assessment/Plan:     Bhavana was seen today for blister and depression.    Diagnoses and all orders for this visit:    Rash  -     Discontinue: acyclovir (ZOVIRAX) 800 MG Tab; Take 1 Tab by mouth 5 Times a Day for 7 days.  -     acyclovir (ZOVIRAX) 800 MG Tab; Take 1 Tab by mouth 5 Times a Day for 7 days.    Today patient's rash appears more of a herpetic/shingles type rash.  We will try treating this with acyclovir 5 times a day for 1 week.  If patient has an improvement on the medication but symptoms not completely gone, we may treat this as a herpetic outbreak and begin twice a day treatment ongoing to see if symptoms improve. "  Patient is unable to get to dermatology as she was referred to dermatology in [place].  She will contact us in 1 week to let us know how she is doing.    Neck pain  -     baclofen (LIORESAL) 20 MG tablet; Take 1 Tab by mouth 2 times a day.    We will try patient on baclofen.  Side effects discussed with medication.  If she is having difficulty, she will notify us.    Adjustment disorder with anxious mood  -     PARoxetine (PAXIL) 30 MG Tab; Take 1 Tab by mouth every day.    Patient feels that the BuSpar and paroxetine are no longer working for her.  She was on Paxil 20 mg and is still on BuSpar 10 mg twice a day.  We will increase the Paxil to 30 mg.  If needed, at next visit, we will adjust the BuSpar.  We will follow-up in 4 weeks, sooner if needed.        Return in about 4 weeks (around 7/27/2020).    Please note that this dictation was created using voice recognition software. I have made every reasonable attempt to correct obvious errors, but expect that there are errors of grammar and possible content that I did not discover before finalizing note.

## 2020-07-28 ENCOUNTER — OFFICE VISIT (OUTPATIENT)
Dept: MEDICAL GROUP | Facility: CLINIC | Age: 55
End: 2020-07-28
Payer: MEDICAID

## 2020-07-28 VITALS
TEMPERATURE: 97.9 F | BODY MASS INDEX: 30.92 KG/M2 | HEART RATE: 80 BPM | RESPIRATION RATE: 14 BRPM | SYSTOLIC BLOOD PRESSURE: 118 MMHG | DIASTOLIC BLOOD PRESSURE: 74 MMHG | OXYGEN SATURATION: 100 % | WEIGHT: 209.4 LBS

## 2020-07-28 DIAGNOSIS — F41.9 ANXIETY: ICD-10-CM

## 2020-07-28 DIAGNOSIS — G60.9 IDIOPATHIC PERIPHERAL NEUROPATHY: ICD-10-CM

## 2020-07-28 DIAGNOSIS — R21 RASH: ICD-10-CM

## 2020-07-28 DIAGNOSIS — R20.2 PARESTHESIA OF BOTH FEET: ICD-10-CM

## 2020-07-28 PROCEDURE — 99213 OFFICE O/P EST LOW 20 MIN: CPT | Performed by: PHYSICIAN ASSISTANT

## 2020-07-28 RX ORDER — GABAPENTIN 100 MG/1
100 CAPSULE ORAL 3 TIMES DAILY
Qty: 90 CAP | Refills: 1 | Status: SHIPPED | OUTPATIENT
Start: 2020-07-28 | End: 2020-09-02

## 2020-07-28 RX ORDER — HYDROXYZINE HYDROCHLORIDE 25 MG/1
25 TABLET, FILM COATED ORAL 3 TIMES DAILY PRN
Qty: 60 TAB | Refills: 1 | Status: SHIPPED | OUTPATIENT
Start: 2020-07-28 | End: 2020-11-24

## 2020-07-28 ASSESSMENT — PATIENT HEALTH QUESTIONNAIRE - PHQ9: CLINICAL INTERPRETATION OF PHQ2 SCORE: 5

## 2020-07-28 ASSESSMENT — FIBROSIS 4 INDEX: FIB4 SCORE: 0.51

## 2020-07-28 NOTE — PROGRESS NOTES
cc: Follow-up    Subjective:     Bhavana Liu is a 55 y.o. female presenting for follow-up    Patient presents to the office for follow-up.  She states that the blisters on her feet were better, but then it became worse.  She states that it has been hot and so has not been wearing her compression stockings and so has been having swelling of the feet.  She states that she has been cooking with salt which is not helping.  She states that has been trying to keep her feet up as much as possible which does not help.      She indicates that the paxil increase dose has helped.  Patient states that she is having increased anxiety.  She states that her son is leaving for college in [facility]. She is requesting help and is requesting medications.    Review of systems:  See above.   Denies any symptoms unless previously indicated.        Current Outpatient Medications:   •  gabapentin (NEURONTIN) 100 MG Cap, Take 1 Cap by mouth 3 times a day., Disp: 90 Cap, Rfl: 1  •  hydrOXYzine HCl (ATARAX) 25 MG Tab, Take 1 Tab by mouth 3 times a day as needed for Anxiety., Disp: 60 Tab, Rfl: 1  •  baclofen (LIORESAL) 20 MG tablet, TAKE ONE TABLET BY MOUTH TWO TIMES A DAY, Disp: 60 Tab, Rfl: 0  •  PARoxetine (PAXIL) 30 MG Tab, Take 1 Tab by mouth every day., Disp: 30 Tab, Rfl: 3  •  pantoprazole (PROTONIX) 40 MG Tablet Delayed Response, TAKE ONE TABLET BY MOUTH EVERY 48 HOURS, Disp: 45 Tab, Rfl: 0  •  loratadine (CLARITIN) 10 MG Tab, Take 1 Tab by mouth every day., Disp: 30 Tab, Rfl: 3  •  furosemide (LASIX) 20 MG Tab, Take 1 Tab by mouth 3 times a day., Disp: 90 Tab, Rfl: 3  •  potassium chloride ER (KLOR-CON) 10 MEQ tablet, Take 1 Tab by mouth 2 times a day., Disp: 60 Tab, Rfl: 3  •  busPIRone (BUSPAR) 10 MG Tab tablet, Take 1 Tab by mouth 2 Times a Day., Disp: 180 Tab, Rfl: 3  •  albuterol (VENTOLIN OR PROVENTIL) 108 (90 BASE) MCG/ACT AERS, Inhale 2 Puffs by mouth every 6 hours as needed for Shortness of Breath., Disp: 1  Inhaler, Rfl: 3    Allergies, past medical history, past surgical history, family history, social history reviewed and updated    Objective:     Vitals: /74 (BP Location: Left arm, Patient Position: Sitting, BP Cuff Size: Adult)   Pulse 80   Temp 36.6 °C (97.9 °F) (Temporal)   Resp 14   Wt 95 kg (209 lb 6.4 oz)   LMP 10/28/2012   SpO2 100%   BMI 30.92 kg/m²   General: Alert, pleasant, NAD  EYES:   PERRL, EOMI, no icterus or pallor.  Conjunctivae and lids normal.   HENT:  Normocephalic.  External ears normal. Neck supple.    Abdomen: Not obese  Skin: Warm, dry, no rashes.  Musculoskeletal: Gait is normal.  Moves all extremities well.    Extremities: normal range of motion all extremities.   Neurological: No tremors, sensation grossly intact,  CN2-12 intact.  Psych:  Affect/mood is normal, judgement is good, memory is intact, grooming is appropriate.    Assessment/Plan:     Bhavana was seen today for follow-up, leg swelling and foot burn.    Diagnoses and all orders for this visit:    Rash  -     REFERRAL TO PODIATRY    Not improved and patient is not able to travel to [place] for dermatology.  As she continues to have symptoms of her feet, will refer to podiatry    Paresthesia of both feet  -     REFERRAL TO PODIATRY  -     gabapentin (NEURONTIN) 100 MG Cap; Take 1 Cap by mouth 3 times a day.  Idiopathic peripheral neuropathy  -     REFERRAL TO PODIATRY  -     gabapentin (NEURONTIN) 100 MG Cap; Take 1 Cap by mouth 3 times a day.    Patient has a sock type presentation.  Will try patient on gabapentin and will increase the dose slowly to 3 times a day over the next several weeks.  Will follow up in 4-6 weeks, sooner if needed.  Side effects discussed with patient.     Anxiety  -     hydrOXYzine HCl (ATARAX) 25 MG Tab; Take 1 Tab by mouth 3 times a day as needed for Anxiety.    Discussed medication with patient and she would like to try at this time.  Side effects of medication also discussed.  Will  follow up in 4-6 weeks.         Return in about 4 weeks (around 8/25/2020), or if symptoms worsen or fail to improve, for 4-6 weeks.    Please note that this dictation was created using voice recognition software. I have made every reasonable attempt to correct obvious errors, but expect that there are errors of grammar and possible content that I did not discover before finalizing note.

## 2020-09-02 ENCOUNTER — OFFICE VISIT (OUTPATIENT)
Dept: MEDICAL GROUP | Facility: CLINIC | Age: 55
End: 2020-09-02
Payer: MEDICAID

## 2020-09-02 VITALS
HEIGHT: 69 IN | WEIGHT: 209 LBS | DIASTOLIC BLOOD PRESSURE: 78 MMHG | OXYGEN SATURATION: 98 % | RESPIRATION RATE: 12 BRPM | SYSTOLIC BLOOD PRESSURE: 112 MMHG | TEMPERATURE: 97.1 F | HEART RATE: 94 BPM | BODY MASS INDEX: 30.96 KG/M2

## 2020-09-02 DIAGNOSIS — M25.50 MULTIPLE JOINT PAIN: ICD-10-CM

## 2020-09-02 DIAGNOSIS — R20.2 PARESTHESIA OF BOTH FEET: ICD-10-CM

## 2020-09-02 DIAGNOSIS — I83.893 VARICOSE VEINS OF LEG WITH EDEMA, BILATERAL: ICD-10-CM

## 2020-09-02 DIAGNOSIS — M79.641 PAIN IN BOTH HANDS: ICD-10-CM

## 2020-09-02 DIAGNOSIS — M79.642 PAIN IN BOTH HANDS: ICD-10-CM

## 2020-09-02 DIAGNOSIS — F41.9 ANXIETY: ICD-10-CM

## 2020-09-02 PROCEDURE — 99214 OFFICE O/P EST MOD 30 MIN: CPT | Performed by: PHYSICIAN ASSISTANT

## 2020-09-02 RX ORDER — GABAPENTIN 300 MG/1
300 CAPSULE ORAL 2 TIMES DAILY
Qty: 180 CAP | Refills: 1 | Status: SHIPPED | OUTPATIENT
Start: 2020-09-02 | End: 2021-01-18 | Stop reason: SDUPTHER

## 2020-09-02 ASSESSMENT — FIBROSIS 4 INDEX: FIB4 SCORE: 0.51

## 2020-09-02 NOTE — PROGRESS NOTES
cc:  Follow up    Subjective:     Bhavana Liu is a 55 y.o. female presenting for follow up      Patient presents to the office for follow up with neuropathy of her her feet.  She states that she has increased the dose of her gabapentin to 2 pills twice a day.  She states that her feet still get hot and burn but it is not as bad as it was.  She still has blistering on her feet.  She is willing to increase the gabapentin to 330 mg bid.      Patient is doing well with the paxil and she did well on the hydroxyzine.  She would like to continue with her medications.      Patient is still having varicose veins.  She states that if her feet get hot and she puts cold water on her legs that her legs will turn purple.  She states that there are larger veins and she is still swelling but not as bad.  Pistachios was making this worse so she has cut back.  She is still using her compression stockings.      Patient states that she took an online test and feels that she has psoriatic arthritis.  Patient states that she has pain in her hands and wakes up with her hands feeling stiff.  She did have lab work in April which was negative but would like to repeat.     Review of systems:  See above.   Denies any symptoms unless previously indicated.        Current Outpatient Medications:   •  gabapentin (NEURONTIN) 300 MG Cap, Take 1 Cap by mouth 2 Times a Day., Disp: 180 Cap, Rfl: 1  •  baclofen (LIORESAL) 20 MG tablet, TAKE ONE TABLET BY MOUTH TWO TIMES A DAY, Disp: 60 Tab, Rfl: 5  •  pantoprazole (PROTONIX) 40 MG Tablet Delayed Response, TAKE ONE TABLET BY MOUTH EVERY 48 HOURS, Disp: 45 Tab, Rfl: 5  •  hydrOXYzine HCl (ATARAX) 25 MG Tab, Take 1 Tab by mouth 3 times a day as needed for Anxiety., Disp: 60 Tab, Rfl: 1  •  PARoxetine (PAXIL) 30 MG Tab, Take 1 Tab by mouth every day., Disp: 30 Tab, Rfl: 3  •  furosemide (LASIX) 20 MG Tab, Take 1 Tab by mouth 3 times a day., Disp: 90 Tab, Rfl: 3  •  potassium chloride ER (KLOR-CON)  "10 MEQ tablet, Take 1 Tab by mouth 2 times a day., Disp: 60 Tab, Rfl: 3  •  busPIRone (BUSPAR) 10 MG Tab tablet, Take 1 Tab by mouth 2 Times a Day., Disp: 180 Tab, Rfl: 3  •  albuterol (VENTOLIN OR PROVENTIL) 108 (90 BASE) MCG/ACT AERS, Inhale 2 Puffs by mouth every 6 hours as needed for Shortness of Breath., Disp: 1 Inhaler, Rfl: 3    Allergies, past medical history, past surgical history, family history, social history reviewed and updated    Objective:     Vitals: /78 (BP Location: Left arm, Patient Position: Sitting, BP Cuff Size: Adult)   Pulse 94   Temp 36.2 °C (97.1 °F) (Temporal)   Resp 12   Ht 1.753 m (5' 9\")   Wt 94.8 kg (209 lb)   LMP 10/28/2012   SpO2 98%   BMI 30.86 kg/m²   General: Alert, pleasant, NAD  EYES:   PERRL, EOMI, no icterus or pallor.  Conjunctivae and lids normal.   HENT:  Normocephalic.  External ears normal.   Neck supple.    Respiratory: Normal respiratory effort.   Abdomen: obese.  Skin: Warm, dry, no rashes.  Musculoskeletal: Gait is normal.  Moves all extremities well.    Extremities: varicosities lower extremities bilaterally.  Swelling hands bilaterally.   Neurological: No tremors, sensation grossly intact, CN2-12 intact.  Psych:  Affect/mood is normal, judgement is good, memory is intact, grooming is appropriate.    Assessment/Plan:     Bhavana was seen today for foot burn.    Diagnoses and all orders for this visit:    Paresthesia of both feet  -     gabapentin (NEURONTIN) 300 MG Cap; Take 1 Cap by mouth 2 Times a Day.    We will increase the gabapentin from 200 mg twice a day to 300 mg twice a day.  The plan will be to follow-up in the next 4 to 8 weeks.    Varicose veins of leg with edema, bilateral  -     US-EXTREMITY VENOUS LOWER BILAT; Future    She has continue to use compression stockings.  We will order for the venous duplex ultrasound for further evaluation.    Anxiety    Continue with current medication    Pain in both hands  -     DX-HAND 3+ LEFT; " Future  -     DX-HAND 3+ RIGHT; Future  Multiple joint pain  -     RHEUMATOID ARTHRITIS FACTOR; Future  -     Sed Rate; Future  -     KRIS COMPREHENSIVE PANEL  -     HLA-B27; Future      Patient did have autoimmune testing in April.  We will repeat labs in October if she continues to have symptoms and will obtain x-rays to evaluate further.  Again we will follow-up in approximately 4 to 8 weeks.      Return if symptoms worsen or fail to improve, for 1-2 months.    Please note that this dictation was created using voice recognition software. I have made every reasonable attempt to correct obvious errors, but expect that there are errors of grammar and possible content that I did not discover before finalizing note.

## 2020-09-29 DIAGNOSIS — L29.9 ITCHING: ICD-10-CM

## 2020-09-29 DIAGNOSIS — R60.0 BILATERAL LOWER EXTREMITY EDEMA: ICD-10-CM

## 2020-09-29 RX ORDER — LORATADINE 10 MG/1
10 TABLET ORAL
Qty: 90 TAB | Refills: 0 | Status: CANCELLED | OUTPATIENT
Start: 2020-09-29

## 2020-09-29 NOTE — TELEPHONE ENCOUNTER
Was the patient seen in the last year in this department? Yes    Does patient have an active prescription for medications requested? Yes    Received Request Via: Patient    Hospital Outpatient Visit on 05/13/2020   Component Date Value   • Antinuclear Antibody 05/13/2020 None Detected    Hospital Outpatient Visit on 04/30/2020   Component Date Value   • Rheumatoid Factor -Neph- 04/30/2020 <10    • WBC 04/30/2020 6.6    • RBC 04/30/2020 4.48    • Hemoglobin 04/30/2020 13.5    • Hematocrit 04/30/2020 41.6    • MCV 04/30/2020 92.9    • MCH 04/30/2020 30.1    • MCHC 04/30/2020 32.5*   • RDW 04/30/2020 44.0    • Platelet Count 04/30/2020 318    • MPV 04/30/2020 9.7    • Neutrophils-Polys 04/30/2020 43.50*   • Lymphocytes 04/30/2020 40.60    • Monocytes 04/30/2020 11.30    • Eosinophils 04/30/2020 2.60    • Basophils 04/30/2020 1.70    • Immature Granulocytes 04/30/2020 0.30    • Nucleated RBC 04/30/2020 0.00    • Neutrophils (Absolute) 04/30/2020 2.89    • Lymphs (Absolute) 04/30/2020 2.69    • Monos (Absolute) 04/30/2020 0.75    • Eos (Absolute) 04/30/2020 0.17    • Baso (Absolute) 04/30/2020 0.11    • Immature Granulocytes (a* 04/30/2020 0.02    • NRBC (Absolute) 04/30/2020 0.00    • Color 04/30/2020 Yellow    • Character 04/30/2020 Cloudy*   • Specific Gravity 04/30/2020 1.025    • Ph 04/30/2020 6.5    • Glucose 04/30/2020 Negative    • Ketones 04/30/2020 Negative    • Protein 04/30/2020 Negative    • Bilirubin 04/30/2020 Negative    • Urobilinogen, Urine 04/30/2020 0.2    • Nitrite 04/30/2020 Negative    • Leukocyte Esterase 04/30/2020 Negative    • Occult Blood 04/30/2020 Negative    • Micro Urine Req 04/30/2020 Microscopic    • WBC 04/30/2020 0-2    • RBC 04/30/2020 0-2    • Bacteria 04/30/2020 Negative    • Epithelial Cells 04/30/2020 Negative    • Ca Oxalate Crystal 04/30/2020 Moderate    • Hyaline Cast 04/30/2020 0-2    Hospital Outpatient Visit on 04/10/2020   Component Date Value   • TSH 04/10/2020 4.000     Hospital Outpatient Visit on 04/10/2020   Component Date Value   • Cholesterol,Tot 04/10/2020 163    • Triglycerides 04/10/2020 70    • HDL 04/10/2020 51    • LDL 04/10/2020 98    • WBC 04/10/2020 7.2    • RBC 04/10/2020 4.46    • Hemoglobin 04/10/2020 13.6    • Hematocrit 04/10/2020 40.7    • MCV 04/10/2020 91.3    • MCH 04/10/2020 30.5    • MCHC 04/10/2020 33.4*   • RDW 04/10/2020 44.0    • Platelet Count 04/10/2020 332    • MPV 04/10/2020 9.3    • GFR If  04/10/2020 >60    • GFR If Non  Ameri* 04/10/2020 >60    • Sodium 04/10/2020 141    • Potassium 04/10/2020 3.8    • Chloride 04/10/2020 104    • Co2 04/10/2020 27    • Anion Gap 04/10/2020 10.0    • Glucose 04/10/2020 82    • Bun 04/10/2020 20    • Creatinine 04/10/2020 0.85    • Calcium 04/10/2020 9.3    • AST(SGOT) 04/10/2020 12    • ALT(SGPT) 04/10/2020 17    • Alkaline Phosphatase 04/10/2020 69    • Total Bilirubin 04/10/2020 0.4    • Albumin 04/10/2020 4.0    • Total Protein 04/10/2020 6.5    • Globulin 04/10/2020 2.5    • A-G Ratio 04/10/2020 1.6    • NT-proBNP 04/10/2020 36    ]

## 2020-09-30 RX ORDER — LORATADINE 10 MG/1
10 TABLET, ORALLY DISINTEGRATING ORAL DAILY
Qty: 30 TAB | Refills: 3 | Status: SHIPPED | OUTPATIENT
Start: 2020-09-30 | End: 2021-01-25

## 2020-09-30 RX ORDER — POTASSIUM CHLORIDE 750 MG/1
10 TABLET, FILM COATED, EXTENDED RELEASE ORAL 2 TIMES DAILY
Qty: 60 TAB | Refills: 3 | Status: SHIPPED | OUTPATIENT
Start: 2020-09-30 | End: 2021-01-25

## 2020-11-20 ENCOUNTER — HOSPITAL ENCOUNTER (OUTPATIENT)
Dept: LAB | Facility: MEDICAL CENTER | Age: 55
End: 2020-11-20
Attending: PHYSICIAN ASSISTANT
Payer: MEDICAID

## 2020-11-20 ENCOUNTER — APPOINTMENT (OUTPATIENT)
Dept: RADIOLOGY | Facility: IMAGING CENTER | Age: 55
End: 2020-11-20
Attending: PHYSICIAN ASSISTANT
Payer: MEDICAID

## 2020-11-20 DIAGNOSIS — R21 RASH: ICD-10-CM

## 2020-11-20 DIAGNOSIS — R60.0 BILATERAL LOWER EXTREMITY EDEMA: ICD-10-CM

## 2020-11-20 DIAGNOSIS — M79.642 PAIN IN BOTH HANDS: ICD-10-CM

## 2020-11-20 DIAGNOSIS — I83.893 VARICOSE VEINS OF LEG WITH EDEMA, BILATERAL: ICD-10-CM

## 2020-11-20 DIAGNOSIS — M25.50 MULTIPLE JOINT PAIN: ICD-10-CM

## 2020-11-20 DIAGNOSIS — L29.9 ITCHING: ICD-10-CM

## 2020-11-20 DIAGNOSIS — M79.641 PAIN IN BOTH HANDS: ICD-10-CM

## 2020-11-20 LAB
NT-PROBNP SERPL IA-MCNC: 54 PG/ML (ref 0–125)
RHEUMATOID FACT SER IA-ACNC: <10 IU/ML (ref 0–14)

## 2020-11-20 PROCEDURE — 86765 RUBEOLA ANTIBODY: CPT

## 2020-11-20 PROCEDURE — 86235 NUCLEAR ANTIGEN ANTIBODY: CPT

## 2020-11-20 PROCEDURE — 86787 VARICELLA-ZOSTER ANTIBODY: CPT

## 2020-11-20 PROCEDURE — 36415 COLL VENOUS BLD VENIPUNCTURE: CPT

## 2020-11-20 PROCEDURE — 86788 WEST NILE VIRUS AB IGM: CPT

## 2020-11-20 PROCEDURE — 86431 RHEUMATOID FACTOR QUANT: CPT

## 2020-11-20 PROCEDURE — 86694 HERPES SIMPLEX NES ANTBDY: CPT

## 2020-11-20 PROCEDURE — 83516 IMMUNOASSAY NONANTIBODY: CPT

## 2020-11-20 PROCEDURE — 85652 RBC SED RATE AUTOMATED: CPT

## 2020-11-20 PROCEDURE — 86695 HERPES SIMPLEX TYPE 1 TEST: CPT

## 2020-11-20 PROCEDURE — 83880 ASSAY OF NATRIURETIC PEPTIDE: CPT

## 2020-11-20 PROCEDURE — 86003 ALLG SPEC IGE CRUDE XTRC EA: CPT | Mod: 91

## 2020-11-20 PROCEDURE — 86812 HLA TYPING A B OR C: CPT

## 2020-11-20 PROCEDURE — 86789 WEST NILE VIRUS ANTIBODY: CPT

## 2020-11-20 PROCEDURE — 86696 HERPES SIMPLEX TYPE 2 TEST: CPT

## 2020-11-20 PROCEDURE — 86735 MUMPS ANTIBODY: CPT

## 2020-11-20 PROCEDURE — 73130 X-RAY EXAM OF HAND: CPT | Mod: TC,FY,RT | Performed by: PHYSICIAN ASSISTANT

## 2020-11-20 PROCEDURE — 86225 DNA ANTIBODY NATIVE: CPT

## 2020-11-21 LAB — ERYTHROCYTE [SEDIMENTATION RATE] IN BLOOD BY WESTERGREN METHOD: 2 MM/HOUR (ref 0–30)

## 2020-11-22 LAB
DSDNA AB TITR SER CLIF: NORMAL {TITER}
ENA SCL70 IGG SER QL: 0 AU/ML (ref 0–40)
ENA SM IGG SER-ACNC: 0 AU/ML (ref 0–40)
ENA SS-B IGG SER IA-ACNC: 0 AU/ML (ref 0–40)
ENA SS-B IGG SER IA-ACNC: 0 AU/ML (ref 0–40)
HLA-B27 QL FC: NEGATIVE
SSA52 R0ENA AB IGG Q0420: 2 AU/ML (ref 0–40)
SSA52 R0ENA AB IGG Q0420: 2 AU/ML (ref 0–40)
SSA60 R0ENA AB IGG Q0419: 0 AU/ML (ref 0–40)
SSA60 R0ENA AB IGG Q0419: 0 AU/ML (ref 0–40)
U1 SNRNP IGG SER QL: 0 AU/ML (ref 0–40)

## 2020-11-23 LAB
HSV1 GG IGG SER-ACNC: 33.9 IV
HSV2 GG IGG SER-ACNC: 2.63 IV
TEST NAME 95000: NORMAL

## 2020-11-24 DIAGNOSIS — F41.9 ANXIETY: ICD-10-CM

## 2020-11-24 DIAGNOSIS — F43.22 ADJUSTMENT DISORDER WITH ANXIOUS MOOD: ICD-10-CM

## 2020-11-24 LAB
ALMOND IGE QN: <0.1 KU/L
ASPARAGUS IGE QN: <0.1 KU/L
AVOCADO IGE QN: <0.1 KU/L
BAKER'S YEAST IGE QN: <0.1 KU/L
BANANA IGE QN: <0.1 KU/L
BASIL IGE QN: <0.1 KU/L
BAYLEAF IGE QN: <0.1 KU/L
BEEF IGE QN: <0.1 KU/L
BEET IGE QN: <0.1 KU/L
BELL PEPPER IGE QN: <0.1 KU/L
BLACK PEPPER IGE QN: <0.1 KU/L
BLUE MUSSEL IGE QN: <0.1 KU/L
BLUEBERRY IGE QN: <0.1 KU/L
BRAZIL NUT IGE QN: <0.1 KU/L
BROCCOLI IGE QN: <0.1 KU/L
BUCKWHEAT IGE QN: <0.1 KU/L
CABBAGE IGE QN: <0.1 KU/L
CARROT IGE QN: <0.1 KU/L
CASHEW NUT IGE QN: <0.1 KU/L
CHESTNUT IGE QN: <0.1 KU/L
CHICKPEA IGE AB [UNITS/VOLUME] IN SERUM: <0.1 KU/L
CHOCOLATE IGE QN: <0.1 KU/L
CINNAMON IGE QN: <0.1 KU/L
CLAM IGE QN: <0.1 KU/L
COCONUT IGE QN: <0.1 KU/L
CODFISH IGE QN: <0.1 KU/L
COW MILK IGE QN: <0.1 KU/L
CRAB IGE QN: <0.1 KU/L
CUCUMBER IGE QN: <0.1 KU/L
CULTIVATED COTTON IGE QN: <0.1 KU/L
DEPRECATED MISC ALLERGEN IGE RAST QL: NORMAL
DILL IGE QN: <0.1 KU/L
EGG WHITE IGE QN: <0.1 KU/L
GINGER IGE QN: <0.1 KU/L
GRAPE IGE QN: <0.1 KU/L
HALIBUT IGE QN: <0.1 KU/L
HAZELNUT IGE QN: <0.1 KU/L
HSV1+2 IGG SER IA-ACNC: >22.4 IV
HSV1+2 IGM SER IA-ACNC: 0.54 IV
LETTUCE IGE QN: <0.1 KU/L
LIMA BEAN IGE QN: <0.1 KU/L
MELON IGE QN: <0.1 KU/L
MEV IGG SER IA-ACNC: 273 AU/ML
MEV IGM SER IA-ACNC: 0.67 AU (ref 0–0.79)
MUV IGG SER IA-ACNC: >300 AU/ML
MUV IGM SER IA-ACNC: 0.45 IV
ONION IGE QN: <0.1 KU/L
ORANGE IGE QN: <0.1 KU/L
OREGANO IGE QN: <0.1 KU/L
PEA IGE QN: <0.1 KU/L
PEANUT IGE QN: <0.1 KU/L
PEAR IGE QN: <0.1 KU/L
PLUM IGE QN: <0.1 KU/L
PORK IGE QN: <0.1 KU/L
POTATO IGE QN: <0.1 KU/L
RASPBERRY IGE QN: <0.1 KU/L
SESAME SEED IGE QN: <0.1 KU/L
SHRIMP IGE QN: <0.1 KU/L
SOYBEAN IGE QN: <0.1 KU/L
TEA IGE QN: <0.1 KU/L
TOMATO IGE QN: <0.1 KU/L
TROUT IGE QN: <0.1 KU/L
TURKEY MEAT IGE QN: <0.1 KU/L
VZV IGG SER IA-ACNC: 1506 IV
VZV IGM SER IA-ACNC: 0.09 ISR
WALNUT IGE QN: <0.1 KU/L
WATERMELON IGE QN: <0.1 KU/L
WNV IGG SER IA-ACNC: 0.19 IV
WNV IGM SER IA-ACNC: 0.06 IV

## 2020-11-24 RX ORDER — BUSPIRONE HYDROCHLORIDE 10 MG/1
TABLET ORAL
Qty: 180 TAB | Refills: 0 | Status: ON HOLD | OUTPATIENT
Start: 2020-11-24 | End: 2021-05-20

## 2020-11-24 RX ORDER — HYDROXYZINE HYDROCHLORIDE 25 MG/1
TABLET, FILM COATED ORAL
Qty: 60 TAB | Refills: 0 | Status: SHIPPED | OUTPATIENT
Start: 2020-11-24 | End: 2021-01-18 | Stop reason: SDUPTHER

## 2020-11-25 NOTE — TELEPHONE ENCOUNTER
Was the patient seen in the last year in this department? Yes    Does patient have an active prescription for medications requested? Yes    Received Request Via: Pharmacy    Hospital Outpatient Visit on 11/20/2020   Component Date Value   • HLA-B27 Screen 11/20/2020 Negative    • Sed Rate [place]gren 11/20/2020 2    • Rheumatoid Factor -Neph- 11/20/2020 <10    • NT-proBNP 11/20/2020 54    • West Nile Virus Igg 11/20/2020 0.19    • West Nile Virus Igm 11/20/2020 0.06    • Hsv I-Ii Igg Antibodies 11/20/2020 >22.40    • Hsv Igm I-Ii Combination 11/20/2020 0.54    • Rubeola Igg Antibody 11/20/2020 273.0    • Rubeola -Measles- Ab Igm 11/20/2020 0.67    • Mumps Ab Igg 11/20/2020 >300.0    • Mumps Ab Igm 11/20/2020 0.45    • Varicella Zoster IgG Ab 11/20/2020 1506.0    • Varicella Zoster IgM Ab 11/20/2020 0.09    • Anti-Dna -Ds 11/20/2020 None Detected    • SSA 52 (R0)(CHANDRIKA) Ab, IgG 11/20/2020 2    • SSA 60 (R0)(CHANDRIKA) Ab, IgG 11/20/2020 0    • Sjogren'S Anti-Ss-B 11/20/2020 0    • Thompson Antibodies 11/20/2020 0    • Thompson/RNP IgG (CHANDRIKA) 11/20/2020 0    • Anti-Scl-70 11/20/2020 0    • Miscellaneous Lab Result 11/20/2020 SEE NOTE    • SSA 52 (R0)(CHANDRIKA) Ab, IgG 11/20/2020 2    • SSA 60 (R0)(CHANDRIKA) Ab, IgG 11/20/2020 0    • Sjogren'S Anti-Ss-B 11/20/2020 0    • [place], IgE 11/20/2020 <0.10    • Asparagus  F261 11/20/2020 <0.10    • F138 Avocado 11/20/2020 <0.10    • Garcia/Esquivel Yeast IgE 11/20/2020 <0.10    • F204 Banana 11/20/2020 <0.10    • Basil  F269 11/20/2020 <0.10    • [place]   F278 11/20/2020 <0.10    • F27 Beef 11/20/2020 <0.10    • Beet Root, IgE 11/20/2020 <0.10    • Pepper C. annuum, IgE 11/20/2020 <0.10    • Black Pepper  F280 11/20/2020 <0.10    • Mussel F037 11/20/2020 <0.10    • Blueberry  F288 11/20/2020 <0.10    • Brazil Nut  F018 11/20/2020 <0.10    • Broccoli  F260 11/20/2020 <0.10    • Buckwheat  F011 11/20/2020 <0.10    • Cabbage F216 11/20/2020 <0.10    • F31 Carrot 11/20/2020 <0.10    • Cashew Nut  F202  11/20/2020 <0.10    • F299 Sweet [place] 11/20/2020 <0.10    • Chick Pea  F309 11/20/2020 <0.10    • Chocolate Cacao, IgE F093 11/20/2020 <0.10    • Cinnamon  F220 11/20/2020 <0.10    • Clam  F207 11/20/2020 <0.10    • Coconut 11/20/2020 <0.10    • Codfish Allergen 11/20/2020 <0.10    • Occupational Cotton Seed* 11/20/2020 <0.10    • Crab  F023 11/20/2020 <0.10    • Cucumber  F244 11/20/2020 <0.10    • Dill  F277 11/20/2020 <0.10    • F1 Eggwhite 11/20/2020 <0.10    • Mabel  F270 11/20/2020 <0.10    • Grape Allergen 11/20/2020 <0.10    • Halibut  F303 11/20/2020 <0.10    • Hazelnut/Filbert  F017 11/20/2020 <0.10    • Honeydew/Cantaloupe, IgE 11/20/2020 <0.10    • Lettuce F215 11/20/2020 <0.10    • Pedro Brean/White Henson, I* 11/20/2020 <0.10    • F2 Milk 11/20/2020 <0.10    • Onions  F048 11/20/2020 <0.10    • [place] F033 11/20/2020 <0.10    • Oregano  F283 11/20/2020 <0.10    • F12 Pea 11/20/2020 <0.10    • F013 Peanut 11/20/2020 <0.10    • Pear  F094 11/20/2020 <0.10    • [place]  F255 11/20/2020 <0.10    • Pork  F026 11/20/2020 <0.10    • Potato 11/20/2020 <0.10    • Raspberry, IgE F343 11/20/2020 <0.10    • Sesame Seed 11/20/2020 <0.10    • Shrimp IgE 11/20/2020 <0.10    • F14 Henson Soybean 11/20/2020 <0.10    • Tea  F222 11/20/2020 <0.10    • Tomato IgE 11/20/2020 <0.10    • Trout  F204 11/20/2020 <0.10    • Turkey F284 11/20/2020 <0.10    • [place]  F255 11/20/2020 <0.10    • Watermelon 11/20/2020 <0.10    • Immunocap Score 11/20/2020 See Note    • HSV1 Gly IgG 11/20/2020 33.90*   • HSV2 Gly IgG 11/20/2020 2.63*   Hospital Outpatient Visit on 05/13/2020   Component Date Value   • Antinuclear Antibody 05/13/2020 None Detected    Hospital Outpatient Visit on 04/30/2020   Component Date Value   • Rheumatoid Factor -Neph- 04/30/2020 <10    • WBC 04/30/2020 6.6    • RBC 04/30/2020 4.48    • Hemoglobin 04/30/2020 13.5    • Hematocrit 04/30/2020 41.6    • MCV 04/30/2020 92.9    • MCH 04/30/2020 30.1    • MCHC 04/30/2020  32.5*   • RDW 04/30/2020 44.0    • Platelet Count 04/30/2020 318    • MPV 04/30/2020 9.7    • Neutrophils-Polys 04/30/2020 43.50*   • Lymphocytes 04/30/2020 40.60    • Monocytes 04/30/2020 11.30    • Eosinophils 04/30/2020 2.60    • Basophils 04/30/2020 1.70    • Immature Granulocytes 04/30/2020 0.30    • Nucleated RBC 04/30/2020 0.00    • Neutrophils (Absolute) 04/30/2020 2.89    • Lymphs (Absolute) 04/30/2020 2.69    • Monos (Absolute) 04/30/2020 0.75    • Eos (Absolute) 04/30/2020 0.17    • Baso (Absolute) 04/30/2020 0.11    • Immature Granulocytes (a* 04/30/2020 0.02    • NRBC (Absolute) 04/30/2020 0.00    • Color 04/30/2020 Yellow    • Character 04/30/2020 Cloudy*   • Specific Gravity 04/30/2020 1.025    • Ph 04/30/2020 6.5    • Glucose 04/30/2020 Negative    • Ketones 04/30/2020 Negative    • Protein 04/30/2020 Negative    • Bilirubin 04/30/2020 Negative    • Urobilinogen, Urine 04/30/2020 0.2    • Nitrite 04/30/2020 Negative    • Leukocyte Esterase 04/30/2020 Negative    • Occult Blood 04/30/2020 Negative    • Micro Urine Req 04/30/2020 Microscopic    • WBC 04/30/2020 0-2    • RBC 04/30/2020 0-2    • Bacteria 04/30/2020 Negative    • Epithelial Cells 04/30/2020 Negative    • Ca Oxalate Crystal 04/30/2020 Moderate    • Hyaline Cast 04/30/2020 0-2    Hospital Outpatient Visit on 04/10/2020   Component Date Value   • TSH 04/10/2020 4.000    Hospital Outpatient Visit on 04/10/2020   Component Date Value   • Cholesterol,Tot 04/10/2020 163    • Triglycerides 04/10/2020 70    • HDL 04/10/2020 51    • LDL 04/10/2020 98    • WBC 04/10/2020 7.2    • RBC 04/10/2020 4.46    • Hemoglobin 04/10/2020 13.6    • Hematocrit 04/10/2020 40.7    • MCV 04/10/2020 91.3    • MCH 04/10/2020 30.5    • MCHC 04/10/2020 33.4*   • RDW 04/10/2020 44.0    • Platelet Count 04/10/2020 332    • MPV 04/10/2020 9.3    • GFR If  04/10/2020 >60    • GFR If Non  Ameri* 04/10/2020 >60    • Sodium 04/10/2020 141    • Potassium  04/10/2020 3.8    • Chloride 04/10/2020 104    • Co2 04/10/2020 27    • Anion Gap 04/10/2020 10.0    • Glucose 04/10/2020 82    • Bun 04/10/2020 20    • Creatinine 04/10/2020 0.85    • Calcium 04/10/2020 9.3    • AST(SGOT) 04/10/2020 12    • ALT(SGPT) 04/10/2020 17    • Alkaline Phosphatase 04/10/2020 69    • Total Bilirubin 04/10/2020 0.4    • Albumin 04/10/2020 4.0    • Total Protein 04/10/2020 6.5    • Globulin 04/10/2020 2.5    • A-G Ratio 04/10/2020 1.6    • NT-proBNP 04/10/2020 36    ]

## 2020-12-23 DIAGNOSIS — F43.22 ADJUSTMENT DISORDER WITH ANXIOUS MOOD: ICD-10-CM

## 2020-12-24 RX ORDER — PAROXETINE 30 MG/1
TABLET, FILM COATED ORAL
Qty: 90 TAB | Refills: 0 | Status: SHIPPED | OUTPATIENT
Start: 2020-12-24 | End: 2021-03-29

## 2020-12-24 NOTE — TELEPHONE ENCOUNTER
Was the patient seen in the last year in this department? Yes    Does patient have an active prescription for medications requested? Yes    Received Request Via: Pharmacy    Hospital Outpatient Visit on 11/20/2020   Component Date Value   • HLA-B27 Screen 11/20/2020 Negative    • Sed Rate Westergren 11/20/2020 2    • Rheumatoid Factor -Neph- 11/20/2020 <10    • NT-proBNP 11/20/2020 54    • West Nile Virus Igg 11/20/2020 0.19    • West Nile Virus Igm 11/20/2020 0.06    • Hsv I-Ii Igg Antibodies 11/20/2020 >22.40    • Hsv Igm I-Ii Combination 11/20/2020 0.54    • Rubeola Igg Antibody 11/20/2020 273.0    • Rubeola -Measles- Ab Igm 11/20/2020 0.67    • Mumps Ab Igg 11/20/2020 >300.0    • Mumps Ab Igm 11/20/2020 0.45    • Varicella Zoster IgG Ab 11/20/2020 1506.0    • Varicella Zoster IgM Ab 11/20/2020 0.09    • Anti-Dna -Ds 11/20/2020 None Detected    • SSA 52 (R0)(CHANDRIKA) Ab, IgG 11/20/2020 2    • SSA 60 (R0)(CHANDRIKA) Ab, IgG 11/20/2020 0    • Sjogren'S Anti-Ss-B 11/20/2020 0    • Thompson Antibodies 11/20/2020 0    • Thompson/RNP IgG (CHANDRIKA) 11/20/2020 0    • Anti-Scl-70 11/20/2020 0    • Miscellaneous Lab Result 11/20/2020 SEE NOTE    • SSA 52 (R0)(CHANDRIKA) Ab, IgG 11/20/2020 2    • SSA 60 (R0)(CHANDRIKA) Ab, IgG 11/20/2020 0    • Sjogren'S Anti-Ss-B 11/20/2020 0    • Hammond, IgE 11/20/2020 <0.10    • Asparagus  F261 11/20/2020 <0.10    • F138 Avocado 11/20/2020 <0.10    • Garcia/Esquivel Yeast IgE 11/20/2020 <0.10    • F204 Banana 11/20/2020 <0.10    • Basil  F269 11/20/2020 <0.10    • [place]leaf   F278 11/20/2020 <0.10    • F27 Beef 11/20/2020 <0.10    • Beet Root, IgE 11/20/2020 <0.10    • Pepper C. annuum, IgE 11/20/2020 <0.10    • Black Pepper  F280 11/20/2020 <0.10    • Mussel F037 11/20/2020 <0.10    • Blueberry  F288 11/20/2020 <0.10    • Brazil Nut  F018 11/20/2020 <0.10    • Broccoli  F260 11/20/2020 <0.10    • Buckwheat  F011 11/20/2020 <0.10    • Cabbage F216 11/20/2020 <0.10    • F31 Carrot 11/20/2020 <0.10    • Cashew Nut  F202  11/20/2020 <0.10    • F299 Sweet Elloree 11/20/2020 <0.10    • Chick Pea  F309 11/20/2020 <0.10    • Chocolate Cacao, IgE F093 11/20/2020 <0.10    • Cinnamon  F220 11/20/2020 <0.10    • Clam  F207 11/20/2020 <0.10    • Coconut 11/20/2020 <0.10    • Codfish Allergen 11/20/2020 <0.10    • Occupational Cotton Seed* 11/20/2020 <0.10    • Crab  F023 11/20/2020 <0.10    • Cucumber  F244 11/20/2020 <0.10    • Dill  F277 11/20/2020 <0.10    • F1 Eggwhite 11/20/2020 <0.10    • Mabel  F270 11/20/2020 <0.10    • Grape Allergen 11/20/2020 <0.10    • Halibut  F303 11/20/2020 <0.10    • Hazelnut/Filbert  F017 11/20/2020 <0.10    • Honeydew/Cantaloupe, IgE 11/20/2020 <0.10    • Lettuce F215 11/20/2020 <0.10    • Pedro Brean/White Henson, I* 11/20/2020 <0.10    • F2 Milk 11/20/2020 <0.10    • Onions  F048 11/20/2020 <0.10    • Hernando F033 11/20/2020 <0.10    • Oregano  F283 11/20/2020 <0.10    • F12 Pea 11/20/2020 <0.10    • F013 Peanut 11/20/2020 <0.10    • Pear  F094 11/20/2020 <0.10    • Niederwald  F255 11/20/2020 <0.10    • Pork  F026 11/20/2020 <0.10    • Potato 11/20/2020 <0.10    • Raspberry, IgE F343 11/20/2020 <0.10    • Sesame Seed 11/20/2020 <0.10    • Shrimp IgE 11/20/2020 <0.10    • F14 Henson Soybean 11/20/2020 <0.10    • Tea  F222 11/20/2020 <0.10    • Tomato IgE 11/20/2020 <0.10    • Trout  F204 11/20/2020 <0.10    • Turkey F284 11/20/2020 <0.10    • [place]  F255 11/20/2020 <0.10    • Watermelon 11/20/2020 <0.10    • Immunocap Score 11/20/2020 See Note    • HSV1 Gly IgG 11/20/2020 33.90*   • HSV2 Gly IgG 11/20/2020 2.63*   Hospital Outpatient Visit on 05/13/2020   Component Date Value   • Antinuclear Antibody 05/13/2020 None Detected    Hospital Outpatient Visit on 04/30/2020   Component Date Value   • Rheumatoid Factor -Neph- 04/30/2020 <10    • WBC 04/30/2020 6.6    • RBC 04/30/2020 4.48    • Hemoglobin 04/30/2020 13.5    • Hematocrit 04/30/2020 41.6    • MCV 04/30/2020 92.9    • MCH 04/30/2020 30.1    • MCHC 04/30/2020  32.5*   • RDW 04/30/2020 44.0    • Platelet Count 04/30/2020 318    • MPV 04/30/2020 9.7    • Neutrophils-Polys 04/30/2020 43.50*   • Lymphocytes 04/30/2020 40.60    • Monocytes 04/30/2020 11.30    • Eosinophils 04/30/2020 2.60    • Basophils 04/30/2020 1.70    • Immature Granulocytes 04/30/2020 0.30    • Nucleated RBC 04/30/2020 0.00    • Neutrophils (Absolute) 04/30/2020 2.89    • Lymphs (Absolute) 04/30/2020 2.69    • Monos (Absolute) 04/30/2020 0.75    • Eos (Absolute) 04/30/2020 0.17    • Baso (Absolute) 04/30/2020 0.11    • Immature Granulocytes (a* 04/30/2020 0.02    • NRBC (Absolute) 04/30/2020 0.00    • Color 04/30/2020 Yellow    • Character 04/30/2020 Cloudy*   • Specific Gravity 04/30/2020 1.025    • Ph 04/30/2020 6.5    • Glucose 04/30/2020 Negative    • Ketones 04/30/2020 Negative    • Protein 04/30/2020 Negative    • Bilirubin 04/30/2020 Negative    • Urobilinogen, Urine 04/30/2020 0.2    • Nitrite 04/30/2020 Negative    • Leukocyte Esterase 04/30/2020 Negative    • Occult Blood 04/30/2020 Negative    • Micro Urine Req 04/30/2020 Microscopic    • WBC 04/30/2020 0-2    • RBC 04/30/2020 0-2    • Bacteria 04/30/2020 Negative    • Epithelial Cells 04/30/2020 Negative    • Ca Oxalate Crystal 04/30/2020 Moderate    • Hyaline Cast 04/30/2020 0-2    Hospital Outpatient Visit on 04/10/2020   Component Date Value   • TSH 04/10/2020 4.000    Hospital Outpatient Visit on 04/10/2020   Component Date Value   • Cholesterol,Tot 04/10/2020 163    • Triglycerides 04/10/2020 70    • HDL 04/10/2020 51    • LDL 04/10/2020 98    • WBC 04/10/2020 7.2    • RBC 04/10/2020 4.46    • Hemoglobin 04/10/2020 13.6    • Hematocrit 04/10/2020 40.7    • MCV 04/10/2020 91.3    • MCH 04/10/2020 30.5    • MCHC 04/10/2020 33.4*   • RDW 04/10/2020 44.0    • Platelet Count 04/10/2020 332    • MPV 04/10/2020 9.3    • GFR If  04/10/2020 >60    • GFR If Non  Ameri* 04/10/2020 >60    • Sodium 04/10/2020 141    • Potassium  04/10/2020 3.8    • Chloride 04/10/2020 104    • Co2 04/10/2020 27    • Anion Gap 04/10/2020 10.0    • Glucose 04/10/2020 82    • Bun 04/10/2020 20    • Creatinine 04/10/2020 0.85    • Calcium 04/10/2020 9.3    • AST(SGOT) 04/10/2020 12    • ALT(SGPT) 04/10/2020 17    • Alkaline Phosphatase 04/10/2020 69    • Total Bilirubin 04/10/2020 0.4    • Albumin 04/10/2020 4.0    • Total Protein 04/10/2020 6.5    • Globulin 04/10/2020 2.5    • A-G Ratio 04/10/2020 1.6    • NT-proBNP 04/10/2020 36    ]

## 2021-01-18 ENCOUNTER — OFFICE VISIT (OUTPATIENT)
Dept: MEDICAL GROUP | Facility: CLINIC | Age: 56
End: 2021-01-18
Payer: MEDICAID

## 2021-01-18 ENCOUNTER — HOSPITAL ENCOUNTER (EMERGENCY)
Facility: MEDICAL CENTER | Age: 56
End: 2021-01-19
Attending: EMERGENCY MEDICINE
Payer: MEDICAID

## 2021-01-18 VITALS
TEMPERATURE: 98.3 F | WEIGHT: 218 LBS | DIASTOLIC BLOOD PRESSURE: 60 MMHG | BODY MASS INDEX: 32.29 KG/M2 | OXYGEN SATURATION: 96 % | SYSTOLIC BLOOD PRESSURE: 118 MMHG | HEART RATE: 100 BPM | HEIGHT: 69 IN | RESPIRATION RATE: 18 BRPM

## 2021-01-18 DIAGNOSIS — G60.9 IDIOPATHIC PERIPHERAL NEUROPATHY: ICD-10-CM

## 2021-01-18 DIAGNOSIS — F41.9 ANXIETY: ICD-10-CM

## 2021-01-18 DIAGNOSIS — N30.01 ACUTE CYSTITIS WITH HEMATURIA: ICD-10-CM

## 2021-01-18 DIAGNOSIS — N81.4 PROLAPSED UTERUS: ICD-10-CM

## 2021-01-18 DIAGNOSIS — E78.5 DYSLIPIDEMIA, GOAL LDL BELOW 130: ICD-10-CM

## 2021-01-18 DIAGNOSIS — R20.2 PARESTHESIA OF BOTH FEET: ICD-10-CM

## 2021-01-18 DIAGNOSIS — N81.4 UTERINE PROLAPSE: ICD-10-CM

## 2021-01-18 PROCEDURE — 87086 URINE CULTURE/COLONY COUNT: CPT

## 2021-01-18 PROCEDURE — 99214 OFFICE O/P EST MOD 30 MIN: CPT | Performed by: PHYSICIAN ASSISTANT

## 2021-01-18 PROCEDURE — 81001 URINALYSIS AUTO W/SCOPE: CPT

## 2021-01-18 PROCEDURE — 99284 EMERGENCY DEPT VISIT MOD MDM: CPT

## 2021-01-18 RX ORDER — HYDROXYZINE HYDROCHLORIDE 25 MG/1
TABLET, FILM COATED ORAL
Qty: 60 TAB | Refills: 2 | Status: SHIPPED | OUTPATIENT
Start: 2021-01-18 | End: 2021-03-29

## 2021-01-18 RX ORDER — GABAPENTIN 300 MG/1
300 CAPSULE ORAL 3 TIMES DAILY
Qty: 270 CAP | Refills: 0 | Status: SHIPPED | OUTPATIENT
Start: 2021-01-18 | End: 2021-03-29

## 2021-01-18 RX ORDER — QUINIDINE SULFATE 200 MG
2400 TABLET ORAL
Status: ON HOLD | COMMUNITY
End: 2021-05-20

## 2021-01-18 RX ORDER — LORATADINE 10 MG/1
TABLET ORAL
COMMUNITY
Start: 2020-10-27 | End: 2021-05-14

## 2021-01-18 RX ORDER — CHOLECALCIFEROL (VITAMIN D3) 125 MCG
500 CAPSULE ORAL DAILY
Status: ON HOLD | COMMUNITY
End: 2021-05-20

## 2021-01-18 ASSESSMENT — PATIENT HEALTH QUESTIONNAIRE - PHQ9
SUM OF ALL RESPONSES TO PHQ QUESTIONS 1-9: 14
CLINICAL INTERPRETATION OF PHQ2 SCORE: 3
5. POOR APPETITE OR OVEREATING: 3 - NEARLY EVERY DAY

## 2021-01-18 ASSESSMENT — FIBROSIS 4 INDEX
FIB4 SCORE: 0.51
FIB4 SCORE: 0.51

## 2021-01-19 ENCOUNTER — TELEPHONE (OUTPATIENT)
Dept: MEDICAL GROUP | Facility: CLINIC | Age: 56
End: 2021-01-19

## 2021-01-19 VITALS
DIASTOLIC BLOOD PRESSURE: 76 MMHG | TEMPERATURE: 98.6 F | OXYGEN SATURATION: 97 % | SYSTOLIC BLOOD PRESSURE: 125 MMHG | BODY MASS INDEX: 31.84 KG/M2 | HEIGHT: 69 IN | HEART RATE: 100 BPM | WEIGHT: 215 LBS | RESPIRATION RATE: 16 BRPM

## 2021-01-19 LAB
APPEARANCE UR: ABNORMAL
BACTERIA #/AREA URNS HPF: ABNORMAL /HPF
BILIRUB UR QL STRIP.AUTO: NEGATIVE
COLOR UR: YELLOW
EPI CELLS #/AREA URNS HPF: ABNORMAL /HPF
GLUCOSE UR STRIP.AUTO-MCNC: NEGATIVE MG/DL
HYALINE CASTS #/AREA URNS LPF: ABNORMAL /LPF
KETONES UR STRIP.AUTO-MCNC: NEGATIVE MG/DL
LEUKOCYTE ESTERASE UR QL STRIP.AUTO: ABNORMAL
MICRO URNS: ABNORMAL
NITRITE UR QL STRIP.AUTO: NEGATIVE
PH UR STRIP.AUTO: 7 [PH] (ref 5–8)
PROT UR QL STRIP: NEGATIVE MG/DL
RBC # URNS HPF: ABNORMAL /HPF
RBC UR QL AUTO: NEGATIVE
SP GR UR STRIP.AUTO: 1.02
UROBILINOGEN UR STRIP.AUTO-MCNC: 0.2 MG/DL
WBC #/AREA URNS HPF: ABNORMAL /HPF

## 2021-01-19 PROCEDURE — 700102 HCHG RX REV CODE 250 W/ 637 OVERRIDE(OP): Performed by: EMERGENCY MEDICINE

## 2021-01-19 PROCEDURE — A9270 NON-COVERED ITEM OR SERVICE: HCPCS | Performed by: EMERGENCY MEDICINE

## 2021-01-19 RX ORDER — CEPHALEXIN 500 MG/1
500 CAPSULE ORAL 4 TIMES DAILY
Qty: 20 CAP | Refills: 0 | Status: SHIPPED | OUTPATIENT
Start: 2021-01-19 | End: 2021-01-24

## 2021-01-19 RX ORDER — CEPHALEXIN 500 MG/1
500 CAPSULE ORAL ONCE
Status: COMPLETED | OUTPATIENT
Start: 2021-01-19 | End: 2021-01-19

## 2021-01-19 RX ADMIN — CEPHALEXIN 500 MG: 500 CAPSULE ORAL at 00:42

## 2021-01-19 NOTE — ED TRIAGE NOTES
Pt  Experiencing discomfort for 2 weeks and 2 days ago felt that uterus was dropping. Called PCP and was directed to ER. abd pain 3/10 no bleeding or discharge noted. Able to move bowels today with no pushing for evacuation. AOX4 great historian reports being healthy besides this event.

## 2021-01-19 NOTE — PROGRESS NOTES
"cc:  Uterine prolapse    Subjective:     Bhavana Liu is a 55 y.o. female presenting for uterine prolapse      Patient presents to the office for uterine prolapse.  She states that she has felt a bulge forward for about 2 weeks and states that she has been having pain in the lower abdomen.  She states that her urination will start and stop.  She states that her mother is about to pass and so this is a concern for her.     Patient states that she is needing a refill of her hydroxyzine.  Patient indicates that her mother is about to pass which is increased her anxiety and she is needing a refill of her hydroxyzine at this time.    Patient feels that the neuropathy is worsening.  She is wanting to increase the dose of her medication. She states that she had a \"big fall' and states that she has been having pain.     Patient is due for routine lab work.  History of dyslipidemia.    Review of systems:  See above.   Denies any symptoms unless previously indicated.        Current Outpatient Medications:   •  cyanocobalamin (VITAMIN B-12) 500 MCG Tab, Take 500 mcg by mouth every day., Disp: , Rfl:   •  Garcinia Cambogia-Chromium 500-200 MG-MCG Tab, Take 2,400 mg by mouth 2 Times a Day., Disp: , Rfl:   •  hydrOXYzine HCl (ATARAX) 25 MG Tab, TAKE ONE TABLET BY MOUTH THREE TIMES A DAY AS NEEDED FOR ANXIETY, Disp: 60 Tab, Rfl: 2  •  gabapentin (NEURONTIN) 300 MG Cap, Take 1 Cap by mouth 3 times a day., Disp: 270 Cap, Rfl: 0  •  PARoxetine (PAXIL) 30 MG Tab, TAKE ONE TABLET BY MOUTH EVERY DAY, Disp: 90 Tab, Rfl: 0  •  busPIRone (BUSPAR) 10 MG Tab tablet, TAKE ONE TABLET BY MOUTH TWO TIMES A DAY, Disp: 180 Tab, Rfl: 0  •  potassium chloride ER (KLOR-CON) 10 MEQ tablet, Take 1 Tab by mouth 2 times a day., Disp: 60 Tab, Rfl: 3  •  baclofen (LIORESAL) 20 MG tablet, TAKE ONE TABLET BY MOUTH TWO TIMES A DAY, Disp: 60 Tab, Rfl: 5  •  pantoprazole (PROTONIX) 40 MG Tablet Delayed Response, TAKE ONE TABLET BY MOUTH EVERY 48 HOURS, " "Disp: 45 Tab, Rfl: 5  •  albuterol (VENTOLIN OR PROVENTIL) 108 (90 BASE) MCG/ACT AERS, Inhale 2 Puffs by mouth every 6 hours as needed for Shortness of Breath., Disp: 1 Inhaler, Rfl: 3  •  furosemide (LASIX) 20 MG Tab, TAKE ONE TABLET BY MOUTH THREE TIMES A DAY, Disp: 180 Tab, Rfl: 0  •  loratadine (CLARITIN) 10 MG Tab, , Disp: , Rfl:   •  loratadine (CLARITIN REDITABS) 10 MG dissolvable tablet, Take 1 Tab by mouth every day. (Patient not taking: Reported on 1/18/2021), Disp: 30 Tab, Rfl: 3    Allergies, past medical history, past surgical history, family history, social history reviewed and updated    Objective:     Vitals: /60 (BP Location: Left arm, Patient Position: Sitting, BP Cuff Size: Adult)   Pulse 100   Temp 36.8 °C (98.3 °F) (Temporal)   Resp 18   Ht 1.753 m (5' 9\")   Wt 98.9 kg (218 lb)   LMP 10/28/2012   SpO2 96%   BMI 32.19 kg/m²   General: Alert, pleasant, NAD  EYES:   PERRL, EOMI, no icterus or pallor.  Conjunctivae and lids normal.   HENT:  Normocephalic.  External ears normal.  Neck supple.    Respiratory: Normal respiratory effort.  Clear to auscultation bilaterally.  Abdomen: obese  :  Hesitant for exam  Skin: Warm, dry, no rashes.  Musculoskeletal: Gait is normal.  Moves all extremities well.    Neurological: No tremors, sensation grossly intact,  CN2-12 intact.  Psych:  Affect/mood is anxious, judgement is good, memory is intact, grooming is appropriate.    Assessment/Plan:     Bhavana was seen today for uterine prolapse and medication refill.    Diagnoses and all orders for this visit:    Prolapsed uterus  -     REFERRAL TO GYNECOLOGY    Patient feels that this is an emergent issue especially with the pain that she is experiencing.  She states that it is getting worse.  I have tried to explain to the patient that this will not completely prolapse on her.  I have also tried to indicate time that this is typically not any emergent procedure.  Because of patient's symptoms and " pain, she feels that she does need to go in today.  I have advised her that she may need to be prepared that surgery will be scheduled for later date.  Patient has verbalized understanding to these instructions.  I have placed an urgent referral to gyn.  As patient is going in, I have called the transfer center.    Anxiety  -     hydrOXYzine HCl (ATARAX) 25 MG Tab; TAKE ONE TABLET BY MOUTH THREE TIMES A DAY AS NEEDED FOR ANXIETY    Medications refilled.    Paresthesia of both feet  -     gabapentin (NEURONTIN) 300 MG Cap; Take 1 Cap by mouth 3 times a day.  Idiopathic peripheral neuropathy  -     gabapentin (NEURONTIN) 300 MG Cap; Take 1 Cap by mouth 3 times a day.    I have increased her gabapentin use to 3 times a day as it was previously twice a day due to the increase in her pain symptoms.    Dyslipidemia, goal LDL below 130  -     Lipid Profile; Future  -     Comp Metabolic Panel; Future      Labs have been ordered to evaluate further.      No follow-ups on file.    Please note that this dictation was created using voice recognition software. I have made every reasonable attempt to correct obvious errors, but expect that there are errors of grammar and possible content that I did not discover before finalizing note.

## 2021-01-19 NOTE — ED NOTES
Pt ambulatory to bathroom to obtain urine sample, collected and sent to lab. Pt now back in bed talking to visitor at bedside and playing on cell phone. No distress is noted at this time and pt's breaths are even and unlabored.   Subjective   History of Present Illness  3-year-old female presents with elevated blood pressure.  She reports that she had seen her gynecologist yesterday was started on Effexor.  She states she did not like the way it made her feel.  She states that she has been under increased stress and anxiety since losing a child in 2015.  She reports that her blood pressure was 1 9123 at home this morning.  She has taken hydrochlorothiazide for the last 3 days.  She states she was given this year to go for elevated blood pressure.  She states she takes it intermittently.  She is having no chest pain.  No shortness of breath.  No headache at this time.  No visual difficulty.  No localized numbness weakness paresthesia extremities.    Review of Systems  Positive for anxiety negative for chest pain.  Positive for occasional headache occasional blurred vision.  Positive for anxiety  Past Medical History:   Diagnosis Date   • Hypertension        Allergies   Allergen Reactions   • Penicillins Unknown (See Comments)     Unknown       History reviewed. No pertinent surgical history.    History reviewed. No pertinent family history.    Social History     Socioeconomic History   • Marital status: Single     Spouse name: Not on file   • Number of children: Not on file   • Years of education: Not on file   • Highest education level: Not on file   Tobacco Use   • Smoking status: Never Smoker   • Smokeless tobacco: Never Used   Substance and Sexual Activity   • Alcohol use: No     Frequency: Never   • Drug use: No           Objective   Physical Exam  3-year-old female awake alert.  Generally well-developed well-nourished.  Pupils equal round to light.  Oropharynx clear neck supple chest clear equal breath sounds Cardia vascular rhythm rhythm M soft nontender neurologic exam without focal findings noted.  Procedures           ED Course      Results for orders placed or performed during the hospital encounter of 09/14/19   Basic Metabolic  "Panel   Result Value Ref Range    Glucose 138 (H) 65 - 99 mg/dL    BUN 11 8 - 20 mg/dL    Creatinine 0.70 0.40 - 1.00 mg/dL    Sodium 135 (L) 136 - 144 mmol/L    Potassium 2.8 (L) 3.6 - 5.1 mmol/L    Chloride 98 (L) 101 - 111 mmol/L    CO2 25.0 22.0 - 32.0 mmol/L    Calcium 8.8 (L) 8.9 - 10.3 mg/dL    eGFR Non African Amer 93 >60 mL/min/1.73    BUN/Creatinine Ratio 15.7 5.4 - 26.2    Anion Gap 14.8 5.0 - 15.0 mmol/L     No radiology results for the last day  Medications   sodium chloride 0.9 % flush 10 mL (not administered)   potassium chloride (K-DUR,KLOR-CON) CR tablet 40 mEq (not administered)   labetalol (NORMODYNE,TRANDATE) injection 10 mg (10 mg Intravenous Given 9/14/19 1520)     BP (!) 207/108   Pulse 113   Temp 98.8 °F (37.1 °C)   Resp 16   Ht 165.1 cm (65\")   Wt 75.1 kg (165 lb 10.5 oz)   SpO2 98%   BMI 27.57 kg/m²               MDM  Chart review:   Comorbidity: Hypertension, anxiety  Differential: Left leg abnormality, hypertension  My EKG interpretation: Sinus rhythm without acute ab normality  Lab: Remarkable for potassium of 2.8  Radiology:   Discussion/treatment: Patient was given labetalol 10 g IV.  She had significant improvement in her blood pressure with treatment.  Findings were discussed with her.  She was discharged placed on potassium and magnesium supplement.  She was started on Norvasc 5 mg daily and was placed on Zoloft 25 mg daily.  She is going to discontinue her Effexor.    Final diagnoses:   Hypertension, unspecified type   Hypokalemia   Anxiety              Miki Salinas MD  09/14/19 5822    "

## 2021-01-19 NOTE — DISCHARGE PLANNING
note:  Pt called because the doctor she was referred to ( Dr. Davila does not take Medicaid.)    CM called Dr. Davila's office, talked to Cinthya who confirmed that they do not take Medicaid FFS.     Advised pt to ask her PCP to send a referral to another gynecologist that takes her insurance. Pt was agreeable to call Diamond Moreno PAC'c office. Number provided to pt.

## 2021-01-19 NOTE — ED PROVIDER NOTES
ER Provider Note     Scribed for Agustin Murry M.D. by Dana Foss. 1/18/2021, 10:47 PM.    Primary Care Provider: Diamond Moreno P.A.-C.  Means of Arrival: Walk-in   History obtained from: Patient  History limited by: None     CHIEF COMPLAINT  Chief Complaint   Patient presents with   • Vaginal Pain     uterus discomfort     HPI  Bhavana Liu is a 55 y.o. female who presents to the Emergency Department for evaluation of vaginal and abdominal pain for the past two weeks. Patient reports feeling like her uterus was dropping two days ago. She called her PCP today and was directed to the ED.  She denies any associated dysuria, hematuria, or vaginal bleeding or discharge.     REVIEW OF SYSTEMS  See HPI for further details.     PAST MEDICAL HISTORY   has a past medical history of Abnormal drug screen (12/8/2017), Adjustment disorder with anxious mood, Arthritis, Bilateral leg edema, Dyslipidemia, goal LDL below 130, GERD (gastroesophageal reflux disease), Menopausal symptoms, and Methamphetamine use (HCC) (12/8/2017).    SURGICAL HISTORY  patient denies any surgical history    SOCIAL HISTORY  Social History     Tobacco Use   • Smoking status: Current Every Day Smoker     Packs/day: 0.50     Years: 25.00     Pack years: 12.50     Types: Cigarettes   • Smokeless tobacco: Never Used   • Tobacco comment: getting close, she down to 1/4 ppd, still trying to quit   Substance Use Topics   • Alcohol use: No     Alcohol/week: 0.0 oz     Comment: none at all   • Drug use: No      Social History     Substance and Sexual Activity   Drug Use No     FAMILY HISTORY  Family History   Problem Relation Age of Onset   • Diabetes Mother         type 2, diet controlled   • Heart Disease Mother         pacemaker   • Cancer Maternal Grandmother         uterine   • Genitourinary () Problems Sister         tumor, benign   • Heart Disease Father         CHF   • Non-contributory Father         emphysema   • Arthritis Brother  "        hip replacement   • Non-contributory Sister         encephalitis     CURRENT MEDICATIONS  Home Medications    **Home medications have not yet been reviewed for this encounter**       ALLERGIES  Allergies   Allergen Reactions   • Pcn [Penicillins]      Per pt. Wants in chart due to kid and  being allergic.     PHYSICAL EXAM  VITAL SIGNS: BP (!) 175/110   Pulse (!) 114   Temp 37 °C (98.6 °F)   Resp 18   Ht 1.76 m (5' 9.29\")   Wt 97.5 kg (215 lb)   LMP 10/28/2012   SpO2 98%   BMI 31.48 kg/m²      Constitutional: Alert in no apparent distress.  HENT: No signs of trauma, Bilateral external ears normal, Nose normal.   Eyes: Pupils are equal and reactive, Conjunctiva normal, Non-icteric.   Neck: Normal range of motion, No tenderness, Supple, No stridor.   Lymphatic: No lymphadenopathy noted.   Cardiovascular: Regular rate and rhythm, no palpable thrill  Thorax & Lungs: No respiratory distress,  No chest tenderness.   Abdomen: Bowel sounds normal, Soft, mild suprapubic tenderness, No masses, No pulsatile masses. No peritoneal signs.  Pelvic: Pink tissues at the vaginal canal. Nothing extruding from the vaginal canal.   Skin: Warm, Dry, No erythema, No rash.   Back: No bony tenderness, No CVA tenderness.   Extremities: Intact distal pulses, No edema, No tenderness, No cyanosis.  Musculoskeletal: Good range of motion in all major joints. No tenderness to palpation or major deformities noted.   Neurologic: Alert , Normal motor function, Normal sensory function, No focal deficits noted.   Psychiatric: Affect normal, Judgment normal, Mood normal.     DIAGNOSTIC STUDIES / PROCEDURES    LABS  Labs Reviewed   URINALYSIS,CULTURE IF INDICATED - Abnormal; Notable for the following components:       Result Value    Character Turbid (*)     Leukocyte Esterase Small (*)     All other components within normal limits    Narrative:     Indication for culture:->Patient WITHOUT an indwelling Martinez  catheter in place " with new onset of Dysuria, Frequency,  Urgency, and/or Suprapubic pain   URINE MICROSCOPIC (W/UA) - Abnormal; Notable for the following components:    WBC 10-20 (*)     RBC 2-5 (*)     Bacteria Moderate (*)     Hyaline Cast 3-5 (*)     All other components within normal limits    Narrative:     Indication for culture:->Patient WITHOUT an indwelling Martinez  catheter in place with new onset of Dysuria, Frequency,  Urgency, and/or Suprapubic pain   URINE CULTURE(NEW)    Narrative:     Indication for culture:->Patient WITHOUT an indwelling Martinez  catheter in place with new onset of Dysuria, Frequency,  Urgency, and/or Suprapubic pain     All labs reviewed by me.    COURSE & MEDICAL DECISION MAKING  Pertinent Labs & Imaging studies reviewed. (See chart for details)    This is a 55 y.o. female that presents with what appears to be uterine prolapse as well as suprapubic pain.  I will get a urinalysis.  We will reassess after this..     10:47 PM - Patient seen and examined at bedside. Ordered urinalysis culture.     11:31 PM - I performed a pelvic exam with female RN chaperone at this time. On repeat exam, the patient has no tenderness over the suprapubic area.     11:53 PM - Ordered Urine microscopic.    12:23 AM - Patient will be treated with Keflex 500 mg capsule.  Patient now has no severe back pain.  There does appear to be prolapse.  I will refer her to urogynecology.  She does have a urinary tract infection.  We will treat this.    12:27 AM - I informed the patient she has a UTI. Discussed referral to Dr. Davila (OBGYN) at this time. Encouraged the patient to call her office in the morning. The patient was given the opportunity to ask questions. I discussed plan of discharge and strict return precautions for any new or worsening symptoms. The patient verbalizes agreement and understands.    The patient will return for new or worsening symptoms and is stable at the time of discharge.    The patient is referred to a  primary physician for blood pressure management, diabetic screening, and for all other preventative health concerns.    DISPOSITION:  Patient will be discharged home in stable condition.    FOLLOW UP:  Diamond Moreno P.A.-C.  [address]  [facility] [id number] [phone]          Zhen Davila M.D.  [address] [facility] [address]  [facility] [id number] [phone]    In 1 week      OUTPATIENT MEDICATIONS:  New Prescriptions    CEPHALEXIN (KEFLEX) 500 MG CAP    Take 1 Cap by mouth 4 times a day for 5 days.     FINAL IMPRESSION  1. Acute cystitis with hematuria    2. Uterine prolapse       Dana SUAREZ (Kathleen), am scribing for, and in the presence of, Agustin Murry M.D..    Electronically signed by: Dana Foss (Kathleen), 1/18/2021    IAgustin M.D. personally performed the services described in this documentation, as scribed by Dana Foss in my presence, and it is both accurate and complete.     E.    The note accurately reflects work and decisions made by me.  Agustin Murry M.D.  1/19/2021  2:13 AM

## 2021-01-20 NOTE — TELEPHONE ENCOUNTER
Pt called and states that the ER gave her a referral. When she called them she states that they are not accepting new patients. She would like another referral.

## 2021-01-20 NOTE — TELEPHONE ENCOUNTER
Talked to Pt and she has the information.   She will be calling them today.     Judy Vergara, Med Ass't

## 2021-01-20 NOTE — TELEPHONE ENCOUNTER
[facility]  [address]  [place]. [address]  Phone: [phone]  Fax: [phone]    This is the approved referral that I placed last night.

## 2021-01-21 LAB
BACTERIA UR CULT: NORMAL
SIGNIFICANT IND 70042: NORMAL
SITE SITE: NORMAL
SOURCE SOURCE: NORMAL

## 2021-01-25 DIAGNOSIS — R60.0 BILATERAL LOWER EXTREMITY EDEMA: ICD-10-CM

## 2021-01-25 RX ORDER — LORATADINE 10 MG/1
TABLET ORAL
Qty: 30 TAB | Refills: 5 | Status: ON HOLD | OUTPATIENT
Start: 2021-01-25 | End: 2021-05-20

## 2021-01-25 RX ORDER — POTASSIUM CHLORIDE 750 MG/1
TABLET, FILM COATED, EXTENDED RELEASE ORAL
Qty: 60 TAB | Refills: 5 | Status: ON HOLD | OUTPATIENT
Start: 2021-01-25 | End: 2021-05-20

## 2021-01-25 NOTE — TELEPHONE ENCOUNTER
Was the patient seen in the last year in this department? Yes    Does patient have an active prescription for medications requested? Yes    Received Request Via: Pharmacy    Admission on 01/18/2021, Discharged on 01/19/2021   Component Date Value   • Color 01/18/2021 Yellow    • Character 01/18/2021 Turbid*   • Specific Gravity 01/18/2021 1.020    • Ph 01/18/2021 7.0    • Glucose 01/18/2021 Negative    • Ketones 01/18/2021 Negative    • Protein 01/18/2021 Negative    • Bilirubin 01/18/2021 Negative    • Urobilinogen, Urine 01/18/2021 0.2    • Nitrite 01/18/2021 Negative    • Leukocyte Esterase 01/18/2021 Small*   • Occult Blood 01/18/2021 Negative    • Micro Urine Req 01/18/2021 Microscopic    • WBC 01/18/2021 10-20*   • RBC 01/18/2021 2-5*   • Bacteria 01/18/2021 Moderate*   • Epithelial Cells 01/18/2021 Few    • Hyaline Cast 01/18/2021 3-5*   • Significant Indicator 01/18/2021 NEG    • Source 01/18/2021 UR    • Site 01/18/2021 -    • Culture Result 01/18/2021 Usual skin kianna 10-50,000 cfu/mL    Hospital Outpatient Visit on 11/20/2020   Component Date Value   • HLA-B27 Screen 11/20/2020 Negative    • Sed Rate Westergren 11/20/2020 2    • Rheumatoid Factor -Neph- 11/20/2020 <10    • NT-proBNP 11/20/2020 54    • West Nile Virus Igg 11/20/2020 0.19    • West Nile Virus Igm 11/20/2020 0.06    • Hsv I-Ii Igg Antibodies 11/20/2020 >22.40    • Hsv Igm I-Ii Combination 11/20/2020 0.54    • Rubeola Igg Antibody 11/20/2020 273.0    • Rubeola -Measles- Ab Igm 11/20/2020 0.67    • Mumps Ab Igg 11/20/2020 >300.0    • Mumps Ab Igm 11/20/2020 0.45    • Varicella Zoster IgG Ab 11/20/2020 1506.0    • Varicella Zoster IgM Ab 11/20/2020 0.09    • Anti-Dna -Ds 11/20/2020 None Detected    • SSA 52 (R0)(CHANDRIKA) Ab, IgG 11/20/2020 2    • SSA 60 (R0)(CHANDRIKA) Ab, IgG 11/20/2020 0    • Sjogren'S Anti-Ss-B 11/20/2020 0    • Thompson Antibodies 11/20/2020 0    • Thompson/RNP IgG (CHANDRIKA) 11/20/2020 0    • Anti-Scl-70 11/20/2020 0    • Miscellaneous Lab  Result 11/20/2020 SEE NOTE    • SSA 52 (R0)(CHANDRIKA) Ab, IgG 11/20/2020 2    • SSA 60 (R0)(CHANDRIKA) Ab, IgG 11/20/2020 0    • Sjogren'S Anti-Ss-B 11/20/2020 0    • Cotton, IgE 11/20/2020 <0.10    • Asparagus  F261 11/20/2020 <0.10    • F138 Avocado 11/20/2020 <0.10    • Garcia/Esquivel Yeast IgE 11/20/2020 <0.10    • F204 Banana 11/20/2020 <0.10    • Basil  F269 11/20/2020 <0.10    • [place]   F278 11/20/2020 <0.10    • F27 Beef 11/20/2020 <0.10    • Beet Root, IgE 11/20/2020 <0.10    • Pepper C. annuum, IgE 11/20/2020 <0.10    • Black Pepper  F280 11/20/2020 <0.10    • Mussel F037 11/20/2020 <0.10    • Blueberry  F288 11/20/2020 <0.10    • Brazil Nut  F018 11/20/2020 <0.10    • Broccoli  F260 11/20/2020 <0.10    • Buckwheat  F011 11/20/2020 <0.10    • Cabbage F216 11/20/2020 <0.10    • F31 Carrot 11/20/2020 <0.10    • Cashew Nut  F202 11/20/2020 <0.10    • F299 Sweet [place] 11/20/2020 <0.10    • Chick Pea  F309 11/20/2020 <0.10    • Chocolate Cacao, IgE F093 11/20/2020 <0.10    • Cinnamon  F220 11/20/2020 <0.10    • Clam  F207 11/20/2020 <0.10    • Coconut 11/20/2020 <0.10    • Codfish Allergen 11/20/2020 <0.10    • Occupational Cotton Seed* 11/20/2020 <0.10    • Crab  F023 11/20/2020 <0.10    • Cucumber  F244 11/20/2020 <0.10    • Dill  F277 11/20/2020 <0.10    • F1 Eggwhite 11/20/2020 <0.10    • Mabel  F270 11/20/2020 <0.10    • Grape Allergen 11/20/2020 <0.10    • Halibut  F303 11/20/2020 <0.10    • Hazelnut/Filbert  F017 11/20/2020 <0.10    • Honeydew/Cantaloupe, IgE 11/20/2020 <0.10    • Lettuce F215 11/20/2020 <0.10    • Pedro Brean/White Henson, I* 11/20/2020 <0.10    • F2 Milk 11/20/2020 <0.10    • Onions  F048 11/20/2020 <0.10    • [place] F033 11/20/2020 <0.10    • Oregano  F283 11/20/2020 <0.10    • F12 Pea 11/20/2020 <0.10    • F013 Peanut 11/20/2020 <0.10    • Pear  F094 11/20/2020 <0.10    • [place]  F255 11/20/2020 <0.10    • Pork  F026 11/20/2020 <0.10    • Potato 11/20/2020 <0.10    • Raspberry, IgE F343  11/20/2020 <0.10    • Sesame Seed 11/20/2020 <0.10    • Shrimp IgE 11/20/2020 <0.10    • F14 Henson Soybean 11/20/2020 <0.10    • Tea  F222 11/20/2020 <0.10    • Tomato IgE 11/20/2020 <0.10    • Trout  F204 11/20/2020 <0.10    • Turkey F284 11/20/2020 <0.10    • [place]  F255 11/20/2020 <0.10    • Watermelon 11/20/2020 <0.10    • Immunocap Score 11/20/2020 See Note    • HSV1 Gly IgG 11/20/2020 33.90*   • HSV2 Gly IgG 11/20/2020 2.63*   Hospital Outpatient Visit on 05/13/2020   Component Date Value   • Antinuclear Antibody 05/13/2020 None Detected    Hospital Outpatient Visit on 04/30/2020   Component Date Value   • Rheumatoid Factor -Neph- 04/30/2020 <10    • WBC 04/30/2020 6.6    • RBC 04/30/2020 4.48    • Hemoglobin 04/30/2020 13.5    • Hematocrit 04/30/2020 41.6    • MCV 04/30/2020 92.9    • MCH 04/30/2020 30.1    • MCHC 04/30/2020 32.5*   • RDW 04/30/2020 44.0    • Platelet Count 04/30/2020 318    • MPV 04/30/2020 9.7    • Neutrophils-Polys 04/30/2020 43.50*   • Lymphocytes 04/30/2020 40.60    • Monocytes 04/30/2020 11.30    • Eosinophils 04/30/2020 2.60    • Basophils 04/30/2020 1.70    • Immature Granulocytes 04/30/2020 0.30    • Nucleated RBC 04/30/2020 0.00    • Neutrophils (Absolute) 04/30/2020 2.89    • Lymphs (Absolute) 04/30/2020 2.69    • Monos (Absolute) 04/30/2020 0.75    • Eos (Absolute) 04/30/2020 0.17    • Baso (Absolute) 04/30/2020 0.11    • Immature Granulocytes (a* 04/30/2020 0.02    • NRBC (Absolute) 04/30/2020 0.00    • Color 04/30/2020 Yellow    • Character 04/30/2020 Cloudy*   • Specific Gravity 04/30/2020 1.025    • Ph 04/30/2020 6.5    • Glucose 04/30/2020 Negative    • Ketones 04/30/2020 Negative    • Protein 04/30/2020 Negative    • Bilirubin 04/30/2020 Negative    • Urobilinogen, Urine 04/30/2020 0.2    • Nitrite 04/30/2020 Negative    • Leukocyte Esterase 04/30/2020 Negative    • Occult Blood 04/30/2020 Negative    • Micro Urine Req 04/30/2020 Microscopic    • WBC 04/30/2020 0-2    • RBC  04/30/2020 0-2    • Bacteria 04/30/2020 Negative    • Epithelial Cells 04/30/2020 Negative    • Ca Oxalate Crystal 04/30/2020 Moderate    • Hyaline Cast 04/30/2020 0-2    Hospital Outpatient Visit on 04/10/2020   Component Date Value   • TSH 04/10/2020 4.000    Hospital Outpatient Visit on 04/10/2020   Component Date Value   • Cholesterol,Tot 04/10/2020 163    • Triglycerides 04/10/2020 70    • HDL 04/10/2020 51    • LDL 04/10/2020 98    • WBC 04/10/2020 7.2    • RBC 04/10/2020 4.46    • Hemoglobin 04/10/2020 13.6    • Hematocrit 04/10/2020 40.7    • MCV 04/10/2020 91.3    • MCH 04/10/2020 30.5    • MCHC 04/10/2020 33.4*   • RDW 04/10/2020 44.0    • Platelet Count 04/10/2020 332    • MPV 04/10/2020 9.3    • GFR If  04/10/2020 >60    • GFR If Non  Ameri* 04/10/2020 >60    • Sodium 04/10/2020 141    • Potassium 04/10/2020 3.8    • Chloride 04/10/2020 104    • Co2 04/10/2020 27    • Anion Gap 04/10/2020 10.0    • Glucose 04/10/2020 82    • Bun 04/10/2020 20    • Creatinine 04/10/2020 0.85    • Calcium 04/10/2020 9.3    • AST(SGOT) 04/10/2020 12    • ALT(SGPT) 04/10/2020 17    • Alkaline Phosphatase 04/10/2020 69    • Total Bilirubin 04/10/2020 0.4    • Albumin 04/10/2020 4.0    • Total Protein 04/10/2020 6.5    • Globulin 04/10/2020 2.5    • A-G Ratio 04/10/2020 1.6    • NT-proBNP 04/10/2020 36    ]

## 2021-02-25 DIAGNOSIS — M54.2 NECK PAIN: ICD-10-CM

## 2021-02-25 DIAGNOSIS — R60.0 BILATERAL LOWER EXTREMITY EDEMA: ICD-10-CM

## 2021-02-26 RX ORDER — FUROSEMIDE 20 MG/1
TABLET ORAL
Qty: 180 TABLET | Refills: 0 | Status: SHIPPED | OUTPATIENT
Start: 2021-02-26 | End: 2021-03-29

## 2021-02-26 RX ORDER — BACLOFEN 20 MG/1
TABLET ORAL
Qty: 60 TABLET | Refills: 0 | Status: SHIPPED | OUTPATIENT
Start: 2021-02-26 | End: 2021-03-29

## 2021-02-26 NOTE — TELEPHONE ENCOUNTER
Was the patient seen in the last year in this department? Yes    Does patient have an active prescription for medications requested? Yes    Received Request Via: Pharmacy    Admission on 01/18/2021, Discharged on 01/19/2021   Component Date Value   • Color 01/18/2021 Yellow    • Character 01/18/2021 Turbid*   • Specific Gravity 01/18/2021 1.020    • Ph 01/18/2021 7.0    • Glucose 01/18/2021 Negative    • Ketones 01/18/2021 Negative    • Protein 01/18/2021 Negative    • Bilirubin 01/18/2021 Negative    • Urobilinogen, Urine 01/18/2021 0.2    • Nitrite 01/18/2021 Negative    • Leukocyte Esterase 01/18/2021 Small*   • Occult Blood 01/18/2021 Negative    • Micro Urine Req 01/18/2021 Microscopic    • WBC 01/18/2021 10-20*   • RBC 01/18/2021 2-5*   • Bacteria 01/18/2021 Moderate*   • Epithelial Cells 01/18/2021 Few    • Hyaline Cast 01/18/2021 3-5*   • Significant Indicator 01/18/2021 NEG    • Source 01/18/2021 UR    • Site 01/18/2021 -    • Culture Result 01/18/2021 Usual skin kianna 10-50,000 cfu/mL    Hospital Outpatient Visit on 11/20/2020   Component Date Value   • HLA-B27 Screen 11/20/2020 Negative    • Sed Rate Westergren 11/20/2020 2    • Rheumatoid Factor -Neph- 11/20/2020 <10    • NT-proBNP 11/20/2020 54    • West Nile Virus Igg 11/20/2020 0.19    • West Nile Virus Igm 11/20/2020 0.06    • Hsv I-Ii Igg Antibodies 11/20/2020 >22.40    • Hsv Igm I-Ii Combination 11/20/2020 0.54    • Rubeola Igg Antibody 11/20/2020 273.0    • Rubeola -Measles- Ab Igm 11/20/2020 0.67    • Mumps Ab Igg 11/20/2020 >300.0    • Mumps Ab Igm 11/20/2020 0.45    • Varicella Zoster IgG Ab 11/20/2020 1506.0    • Varicella Zoster IgM Ab 11/20/2020 0.09    • Anti-Dna -Ds 11/20/2020 None Detected    • SSA 52 (R0)(CHANDRIKA) Ab, IgG 11/20/2020 2    • SSA 60 (R0)(CHANDRIKA) Ab, IgG 11/20/2020 0    • Sjogren'S Anti-Ss-B 11/20/2020 0    • Thompson Antibodies 11/20/2020 0    • Thompson/RNP IgG (CHANDRIKA) 11/20/2020 0    • Anti-Scl-70 11/20/2020 0    • Miscellaneous Lab  Result 11/20/2020 SEE NOTE    • SSA 52 (R0)(CHANDRIKA) Ab, IgG 11/20/2020 2    • SSA 60 (R0)(CHANDRIKA) Ab, IgG 11/20/2020 0    • Sjogren'S Anti-Ss-B 11/20/2020 0    • [place], IgE 11/20/2020 <0.10    • Asparagus  F261 11/20/2020 <0.10    • F138 Avocado 11/20/2020 <0.10    • Garcia/Esquivel Yeast IgE 11/20/2020 <0.10    • F204 Banana 11/20/2020 <0.10    • Basil  F269 11/20/2020 <0.10    • [place]   F278 11/20/2020 <0.10    • F27 Beef 11/20/2020 <0.10    • Beet Root, IgE 11/20/2020 <0.10    • Pepper C. annuum, IgE 11/20/2020 <0.10    • Black Pepper  F280 11/20/2020 <0.10    • Mussel F037 11/20/2020 <0.10    • Blueberry  F288 11/20/2020 <0.10    • Brazil Nut  F018 11/20/2020 <0.10    • Broccoli  F260 11/20/2020 <0.10    • Buckwheat  F011 11/20/2020 <0.10    • Cabbage F216 11/20/2020 <0.10    • F31 Carrot 11/20/2020 <0.10    • Cashew Nut  F202 11/20/2020 <0.10    • F299 Sweet [place] 11/20/2020 <0.10    • Chick Pea  F309 11/20/2020 <0.10    • Chocolate Cacao, IgE F093 11/20/2020 <0.10    • Cinnamon  F220 11/20/2020 <0.10    • Clam  F207 11/20/2020 <0.10    • Coconut 11/20/2020 <0.10    • Codfish Allergen 11/20/2020 <0.10    • Occupational Cotton Seed* 11/20/2020 <0.10    • Crab  F023 11/20/2020 <0.10    • Cucumber  F244 11/20/2020 <0.10    • Dill  F277 11/20/2020 <0.10    • F1 Eggwhite 11/20/2020 <0.10    • Mabel  F270 11/20/2020 <0.10    • Grape Allergen 11/20/2020 <0.10    • Halibut  F303 11/20/2020 <0.10    • Hazelnut/Filbert  F017 11/20/2020 <0.10    • Honeydew/Cantaloupe, IgE 11/20/2020 <0.10    • Lettuce F215 11/20/2020 <0.10    • Pedro Brean/White Henson, I* 11/20/2020 <0.10    • F2 Milk 11/20/2020 <0.10    • Onions  F048 11/20/2020 <0.10    • [place] F033 11/20/2020 <0.10    • Oregano  F283 11/20/2020 <0.10    • F12 Pea 11/20/2020 <0.10    • F013 Peanut 11/20/2020 <0.10    • Pear  F094 11/20/2020 <0.10    • [place]  F255 11/20/2020 <0.10    • Pork  F026 11/20/2020 <0.10    • Potato 11/20/2020 <0.10    • Raspberry, IgE F343  11/20/2020 <0.10    • Sesame Seed 11/20/2020 <0.10    • Shrimp IgE 11/20/2020 <0.10    • F14 Henson Soybean 11/20/2020 <0.10    • Tea  F222 11/20/2020 <0.10    • Tomato IgE 11/20/2020 <0.10    • Trout  F204 11/20/2020 <0.10    • Turkey F284 11/20/2020 <0.10    • [place]  F255 11/20/2020 <0.10    • Watermelon 11/20/2020 <0.10    • Immunocap Score 11/20/2020 See Note    • HSV1 Gly IgG 11/20/2020 33.90*   • HSV2 Gly IgG 11/20/2020 2.63*   Hospital Outpatient Visit on 05/13/2020   Component Date Value   • Antinuclear Antibody 05/13/2020 None Detected    Hospital Outpatient Visit on 04/30/2020   Component Date Value   • Rheumatoid Factor -Neph- 04/30/2020 <10    • WBC 04/30/2020 6.6    • RBC 04/30/2020 4.48    • Hemoglobin 04/30/2020 13.5    • Hematocrit 04/30/2020 41.6    • MCV 04/30/2020 92.9    • MCH 04/30/2020 30.1    • MCHC 04/30/2020 32.5*   • RDW 04/30/2020 44.0    • Platelet Count 04/30/2020 318    • MPV 04/30/2020 9.7    • Neutrophils-Polys 04/30/2020 43.50*   • Lymphocytes 04/30/2020 40.60    • Monocytes 04/30/2020 11.30    • Eosinophils 04/30/2020 2.60    • Basophils 04/30/2020 1.70    • Immature Granulocytes 04/30/2020 0.30    • Nucleated RBC 04/30/2020 0.00    • Neutrophils (Absolute) 04/30/2020 2.89    • Lymphs (Absolute) 04/30/2020 2.69    • Monos (Absolute) 04/30/2020 0.75    • Eos (Absolute) 04/30/2020 0.17    • Baso (Absolute) 04/30/2020 0.11    • Immature Granulocytes (a* 04/30/2020 0.02    • NRBC (Absolute) 04/30/2020 0.00    • Color 04/30/2020 Yellow    • Character 04/30/2020 Cloudy*   • Specific Gravity 04/30/2020 1.025    • Ph 04/30/2020 6.5    • Glucose 04/30/2020 Negative    • Ketones 04/30/2020 Negative    • Protein 04/30/2020 Negative    • Bilirubin 04/30/2020 Negative    • Urobilinogen, Urine 04/30/2020 0.2    • Nitrite 04/30/2020 Negative    • Leukocyte Esterase 04/30/2020 Negative    • Occult Blood 04/30/2020 Negative    • Micro Urine Req 04/30/2020 Microscopic    • WBC 04/30/2020 0-2    • RBC  04/30/2020 0-2    • Bacteria 04/30/2020 Negative    • Epithelial Cells 04/30/2020 Negative    • Ca Oxalate Crystal 04/30/2020 Moderate    • Hyaline Cast 04/30/2020 0-2    Hospital Outpatient Visit on 04/10/2020   Component Date Value   • TSH 04/10/2020 4.000    Hospital Outpatient Visit on 04/10/2020   Component Date Value   • Cholesterol,Tot 04/10/2020 163    • Triglycerides 04/10/2020 70    • HDL 04/10/2020 51    • LDL 04/10/2020 98    • WBC 04/10/2020 7.2    • RBC 04/10/2020 4.46    • Hemoglobin 04/10/2020 13.6    • Hematocrit 04/10/2020 40.7    • MCV 04/10/2020 91.3    • MCH 04/10/2020 30.5    • MCHC 04/10/2020 33.4*   • RDW 04/10/2020 44.0    • Platelet Count 04/10/2020 332    • MPV 04/10/2020 9.3    • GFR If  04/10/2020 >60    • GFR If Non  Ameri* 04/10/2020 >60    • Sodium 04/10/2020 141    • Potassium 04/10/2020 3.8    • Chloride 04/10/2020 104    • Co2 04/10/2020 27    • Anion Gap 04/10/2020 10.0    • Glucose 04/10/2020 82    • Bun 04/10/2020 20    • Creatinine 04/10/2020 0.85    • Calcium 04/10/2020 9.3    • AST(SGOT) 04/10/2020 12    • ALT(SGPT) 04/10/2020 17    • Alkaline Phosphatase 04/10/2020 69    • Total Bilirubin 04/10/2020 0.4    • Albumin 04/10/2020 4.0    • Total Protein 04/10/2020 6.5    • Globulin 04/10/2020 2.5    • A-G Ratio 04/10/2020 1.6    • NT-proBNP 04/10/2020 36    ]

## 2021-03-26 DIAGNOSIS — M54.2 NECK PAIN: ICD-10-CM

## 2021-03-29 DIAGNOSIS — F41.9 ANXIETY: ICD-10-CM

## 2021-03-29 DIAGNOSIS — F43.22 ADJUSTMENT DISORDER WITH ANXIOUS MOOD: ICD-10-CM

## 2021-03-29 DIAGNOSIS — R20.2 PARESTHESIA OF BOTH FEET: ICD-10-CM

## 2021-03-29 DIAGNOSIS — G60.9 IDIOPATHIC PERIPHERAL NEUROPATHY: ICD-10-CM

## 2021-03-29 DIAGNOSIS — R60.0 BILATERAL LOWER EXTREMITY EDEMA: ICD-10-CM

## 2021-03-29 RX ORDER — GABAPENTIN 300 MG/1
CAPSULE ORAL
Qty: 90 CAPSULE | Refills: 0 | Status: ON HOLD | OUTPATIENT
Start: 2021-03-29 | End: 2021-05-20

## 2021-03-29 RX ORDER — HYDROXYZINE HYDROCHLORIDE 25 MG/1
TABLET, FILM COATED ORAL
Qty: 60 TABLET | Refills: 2 | Status: ON HOLD | OUTPATIENT
Start: 2021-03-29 | End: 2021-05-20

## 2021-03-29 RX ORDER — FUROSEMIDE 20 MG/1
TABLET ORAL
Qty: 180 TABLET | Refills: 0 | Status: ON HOLD | OUTPATIENT
Start: 2021-03-29 | End: 2021-05-20

## 2021-03-29 RX ORDER — PAROXETINE 30 MG/1
TABLET, FILM COATED ORAL
Qty: 90 TABLET | Refills: 0 | Status: ON HOLD | OUTPATIENT
Start: 2021-03-29 | End: 2021-05-20

## 2021-03-29 RX ORDER — BACLOFEN 20 MG/1
TABLET ORAL
Qty: 60 TABLET | Refills: 0 | Status: ON HOLD | OUTPATIENT
Start: 2021-03-29 | End: 2021-05-20

## 2021-03-29 NOTE — TELEPHONE ENCOUNTER
Was the patient seen in the last year in this department? Yes   LOV 01/18/2021     Does patient have an active prescription for medications requested? Yes    Received Request Via: Pharmacy    Admission on 01/18/2021, Discharged on 01/19/2021   Component Date Value   • Color 01/18/2021 Yellow    • Character 01/18/2021 Turbid*   • Specific Gravity 01/18/2021 1.020    • Ph 01/18/2021 7.0    • Glucose 01/18/2021 Negative    • Ketones 01/18/2021 Negative    • Protein 01/18/2021 Negative    • Bilirubin 01/18/2021 Negative    • Urobilinogen, Urine 01/18/2021 0.2    • Nitrite 01/18/2021 Negative    • Leukocyte Esterase 01/18/2021 Small*   • Occult Blood 01/18/2021 Negative    • Micro Urine Req 01/18/2021 Microscopic    • WBC 01/18/2021 10-20*   • RBC 01/18/2021 2-5*   • Bacteria 01/18/2021 Moderate*   • Epithelial Cells 01/18/2021 Few    • Hyaline Cast 01/18/2021 3-5*   • Significant Indicator 01/18/2021 NEG    • Source 01/18/2021 UR    • Site 01/18/2021 -    • Culture Result 01/18/2021 Usual skin kianna 10-50,000 cfu/mL    Hospital Outpatient Visit on 11/20/2020   Component Date Value   • HLA-B27 Screen 11/20/2020 Negative    • Sed Rate Westergren 11/20/2020 2    • Rheumatoid Factor -Neph- 11/20/2020 <10    • NT-proBNP 11/20/2020 54    • West Nile Virus Igg 11/20/2020 0.19    • West Nile Virus Igm 11/20/2020 0.06    • Hsv I-Ii Igg Antibodies 11/20/2020 >22.40    • Hsv Igm I-Ii Combination 11/20/2020 0.54    • Rubeola Igg Antibody 11/20/2020 273.0    • Rubeola -Measles- Ab Igm 11/20/2020 0.67    • Mumps Ab Igg 11/20/2020 >300.0    • Mumps Ab Igm 11/20/2020 0.45    • Varicella Zoster IgG Ab 11/20/2020 1506.0    • Varicella Zoster IgM Ab 11/20/2020 0.09    • Anti-Dna -Ds 11/20/2020 None Detected    • SSA 52 (R0)(CHANDRIKA) Ab, IgG 11/20/2020 2    • SSA 60 (R0)(CHANDRIKA) Ab, IgG 11/20/2020 0    • Sjogren'S Anti-Ss-B 11/20/2020 0    • Thompson Antibodies 11/20/2020 0    • Thompson/RNP IgG (CHANDRIKA) 11/20/2020 0    • Anti-Scl-70 11/20/2020 0    •  Miscellaneous Lab Result 11/20/2020 SEE NOTE    • SSA 52 (R0)(CHANDRIKA) Ab, IgG 11/20/2020 2    • SSA 60 (R0)(CHANDRIKA) Ab, IgG 11/20/2020 0    • Sjogren'S Anti-Ss-B 11/20/2020 0    • [place], IgE 11/20/2020 <0.10    • Asparagus  F261 11/20/2020 <0.10    • F138 Avocado 11/20/2020 <0.10    • Garcia/Esquivel Yeast IgE 11/20/2020 <0.10    • F204 Banana 11/20/2020 <0.10    • Basil  F269 11/20/2020 <0.10    • [place]   F278 11/20/2020 <0.10    • F27 Beef 11/20/2020 <0.10    • Beet Root, IgE 11/20/2020 <0.10    • Pepper C. annuum, IgE 11/20/2020 <0.10    • Black Pepper  F280 11/20/2020 <0.10    • Mussel F037 11/20/2020 <0.10    • Blueberry  F288 11/20/2020 <0.10    • Brazil Nut  F018 11/20/2020 <0.10    • Broccoli  F260 11/20/2020 <0.10    • Buckwheat  F011 11/20/2020 <0.10    • Cabbage F216 11/20/2020 <0.10    • F31 Carrot 11/20/2020 <0.10    • Cashew Nut  F202 11/20/2020 <0.10    • F299 Sweet [place] 11/20/2020 <0.10    • Chick Pea  F309 11/20/2020 <0.10    • Chocolate Cacao, IgE F093 11/20/2020 <0.10    • Cinnamon  F220 11/20/2020 <0.10    • Clam  F207 11/20/2020 <0.10    • Coconut 11/20/2020 <0.10    • Codfish Allergen 11/20/2020 <0.10    • Occupational Cotton Seed* 11/20/2020 <0.10    • Crab  F023 11/20/2020 <0.10    • Cucumber  F244 11/20/2020 <0.10    • Dill  F277 11/20/2020 <0.10    • F1 Eggwhite 11/20/2020 <0.10    • Mabel  F270 11/20/2020 <0.10    • Grape Allergen 11/20/2020 <0.10    • Halibut  F303 11/20/2020 <0.10    • Hazelnut/Filbert  F017 11/20/2020 <0.10    • Honeydew/Cantaloupe, IgE 11/20/2020 <0.10    • Lettuce F215 11/20/2020 <0.10    • Pedro Brean/White Henson, I* 11/20/2020 <0.10    • F2 Milk 11/20/2020 <0.10    • Onions  F048 11/20/2020 <0.10    • [place] F033 11/20/2020 <0.10    • Oregano  F283 11/20/2020 <0.10    • F12 Pea 11/20/2020 <0.10    • F013 Peanut 11/20/2020 <0.10    • Pear  F094 11/20/2020 <0.10    • [place]  F255 11/20/2020 <0.10    • Pork  F026 11/20/2020 <0.10    • Potato 11/20/2020 <0.10    • Raspberry,  IgE F343 11/20/2020 <0.10    • Sesame Seed 11/20/2020 <0.10    • Shrimp IgE 11/20/2020 <0.10    • F14 Henson Soybean 11/20/2020 <0.10    • Tea  F222 11/20/2020 <0.10    • Tomato IgE 11/20/2020 <0.10    • Trout  F204 11/20/2020 <0.10    • Turkey F284 11/20/2020 <0.10    • [place]  F255 11/20/2020 <0.10    • Watermelon 11/20/2020 <0.10    • Immunocap Score 11/20/2020 See Note    • HSV1 Gly IgG 11/20/2020 33.90*   • HSV2 Gly IgG 11/20/2020 2.63*   Hospital Outpatient Visit on 05/13/2020   Component Date Value   • Antinuclear Antibody 05/13/2020 None Detected    Hospital Outpatient Visit on 04/30/2020   Component Date Value   • Rheumatoid Factor -Neph- 04/30/2020 <10    • WBC 04/30/2020 6.6    • RBC 04/30/2020 4.48    • Hemoglobin 04/30/2020 13.5    • Hematocrit 04/30/2020 41.6    • MCV 04/30/2020 92.9    • MCH 04/30/2020 30.1    • MCHC 04/30/2020 32.5*   • RDW 04/30/2020 44.0    • Platelet Count 04/30/2020 318    • MPV 04/30/2020 9.7    • Neutrophils-Polys 04/30/2020 43.50*   • Lymphocytes 04/30/2020 40.60    • Monocytes 04/30/2020 11.30    • Eosinophils 04/30/2020 2.60    • Basophils 04/30/2020 1.70    • Immature Granulocytes 04/30/2020 0.30    • Nucleated RBC 04/30/2020 0.00    • Neutrophils (Absolute) 04/30/2020 2.89    • Lymphs (Absolute) 04/30/2020 2.69    • Monos (Absolute) 04/30/2020 0.75    • Eos (Absolute) 04/30/2020 0.17    • Baso (Absolute) 04/30/2020 0.11    • Immature Granulocytes (a* 04/30/2020 0.02    • NRBC (Absolute) 04/30/2020 0.00    • Color 04/30/2020 Yellow    • Character 04/30/2020 Cloudy*   • Specific Gravity 04/30/2020 1.025    • Ph 04/30/2020 6.5    • Glucose 04/30/2020 Negative    • Ketones 04/30/2020 Negative    • Protein 04/30/2020 Negative    • Bilirubin 04/30/2020 Negative    • Urobilinogen, Urine 04/30/2020 0.2    • Nitrite 04/30/2020 Negative    • Leukocyte Esterase 04/30/2020 Negative    • Occult Blood 04/30/2020 Negative    • Micro Urine Req 04/30/2020 Microscopic    • WBC 04/30/2020 0-2     • RBC 04/30/2020 0-2    • Bacteria 04/30/2020 Negative    • Epithelial Cells 04/30/2020 Negative    • Ca Oxalate Crystal 04/30/2020 Moderate    • Hyaline Cast 04/30/2020 0-2    Hospital Outpatient Visit on 04/10/2020   Component Date Value   • TSH 04/10/2020 4.000    Hospital Outpatient Visit on 04/10/2020   Component Date Value   • Cholesterol,Tot 04/10/2020 163    • Triglycerides 04/10/2020 70    • HDL 04/10/2020 51    • LDL 04/10/2020 98    • WBC 04/10/2020 7.2    • RBC 04/10/2020 4.46    • Hemoglobin 04/10/2020 13.6    • Hematocrit 04/10/2020 40.7    • MCV 04/10/2020 91.3    • MCH 04/10/2020 30.5    • MCHC 04/10/2020 33.4*   • RDW 04/10/2020 44.0    • Platelet Count 04/10/2020 332    • MPV 04/10/2020 9.3    • GFR If  04/10/2020 >60    • GFR If Non  [facility]* 04/10/2020 >60    • Sodium 04/10/2020 141    • Potassium 04/10/2020 3.8    • Chloride 04/10/2020 104    • Co2 04/10/2020 27    • Anion Gap 04/10/2020 10.0    • Glucose 04/10/2020 82    • Bun 04/10/2020 20    • Creatinine 04/10/2020 0.85    • Calcium 04/10/2020 9.3    • AST(SGOT) 04/10/2020 12    • ALT(SGPT) 04/10/2020 17    • Alkaline Phosphatase 04/10/2020 69    • Total Bilirubin 04/10/2020 0.4    • Albumin 04/10/2020 4.0    • Total Protein 04/10/2020 6.5    • Globulin 04/10/2020 2.5    • A-G Ratio 04/10/2020 1.6    • NT-proBNP 04/10/2020 36    ]

## 2021-03-29 NOTE — TELEPHONE ENCOUNTER
Was the patient seen in the last year in this department? Yes  LOV 01/18/2021     Does patient have an active prescription for medications requested? Yes    Received Request Via: Pharmacy    Admission on 01/18/2021, Discharged on 01/19/2021   Component Date Value   • Color 01/18/2021 Yellow    • Character 01/18/2021 Turbid*   • Specific Gravity 01/18/2021 1.020    • Ph 01/18/2021 7.0    • Glucose 01/18/2021 Negative    • Ketones 01/18/2021 Negative    • Protein 01/18/2021 Negative    • Bilirubin 01/18/2021 Negative    • Urobilinogen, Urine 01/18/2021 0.2    • Nitrite 01/18/2021 Negative    • Leukocyte Esterase 01/18/2021 Small*   • Occult Blood 01/18/2021 Negative    • Micro Urine Req 01/18/2021 Microscopic    • WBC 01/18/2021 10-20*   • RBC 01/18/2021 2-5*   • Bacteria 01/18/2021 Moderate*   • Epithelial Cells 01/18/2021 Few    • Hyaline Cast 01/18/2021 3-5*   • Significant Indicator 01/18/2021 NEG    • Source 01/18/2021 UR    • Site 01/18/2021 -    • Culture Result 01/18/2021 Usual skin kianna 10-50,000 cfu/mL    Hospital Outpatient Visit on 11/20/2020   Component Date Value   • HLA-B27 Screen 11/20/2020 Negative    • Sed Rate Westergren 11/20/2020 2    • Rheumatoid Factor -Neph- 11/20/2020 <10    • NT-proBNP 11/20/2020 54    • West Nile Virus Igg 11/20/2020 0.19    • West Nile Virus Igm 11/20/2020 0.06    • Hsv I-Ii Igg Antibodies 11/20/2020 >22.40    • Hsv Igm I-Ii Combination 11/20/2020 0.54    • Rubeola Igg Antibody 11/20/2020 273.0    • Rubeola -Measles- Ab Igm 11/20/2020 0.67    • Mumps Ab Igg 11/20/2020 >300.0    • Mumps Ab Igm 11/20/2020 0.45    • Varicella Zoster IgG Ab 11/20/2020 1506.0    • Varicella Zoster IgM Ab 11/20/2020 0.09    • Anti-Dna -Ds 11/20/2020 None Detected    • SSA 52 (R0)(CHANDRIKA) Ab, IgG 11/20/2020 2    • SSA 60 (R0)(CHANDRIKA) Ab, IgG 11/20/2020 0    • Sjogren'S Anti-Ss-B 11/20/2020 0    • Thompson Antibodies 11/20/2020 0    • Thompson/RNP IgG (CHANDRIKA) 11/20/2020 0    • Anti-Scl-70 11/20/2020 0    •  Miscellaneous Lab Result 11/20/2020 SEE NOTE    • SSA 52 (R0)(CHANDRIKA) Ab, IgG 11/20/2020 2    • SSA 60 (R0)(CHANDRIKA) Ab, IgG 11/20/2020 0    • Sjogren'S Anti-Ss-B 11/20/2020 0    • [place], IgE 11/20/2020 <0.10    • Asparagus  F261 11/20/2020 <0.10    • F138 Avocado 11/20/2020 <0.10    • Garcia/Esquivel Yeast IgE 11/20/2020 <0.10    • F204 Banana 11/20/2020 <0.10    • Basil  F269 11/20/2020 <0.10    • [place]   F278 11/20/2020 <0.10    • F27 Beef 11/20/2020 <0.10    • Beet Root, IgE 11/20/2020 <0.10    • Pepper C. annuum, IgE 11/20/2020 <0.10    • Black Pepper  F280 11/20/2020 <0.10    • Mussel F037 11/20/2020 <0.10    • Blueberry  F288 11/20/2020 <0.10    • Brazil Nut  F018 11/20/2020 <0.10    • Broccoli  F260 11/20/2020 <0.10    • Buckwheat  F011 11/20/2020 <0.10    • Cabbage F216 11/20/2020 <0.10    • F31 Carrot 11/20/2020 <0.10    • Cashew Nut  F202 11/20/2020 <0.10    • F299 Sweet [place] 11/20/2020 <0.10    • Chick Pea  F309 11/20/2020 <0.10    • Chocolate Cacao, IgE F093 11/20/2020 <0.10    • Cinnamon  F220 11/20/2020 <0.10    • Clam  F207 11/20/2020 <0.10    • Coconut 11/20/2020 <0.10    • Codfish Allergen 11/20/2020 <0.10    • Occupational Cotton Seed* 11/20/2020 <0.10    • Crab  F023 11/20/2020 <0.10    • Cucumber  F244 11/20/2020 <0.10    • Dill  F277 11/20/2020 <0.10    • F1 Eggwhite 11/20/2020 <0.10    • Mabel  F270 11/20/2020 <0.10    • Grape Allergen 11/20/2020 <0.10    • Halibut  F303 11/20/2020 <0.10    • Hazelnut/Filbert  F017 11/20/2020 <0.10    • Honeydew/Cantaloupe, IgE 11/20/2020 <0.10    • Lettuce F215 11/20/2020 <0.10    • Pedro Brean/White Henson, I* 11/20/2020 <0.10    • F2 Milk 11/20/2020 <0.10    • Onions  F048 11/20/2020 <0.10    • [place] F033 11/20/2020 <0.10    • Oregano  F283 11/20/2020 <0.10    • F12 Pea 11/20/2020 <0.10    • F013 Peanut 11/20/2020 <0.10    • Pear  F094 11/20/2020 <0.10    • [place]  F255 11/20/2020 <0.10    • Pork  F026 11/20/2020 <0.10    • Potato 11/20/2020 <0.10    • Raspberry,  IgE F343 11/20/2020 <0.10    • Sesame Seed 11/20/2020 <0.10    • Shrimp IgE 11/20/2020 <0.10    • F14 Henson Soybean 11/20/2020 <0.10    • Tea  F222 11/20/2020 <0.10    • Tomato IgE 11/20/2020 <0.10    • Trout  F204 11/20/2020 <0.10    • Turkey F284 11/20/2020 <0.10    • [place]  F255 11/20/2020 <0.10    • Watermelon 11/20/2020 <0.10    • Immunocap Score 11/20/2020 See Note    • HSV1 Gly IgG 11/20/2020 33.90*   • HSV2 Gly IgG 11/20/2020 2.63*   Hospital Outpatient Visit on 05/13/2020   Component Date Value   • Antinuclear Antibody 05/13/2020 None Detected    Hospital Outpatient Visit on 04/30/2020   Component Date Value   • Rheumatoid Factor -Neph- 04/30/2020 <10    • WBC 04/30/2020 6.6    • RBC 04/30/2020 4.48    • Hemoglobin 04/30/2020 13.5    • Hematocrit 04/30/2020 41.6    • MCV 04/30/2020 92.9    • MCH 04/30/2020 30.1    • MCHC 04/30/2020 32.5*   • RDW 04/30/2020 44.0    • Platelet Count 04/30/2020 318    • MPV 04/30/2020 9.7    • Neutrophils-Polys 04/30/2020 43.50*   • Lymphocytes 04/30/2020 40.60    • Monocytes 04/30/2020 11.30    • Eosinophils 04/30/2020 2.60    • Basophils 04/30/2020 1.70    • Immature Granulocytes 04/30/2020 0.30    • Nucleated RBC 04/30/2020 0.00    • Neutrophils (Absolute) 04/30/2020 2.89    • Lymphs (Absolute) 04/30/2020 2.69    • Monos (Absolute) 04/30/2020 0.75    • Eos (Absolute) 04/30/2020 0.17    • Baso (Absolute) 04/30/2020 0.11    • Immature Granulocytes (a* 04/30/2020 0.02    • NRBC (Absolute) 04/30/2020 0.00    • Color 04/30/2020 Yellow    • Character 04/30/2020 Cloudy*   • Specific Gravity 04/30/2020 1.025    • Ph 04/30/2020 6.5    • Glucose 04/30/2020 Negative    • Ketones 04/30/2020 Negative    • Protein 04/30/2020 Negative    • Bilirubin 04/30/2020 Negative    • Urobilinogen, Urine 04/30/2020 0.2    • Nitrite 04/30/2020 Negative    • Leukocyte Esterase 04/30/2020 Negative    • Occult Blood 04/30/2020 Negative    • Micro Urine Req 04/30/2020 Microscopic    • WBC 04/30/2020 0-2     • RBC 04/30/2020 0-2    • Bacteria 04/30/2020 Negative    • Epithelial Cells 04/30/2020 Negative    • Ca Oxalate Crystal 04/30/2020 Moderate    • Hyaline Cast 04/30/2020 0-2    Hospital Outpatient Visit on 04/10/2020   Component Date Value   • TSH 04/10/2020 4.000    Hospital Outpatient Visit on 04/10/2020   Component Date Value   • Cholesterol,Tot 04/10/2020 163    • Triglycerides 04/10/2020 70    • HDL 04/10/2020 51    • LDL 04/10/2020 98    • WBC 04/10/2020 7.2    • RBC 04/10/2020 4.46    • Hemoglobin 04/10/2020 13.6    • Hematocrit 04/10/2020 40.7    • MCV 04/10/2020 91.3    • MCH 04/10/2020 30.5    • MCHC 04/10/2020 33.4*   • RDW 04/10/2020 44.0    • Platelet Count 04/10/2020 332    • MPV 04/10/2020 9.3    • GFR If  04/10/2020 >60    • GFR If Non  Ameri* 04/10/2020 >60    • Sodium 04/10/2020 141    • Potassium 04/10/2020 3.8    • Chloride 04/10/2020 104    • Co2 04/10/2020 27    • Anion Gap 04/10/2020 10.0    • Glucose 04/10/2020 82    • Bun 04/10/2020 20    • Creatinine 04/10/2020 0.85    • Calcium 04/10/2020 9.3    • AST(SGOT) 04/10/2020 12    • ALT(SGPT) 04/10/2020 17    • Alkaline Phosphatase 04/10/2020 69    • Total Bilirubin 04/10/2020 0.4    • Albumin 04/10/2020 4.0    • Total Protein 04/10/2020 6.5    • Globulin 04/10/2020 2.5    • A-G Ratio 04/10/2020 1.6    • NT-proBNP 04/10/2020 36    ]

## 2021-03-29 NOTE — TELEPHONE ENCOUNTER
Was the patient seen in the last year in this department? Yes   LOV 01/18/2021     Does patient have an active prescription for medications requested? Yes    Received Request Via: Pharmacy    Admission on 01/18/2021, Discharged on 01/19/2021   Component Date Value   • Color 01/18/2021 Yellow    • Character 01/18/2021 Turbid*   • Specific Gravity 01/18/2021 1.020    • Ph 01/18/2021 7.0    • Glucose 01/18/2021 Negative    • Ketones 01/18/2021 Negative    • Protein 01/18/2021 Negative    • Bilirubin 01/18/2021 Negative    • Urobilinogen, Urine 01/18/2021 0.2    • Nitrite 01/18/2021 Negative    • Leukocyte Esterase 01/18/2021 Small*   • Occult Blood 01/18/2021 Negative    • Micro Urine Req 01/18/2021 Microscopic    • WBC 01/18/2021 10-20*   • RBC 01/18/2021 2-5*   • Bacteria 01/18/2021 Moderate*   • Epithelial Cells 01/18/2021 Few    • Hyaline Cast 01/18/2021 3-5*   • Significant Indicator 01/18/2021 NEG    • Source 01/18/2021 UR    • Site 01/18/2021 -    • Culture Result 01/18/2021 Usual skin kianna 10-50,000 cfu/mL    Hospital Outpatient Visit on 11/20/2020   Component Date Value   • HLA-B27 Screen 11/20/2020 Negative    • Sed Rate Westergren 11/20/2020 2    • Rheumatoid Factor -Neph- 11/20/2020 <10    • NT-proBNP 11/20/2020 54    • West Nile Virus Igg 11/20/2020 0.19    • West Nile Virus Igm 11/20/2020 0.06    • Hsv I-Ii Igg Antibodies 11/20/2020 >22.40    • Hsv Igm I-Ii Combination 11/20/2020 0.54    • Rubeola Igg Antibody 11/20/2020 273.0    • Rubeola -Measles- Ab Igm 11/20/2020 0.67    • Mumps Ab Igg 11/20/2020 >300.0    • Mumps Ab Igm 11/20/2020 0.45    • Varicella Zoster IgG Ab 11/20/2020 1506.0    • Varicella Zoster IgM Ab 11/20/2020 0.09    • Anti-Dna -Ds 11/20/2020 None Detected    • SSA 52 (R0)(CHANDRIKA) Ab, IgG 11/20/2020 2    • SSA 60 (R0)(CHANDRIKA) Ab, IgG 11/20/2020 0    • Sjogren'S Anti-Ss-B 11/20/2020 0    • Thompson Antibodies 11/20/2020 0    • Thompson/RNP IgG (CHANDRIKA) 11/20/2020 0    • Anti-Scl-70 11/20/2020 0    •  Miscellaneous Lab Result 11/20/2020 SEE NOTE    • SSA 52 (R0)(CHANDRIKA) Ab, IgG 11/20/2020 2    • SSA 60 (R0)(CHANDRIKA) Ab, IgG 11/20/2020 0    • Sjogren'S Anti-Ss-B 11/20/2020 0    • [place], IgE 11/20/2020 <0.10    • Asparagus  F261 11/20/2020 <0.10    • F138 Avocado 11/20/2020 <0.10    • Garcia/Esquivel Yeast IgE 11/20/2020 <0.10    • F204 Banana 11/20/2020 <0.10    • Basil  F269 11/20/2020 <0.10    • [place]   F278 11/20/2020 <0.10    • F27 Beef 11/20/2020 <0.10    • Beet Root, IgE 11/20/2020 <0.10    • Pepper C. annuum, IgE 11/20/2020 <0.10    • Black Pepper  F280 11/20/2020 <0.10    • Mussel F037 11/20/2020 <0.10    • Blueberry  F288 11/20/2020 <0.10    • Brazil Nut  F018 11/20/2020 <0.10    • Broccoli  F260 11/20/2020 <0.10    • Buckwheat  F011 11/20/2020 <0.10    • Cabbage F216 11/20/2020 <0.10    • F31 Carrot 11/20/2020 <0.10    • Cashew Nut  F202 11/20/2020 <0.10    • F299 Sweet [place] 11/20/2020 <0.10    • Chick Pea  F309 11/20/2020 <0.10    • Chocolate Cacao, IgE F093 11/20/2020 <0.10    • Cinnamon  F220 11/20/2020 <0.10    • Clam  F207 11/20/2020 <0.10    • Coconut 11/20/2020 <0.10    • Codfish Allergen 11/20/2020 <0.10    • Occupational Cotton Seed* 11/20/2020 <0.10    • Crab  F023 11/20/2020 <0.10    • Cucumber  F244 11/20/2020 <0.10    • Dill  F277 11/20/2020 <0.10    • F1 Eggwhite 11/20/2020 <0.10    • Mabel  F270 11/20/2020 <0.10    • Grape Allergen 11/20/2020 <0.10    • Halibut  F303 11/20/2020 <0.10    • Hazelnut/Filbert  F017 11/20/2020 <0.10    • Honeydew/Cantaloupe, IgE 11/20/2020 <0.10    • Lettuce F215 11/20/2020 <0.10    • Pedro Brean/White Henson, I* 11/20/2020 <0.10    • F2 Milk 11/20/2020 <0.10    • Onions  F048 11/20/2020 <0.10    • [place] F033 11/20/2020 <0.10    • Oregano  F283 11/20/2020 <0.10    • F12 Pea 11/20/2020 <0.10    • F013 Peanut 11/20/2020 <0.10    • Pear  F094 11/20/2020 <0.10    • [place]  F255 11/20/2020 <0.10    • Pork  F026 11/20/2020 <0.10    • Potato 11/20/2020 <0.10    • Raspberry,  IgE F343 11/20/2020 <0.10    • Sesame Seed 11/20/2020 <0.10    • Shrimp IgE 11/20/2020 <0.10    • F14 Henson Soybean 11/20/2020 <0.10    • Tea  F222 11/20/2020 <0.10    • Tomato IgE 11/20/2020 <0.10    • Trout  F204 11/20/2020 <0.10    • Turkey F284 11/20/2020 <0.10    • [place]  F255 11/20/2020 <0.10    • Watermelon 11/20/2020 <0.10    • Immunocap Score 11/20/2020 See Note    • HSV1 Gly IgG 11/20/2020 33.90*   • HSV2 Gly IgG 11/20/2020 2.63*   Hospital Outpatient Visit on 05/13/2020   Component Date Value   • Antinuclear Antibody 05/13/2020 None Detected    Hospital Outpatient Visit on 04/30/2020   Component Date Value   • Rheumatoid Factor -Neph- 04/30/2020 <10    • WBC 04/30/2020 6.6    • RBC 04/30/2020 4.48    • Hemoglobin 04/30/2020 13.5    • Hematocrit 04/30/2020 41.6    • MCV 04/30/2020 92.9    • MCH 04/30/2020 30.1    • MCHC 04/30/2020 32.5*   • RDW 04/30/2020 44.0    • Platelet Count 04/30/2020 318    • MPV 04/30/2020 9.7    • Neutrophils-Polys 04/30/2020 43.50*   • Lymphocytes 04/30/2020 40.60    • Monocytes 04/30/2020 11.30    • Eosinophils 04/30/2020 2.60    • Basophils 04/30/2020 1.70    • Immature Granulocytes 04/30/2020 0.30    • Nucleated RBC 04/30/2020 0.00    • Neutrophils (Absolute) 04/30/2020 2.89    • Lymphs (Absolute) 04/30/2020 2.69    • Monos (Absolute) 04/30/2020 0.75    • Eos (Absolute) 04/30/2020 0.17    • Baso (Absolute) 04/30/2020 0.11    • Immature Granulocytes (a* 04/30/2020 0.02    • NRBC (Absolute) 04/30/2020 0.00    • Color 04/30/2020 Yellow    • Character 04/30/2020 Cloudy*   • Specific Gravity 04/30/2020 1.025    • Ph 04/30/2020 6.5    • Glucose 04/30/2020 Negative    • Ketones 04/30/2020 Negative    • Protein 04/30/2020 Negative    • Bilirubin 04/30/2020 Negative    • Urobilinogen, Urine 04/30/2020 0.2    • Nitrite 04/30/2020 Negative    • Leukocyte Esterase 04/30/2020 Negative    • Occult Blood 04/30/2020 Negative    • Micro Urine Req 04/30/2020 Microscopic    • WBC 04/30/2020 0-2     • RBC 04/30/2020 0-2    • Bacteria 04/30/2020 Negative    • Epithelial Cells 04/30/2020 Negative    • Ca Oxalate Crystal 04/30/2020 Moderate    • Hyaline Cast 04/30/2020 0-2    Hospital Outpatient Visit on 04/10/2020   Component Date Value   • TSH 04/10/2020 4.000    Hospital Outpatient Visit on 04/10/2020   Component Date Value   • Cholesterol,Tot 04/10/2020 163    • Triglycerides 04/10/2020 70    • HDL 04/10/2020 51    • LDL 04/10/2020 98    • WBC 04/10/2020 7.2    • RBC 04/10/2020 4.46    • Hemoglobin 04/10/2020 13.6    • Hematocrit 04/10/2020 40.7    • MCV 04/10/2020 91.3    • MCH 04/10/2020 30.5    • MCHC 04/10/2020 33.4*   • RDW 04/10/2020 44.0    • Platelet Count 04/10/2020 332    • MPV 04/10/2020 9.3    • GFR If  04/10/2020 >60    • GFR If Non  Ameri* 04/10/2020 >60    • Sodium 04/10/2020 141    • Potassium 04/10/2020 3.8    • Chloride 04/10/2020 104    • Co2 04/10/2020 27    • Anion Gap 04/10/2020 10.0    • Glucose 04/10/2020 82    • Bun 04/10/2020 20    • Creatinine 04/10/2020 0.85    • Calcium 04/10/2020 9.3    • AST(SGOT) 04/10/2020 12    • ALT(SGPT) 04/10/2020 17    • Alkaline Phosphatase 04/10/2020 69    • Total Bilirubin 04/10/2020 0.4    • Albumin 04/10/2020 4.0    • Total Protein 04/10/2020 6.5    • Globulin 04/10/2020 2.5    • A-G Ratio 04/10/2020 1.6    • NT-proBNP 04/10/2020 36    ]

## 2021-03-29 NOTE — TELEPHONE ENCOUNTER
Was the patient seen in the last year in this department? Yes  LOV 01/18/2021     Does patient have an active prescription for medications requested? Yes    Received Request Via: Pharmacy    Admission on 01/18/2021, Discharged on 01/19/2021   Component Date Value   • Color 01/18/2021 Yellow    • Character 01/18/2021 Turbid*   • Specific Gravity 01/18/2021 1.020    • Ph 01/18/2021 7.0    • Glucose 01/18/2021 Negative    • Ketones 01/18/2021 Negative    • Protein 01/18/2021 Negative    • Bilirubin 01/18/2021 Negative    • Urobilinogen, Urine 01/18/2021 0.2    • Nitrite 01/18/2021 Negative    • Leukocyte Esterase 01/18/2021 Small*   • Occult Blood 01/18/2021 Negative    • Micro Urine Req 01/18/2021 Microscopic    • WBC 01/18/2021 10-20*   • RBC 01/18/2021 2-5*   • Bacteria 01/18/2021 Moderate*   • Epithelial Cells 01/18/2021 Few    • Hyaline Cast 01/18/2021 3-5*   • Significant Indicator 01/18/2021 NEG    • Source 01/18/2021 UR    • Site 01/18/2021 -    • Culture Result 01/18/2021 Usual skin kianna 10-50,000 cfu/mL    Hospital Outpatient Visit on 11/20/2020   Component Date Value   • HLA-B27 Screen 11/20/2020 Negative    • Sed Rate Westergren 11/20/2020 2    • Rheumatoid Factor -Neph- 11/20/2020 <10    • NT-proBNP 11/20/2020 54    • West Nile Virus Igg 11/20/2020 0.19    • West Nile Virus Igm 11/20/2020 0.06    • Hsv I-Ii Igg Antibodies 11/20/2020 >22.40    • Hsv Igm I-Ii Combination 11/20/2020 0.54    • Rubeola Igg Antibody 11/20/2020 273.0    • Rubeola -Measles- Ab Igm 11/20/2020 0.67    • Mumps Ab Igg 11/20/2020 >300.0    • Mumps Ab Igm 11/20/2020 0.45    • Varicella Zoster IgG Ab 11/20/2020 1506.0    • Varicella Zoster IgM Ab 11/20/2020 0.09    • Anti-Dna -Ds 11/20/2020 None Detected    • SSA 52 (R0)(CHANDRIKA) Ab, IgG 11/20/2020 2    • SSA 60 (R0)(CHANDRIKA) Ab, IgG 11/20/2020 0    • Sjogren'S Anti-Ss-B 11/20/2020 0    • Thompson Antibodies 11/20/2020 0    • Thompson/RNP IgG (CHANDRIKA) 11/20/2020 0    • Anti-Scl-70 11/20/2020 0    •  Miscellaneous Lab Result 11/20/2020 SEE NOTE    • SSA 52 (R0)(CHANDRIKA) Ab, IgG 11/20/2020 2    • SSA 60 (R0)(CHANDRIKA) Ab, IgG 11/20/2020 0    • Sjogren'S Anti-Ss-B 11/20/2020 0    • [place], IgE 11/20/2020 <0.10    • Asparagus  F261 11/20/2020 <0.10    • F138 Avocado 11/20/2020 <0.10    • Garcia/Esquivel Yeast IgE 11/20/2020 <0.10    • F204 Banana 11/20/2020 <0.10    • Basil  F269 11/20/2020 <0.10    • [place]   F278 11/20/2020 <0.10    • F27 Beef 11/20/2020 <0.10    • Beet Root, IgE 11/20/2020 <0.10    • Pepper C. annuum, IgE 11/20/2020 <0.10    • Black Pepper  F280 11/20/2020 <0.10    • Mussel F037 11/20/2020 <0.10    • Blueberry  F288 11/20/2020 <0.10    • Brazil Nut  F018 11/20/2020 <0.10    • Broccoli  F260 11/20/2020 <0.10    • Buckwheat  F011 11/20/2020 <0.10    • Cabbage F216 11/20/2020 <0.10    • F31 Carrot 11/20/2020 <0.10    • Cashew Nut  F202 11/20/2020 <0.10    • F299 Sweet [place] 11/20/2020 <0.10    • Chick Pea  F309 11/20/2020 <0.10    • Chocolate Cacao, IgE F093 11/20/2020 <0.10    • Cinnamon  F220 11/20/2020 <0.10    • Clam  F207 11/20/2020 <0.10    • Coconut 11/20/2020 <0.10    • Codfish Allergen 11/20/2020 <0.10    • Occupational Cotton Seed* 11/20/2020 <0.10    • Crab  F023 11/20/2020 <0.10    • Cucumber  F244 11/20/2020 <0.10    • Dill  F277 11/20/2020 <0.10    • F1 Eggwhite 11/20/2020 <0.10    • Mabel  F270 11/20/2020 <0.10    • Grape Allergen 11/20/2020 <0.10    • Halibut  F303 11/20/2020 <0.10    • Hazelnut/Filbert  F017 11/20/2020 <0.10    • Honeydew/Cantaloupe, IgE 11/20/2020 <0.10    • Lettuce F215 11/20/2020 <0.10    • Pedro Brean/White Henson, I* 11/20/2020 <0.10    • F2 Milk 11/20/2020 <0.10    • Onions  F048 11/20/2020 <0.10    • [place] F033 11/20/2020 <0.10    • Oregano  F283 11/20/2020 <0.10    • F12 Pea 11/20/2020 <0.10    • F013 Peanut 11/20/2020 <0.10    • Pear  F094 11/20/2020 <0.10    • [place]  F255 11/20/2020 <0.10    • Pork  F026 11/20/2020 <0.10    • Potato 11/20/2020 <0.10    • Raspberry,  IgE F343 11/20/2020 <0.10    • Sesame Seed 11/20/2020 <0.10    • Shrimp IgE 11/20/2020 <0.10    • F14 Henson Soybean 11/20/2020 <0.10    • Tea  F222 11/20/2020 <0.10    • Tomato IgE 11/20/2020 <0.10    • Trout  F204 11/20/2020 <0.10    • Turkey F284 11/20/2020 <0.10    • [place]  F255 11/20/2020 <0.10    • Watermelon 11/20/2020 <0.10    • Immunocap Score 11/20/2020 See Note    • HSV1 Gly IgG 11/20/2020 33.90*   • HSV2 Gly IgG 11/20/2020 2.63*   Hospital Outpatient Visit on 05/13/2020   Component Date Value   • Antinuclear Antibody 05/13/2020 None Detected    Hospital Outpatient Visit on 04/30/2020   Component Date Value   • Rheumatoid Factor -Neph- 04/30/2020 <10    • WBC 04/30/2020 6.6    • RBC 04/30/2020 4.48    • Hemoglobin 04/30/2020 13.5    • Hematocrit 04/30/2020 41.6    • MCV 04/30/2020 92.9    • MCH 04/30/2020 30.1    • MCHC 04/30/2020 32.5*   • RDW 04/30/2020 44.0    • Platelet Count 04/30/2020 318    • MPV 04/30/2020 9.7    • Neutrophils-Polys 04/30/2020 43.50*   • Lymphocytes 04/30/2020 40.60    • Monocytes 04/30/2020 11.30    • Eosinophils 04/30/2020 2.60    • Basophils 04/30/2020 1.70    • Immature Granulocytes 04/30/2020 0.30    • Nucleated RBC 04/30/2020 0.00    • Neutrophils (Absolute) 04/30/2020 2.89    • Lymphs (Absolute) 04/30/2020 2.69    • Monos (Absolute) 04/30/2020 0.75    • Eos (Absolute) 04/30/2020 0.17    • Baso (Absolute) 04/30/2020 0.11    • Immature Granulocytes (a* 04/30/2020 0.02    • NRBC (Absolute) 04/30/2020 0.00    • Color 04/30/2020 Yellow    • Character 04/30/2020 Cloudy*   • Specific Gravity 04/30/2020 1.025    • Ph 04/30/2020 6.5    • Glucose 04/30/2020 Negative    • Ketones 04/30/2020 Negative    • Protein 04/30/2020 Negative    • Bilirubin 04/30/2020 Negative    • Urobilinogen, Urine 04/30/2020 0.2    • Nitrite 04/30/2020 Negative    • Leukocyte Esterase 04/30/2020 Negative    • Occult Blood 04/30/2020 Negative    • Micro Urine Req 04/30/2020 Microscopic    • WBC 04/30/2020 0-2     • RBC 04/30/2020 0-2    • Bacteria 04/30/2020 Negative    • Epithelial Cells 04/30/2020 Negative    • Ca Oxalate Crystal 04/30/2020 Moderate    • Hyaline Cast 04/30/2020 0-2    Hospital Outpatient Visit on 04/10/2020   Component Date Value   • TSH 04/10/2020 4.000    Hospital Outpatient Visit on 04/10/2020   Component Date Value   • Cholesterol,Tot 04/10/2020 163    • Triglycerides 04/10/2020 70    • HDL 04/10/2020 51    • LDL 04/10/2020 98    • WBC 04/10/2020 7.2    • RBC 04/10/2020 4.46    • Hemoglobin 04/10/2020 13.6    • Hematocrit 04/10/2020 40.7    • MCV 04/10/2020 91.3    • MCH 04/10/2020 30.5    • MCHC 04/10/2020 33.4*   • RDW 04/10/2020 44.0    • Platelet Count 04/10/2020 332    • MPV 04/10/2020 9.3    • GFR If  04/10/2020 >60    • GFR If Non  [facility]* 04/10/2020 >60    • Sodium 04/10/2020 141    • Potassium 04/10/2020 3.8    • Chloride 04/10/2020 104    • Co2 04/10/2020 27    • Anion Gap 04/10/2020 10.0    • Glucose 04/10/2020 82    • Bun 04/10/2020 20    • Creatinine 04/10/2020 0.85    • Calcium 04/10/2020 9.3    • AST(SGOT) 04/10/2020 12    • ALT(SGPT) 04/10/2020 17    • Alkaline Phosphatase 04/10/2020 69    • Total Bilirubin 04/10/2020 0.4    • Albumin 04/10/2020 4.0    • Total Protein 04/10/2020 6.5    • Globulin 04/10/2020 2.5    • A-G Ratio 04/10/2020 1.6    • NT-proBNP 04/10/2020 36    ]

## 2021-04-08 ENCOUNTER — HOSPITAL ENCOUNTER (OUTPATIENT)
Dept: LAB | Facility: MEDICAL CENTER | Age: 56
End: 2021-04-08
Attending: PHYSICIAN ASSISTANT
Payer: MEDICAID

## 2021-04-08 DIAGNOSIS — E78.5 DYSLIPIDEMIA, GOAL LDL BELOW 130: ICD-10-CM

## 2021-04-08 PROCEDURE — 80053 COMPREHEN METABOLIC PANEL: CPT

## 2021-04-08 PROCEDURE — 36415 COLL VENOUS BLD VENIPUNCTURE: CPT

## 2021-04-08 PROCEDURE — 80061 LIPID PANEL: CPT

## 2021-04-09 LAB
ALBUMIN SERPL BCP-MCNC: 4.2 G/DL (ref 3.2–4.9)
ALBUMIN/GLOB SERPL: 1.7 G/DL
ALP SERPL-CCNC: 85 U/L (ref 30–99)
ALT SERPL-CCNC: 19 U/L (ref 2–50)
ANION GAP SERPL CALC-SCNC: 6 MMOL/L (ref 7–16)
AST SERPL-CCNC: 22 U/L (ref 12–45)
BILIRUB SERPL-MCNC: 0.3 MG/DL (ref 0.1–1.5)
BUN SERPL-MCNC: 14 MG/DL (ref 8–22)
CALCIUM SERPL-MCNC: 9.2 MG/DL (ref 8.5–10.5)
CHLORIDE SERPL-SCNC: 103 MMOL/L (ref 96–112)
CHOLEST SERPL-MCNC: 190 MG/DL (ref 100–199)
CO2 SERPL-SCNC: 31 MMOL/L (ref 20–33)
CREAT SERPL-MCNC: 0.92 MG/DL (ref 0.5–1.4)
FASTING STATUS PATIENT QL REPORTED: NORMAL
GLOBULIN SER CALC-MCNC: 2.5 G/DL (ref 1.9–3.5)
GLUCOSE SERPL-MCNC: 98 MG/DL (ref 65–99)
HDLC SERPL-MCNC: 52 MG/DL
LDLC SERPL CALC-MCNC: 125 MG/DL
POTASSIUM SERPL-SCNC: 4.1 MMOL/L (ref 3.6–5.5)
PROT SERPL-MCNC: 6.7 G/DL (ref 6–8.2)
SODIUM SERPL-SCNC: 140 MMOL/L (ref 135–145)
TRIGL SERPL-MCNC: 65 MG/DL (ref 0–149)

## 2021-04-10 NOTE — PROGRESS NOTES
Chief Complaint   Patient presents with   • Shoulder Pain       Subjective:     HPI:   Bhavana Liu presents today with the followin. Chronic right shoulder pain  She has chronic right shoulder pain with arthritis and long-standing dislocation issues.  This pain interferes with sleep and her activities of daily living.  She has been using oxycodone APAP for treatment for quite a while.  However, her drug screen has shown illicit drug use repeatedly.  She states she is working on this but I am no longer comfortable in any way writing a schedule II narcotic in these circumstances.  I have discussed this with her in the past.  She has prohibited me from mentioning this in the room when her  is there as apparently he does not know.  He is there today so I have had to be less than forthright and has had to just state that I am changing her pain regimen from the Percocet to the tramadol.  She accepted this and did not argue with me.  This is a bad situation.    2. Recurrent dislocation of shoulder, unspecified laterality  I believe she should be seen by orthopedics and possibly surgery could be helpful.  However, she does not have insurance.  Patient has been relying on the pain medication to help her through her ADLs.  I do realize this and in normal circumstances if we were making a transition I would wean her down.  However, these are not normal circumstances and as discussed with her in the past if the repeat drug screen showed positive I would no longer be writing the schedule II narcotic.  I have changed to the tramadol.  I really am not fully comfortable writing this.  She is aware that she will have a drug test again sometime in the next 6 months and there may be very short notice.  If positive again I will not be writing any controlled substances.    3. Chronic use of opiate for therapeutic purpose  Discussed, changing to schedule III drugs while she addresses her amphetamine problem. State  board of pharmacy interface is reviewed.  No inconsistencies are found except the drug test results.  Her average MME is 90, active is 90, max active is 90.  This is within CDC guideline for chronic pain prescribing by primary care.    4. Abnormal drug screen  She has twice been positive for methamphetamine.  Patient states she is weaning off and doing her own treatment.  Will not write schedule II narcotics any more.      5. Methamphetamine use  She is working on this.  Will agree to keep with tramadol instead but if next test is also positive will no longer be able to write any controlled substances at all.  Have asked her to make contact with rehab facilities and see if there are any cramps or anything that she can use.  This was not discussed today in front of her  per her previous request but was discussed when I saw her brother-in-law.  She was there in the room as she is his primary caretaker.  We spent several minutes when he had left the room to go to the bathroom, discussing this situation.        Patient Active Problem List    Diagnosis Date Noted   • Abnormal drug screen 12/08/2017   • Methamphetamine use 12/08/2017   • Chronic use of opiate for therapeutic purpose 07/05/2016   • Recurrent shoulder dislocation 10/03/2014   • Right shoulder pain 07/30/2014   • Adjustment disorder with anxious mood    • Migraine headache 09/30/2013   • GERD (gastroesophageal reflux disease)    • Dyslipidemia, goal LDL below 130        Current medicines (including changes today)  Current Outpatient Prescriptions   Medication Sig Dispense Refill   • tramadol (ULTRAM) 50 MG Tab Take 1 Tab by mouth every four hours as needed for up to 180 days. 180 Tab 5   • carisoprodol (SOMA) 350 MG Tab Take 1 Tab by mouth every 8 hours as needed for Muscle Spasms for up to 180 days. 90 Tab 5   • ALPRAZolam (XANAX) 1 MG Tab Take 1 Tab by mouth 3 times a day as needed for Sleep or Anxiety for up to 180 days. 90 Tab 5   •  "cyclobenzaprine (FLEXERIL) 10 MG Tab Take 1 Tab by mouth 3 times a day as needed for up to 180 days. 30 Tab 5   • omeprazole (PRILOSEC) 20 MG delayed-release capsule TAKE 1 CAPSULE BY MOUTH DAILY 90 Cap 3   • busPIRone (BUSPAR) 10 MG Tab Take 1 Tab by mouth 2 Times a Day. 180 Tab 3   • paroxetine (PAXIL) 20 MG Tab Take 1 Tab by mouth every day. 180 Tab 3   • albuterol (VENTOLIN OR PROVENTIL) 108 (90 BASE) MCG/ACT AERS Inhale 2 Puffs by mouth every 6 hours as needed for Shortness of Breath. 1 Inhaler 3     No current facility-administered medications for this visit.        Allergies   Allergen Reactions   • Amoxicillin Rash       ROS: As per HPI       Objective:     Blood pressure 122/74, pulse 99, temperature 36.2 °C (97.2 °F), resp. rate 13, height 1.753 m (5' 9\"), weight 88 kg (194 lb), SpO2 95 %. Body mass index is 28.65 kg/m².    Physical Exam:  Constitutional: Well-developed and well-nourished. Not diaphoretic. No distress. Lucid and fluent.  Skin: Skin is warm and dry. No rash noted.  Head: Atraumatic without lesions.  Eyes: Conjunctivae and extraocular motions are normal. Pupils are equal, round, and reactive to light. No scleral icterus.   Mouth/Throat: Tongue normal. Oropharynx is clear and moist. Posterior pharynx without erythema or exudates.  Neck: Supple, trachea midline. No thyromegaly present.  No JVD or carotid bruits appreciated  Cardiovascular: Regular rate and rhythm.  Normal S1, S2 without murmur appreciated.  Chest: Effort normal. Clear to auscultation throughout. No adventitious sounds.   Extremities: No cyanosis, clubbing, erythema, nor edema.  Reduced right shoulder range of motion.  Neurological: Alert and oriented x 3.  Gait is normal.  Psychiatric:  Behavior, mood, and affect are appropriate.       Assessment and Plan:     52 y.o. female with the following issues:    1. Chronic right shoulder pain  tramadol (ULTRAM) 50 MG Tab   2. Recurrent dislocation of shoulder, unspecified laterality  " tramadol (ULTRAM) 50 MG Tab   3. Chronic use of opiate for therapeutic purpose     4. Abnormal drug screen     5. Methamphetamine use       Financially it is very difficult for her to come in here.  We have agreed on a six-month recheck but I may call her at any time to do the urine drug screen.    Followup: Return in about 6 months (around 11/24/2018), or if symptoms worsen or fail to improve.   VITALS:   T(C): 36.4 (04-10-21 @ 16:55), Max: 36.6 (04-10-21 @ 12:33)  HR: 81 (04-10-21 @ 16:55) (74 - 81)  BP: 107/66 (04-10-21 @ 16:55) (102/70 - 107/74)  RR: 20 (04-10-21 @ 16:55) (20 - 22)  SpO2: 99% (04-10-21 @ 16:55) (99% - 100%)    PHYSICAL EXAM:     GENERAL: NAD, lying in bed comfortably  HEAD:  Atraumatic, Normocephalic  EYES: EOMI, PERRLA, conjunctiva and sclera clear  ENT: Moist mucous membranes  NECK: Supple, No JVD  CHEST/LUNG: Clear to auscultation bilaterally; No rales, rhonchi, wheezing, or rubs. Unlabored respirations  HEART: Regular rate and rhythm; No murmurs, rubs, or gallops  ABDOMEN: normal bowel sounds; Soft, nontender, nondistended  EXTREMITIES:  2+ Peripheral Pulses, brisk capillary refill. No clubbing, cyanosis, or edema  Neurological:  A&Ox3, no focal deficits   SKIN: No rashes or lesions  PSYCH: normal affect and mood VITALS:   T(C): 36.4 (04-10-21 @ 16:55), Max: 36.6 (04-10-21 @ 12:33)  HR: 81 (04-10-21 @ 16:55) (74 - 81)  BP: 107/66 (04-10-21 @ 16:55) (102/70 - 107/74)  RR: 20 (04-10-21 @ 16:55) (20 - 22)  SpO2: 99% (04-10-21 @ 16:55) (99% - 100%)    PHYSICAL EXAM:     GENERAL: NAD, lying in bed comfortably, receiving 1 unit pRBCs  HEAD:  Atraumatic, Normocephalic  EYES: EOMI, PERRLA, conjunctiva and sclera clear  ENT: Moist mucous membranes  NECK: Supple, No JVD  CHEST/LUNG: ; No rales, rhonchi, wheezing, or rubs. Unlabored respirations  HEART: Regular rate and rhythm; No murmurs, rubs, or gallops  ABDOMEN: normal bowel sounds; Soft, nontender, nondistended  EXTREMITIES:  2+ Peripheral Pulses, brisk capillary refill. No clubbing, cyanosis, or edema, left AV fistula with good thrill   Neurological:  A&Ox3, no focal deficits   SKIN: No rashes or lesions  PSYCH: normal affect and mood VITALS:   T(C): 36.4 (04-10-21 @ 16:55), Max: 36.6 (04-10-21 @ 12:33)  HR: 81 (04-10-21 @ 16:55) (74 - 81)  BP: 107/66 (04-10-21 @ 16:55) (102/70 - 107/74)  RR: 20 (04-10-21 @ 16:55) (20 - 22)  SpO2: 99% (04-10-21 @ 16:55) (99% - 100%)    PHYSICAL EXAM:     GENERAL: NAD, lying in bed comfortably, receiving 1 unit pRBCs  HEAD:  Atraumatic, Normocephalic  EYES: EOMI, PERRLA, conjunctiva and sclera clear  ENT: Moist mucous membranes  NECK: Supple, No JVD  CHEST/LUNG: ; No rales, rhonchi, wheezing, or rubs. Unlabored respirations  HEART: Regular rate and rhythm; No murmurs, rubs, or gallops  ABDOMEN: normal bowel sounds; Soft, nontender, mildly distended, able to palpate edge of spleen further mid-axilla, nontender   EXTREMITIES:  2+ Peripheral Pulses, brisk capillary refill. No clubbing, cyanosis, or edema, left AV fistula with good thrill   Neurological:  A&Ox3, no focal deficits   SKIN: No rashes or lesions  PSYCH: normal affect and mood

## 2021-05-06 NOTE — H&P
DATE OF ADMISSION:  2021     HISTORY OF PRESENT ILLNESS:  The patient is a 55-year-old  3, para   2-0-1-2 with 2 normal vaginal deliveries, postmenopausal for the past 10 years   who was seen for pelvic organ prolapse with grade III uterine descent with   grade III cystocele.  The patient has been taking care of her disabled    for years and this prolapse has been a problem for her and wants to take care   of it.  The patient scheduled for total vaginal hysterectomy with possible   bilateral salpingo-oophorectomy, anterior and posterior colpoperineorrhaphy,   uterosacral vault suspension, here for preop appointment.  The patient is   menopausal for the past 10 years and has not been sexually active as the   partner is disabled, previous history of meth abuse and current smoker, but   has cut down significantly.     PAST MEDICAL HISTORY:  Denies high blood pressure, diabetes or thyroid issues.     OBSTETRIC HISTORY:   3, 2 normal vaginal deliveries.  Menopausal for   past 10 years.     SOCIAL HISTORY:  She is a current tobacco user.  Denies alcohol use.  History   of meth abuse previously.     PAST SURGICAL HISTORY:  Had laparoscopy previously.     FAMILY HISTORY:  Father is alcoholic.  Mother is alcoholic and has depression.    Sister has alcoholism and depression.     ALLERGIES:  No known drug allergies.     REVIEW OF SYSTEMS:  The patient is alert and oriented.  Denies shortness of   breath, chest pain or palpitation.  Regular bowel and bladder movements.     PHYSICAL EXAMINATION:    VITAL SIGNS:  The patient is 5 feet 9 inches tall, weighs 213 pounds.  Vital   signs are stable.  See EMR for current vitals.  GENERAL:  The patient is awake, alert, well developed, well nourished and well   groomed.  RESPIRATORY:  The patient is relaxed and breathes without effort.  LUNGS:  Clear to auscultate.  No crackles, wheezes or rhonchi.  CARDIOVASCULAR:  Rate is normal, rhythm is regular.  S1, S2  normal.  No   murmurs, gallops or rubs.  GASTROINTESTINAL:  Abdomen is soft, nontender, no guarding or rigidity.  Bowel   sounds are normal.  No palpable masses, no hepatosplenomegaly.  No   costovertebral angle tenderness.  PELVIC:  Deferred.     ASSESSMENT AND PLAN:  The patient is 55-year-old  3, para 2-0-1-2 with   2 normal vaginal deliveries, is postmenopausal and has grade III uterine   descent with a grade III cystocele, current everyday smoker, has cut down   significantly, scheduled for total vaginal hysterectomy with possible   bilateral salpingo-oophorectomy, anterior and posterior colpoperineorrhaphy,   uterosacral vault suspension.  Talked about the option of obturator sling and   the risks and complications, the option of observation after surgery without   the sling and see if symptoms are consistent or worse, then would reevaluate   and consider an obturator sling and later procedure.  The patient agrees to be   reevaluated later for a sling procedure.  Preoperative instructions were   given.  Operative procedure discussed in detail.  Postoperative recovery   explained.  The importance of no weight lifting for 6 months and to allow for   wound healing. Talked about the risk of recurrence of 20%, which is inherent   related with using the body tissue for the pelvic support.  All questions were   answered to satisfaction.  Risks inclusive of but not limited to infection,   injury and bleeding and infection was discussed.  The patient understands and   wants to go ahead with the procedure.        ______________________________  MD KATHY Ivory/AISHA/ROBB    DD:  2021 13:41  DT:  2021 14:15    Job#:  [id number]

## 2021-05-14 ENCOUNTER — PRE-ADMISSION TESTING (OUTPATIENT)
Dept: ADMISSIONS | Facility: MEDICAL CENTER | Age: 56
DRG: 743 | End: 2021-05-14
Attending: OBSTETRICS & GYNECOLOGY
Payer: MEDICAID

## 2021-05-14 DIAGNOSIS — Z01.810 PRE-OPERATIVE CARDIOVASCULAR EXAMINATION: ICD-10-CM

## 2021-05-14 DIAGNOSIS — Z01.812 PRE-OPERATIVE LABORATORY EXAMINATION: ICD-10-CM

## 2021-05-14 LAB
ANION GAP SERPL CALC-SCNC: 10 MMOL/L (ref 7–16)
APPEARANCE UR: CLEAR
BASOPHILS # BLD AUTO: 1.8 % (ref 0–1.8)
BASOPHILS # BLD: 0.16 K/UL (ref 0–0.12)
BILIRUB UR QL STRIP.AUTO: NEGATIVE
BUN SERPL-MCNC: 9 MG/DL (ref 8–22)
CALCIUM SERPL-MCNC: 9.1 MG/DL (ref 8.5–10.5)
CHLORIDE SERPL-SCNC: 103 MMOL/L (ref 96–112)
CO2 SERPL-SCNC: 30 MMOL/L (ref 20–33)
COLOR UR: YELLOW
CREAT SERPL-MCNC: 0.95 MG/DL (ref 0.5–1.4)
EKG IMPRESSION: NORMAL
EOSINOPHIL # BLD AUTO: 0.13 K/UL (ref 0–0.51)
EOSINOPHIL NFR BLD: 1.4 % (ref 0–6.9)
ERYTHROCYTE [DISTWIDTH] IN BLOOD BY AUTOMATED COUNT: 44.2 FL (ref 35.9–50)
GLUCOSE SERPL-MCNC: 77 MG/DL (ref 65–99)
GLUCOSE UR STRIP.AUTO-MCNC: NEGATIVE MG/DL
HCT VFR BLD AUTO: 43.4 % (ref 37–47)
HGB BLD-MCNC: 14.4 G/DL (ref 12–16)
IMM GRANULOCYTES # BLD AUTO: 0.02 K/UL (ref 0–0.11)
IMM GRANULOCYTES NFR BLD AUTO: 0.2 % (ref 0–0.9)
KETONES UR STRIP.AUTO-MCNC: NEGATIVE MG/DL
LEUKOCYTE ESTERASE UR QL STRIP.AUTO: NEGATIVE
LYMPHOCYTES # BLD AUTO: 3.01 K/UL (ref 1–4.8)
LYMPHOCYTES NFR BLD: 33.4 % (ref 22–41)
MCH RBC QN AUTO: 29.7 PG (ref 27–33)
MCHC RBC AUTO-ENTMCNC: 33.2 G/DL (ref 33.6–35)
MCV RBC AUTO: 89.5 FL (ref 81.4–97.8)
MICRO URNS: NORMAL
MONOCYTES # BLD AUTO: 0.84 K/UL (ref 0–0.85)
MONOCYTES NFR BLD AUTO: 9.3 % (ref 0–13.4)
NEUTROPHILS # BLD AUTO: 4.85 K/UL (ref 2–7.15)
NEUTROPHILS NFR BLD: 53.9 % (ref 44–72)
NITRITE UR QL STRIP.AUTO: NEGATIVE
NRBC # BLD AUTO: 0 K/UL
NRBC BLD-RTO: 0 /100 WBC
PH UR STRIP.AUTO: 6.5 [PH] (ref 5–8)
PLATELET # BLD AUTO: 372 K/UL (ref 164–446)
PMV BLD AUTO: 9.2 FL (ref 9–12.9)
POTASSIUM SERPL-SCNC: 3.9 MMOL/L (ref 3.6–5.5)
PROT UR QL STRIP: NEGATIVE MG/DL
RBC # BLD AUTO: 4.85 M/UL (ref 4.2–5.4)
RBC UR QL AUTO: NEGATIVE
SODIUM SERPL-SCNC: 143 MMOL/L (ref 135–145)
SP GR UR STRIP.AUTO: 1.01
UROBILINOGEN UR STRIP.AUTO-MCNC: 0.2 MG/DL
WBC # BLD AUTO: 9 K/UL (ref 4.8–10.8)

## 2021-05-14 PROCEDURE — 93005 ELECTROCARDIOGRAM TRACING: CPT

## 2021-05-14 PROCEDURE — 80048 BASIC METABOLIC PNL TOTAL CA: CPT

## 2021-05-14 PROCEDURE — U0005 INFEC AGEN DETEC AMPLI PROBE: HCPCS

## 2021-05-14 PROCEDURE — 81003 URINALYSIS AUTO W/O SCOPE: CPT

## 2021-05-14 PROCEDURE — 36415 COLL VENOUS BLD VENIPUNCTURE: CPT

## 2021-05-14 PROCEDURE — 85025 COMPLETE CBC W/AUTO DIFF WBC: CPT

## 2021-05-14 PROCEDURE — C9803 HOPD COVID-19 SPEC COLLECT: HCPCS

## 2021-05-14 PROCEDURE — 93010 ELECTROCARDIOGRAM REPORT: CPT | Performed by: INTERNAL MEDICINE

## 2021-05-14 PROCEDURE — U0003 INFECTIOUS AGENT DETECTION BY NUCLEIC ACID (DNA OR RNA); SEVERE ACUTE RESPIRATORY SYNDROME CORONAVIRUS 2 (SARS-COV-2) (CORONAVIRUS DISEASE [COVID-19]), AMPLIFIED PROBE TECHNIQUE, MAKING USE OF HIGH THROUGHPUT TECHNOLOGIES AS DESCRIBED BY CMS-2020-01-R: HCPCS

## 2021-05-14 ASSESSMENT — FIBROSIS 4 INDEX: FIB4 SCORE: 0.89

## 2021-05-15 LAB
SARS-COV-2 RNA RESP QL NAA+PROBE: NOTDETECTED
SPECIMEN SOURCE: NORMAL

## 2021-05-19 ENCOUNTER — ANESTHESIA (OUTPATIENT)
Dept: SURGERY | Facility: MEDICAL CENTER | Age: 56
DRG: 743 | End: 2021-05-19
Payer: MEDICAID

## 2021-05-19 ENCOUNTER — HOSPITAL ENCOUNTER (INPATIENT)
Facility: MEDICAL CENTER | Age: 56
LOS: 1 days | DRG: 743 | End: 2021-05-20
Attending: OBSTETRICS & GYNECOLOGY | Admitting: OBSTETRICS & GYNECOLOGY
Payer: MEDICAID

## 2021-05-19 ENCOUNTER — ANESTHESIA EVENT (OUTPATIENT)
Dept: SURGERY | Facility: MEDICAL CENTER | Age: 56
DRG: 743 | End: 2021-05-19
Payer: MEDICAID

## 2021-05-19 LAB — PATHOLOGY CONSULT NOTE: NORMAL

## 2021-05-19 PROCEDURE — 700111 HCHG RX REV CODE 636 W/ 250 OVERRIDE (IP): Performed by: ANESTHESIOLOGY

## 2021-05-19 PROCEDURE — 0UT97ZZ RESECTION OF UTERUS, VIA NATURAL OR ARTIFICIAL OPENING: ICD-10-PCS | Performed by: OBSTETRICS & GYNECOLOGY

## 2021-05-19 PROCEDURE — 160041 HCHG SURGERY MINUTES - EA ADDL 1 MIN LEVEL 4: Performed by: OBSTETRICS & GYNECOLOGY

## 2021-05-19 PROCEDURE — 160009 HCHG ANES TIME/MIN: Performed by: OBSTETRICS & GYNECOLOGY

## 2021-05-19 PROCEDURE — 0UQF7ZZ REPAIR CUL-DE-SAC, VIA NATURAL OR ARTIFICIAL OPENING: ICD-10-PCS | Performed by: OBSTETRICS & GYNECOLOGY

## 2021-05-19 PROCEDURE — 700102 HCHG RX REV CODE 250 W/ 637 OVERRIDE(OP): Performed by: OBSTETRICS & GYNECOLOGY

## 2021-05-19 PROCEDURE — 501330 HCHG SET, CYSTO IRRIG TUBING: Performed by: OBSTETRICS & GYNECOLOGY

## 2021-05-19 PROCEDURE — 160002 HCHG RECOVERY MINUTES (STAT): Performed by: OBSTETRICS & GYNECOLOGY

## 2021-05-19 PROCEDURE — 500892 HCHG PACK, PERI-GYN: Performed by: OBSTETRICS & GYNECOLOGY

## 2021-05-19 PROCEDURE — 700111 HCHG RX REV CODE 636 W/ 250 OVERRIDE (IP)

## 2021-05-19 PROCEDURE — A9270 NON-COVERED ITEM OR SERVICE: HCPCS | Performed by: OBSTETRICS & GYNECOLOGY

## 2021-05-19 PROCEDURE — 501838 HCHG SUTURE GENERAL: Performed by: OBSTETRICS & GYNECOLOGY

## 2021-05-19 PROCEDURE — 700102 HCHG RX REV CODE 250 W/ 637 OVERRIDE(OP): Performed by: ANESTHESIOLOGY

## 2021-05-19 PROCEDURE — 160048 HCHG OR STATISTICAL LEVEL 1-5: Performed by: OBSTETRICS & GYNECOLOGY

## 2021-05-19 PROCEDURE — 0TJB8ZZ INSPECTION OF BLADDER, VIA NATURAL OR ARTIFICIAL OPENING ENDOSCOPIC: ICD-10-PCS | Performed by: OBSTETRICS & GYNECOLOGY

## 2021-05-19 PROCEDURE — A9270 NON-COVERED ITEM OR SERVICE: HCPCS | Performed by: ANESTHESIOLOGY

## 2021-05-19 PROCEDURE — 700101 HCHG RX REV CODE 250: Performed by: ANESTHESIOLOGY

## 2021-05-19 PROCEDURE — 502587 HCHG PACK, D&C: Performed by: OBSTETRICS & GYNECOLOGY

## 2021-05-19 PROCEDURE — 700101 HCHG RX REV CODE 250

## 2021-05-19 PROCEDURE — 110454 HCHG SHELL REV 250: Performed by: OBSTETRICS & GYNECOLOGY

## 2021-05-19 PROCEDURE — 160036 HCHG PACU - EA ADDL 30 MINS PHASE I: Performed by: OBSTETRICS & GYNECOLOGY

## 2021-05-19 PROCEDURE — 0USG7ZZ REPOSITION VAGINA, VIA NATURAL OR ARTIFICIAL OPENING: ICD-10-PCS | Performed by: OBSTETRICS & GYNECOLOGY

## 2021-05-19 PROCEDURE — 160029 HCHG SURGERY MINUTES - 1ST 30 MINS LEVEL 4: Performed by: OBSTETRICS & GYNECOLOGY

## 2021-05-19 PROCEDURE — 501411 HCHG SPONGE, BABY LAP W/O RINGS: Performed by: OBSTETRICS & GYNECOLOGY

## 2021-05-19 PROCEDURE — 700101 HCHG RX REV CODE 250: Performed by: OBSTETRICS & GYNECOLOGY

## 2021-05-19 PROCEDURE — 160035 HCHG PACU - 1ST 60 MINS PHASE I: Performed by: OBSTETRICS & GYNECOLOGY

## 2021-05-19 PROCEDURE — 88305 TISSUE EXAM BY PATHOLOGIST: CPT

## 2021-05-19 PROCEDURE — 770001 HCHG ROOM/CARE - MED/SURG/GYN PRIV*

## 2021-05-19 PROCEDURE — 500901 HCHG PACKING, VAG 2 X-RAY: Performed by: OBSTETRICS & GYNECOLOGY

## 2021-05-19 PROCEDURE — 700105 HCHG RX REV CODE 258: Performed by: OBSTETRICS & GYNECOLOGY

## 2021-05-19 RX ORDER — IBUPROFEN 800 MG/1
800 TABLET ORAL 3 TIMES DAILY
Status: DISCONTINUED | OUTPATIENT
Start: 2021-05-19 | End: 2021-05-20 | Stop reason: HOSPADM

## 2021-05-19 RX ORDER — VASOPRESSIN 20 U/ML
INJECTION PARENTERAL
Status: DISPENSED
Start: 2021-05-19 | End: 2021-05-19

## 2021-05-19 RX ORDER — HALOPERIDOL 5 MG/ML
1 INJECTION INTRAMUSCULAR EVERY 6 HOURS PRN
Status: DISCONTINUED | OUTPATIENT
Start: 2021-05-19 | End: 2021-05-20 | Stop reason: HOSPADM

## 2021-05-19 RX ORDER — OXYCODONE HYDROCHLORIDE 5 MG/1
5 TABLET ORAL
Status: DISCONTINUED | OUTPATIENT
Start: 2021-05-19 | End: 2021-05-20 | Stop reason: HOSPADM

## 2021-05-19 RX ORDER — OXYCODONE HCL 5 MG/5 ML
10 SOLUTION, ORAL ORAL
Status: COMPLETED | OUTPATIENT
Start: 2021-05-19 | End: 2021-05-19

## 2021-05-19 RX ORDER — HYDROMORPHONE HYDROCHLORIDE 1 MG/ML
0.1 INJECTION, SOLUTION INTRAMUSCULAR; INTRAVENOUS; SUBCUTANEOUS
Status: DISCONTINUED | OUTPATIENT
Start: 2021-05-19 | End: 2021-05-19 | Stop reason: HOSPADM

## 2021-05-19 RX ORDER — DEXAMETHASONE SODIUM PHOSPHATE 4 MG/ML
4 INJECTION, SOLUTION INTRA-ARTICULAR; INTRALESIONAL; INTRAMUSCULAR; INTRAVENOUS; SOFT TISSUE
Status: DISCONTINUED | OUTPATIENT
Start: 2021-05-19 | End: 2021-05-20 | Stop reason: HOSPADM

## 2021-05-19 RX ORDER — ACETAMINOPHEN 500 MG
1000 TABLET ORAL EVERY 6 HOURS PRN
Status: DISCONTINUED | OUTPATIENT
Start: 2021-05-24 | End: 2021-05-20 | Stop reason: HOSPADM

## 2021-05-19 RX ORDER — ENEMA 19; 7 G/133ML; G/133ML
1 ENEMA RECTAL
Status: DISCONTINUED | OUTPATIENT
Start: 2021-05-19 | End: 2021-05-20 | Stop reason: HOSPADM

## 2021-05-19 RX ORDER — EPINEPHRINE 1 MG/ML(1)
AMPUL (ML) INJECTION
Status: DISPENSED
Start: 2021-05-19 | End: 2021-05-19

## 2021-05-19 RX ORDER — HYDROMORPHONE HYDROCHLORIDE 1 MG/ML
0.5 INJECTION, SOLUTION INTRAMUSCULAR; INTRAVENOUS; SUBCUTANEOUS
Status: DISCONTINUED | OUTPATIENT
Start: 2021-05-19 | End: 2021-05-20 | Stop reason: HOSPADM

## 2021-05-19 RX ORDER — OXYCODONE HCL 5 MG/5 ML
5 SOLUTION, ORAL ORAL
Status: COMPLETED | OUTPATIENT
Start: 2021-05-19 | End: 2021-05-19

## 2021-05-19 RX ORDER — BUPIVACAINE HYDROCHLORIDE AND EPINEPHRINE 2.5; 5 MG/ML; UG/ML
INJECTION, SOLUTION EPIDURAL; INFILTRATION; INTRACAUDAL; PERINEURAL
Status: DISCONTINUED | OUTPATIENT
Start: 2021-05-19 | End: 2021-05-19 | Stop reason: HOSPADM

## 2021-05-19 RX ORDER — ACETAMINOPHEN 500 MG
1000 TABLET ORAL EVERY 6 HOURS
Status: DISCONTINUED | OUTPATIENT
Start: 2021-05-19 | End: 2021-05-20 | Stop reason: HOSPADM

## 2021-05-19 RX ORDER — HYDROMORPHONE HYDROCHLORIDE 1 MG/ML
0.2 INJECTION, SOLUTION INTRAMUSCULAR; INTRAVENOUS; SUBCUTANEOUS
Status: DISCONTINUED | OUTPATIENT
Start: 2021-05-19 | End: 2021-05-19 | Stop reason: HOSPADM

## 2021-05-19 RX ORDER — POLYETHYLENE GLYCOL 3350 17 G/17G
1 POWDER, FOR SOLUTION ORAL 2 TIMES DAILY PRN
Status: DISCONTINUED | OUTPATIENT
Start: 2021-05-19 | End: 2021-05-20 | Stop reason: HOSPADM

## 2021-05-19 RX ORDER — MIDAZOLAM HYDROCHLORIDE 1 MG/ML
1 INJECTION INTRAMUSCULAR; INTRAVENOUS
Status: DISCONTINUED | OUTPATIENT
Start: 2021-05-19 | End: 2021-05-19 | Stop reason: HOSPADM

## 2021-05-19 RX ORDER — ACETAMINOPHEN 500 MG
1000 TABLET ORAL ONCE
Status: COMPLETED | OUTPATIENT
Start: 2021-05-19 | End: 2021-05-19

## 2021-05-19 RX ORDER — DIPHENHYDRAMINE HYDROCHLORIDE 50 MG/ML
12.5 INJECTION INTRAMUSCULAR; INTRAVENOUS
Status: DISCONTINUED | OUTPATIENT
Start: 2021-05-19 | End: 2021-05-19 | Stop reason: HOSPADM

## 2021-05-19 RX ORDER — DIPHENHYDRAMINE HYDROCHLORIDE 50 MG/ML
25 INJECTION INTRAMUSCULAR; INTRAVENOUS EVERY 6 HOURS PRN
Status: DISCONTINUED | OUTPATIENT
Start: 2021-05-19 | End: 2021-05-20 | Stop reason: HOSPADM

## 2021-05-19 RX ORDER — ROCURONIUM BROMIDE 10 MG/ML
INJECTION, SOLUTION INTRAVENOUS PRN
Status: DISCONTINUED | OUTPATIENT
Start: 2021-05-19 | End: 2021-05-19 | Stop reason: SURG

## 2021-05-19 RX ORDER — LIDOCAINE HYDROCHLORIDE 20 MG/ML
INJECTION, SOLUTION EPIDURAL; INFILTRATION; INTRACAUDAL; PERINEURAL PRN
Status: DISCONTINUED | OUTPATIENT
Start: 2021-05-19 | End: 2021-05-19 | Stop reason: SURG

## 2021-05-19 RX ORDER — DOCUSATE SODIUM 100 MG/1
100 CAPSULE, LIQUID FILLED ORAL 2 TIMES DAILY
Status: DISCONTINUED | OUTPATIENT
Start: 2021-05-19 | End: 2021-05-20 | Stop reason: HOSPADM

## 2021-05-19 RX ORDER — CEFAZOLIN SODIUM 1 G/3ML
INJECTION, POWDER, FOR SOLUTION INTRAMUSCULAR; INTRAVENOUS PRN
Status: DISCONTINUED | OUTPATIENT
Start: 2021-05-19 | End: 2021-05-19 | Stop reason: SURG

## 2021-05-19 RX ORDER — ONDANSETRON 2 MG/ML
4 INJECTION INTRAMUSCULAR; INTRAVENOUS EVERY 4 HOURS PRN
Status: DISCONTINUED | OUTPATIENT
Start: 2021-05-19 | End: 2021-05-20 | Stop reason: HOSPADM

## 2021-05-19 RX ORDER — AMOXICILLIN 250 MG
1 CAPSULE ORAL
Status: DISCONTINUED | OUTPATIENT
Start: 2021-05-19 | End: 2021-05-20 | Stop reason: HOSPADM

## 2021-05-19 RX ORDER — SODIUM CHLORIDE, SODIUM LACTATE, POTASSIUM CHLORIDE, CALCIUM CHLORIDE 600; 310; 30; 20 MG/100ML; MG/100ML; MG/100ML; MG/100ML
INJECTION, SOLUTION INTRAVENOUS CONTINUOUS
Status: ACTIVE | OUTPATIENT
Start: 2021-05-19 | End: 2021-05-19

## 2021-05-19 RX ORDER — AMOXICILLIN 250 MG
1 CAPSULE ORAL NIGHTLY
Status: DISCONTINUED | OUTPATIENT
Start: 2021-05-19 | End: 2021-05-20 | Stop reason: HOSPADM

## 2021-05-19 RX ORDER — HYDROMORPHONE HYDROCHLORIDE 1 MG/ML
0.4 INJECTION, SOLUTION INTRAMUSCULAR; INTRAVENOUS; SUBCUTANEOUS
Status: DISCONTINUED | OUTPATIENT
Start: 2021-05-19 | End: 2021-05-19 | Stop reason: HOSPADM

## 2021-05-19 RX ORDER — SIMETHICONE 80 MG
80 TABLET,CHEWABLE ORAL EVERY 8 HOURS PRN
Status: DISCONTINUED | OUTPATIENT
Start: 2021-05-19 | End: 2021-05-20 | Stop reason: HOSPADM

## 2021-05-19 RX ORDER — OXYCODONE HYDROCHLORIDE 10 MG/1
10 TABLET ORAL
Status: DISCONTINUED | OUTPATIENT
Start: 2021-05-19 | End: 2021-05-20 | Stop reason: HOSPADM

## 2021-05-19 RX ORDER — BISACODYL 10 MG
10 SUPPOSITORY, RECTAL RECTAL
Status: DISCONTINUED | OUTPATIENT
Start: 2021-05-19 | End: 2021-05-20 | Stop reason: HOSPADM

## 2021-05-19 RX ORDER — SODIUM CHLORIDE, SODIUM LACTATE, POTASSIUM CHLORIDE, CALCIUM CHLORIDE 600; 310; 30; 20 MG/100ML; MG/100ML; MG/100ML; MG/100ML
INJECTION, SOLUTION INTRAVENOUS CONTINUOUS
Status: DISCONTINUED | OUTPATIENT
Start: 2021-05-19 | End: 2021-05-19 | Stop reason: HOSPADM

## 2021-05-19 RX ORDER — CELECOXIB 200 MG/1
400 CAPSULE ORAL ONCE
Status: COMPLETED | OUTPATIENT
Start: 2021-05-19 | End: 2021-05-19

## 2021-05-19 RX ORDER — ONDANSETRON 2 MG/ML
INJECTION INTRAMUSCULAR; INTRAVENOUS PRN
Status: DISCONTINUED | OUTPATIENT
Start: 2021-05-19 | End: 2021-05-19 | Stop reason: SURG

## 2021-05-19 RX ORDER — BUPIVACAINE HYDROCHLORIDE 2.5 MG/ML
INJECTION, SOLUTION EPIDURAL; INFILTRATION; INTRACAUDAL
Status: DISPENSED
Start: 2021-05-19 | End: 2021-05-19

## 2021-05-19 RX ORDER — IBUPROFEN 800 MG/1
800 TABLET ORAL 3 TIMES DAILY PRN
Status: DISCONTINUED | OUTPATIENT
Start: 2021-05-24 | End: 2021-05-20 | Stop reason: HOSPADM

## 2021-05-19 RX ORDER — SCOLOPAMINE TRANSDERMAL SYSTEM 1 MG/1
1 PATCH, EXTENDED RELEASE TRANSDERMAL
Status: DISCONTINUED | OUTPATIENT
Start: 2021-05-19 | End: 2021-05-20 | Stop reason: HOSPADM

## 2021-05-19 RX ORDER — HALOPERIDOL 5 MG/ML
1 INJECTION INTRAMUSCULAR
Status: DISCONTINUED | OUTPATIENT
Start: 2021-05-19 | End: 2021-05-19 | Stop reason: HOSPADM

## 2021-05-19 RX ORDER — DEXAMETHASONE SODIUM PHOSPHATE 4 MG/ML
INJECTION, SOLUTION INTRA-ARTICULAR; INTRALESIONAL; INTRAMUSCULAR; INTRAVENOUS; SOFT TISSUE PRN
Status: DISCONTINUED | OUTPATIENT
Start: 2021-05-19 | End: 2021-05-19 | Stop reason: SURG

## 2021-05-19 RX ORDER — MIDAZOLAM HYDROCHLORIDE 1 MG/ML
INJECTION INTRAMUSCULAR; INTRAVENOUS
Status: COMPLETED
Start: 2021-05-19 | End: 2021-05-19

## 2021-05-19 RX ADMIN — ACETAMINOPHEN 1000 MG: 500 TABLET ORAL at 07:25

## 2021-05-19 RX ADMIN — DOCUSATE SODIUM 100 MG: 100 CAPSULE ORAL at 19:39

## 2021-05-19 RX ADMIN — DEXAMETHASONE SODIUM PHOSPHATE 8 MG: 4 INJECTION, SOLUTION INTRA-ARTICULAR; INTRALESIONAL; INTRAMUSCULAR; INTRAVENOUS; SOFT TISSUE at 07:55

## 2021-05-19 RX ADMIN — PROPOFOL 170 MG: 10 INJECTION, EMULSION INTRAVENOUS at 07:45

## 2021-05-19 RX ADMIN — ACETAMINOPHEN 1000 MG: 500 TABLET ORAL at 20:15

## 2021-05-19 RX ADMIN — FENTANYL CITRATE 100 MCG: 50 INJECTION, SOLUTION INTRAMUSCULAR; INTRAVENOUS at 07:42

## 2021-05-19 RX ADMIN — FENTANYL CITRATE 50 MCG: 50 INJECTION, SOLUTION INTRAMUSCULAR; INTRAVENOUS at 08:16

## 2021-05-19 RX ADMIN — SODIUM CHLORIDE, POTASSIUM CHLORIDE, SODIUM LACTATE AND CALCIUM CHLORIDE: 600; 310; 30; 20 INJECTION, SOLUTION INTRAVENOUS at 07:25

## 2021-05-19 RX ADMIN — ACETAMINOPHEN 1000 MG: 500 TABLET ORAL at 14:20

## 2021-05-19 RX ADMIN — MIDAZOLAM HYDROCHLORIDE 1 MG: 1 INJECTION, SOLUTION INTRAMUSCULAR; INTRAVENOUS at 10:20

## 2021-05-19 RX ADMIN — CELECOXIB 400 MG: 200 CAPSULE ORAL at 07:24

## 2021-05-19 RX ADMIN — FENTANYL CITRATE 100 MCG: 50 INJECTION, SOLUTION INTRAMUSCULAR; INTRAVENOUS at 09:27

## 2021-05-19 RX ADMIN — POVIDONE IODINE 15 ML: 100 SOLUTION TOPICAL at 06:57

## 2021-05-19 RX ADMIN — IBUPROFEN 800 MG: 800 TABLET, FILM COATED ORAL at 22:39

## 2021-05-19 RX ADMIN — ROCURONIUM BROMIDE 40 MG: 10 INJECTION, SOLUTION INTRAVENOUS at 07:45

## 2021-05-19 RX ADMIN — LIDOCAINE HYDROCHLORIDE 0.5 ML: 10 INJECTION, SOLUTION INFILTRATION; PERINEURAL at 06:57

## 2021-05-19 RX ADMIN — IBUPROFEN 800 MG: 800 TABLET, FILM COATED ORAL at 14:20

## 2021-05-19 RX ADMIN — LIDOCAINE HYDROCHLORIDE 2 ML: 20 INJECTION, SOLUTION EPIDURAL; INFILTRATION; INTRACAUDAL at 07:45

## 2021-05-19 RX ADMIN — FLUORESCEIN SODIUM 100 MG: 100 INJECTION INTRAVENOUS at 09:34

## 2021-05-19 RX ADMIN — FENTANYL CITRATE 100 MCG: 50 INJECTION, SOLUTION INTRAMUSCULAR; INTRAVENOUS at 09:04

## 2021-05-19 RX ADMIN — ONDANSETRON 4 MG: 2 INJECTION INTRAMUSCULAR; INTRAVENOUS at 09:02

## 2021-05-19 RX ADMIN — FENTANYL CITRATE 50 MCG: 50 INJECTION, SOLUTION INTRAMUSCULAR; INTRAVENOUS at 10:33

## 2021-05-19 RX ADMIN — FENTANYL CITRATE 50 MCG: 50 INJECTION, SOLUTION INTRAMUSCULAR; INTRAVENOUS at 08:54

## 2021-05-19 RX ADMIN — CEFAZOLIN 2 G: 330 INJECTION, POWDER, FOR SOLUTION INTRAMUSCULAR; INTRAVENOUS at 07:33

## 2021-05-19 RX ADMIN — FENTANYL CITRATE 50 MCG: 50 INJECTION, SOLUTION INTRAMUSCULAR; INTRAVENOUS at 08:44

## 2021-05-19 RX ADMIN — FENTANYL CITRATE 50 MCG: 50 INJECTION, SOLUTION INTRAMUSCULAR; INTRAVENOUS at 08:30

## 2021-05-19 RX ADMIN — OXYCODONE HYDROCHLORIDE 10 MG: 5 SOLUTION ORAL at 10:35

## 2021-05-19 RX ADMIN — FENTANYL CITRATE 50 MCG: 50 INJECTION, SOLUTION INTRAMUSCULAR; INTRAVENOUS at 08:40

## 2021-05-19 ASSESSMENT — PAIN DESCRIPTION - PAIN TYPE
TYPE: ACUTE PAIN
TYPE: ACUTE PAIN

## 2021-05-19 ASSESSMENT — FIBROSIS 4 INDEX: FIB4 SCORE: 0.76

## 2021-05-19 ASSESSMENT — PAIN SCALES - GENERAL: PAIN_LEVEL: 0

## 2021-05-19 NOTE — OP REPORT
DATE OF SERVICE:  2021     INDICATIONS:  The patient is a 55 years old  3, para 2 with 2 normal   vaginal deliveries, postmenopausal for the past 10 years, has been having   pelvic organ prolapse grade III and cystocele, which has gotten worse over the   years, taking care of her disabled .  The patient scheduled for total   vaginal hysterectomy with anterior colpoperineorrhaphy, uterosacral wall   suspension and cystoscopy.     PREOPERATIVE DIAGNOSES:  Uterovaginal prolapse with a grade III cervical   descent and grade III cystocele and enterocele.     POSTOPERATIVE DIAGNOSES:  Uterovaginal prolapse with a grade III cervical   descent and grade III cystocele and enterocele.     PROCEDURES PERFORMED:  Total vaginal hysterectomy with uterosacral vault   suspension with enterocele repair with Dang's culdoplasty and Dang's   suturing and cystoscopy and anterior colporrhaphy.     SURGEON:  Melissa Suazo MD     ASSISTANT:  Glenis Slater MD     ANESTHESIA:  General endotracheal tube.     ANESTHESIOLOGIST:  Uriel Gomes MD     COMPLICATIONS:  None.     ESTIMATED BLOOD LOSS:  360 mL     URINE OUTPUT:  200 mL of clear urine at the end of the procedure.     FINDINGS:  External genitalia normal.  Cervix appeared normal with the descent   grade III and it was elongated, uterus atrophic with grade III cystocele and   enterocele noted.  Both fallopian tubes and ovaries were normal.  Cystoscopy,   normal bladder, no evidence of stone, trigonitis or cystitis. Positive   bilateral jet flow was visualized.     DESCRIPTION OF PROCEDURE:  After discussing the risks, benefits, indications,   and alternatives, the patient was taken to the operating room.  General   anesthesia was induced without difficulty, prepped and draped in the normal   sterile fashion in the dorsal lithotomy position.  Bladder was Martinez   catheterized.  Heavy weighted speculum was placed in the vagina and the cervix   was grasped  with single tooth tenaculum.  Cervix was then injected   circumferentially with 0.5% lidocaine and 1:200,000 epinephrine.  Cervix was   then circumferentially incised with the Bovie tip and the bladder was   dissected off of the pubovesical cervical fascia anteriorly with Ray-Shahbaz on   the finger both anteriorly and posteriorly.  Posterior culdotomy was done   after opening of the peritoneal cavity.  A heavy weighted speculum was   introduced and retracted the posterior vaginal wall.  Anteriorly, further   dissection was carried out.  The vesicouterine peritoneum was identified and   entered sharply.  Incision was extended by opening up the scissors and right   angle retractor was introduced.  A Jerrica clamp was placed over the   uterosacral ligament on either side.  These were then transected and suture   ligated with 0 Vicryl sutures.  Hemostasis was assured.  The cardinal   ligaments were then clamped on both the sides, transected, and suture ligated   in a similar fashion.  The uterine arteries and broad ligament was then   serially clamped with Jerrica clamps, transected and suture ligated on both the   sides.  Excellent hemostasis was visualized.  Both cornua were then clamped   with Jerrica clamp, transected and the uterus was delivered.  These pedicles   were then suture ligated followed by a free tie and excellent hemostasis was   attained.  Attention was directed towards the Dang suture. Using 2-0 PDS, a   stitch was taken on the right uterosacral ligament, skimmed over the   peritoneum on the cul-de-sac and then suture bite was taken on the left   uterosacral ligament.  Couple of Dang sutures were placed, three of them, 1   cm above each other and held with a Lorena clamp each of them.  Then, the   vaginal cuff angle was tied down using 0 Vicryl on a 36-inch runner and was   tied down on either side and hemostasis was reaffirmed.  Then, the Dang   sutures were tied down starting from above and below and  the uterosacral was   brought down to the midline.  The uterosacral was already held up with the   vaginal cuff angles and the vaginal cuff was closed in a running fashion   anterior posteriorly in two layers.  Hemostasis was attained.  Then, attention   was directed towards the anterior colporrhaphy.  An Allis clamp was placed   proximal to the urethra along the midline of the anterior vagina and two more   clamps were placed on either side of the vaginal cuff.  A transverse incision   was made between the two Allis clamps.  Metzenbaum scissors was then   introduced under the vaginal mucosa and was  from the underlying   tissue anteriorly.  Once adequate mobility of the vaginal mucosa was achieved,   a small vertical incision was made with the Metzenbaum scissors in the   midline perpendicular to the initial incision.  The scissors was then   carefully placed beneath the most superficial layer of the anterior vaginal   mucosa and advanced with gentle pressure for 2-3 cm.  The scissors were spread   slightly as they are withdrawn ____ the dissection of the vaginal mucosa from   the underlying tissue.  The process was repeated up to the level of the Allis   clamp proximal to the urethra.  Then, hooks were placed on the vaginal mucosa   and was exposed fixing to the Crestline.  Using sharp and blunt dissection,   blunt dissection with a Ray-Shahbaz on the finger, the vaginal mucosa was   dissected off from the underlying periurethral and perivesical fascia, which   was mobilized further.  Then, plication of the vaginal muscularis and   adventitia as laterally as the dissection and run across the base of the   bladder to the opposite side including the vaginal muscularis and fascia using   2-0 Vicryl.  A couple of similar buttressing sutures was placed down, then   tied down supporting the bladder base.  The vaginal mucosa was then   approximated using 2-0 Vicryl on a runner infiltrating with FloSeal underneath    the mucosa.  Then, all the instruments were removed from the vagina.    Attention was directed towards the cystoscopy.  Cystoscope was introduced.    Bladder was distended.  All the walls of the bladder wall were visualized and   noted to be normal.  Bilateral ureteric jet flow was visualized and normal.    Cystoscope was withdrawn.  Bladder Martinez was repositioned.  Vaginal wall was   packed with a Kerlix soaked with Premarin cream.  The patient was extubated   and transferred to the recovery room under stable condition.        ______________________________  MD KATHY Ivory/ARG[place]    DD:  05/19/2021 10:34  DT:  05/19/2021 12:37    Job#:  [id number]

## 2021-05-19 NOTE — CARE PLAN
The patient is Stable - Low risk of patient condition declining or worsening    Shift Goals  Clinical Goals: Patient will state adequate pain control with PO analgesia    Progress made toward(s) clinical / shift goals: adequate stated pain relief from scheduled Tylenol and Ibuprofen    Patient is not progressing towards the following goals:      Problem: Self Care  Goal: Patient will have the ability to perform ADLs independently or with assistance (bathe, groom, dress, toilet and feed)  Outcome: Not Progressing   Patient has not been out of bed since surgery. Will get patient out of bed to chair for dinner and ambulate in wright once this shift.

## 2021-05-19 NOTE — ANESTHESIA PREPROCEDURE EVALUATION
Relevant Problems   CARDIAC   (positive) Migraine headache   (positive) Varicose veins of leg with edema, bilateral      GI   (positive) GERD (gastroesophageal reflux disease)      Other   (positive) Anxiety   (positive) Methamphetamine use (HCC)   (positive) Obesity (BMI 30-39.9)   (positive) Tobacco dependence     Last methamphetamine use was 2 days ago. Pt. states she will never be able to be off for 2 weeks as recommended. Surgeon and patient are aware of additional risk and both wish to proceed.  Physical Exam    Airway   Mallampati: II  TM distance: >3 FB  Neck ROM: full       Cardiovascular - normal exam  Rhythm: regular  Rate: normal  (-) murmur     Dental - normal exam             Pulmonary - normal exam  Breath sounds clear to auscultation     Abdominal    Neurological - normal exam                 Anesthesia Plan    ASA 3       Plan - general               Induction: intravenous    Postoperative Plan: Postoperative administration of opioids is intended.    Pertinent diagnostic labs and testing reviewed    Informed Consent:    Anesthetic plan and risks discussed with patient.    Use of blood products discussed with: patient whom consented to blood products.

## 2021-05-19 NOTE — ANESTHESIA PROCEDURE NOTES
Airway    Date/Time: 5/19/2021 7:47 AM  Performed by: Jose Juan Lugo M.D.  Authorized by: Jose Juan Lugo M.D.     Location:  OR  Urgency:  Elective  Indications for Airway Management:  Anesthesia      Spontaneous Ventilation: absent    Sedation Level:  Deep  Preoxygenated: Yes    Patient Position:  Sniffing  Final Airway Type:  Endotracheal airway  Final Endotracheal Airway:  ETT  Cuffed: Yes    Technique Used for Successful ETT Placement:  Direct laryngoscopy    Insertion Site:  Oral  Blade Type:  Dilma  Laryngoscope Blade/Videolaryngoscope Blade Size:  3  ETT Size (mm):  6.5  Measured from:  Teeth  ETT to Teeth (cm):  22  Placement Verified by: auscultation and capnometry    Cormack-Lehane Classification:  Grade I - full view of glottis  Number of Attempts at Approach:  1  Number of Other Approaches Attempted:  0

## 2021-05-19 NOTE — ANESTHESIA TIME REPORT
Anesthesia Start and Stop Event Times     Date Time Event    5/19/2021 0729 Anesthesia Start     0731 Ready for Procedure     1012 Anesthesia Stop        Responsible Staff  05/19/21    Name Role Begin End    Jose Juan Lugo M.D. Anesth 0729 1012        Preop Diagnosis (Free Text):  Pre-op Diagnosis     CYSTOCELE, UTEROVAGINAL PROLASPE, RECTAL PROLASPE, POSTMENOPAUSAL        Preop Diagnosis (Codes):    Post op Diagnosis  Uterovaginal prolapse      Premium Reason  Non-Premium    Comments:

## 2021-05-19 NOTE — OR NURSING
1009  RECEIVED PATIENT FROM OR.  REPORT FROM DR. TAN.  NO AIRWAY IN PLACE.  RESPIRATIONS ARE EVEN AND UNLABORED.  ARLENE PAD IN PLACE.  JUNIOR TO DD WITH BRIGHT YELLOW URINE NOTED.       1020 PT MEDICATED WITH VERSED FOR ANXIETY.     1033  MEDICATED WITH IV FENTANYL.    1035  MEDICATED WITH PO OXYCODONE.       1226  TRANSFERRED TO [place] [id number].  REPORT TO ANGLE MONTANEZ.

## 2021-05-19 NOTE — OR SURGEON
Immediate Post OP Note    PreOp Diagnosis:  uterovaginal prolapse.   cystocele.  enterocele.      PostOp Diagnosis:  Same.      Procedure(s):  HYSTERECTOMY, TOTAL, VAGINAL - Wound Class: Clean  COLPORRHAPHY, COMBINED ANTERior colporaphy Wound Class: Clean  COLPOPEXY - Wound Class: Clean  CYSTOSCOPY - POSSIBLE. - Wound Class: Clean Contaminated    Surgeon(s):  SAMSON Ivory M.D.    Anesthesiologist/Type of Anesthesia:  Anesthesiologist: Jose Juan Lugo M.D./General    Surgical Staff:  Circulator: Ruma Alfaro R.N.  Relief Circulator: Jamilah Mcghee R.N.  Relief Scrub: Leti Huynh R.N.  Scrub Person: Marisela Hoang    Specimens removed if any:  ID Type Source Tests Collected by Time Destination   A : UTERUS, CERVIX Tissue Uterus PATHOLOGY SPECIMEN Melissa Suazo M.D. 5/19/2021  8:12 AM        Estimated Blood Loss: 360 ml.    Findings: normal uterus both tubes and ovaries normal.    Complications: none.        5/19/2021 10:17 AM Melissa Suazo M.D.

## 2021-05-19 NOTE — ANESTHESIA POSTPROCEDURE EVALUATION
Patient: Bhavana Liu    Procedure Summary     Date: 05/19/21 Room / Location: [facility] ROOM 27 / SURGERY SAME DAY [facility]    Anesthesia Start: 0729 Anesthesia Stop: 1012    Procedures:       HYSTERECTOMY, TOTAL, VAGINAL (Vagina )      COLPORRHAPHY, COMBINED ANTEROPOSTERIOR (Vagina )      COLPOPEXY (Vagina )      CYSTOSCOPY - POSSIBLE. (Bladder) Diagnosis: (CYSTOCELE, UTEROVAGINAL PROLASPE, RECTAL PROLASPE, POSTMENOPAUSAL)    Surgeons: Melissa Suazo M.D. Responsible Provider: Jose Juan Lugo M.D.    Anesthesia Type: general ASA Status: 3          Final Anesthesia Type: general  Last vitals  BP   Blood Pressure: 134/81    Temp   36.3 °C (97.3 °F)    Pulse   90   Resp   18    SpO2   92 %      Anesthesia Post Evaluation    Patient location during evaluation: PACU  Patient participation: complete - patient participated  Level of consciousness: awake and alert  Pain score: 0    Airway patency: patent  Anesthetic complications: no  Cardiovascular status: hemodynamically stable  Respiratory status: acceptable  Hydration status: euvolemic    PONV: none          No complications documented.     Nurse Pain Score: 0 (NPRS)

## 2021-05-20 VITALS
TEMPERATURE: 97.4 F | OXYGEN SATURATION: 98 % | HEART RATE: 90 BPM | DIASTOLIC BLOOD PRESSURE: 63 MMHG | RESPIRATION RATE: 20 BRPM | BODY MASS INDEX: 31.35 KG/M2 | WEIGHT: 211.64 LBS | SYSTOLIC BLOOD PRESSURE: 106 MMHG | HEIGHT: 69 IN

## 2021-05-20 LAB
BASOPHILS # BLD AUTO: 0.4 % (ref 0–1.8)
BASOPHILS # BLD: 0.05 K/UL (ref 0–0.12)
EOSINOPHIL # BLD AUTO: 0 K/UL (ref 0–0.51)
EOSINOPHIL NFR BLD: 0 % (ref 0–6.9)
ERYTHROCYTE [DISTWIDTH] IN BLOOD BY AUTOMATED COUNT: 42.9 FL (ref 35.9–50)
HCT VFR BLD AUTO: 35.2 % (ref 37–47)
HGB BLD-MCNC: 11.6 G/DL (ref 12–16)
IMM GRANULOCYTES # BLD AUTO: 0.06 K/UL (ref 0–0.11)
IMM GRANULOCYTES NFR BLD AUTO: 0.4 % (ref 0–0.9)
LYMPHOCYTES # BLD AUTO: 1.54 K/UL (ref 1–4.8)
LYMPHOCYTES NFR BLD: 11.3 % (ref 22–41)
MCH RBC QN AUTO: 29.6 PG (ref 27–33)
MCHC RBC AUTO-ENTMCNC: 32.4 G/DL (ref 33.6–35)
MCV RBC AUTO: 91.3 FL (ref 81.4–97.8)
MONOCYTES # BLD AUTO: 1.4 K/UL (ref 0–0.85)
MONOCYTES NFR BLD AUTO: 10.3 % (ref 0–13.4)
NEUTROPHILS # BLD AUTO: 10.53 K/UL (ref 2–7.15)
NEUTROPHILS NFR BLD: 77.6 % (ref 44–72)
NRBC # BLD AUTO: 0 K/UL
NRBC BLD-RTO: 0 /100 WBC
PLATELET # BLD AUTO: 298 K/UL (ref 164–446)
PMV BLD AUTO: 9.9 FL (ref 9–12.9)
RBC # BLD AUTO: 3.89 M/UL (ref 4.2–5.4)
WBC # BLD AUTO: 13.6 K/UL (ref 4.8–10.8)

## 2021-05-20 PROCEDURE — A9270 NON-COVERED ITEM OR SERVICE: HCPCS | Performed by: OBSTETRICS & GYNECOLOGY

## 2021-05-20 PROCEDURE — 36415 COLL VENOUS BLD VENIPUNCTURE: CPT

## 2021-05-20 PROCEDURE — 51798 US URINE CAPACITY MEASURE: CPT

## 2021-05-20 PROCEDURE — 700102 HCHG RX REV CODE 250 W/ 637 OVERRIDE(OP): Performed by: OBSTETRICS & GYNECOLOGY

## 2021-05-20 PROCEDURE — 85025 COMPLETE CBC W/AUTO DIFF WBC: CPT

## 2021-05-20 RX ORDER — IBUPROFEN 800 MG/1
800 TABLET ORAL EVERY 8 HOURS PRN
Qty: 30 TABLET | Refills: 1 | Status: SHIPPED | OUTPATIENT
Start: 2021-05-20 | End: 2021-08-02

## 2021-05-20 RX ADMIN — IBUPROFEN 800 MG: 800 TABLET, FILM COATED ORAL at 06:12

## 2021-05-20 RX ADMIN — ACETAMINOPHEN 1000 MG: 500 TABLET ORAL at 03:21

## 2021-05-20 RX ADMIN — ACETAMINOPHEN 1000 MG: 500 TABLET ORAL at 10:06

## 2021-05-20 RX ADMIN — DOCUSATE SODIUM 100 MG: 100 CAPSULE ORAL at 06:12

## 2021-05-20 NOTE — PROGRESS NOTES
Martinez catheter and vaginal packing removed. Pt will keep drinking fluids and try to void in 2 hours. Pt will call when she has voided. Then a bladder scan will be ordered.

## 2021-05-20 NOTE — PROGRESS NOTES
Late [id number] report received from Michelle Myhre RN.  1232-Patient received via [facility] to Room 322 via transport member. Patient transferred independently from [facility] to bed. SCD's placed on patient. Patient placed on pulse oximeter O2 saturation is 99% on O2 at 2L via nasal canula. Patient rates abdominal pain 4 out of 10 and denies need for additional pain medication at this time. Patient is alert and oriented to person, place and time. Assessment done. No vaginal bleeding noted. Marianne pad and underware placed on patient. Saline lock intact to left hand.   1730-Patient weaned to room air. Patient ambulated in wright from room 322 to [facility] and back. Gait steady, patient denies any dizziness. Patient ambulated with pulse oximeter. O2 saturations remained greater than 90% while ambulating.  1940-Bedside report given to Radha MONTANEZ who assumed care of patient.

## 2021-05-20 NOTE — PROGRESS NOTES
RN notified Dr. Suazo of patient discharge order reconciliation being not completed for this admission, per MD she will complete the reconciliation.

## 2021-05-20 NOTE — CARE PLAN
The patient is Stable - Low risk of patient condition declining or worsening    Shift Goals  Clinical Goals: Pt will maintain acceptable pain level, Patient will void without any difficulties    Progress made toward(s) clinical / shift goals:  Yes.     Patient has met the following goals:    Problem: Pain - Standard  Goal: Alleviation of pain or a reduction in pain to the patient’s comfort goal  Outcome: Met  Note: Monitoring patient pain level. Patient receiving scheduled tylenol and ibuprofen medication, refer to MAR. Patient able to ambulate and provide care for self.      Problem: Urinary Elimination  Goal: Establish and maintain regular urinary output  Outcome: Met  Note: Patient is able to void without any difficulties.      Problem: Self Care  Goal: Patient will have the ability to perform ADLs independently or with assistance (bathe, groom, dress, toilet and feed)  Outcome: Met     Problem: Fluid Volume  Goal: Fluid volume balance will be maintained  Outcome: Met     Problem: Respiratory  Goal: Patient will achieve/maintain optimum respiratory ventilation and gas exchange  Outcome: Met     Problem: Discharge Barriers/Planning  Goal: Patient's continuum of care needs are met  Outcome: Met     Problem: Communication  Goal: The ability to communicate needs accurately and effectively will improve  Outcome: Met     Problem: Knowledge Deficit - Standard  Goal: Patient and family/care givers will demonstrate understanding of plan of care, disease process/condition, diagnostic tests and medications  Outcome: Met

## 2021-05-20 NOTE — CARE PLAN
The patient is Stable - Low risk of patient condition declining or worsening    Shift Goals  Clinical Goals: pt will maintain tolerable pain level    Progress made toward(s) clinical / shift goals:  pt has been taking tylenol and motrin for pain. Pt pain has been 2-3 and that is tolerable for the pt.   Pt urine output has been WDL. Martinez catheter will be removed later this morning.    Patient is not progressing towards the following goals:

## 2021-05-20 NOTE — PROGRESS NOTES
"0700:Report received from Radha MONTANEZ. Assumed Patient care. Patient appears calm and in no acute distress. Labs and orders reviewed.    0830: Assessment completed. Patient denies having discharge at this time.  Patient states being able to void without any difficulties at this time. Patient encouraged to use Incentive Spirometer at bedside. Patient encouraged to drink plenty of fluids and hydrate. Plan of care discussed with patient; questions have been answered at this time. Patient needs have been met at this time. Patient encouraged to call when needing assistance. Call light within reach. Rounding in place. /64   Pulse 86   Temp (!) 35.7 °C (96.3 °F) (Temporal)   Resp 18   Ht 1.753 m (5' 9\")   Wt 96 kg (211 lb 10.3 oz)   LMP 10/28/2012   SpO2 93%   BMI 31.25 kg/m² .   "

## 2021-05-20 NOTE — DISCHARGE INSTRUCTIONS
Discharge Instructions    Discharged to home by car with relative. Discharged via wheelchair, hospital escort: Yes.  Special equipment needed: Not Applicable    Be sure to schedule a follow-up appointment with your primary care doctor or any specialists as instructed.     Discharge Plan:   Diet Plan: Discussed  Activity Level: Discussed  Confirmed Follow up Appointment: Patient to Call and Schedule Appointment  Confirmed Symptoms Management: Discussed  Medication Reconciliation Updated: Yes    I understand that a diet low in cholesterol, fat, and sodium is recommended for good health. Unless I have been given specific instructions below for another diet, I accept this instruction as my diet prescription.   Other diet: Resume previous diet    Special Instructions: None    · Is patient discharged on Warfarin / Coumadin?   No     ACTIVITY: Rest and take it easy for the first 24 hours.  A responsible adult is recommended to remain with you during that time.  It is normal to feel sleepy.  We encourage you to not do anything that requires balance, judgment or coordination.    MILD FLU-LIKE SYMPTOMS ARE NORMAL. YOU MAY EXPERIENCE GENERALIZED MUSCLE ACHES, THROAT IRRITATION, HEADACHE AND/OR SOME NAUSEA.    FOR 24 HOURS DO NOT:  Drive, operate machinery or run household appliances.  Drink beer or alcoholic beverages.   Make important decisions or sign legal documents.    SPECIAL INSTRUCTIONS:     DIET: To avoid nausea, slowly advance diet as tolerated, avoiding spicy or greasy foods for the first day.  Add more substantial food to your diet according to your physician's instructions.  Babies can be fed formula or breast milk as soon as they are hungry.  INCREASE FLUIDS AND FIBER TO AVOID CONSTIPATION.        FOLLOW-UP APPOINTMENT:  A follow-up appointment should be arranged with your doctor in 2 weeks; call to schedule.    You should CALL YOUR PHYSICIAN if you develop:  Fever greater than 101 degrees F.  Pain not relieved by  medication, or persistent nausea or vomiting.  Excessive bleeding (blood soaking through dressing) or unexpected drainage from the wound.  Extreme redness or swelling around the incision site, drainage of pus or foul smelling drainage.  Inability to urinate or empty your bladder within 8 hours.  Problems with breathing or chest pain.    You should call 911 if you develop problems with breathing or chest pain.  If you are unable to contact your doctor or surgical center, you should go to the nearest emergency room or urgent care center.    If any questions arise, call your doctor.  If your doctor is not available, please feel free to call the Surgical Center.The Contact Center is open Monday through Friday 7AM to 5PM and may speak to a nurse at [phone], or toll free at [phone].     A registered nurse may call you a few days after your surgery to see how you are doing after your procedure.    MEDICATIONS: Resume taking daily medication.  Take prescribed pain medication with food.  If no medication is prescribed, you may take non-aspirin pain medication if needed.  PAIN MEDICATION CAN BE VERY CONSTIPATING.  Take a stool softener or laxative such as senokot, pericolace, or milk of magnesia if needed.      If your physician has prescribed pain medication that includes Acetaminophen (Tylenol), do not take additional Acetaminophen (Tylenol) while taking the prescribed medication.    Depression / Suicide Risk    As you are discharged from this [facility] facility, it is important to learn how to keep safe from harming yourself.    Recognize the warning signs:  · Abrupt changes in personality, positive or negative- including increase in energy   · Giving away possessions  · Change in eating patterns- significant weight changes-  positive or negative  · Change in sleeping patterns- unable to sleep or sleeping all the time   · Unwillingness or inability to communicate  · Depression  · Unusual sadness,  discouragement and loneliness  · Talk of wanting to die  · Neglect of personal appearance   · Rebelliousness- reckless behavior  · Withdrawal from people/activities they love  · Confusion- inability to concentrate     If you or a loved one observes any of these behaviors or has concerns about self-harm, here's what you can do:  · Talk about it- your feelings and reasons for harming yourself  · Remove any means that you might use to hurt yourself (examples: pills, rope, extension cords, firearm)  · Get professional help from the community (Mental Health, Substance Abuse, psychological counseling)  · Do not be alone:Call your Safe Contact- someone whom you trust who will be there for you.  · Call your local CRISIS HOTLINE 714-4618 or 767-453-4968  · Call your local Children's Mobile Crisis Response Team [place] (389) 604-3637 or www.[employer]  · Call the toll free National Suicide Prevention Hotlines   · National Suicide Prevention Lifeline 250-416-YWYE (8600)  · National Hope Line Network 800-SUICIDE (132-6374)

## 2021-05-20 NOTE — PROGRESS NOTES
Report received from Laura MONTANEZ. Pt assessment complete. Pt will be on Q4H VS, has a solitario catheter in place, wears SCD's while in bed. Pt is sitting upright on the couch. Encouraged pt to drink fluids. Urine output adequate at this time. Pt states pain at a 2-3. Tylenol and motrin to be given per orders. Pt has a SpO2 monitor on. Pt requesting snacks from nourishment room. Call light is within pt reach. Will continue to monitor VS.

## 2021-05-20 NOTE — DISCHARGE SUMMARY
Discharge Summary:      Blessing Liu      Admit Date:   2021  Discharge Date:  2021     Admitting diagnosis:  Cystocele, midline [N81.11]  Discharge Diagnosis: Status post vaginal hysterectomy, Anterior colporaphy, uterosacral vault suspension..     History:  Past Medical History:   Diagnosis Date   • Abnormal drug screen 2017   • Adjustment disorder with anxious mood    • Arrhythmia     history of   • Arthritis    • Bilateral leg edema    • Dyslipidemia, goal LDL below 130    • GERD (gastroesophageal reflux disease)    • Heart burn    • Hemorrhagic disorder ([facility])     pt tends to bleed easy    • Menopausal symptoms    • Methamphetamine use ([facility]) 2017     OB History    Para Term  AB Living   4 3     1 2   SAB TAB Ectopic Molar Multiple Live Births   1                # Outcome Date GA Lbr Michael/2nd Weight Sex Delivery Anes PTL Lv   4 SAB            3 Para            2 Para            1 Para                 Pcn [penicillins]  Patient Active Problem List    Diagnosis Date Noted   • Prolapsed uterus 2021   • Pain in both hands 2020   • Paresthesia of both feet 2020   • Idiopathic peripheral neuropathy 2020   • Anxiety 2020   • Neck pain 2020   • Varicose veins of leg with edema, bilateral 2020   • Rash 2020   • Itching 2020   • Multiple joint pain 2020   • Bilateral lower extremity edema 2020   • Obesity (BMI 30-39.9) 2020   • Eczema 10/31/2019   • Tobacco dependence 2018   • Constipation 2018   • Abnormal drug screen 2017   • Methamphetamine use ([facility]) 2017   • Chronic use of opiate for therapeutic purpose 2016   • Recurrent shoulder dislocation 10/03/2014   • Right shoulder pain 2014   • Adjustment disorder with anxious mood    • Migraine headache 2013   • GERD (gastroesophageal reflux disease)    • Dyslipidemia, goal LDL below 130         Hospital Course:   55  y.o. , , was admitted with the above mentioned diagnosis, underwent vaginal hysterectomy an anterior clporaphy and uterosacral vault suspension and cystoscopy, . Patient postoperative course was unremarkable, with progressive advancement in diet , ambulation and toleration of oral analgesia. Patient without complaints today and desires discharge.      Vitals:    21 1800 21 2203 21 0216 21 0527   BP: 126/83 111/73 109/71 104/64   Pulse: 87 98 95 86   Resp: 18 18 18 18   Temp: 36.2 °C (97.1 °F) 37 °C (98.6 °F) 35.9 °C (96.6 °F) (!) 35.7 °C (96.3 °F)   TempSrc: Temporal Temporal Temporal Temporal   SpO2: 97% 98% 90% 93%   Weight:       Height:           Current Facility-Administered Medications   Medication Dose   • Pharmacy Consult Request ...Pain Management Review 1 Each  1 Each   • ondansetron (ZOFRAN) syringe/vial injection 4 mg  4 mg   • dexamethasone (DECADRON) injection 4 mg  4 mg   • diphenhydrAMINE (BENADRYL) injection 25 mg  25 mg   • haloperidol lactate (HALDOL) injection 1 mg  1 mg   • scopolamine (TRANSDERM-SCOP) patch 1 Patch  1 Patch   • docusate sodium (COLACE) capsule 100 mg  100 mg   • senna-docusate (PERICOLACE or SENOKOT S) 8.6-50 MG per tablet 1 tablet  1 tablet   • senna-docusate (PERICOLACE or SENOKOT S) 8.6-50 MG per tablet 1 tablet  1 tablet   • polyethylene glycol/lytes (MIRALAX) PACKET 1 Packet  1 Packet   • magnesium hydroxide (MILK OF MAGNESIA) suspension 30 mL  30 mL   • bisacodyl (DULCOLAX) suppository 10 mg  10 mg   • fleet enema 133 mL  1 Each   • acetaminophen (TYLENOL) tablet 1,000 mg  1,000 mg    Followed by   • [START ON 2021] acetaminophen (TYLENOL) tablet 1,000 mg  1,000 mg   • ibuprofen (MOTRIN) tablet 800 mg  800 mg    Followed by   • [START ON 2021] ibuprofen (MOTRIN) tablet 800 mg  800 mg   • oxyCODONE immediate-release (ROXICODONE) tablet 5 mg  5 mg    Or   • oxyCODONE immediate release (ROXICODONE) tablet 10 mg  10 mg    Or   •  HYDROmorphone (Dilaudid) injection 0.5 mg  0.5 mg   • simethicone (MYLICON) chewable tab 80 mg  80 mg       Exam:  Breast Exam: negative  Abdomen: Abdomen soft, non-tender. BS normal. No masses,  No organomegaly  Perineum: perineum intact  Extremity: extremities, peripheral pulses and reflexes normal     Labs:  Recent Labs     05/20/21  0227   WBC 13.6*   RBC 3.89*   HEMOGLOBIN 11.6*   HEMATOCRIT 35.2*   MCV 91.3   MCH 29.6   MCHC 32.4*   RDW 42.9   PLATELETCT 298   MPV 9.9        Activity:   Discharge to home  Pelvic Rest x 6 weeks    Assessment:  S/p vaginal hysterectomy an anterior clporaphy and uterosacral vault suspension and cystoscopy.  normal postoperativecourse  Discharge Assessment: No areas of skin breakdown/redness; surgical incision intact/healing     Follow up: 2 weeks.     Discharge Meds:   No current outpatient medications on file.       Melissa Suazo M.D.

## 2021-05-20 NOTE — PROGRESS NOTES
Call placed to Dr. Suazo and informed her that pt is wondering if she should be taking her usual nightly medication: furosemide, gabapentin, paxil, and buspirone. Dr. Suazo stated that the pt may start taking her usual medication tomorrow.   Dr. Suazo gave telephone order to remove solitario catheter and vaginal packing at 0500 tomorrow morning; after 2 hours have the pt void and get a bladder scan to check for post void residual.

## 2021-05-28 ENCOUNTER — OFFICE VISIT (OUTPATIENT)
Dept: URGENT CARE | Facility: PHYSICIAN GROUP | Age: 56
End: 2021-05-28
Payer: MEDICAID

## 2021-05-28 ENCOUNTER — TELEPHONE (OUTPATIENT)
Dept: MEDICAL GROUP | Facility: CLINIC | Age: 56
End: 2021-05-28

## 2021-05-28 VITALS
SYSTOLIC BLOOD PRESSURE: 110 MMHG | HEART RATE: 107 BPM | HEIGHT: 69 IN | TEMPERATURE: 99.5 F | DIASTOLIC BLOOD PRESSURE: 70 MMHG | RESPIRATION RATE: 18 BRPM | BODY MASS INDEX: 31.25 KG/M2 | OXYGEN SATURATION: 96 % | WEIGHT: 211 LBS

## 2021-05-28 DIAGNOSIS — N30.01 ACUTE CYSTITIS WITH HEMATURIA: ICD-10-CM

## 2021-05-28 DIAGNOSIS — R10.30 LOWER ABDOMINAL PAIN: ICD-10-CM

## 2021-05-28 DIAGNOSIS — R30.0 DYSURIA: ICD-10-CM

## 2021-05-28 LAB
APPEARANCE UR: NORMAL
BILIRUB UR STRIP-MCNC: NORMAL MG/DL
COLOR UR AUTO: NORMAL
GLUCOSE UR STRIP.AUTO-MCNC: NEGATIVE MG/DL
KETONES UR STRIP.AUTO-MCNC: NEGATIVE MG/DL
LEUKOCYTE ESTERASE UR QL STRIP.AUTO: NORMAL
NITRITE UR QL STRIP.AUTO: NEGATIVE
PH UR STRIP.AUTO: 6 [PH] (ref 5–8)
PROT UR QL STRIP: 30 MG/DL
RBC UR QL AUTO: NORMAL
SP GR UR STRIP.AUTO: 1.03
UROBILINOGEN UR STRIP-MCNC: NEGATIVE MG/DL

## 2021-05-28 PROCEDURE — 99214 OFFICE O/P EST MOD 30 MIN: CPT | Mod: 25 | Performed by: NURSE PRACTITIONER

## 2021-05-28 PROCEDURE — 81002 URINALYSIS NONAUTO W/O SCOPE: CPT | Performed by: NURSE PRACTITIONER

## 2021-05-28 RX ORDER — PAROXETINE 30 MG/1
30 TABLET, FILM COATED ORAL DAILY
COMMUNITY
End: 2021-06-07

## 2021-05-28 RX ORDER — HYDROXYZINE HYDROCHLORIDE 25 MG/1
25 TABLET, FILM COATED ORAL 3 TIMES DAILY PRN
COMMUNITY
End: 2021-06-07

## 2021-05-28 RX ORDER — ALBUTEROL SULFATE 90 UG/1
2 AEROSOL, METERED RESPIRATORY (INHALATION) EVERY 6 HOURS PRN
COMMUNITY
End: 2021-08-23 | Stop reason: SDUPTHER

## 2021-05-28 RX ORDER — BACLOFEN 20 MG/1
20 TABLET ORAL 3 TIMES DAILY
COMMUNITY
End: 2021-06-07

## 2021-05-28 RX ORDER — FUROSEMIDE 20 MG/1
20 TABLET ORAL 2 TIMES DAILY
COMMUNITY
End: 2021-06-07

## 2021-05-28 RX ORDER — POTASSIUM CHLORIDE 750 MG/1
10 TABLET, FILM COATED, EXTENDED RELEASE ORAL 2 TIMES DAILY
COMMUNITY
End: 2021-06-07

## 2021-05-28 RX ORDER — GABAPENTIN 300 MG/1
300 CAPSULE ORAL 3 TIMES DAILY
COMMUNITY
End: 2021-06-07

## 2021-05-28 RX ORDER — PANTOPRAZOLE SODIUM 40 MG/1
40 TABLET, DELAYED RELEASE ORAL DAILY
COMMUNITY
End: 2021-08-23 | Stop reason: SDUPTHER

## 2021-05-28 RX ORDER — LORATADINE 10 MG/1
10 TABLET ORAL DAILY
COMMUNITY
End: 2021-08-06

## 2021-05-28 RX ORDER — QUINIDINE SULFATE 200 MG
TABLET ORAL
COMMUNITY
End: 2022-04-18

## 2021-05-28 RX ORDER — SULFAMETHOXAZOLE AND TRIMETHOPRIM 800; 160 MG/1; MG/1
1 TABLET ORAL EVERY 12 HOURS
Qty: 10 TABLET | Refills: 0 | Status: SHIPPED | OUTPATIENT
Start: 2021-05-28 | End: 2021-06-02

## 2021-05-28 RX ORDER — CHOLECALCIFEROL (VITAMIN D3) 125 MCG
500 CAPSULE ORAL DAILY
Status: ON HOLD | COMMUNITY
End: 2022-04-27

## 2021-05-28 RX ORDER — BUSPIRONE HYDROCHLORIDE 10 MG/1
10 TABLET ORAL 2 TIMES DAILY
COMMUNITY
End: 2021-06-10 | Stop reason: SDUPTHER

## 2021-05-28 ASSESSMENT — ENCOUNTER SYMPTOMS
DIZZINESS: 0
HEADACHES: 0
CHILLS: 0
ABDOMINAL PAIN: 1
NAUSEA: 0
FEVER: 0
FLANK PAIN: 0
VOMITING: 0
DIARRHEA: 0

## 2021-05-28 ASSESSMENT — FIBROSIS 4 INDEX: FIB4 SCORE: 0.95

## 2021-05-28 NOTE — TELEPHONE ENCOUNTER
Absolutely!!  She needs to go to urgent care or ER.  If she has a temperature after surgery, she could have an infection. Please advise patient she needs to be seen at urgent care or ER

## 2021-05-28 NOTE — TELEPHONE ENCOUNTER
LVM regarding message from PCP. Advised the pt to call back to lend me know they have gotten the message. Will also try again later today. Please advise.

## 2021-05-28 NOTE — TELEPHONE ENCOUNTER
Pt came in asking for orders for blood work. Pt states she just had surgery done and has been running fevers since, pt also stated she does not feel very well. I have advise pt PCP is not in office on fridays and she should head over to the  in [place]. Pt stated she will do that after she talks with her . Pt would like a call in as what she should do. Please advise.

## 2021-05-29 ENCOUNTER — NURSE TRIAGE (OUTPATIENT)
Dept: HEALTH INFORMATION MANAGEMENT | Facility: OTHER | Age: 56
End: 2021-05-29

## 2021-05-29 NOTE — TELEPHONE ENCOUNTER
"Pt had surgery on 19,  yesterday for UTI (antibiotic).  Reading the antibiotic- states not to take with water pill or potassium.  Pt did not take the medication yet. Pt advised to call pharmacy.  I called [facility]  , no answer x2.  Yakelin Barragan (ordering provider) messaged.  [phone]    Reason for Disposition  • Caller has NON-URGENT medication question about med that PCP prescribed and triager unable to answer question    Additional Information  • Negative: Drug overdose and nurse unable to answer question  • Negative: Caller requesting information not related to medicine  • Negative: Caller requesting a prescription for Strep throat and has a positive culture result  • Negative: Rash while taking a medication or within 3 days of stopping it  • Negative: Immunization reaction suspected  • Negative: [1] Asthma and [2] having symptoms of asthma (cough, wheezing, etc)  • Negative: MORE THAN A DOUBLE DOSE of a prescription or over-the-counter (OTC) drug  • Negative: [1] DOUBLE DOSE (an extra dose or lesser amount) of over-the-counter (OTC) drug AND [2] any symptoms (e.g., dizziness, nausea, pain, sleepiness)  • Negative: [1] DOUBLE DOSE (an extra dose or lesser amount) of prescription drug AND [2] any symptoms (e.g., dizziness, nausea, pain, sleepiness)  • Negative: Took another person's prescription drug  • Negative: [1] DOUBLE DOSE (an extra dose or lesser amount) of prescription drug AND [2] NO symptoms (Exception: a double dose of antibiotics)  • Negative: Diabetes drug error or overdose (e.g., insulin or extra dose)  • Negative: [1] Request for URGENT new prescription or refill of \"essential\" medication (i.e., likelihood of harm to patient if not taken) AND [2] triager unable to fill per unit policy  • Negative: [1] Prescription not at pharmacy AND [2] was prescribed today by PCP  • Negative: Pharmacy calling with prescription questions and triager unable to answer question  • Negative: Caller " "has URGENT medication question about med that PCP prescribed and triager unable to answer question    Answer Assessment - Initial Assessment Questions  1. SYMPTOMS: \"Do you have any symptoms?\"      no  2. SEVERITY: If symptoms are present, ask \"Are they mild, moderate or severe?\"      no    Protocols used: MEDICATION QUESTION CALL-A-AH      "

## 2021-05-29 NOTE — PROGRESS NOTES
Subjective:   Bhavana Liu is a 55 y.o. female who presents for Abdominal Pain (x2days ) and Dysuria (Blood in urine )      HPI  55-year-old female patient in urgent care for new onset abdominal pain, dysuria and light vaginal bleeding that she just noticed today.  Patient states that her symptoms have been worsening over the last 2 days.  Notes that she had a total vaginal hysterectomy on 5-.  Is having some lower abdominal pain and pressure.  States she has had a fever T-max 101.  Has not taken anything over-the-counter for her symptoms.    Review of Systems   Constitutional: Negative for chills, fever and malaise/fatigue.   Gastrointestinal: Positive for abdominal pain. Negative for diarrhea, nausea and vomiting.   Genitourinary: Positive for dysuria, frequency, hematuria and urgency. Negative for flank pain.   Skin: Negative for itching and rash.   Neurological: Negative for dizziness and headaches.       Patient Active Problem List   Diagnosis   • GERD (gastroesophageal reflux disease)   • Dyslipidemia, goal LDL below 130   • Migraine headache   • Adjustment disorder with anxious mood   • Right shoulder pain   • Recurrent shoulder dislocation   • Chronic use of opiate for therapeutic purpose   • Abnormal drug screen   • Methamphetamine use (HCC)   • Constipation   • Tobacco dependence   • Eczema   • Bilateral lower extremity edema   • Obesity (BMI 30-39.9)   • Itching   • Multiple joint pain   • Varicose veins of leg with edema, bilateral   • Rash   • Neck pain   • Paresthesia of both feet   • Idiopathic peripheral neuropathy   • Anxiety   • Pain in both hands   • Prolapsed uterus     Past Surgical History:   Procedure Laterality Date   • VAGINAL HYSTERECTOMY TOTAL  5/19/2021    Procedure: HYSTERECTOMY, TOTAL, VAGINAL;  Surgeon: Melissa Suazo M.D.;  Location: SURGERY SAME DAY [facility];  Service: Gynecology   • ANTERIOR AND POSTERIOR REPAIR  5/19/2021    Procedure: COLPORRHAPHY,  "COMBINED ANTEROPOSTERIOR;  Surgeon: Melissa Suazo M.D.;  Location: SURGERY SAME DAY [facility];  Service: Gynecology   • VAGINAL SUSPENSION  5/19/2021    Procedure: COLPOPEXY;  Surgeon: Melissa Suazo M.D.;  Location: SURGERY SAME DAY [facility];  Service: Gynecology   • CYSTOSCOPY  5/19/2021    Procedure: CYSTOSCOPY;  Surgeon: Melissa Suazo M.D.;  Location: SURGERY SAME DAY [facility];  Service: Gynecology     Social History     Tobacco Use   • Smoking status: Current Every Day Smoker     Packs/day: 0.50     Years: 25.00     Pack years: 12.50     Types: Cigarettes   • Smokeless tobacco: Never Used   • Tobacco comment: getting close, she down to 1/4 ppd, still trying to quit   Vaping Use   • Vaping Use: Former   • Quit date: 6/2/2020   Substance Use Topics   • Alcohol use: No     Alcohol/week: 0.0 oz     Comment: none at all   • Drug use: Yes     Comment: Meth daily       Family History   Problem Relation Age of Onset   • Diabetes Mother         type 2, diet controlled   • Heart Disease Mother         pacemaker   • Cancer Maternal Grandmother         uterine   • Genitourinary () Problems Sister         tumor, benign   • Heart Disease Father         CHF   • Non-contributory Father         emphysema   • Arthritis Brother         hip replacement   • Non-contributory Sister         encephalitis      (Allergies, Medications, & Tobacco/Substance Use were reconciled by the Medical Assistant and reviewed by myself. The family history is prepopulated)     Objective:     /70   Pulse (!) 107   Temp 37.5 °C (99.5 °F) (Temporal)   Resp 18   Ht 1.753 m (5' 9\")   Wt 95.7 kg (211 lb)   LMP 10/28/2012   SpO2 96%   BMI 31.16 kg/m²     Physical Exam  Vitals reviewed.   Constitutional:       Appearance: Normal appearance.   Cardiovascular:      Rate and Rhythm: Normal rate and regular rhythm.      Heart sounds: Normal heart sounds.   Pulmonary:      Effort: Pulmonary effort is normal.      Breath sounds: Normal " breath sounds.   Abdominal:      General: Abdomen is flat. Bowel sounds are normal. There is no distension.      Palpations: Abdomen is soft. There is no mass.      Tenderness: There is abdominal tenderness in the suprapubic area. There is no right CVA tenderness or left CVA tenderness.      Hernia: No hernia is present.   Skin:     General: Skin is warm.   Neurological:      Mental Status: She is alert and oriented to person, place, and time.   Psychiatric:         Mood and Affect: Mood normal.         Behavior: Behavior normal.         Thought Content: Thought content normal.         Judgment: Judgment normal.       Lab Results   Component Value Date/Time    POCCOLOR DARK YELLOW 05/28/2021 06:43 PM    POCAPPEAR SLIGHTLY CLOUDY 05/28/2021 06:43 PM    POCLEUKEST SMALL 05/28/2021 06:43 PM    POCNITRITE NEGATIVE 05/28/2021 06:43 PM    POCUROBILIGE NEGATIVE 05/28/2021 06:43 PM    POCPROTEIN 30 05/28/2021 06:43 PM    POCURPH 6.0 05/28/2021 06:43 PM    POCBLOOD MODERATE 05/28/2021 06:43 PM    POCSPGRV 1.030 05/28/2021 06:43 PM    POCKETONES NEGATIVE 05/28/2021 06:43 PM    POCBILIRUBIN SMALL 05/28/2021 06:43 PM    POCGLUCUA NEGATIVE 05/28/2021 06:43 PM          Assessment/Plan:     1. Dysuria  POCT Urinalysis    Urine Culture    sulfamethoxazole-trimethoprim (BACTRIM DS) 800-160 MG tablet   2. Lower abdominal pain  Urine Culture    sulfamethoxazole-trimethoprim (BACTRIM DS) 800-160 MG tablet     Discussed physical examination findings as well as patient presentation, length patient symptoms, urinalysis findings are consistent with urinary tract infection however discussed possible abdominal etiology due to recent hysterectomy.  Strict ER precautions provided for worsening symptoms including intractable pain, profuse bleeding, abdominal bloating, rigidity, distention elevated fever unable to be controlled by over-the-counter medications.  Advised to follow-up with gynecologist as scheduled on June 1.    - Pt educated on  preventative measures for avoiding future UTIs  - Advised to increase fluid intake  - OTC Pyridium (Azo) for symptomatic relief, advised that it will turn urine orange in color  - Pending urine culture  - ER precautions given regarding pyelonephritis including fevers greater than 101 and, vomiting and dehydration, increased back pain.    Patient expressed understanding agrees to plan is no further questions at this time.    Differential diagnosis, natural history, supportive care, and indications for immediate follow-up discussed.    Advised the patient to follow-up with the primary care physician for recheck, reevaluation, and consideration of further management.    Please note that this dictation was created using voice recognition software. I have made a reasonable attempt to correct obvious errors, but I expect that there are errors of grammar and possibly content that I did not discover before finalizing the note.    This note was electronically signed CALISTA Heredia

## 2021-06-04 ENCOUNTER — TELEPHONE (OUTPATIENT)
Dept: MEDICAL GROUP | Facility: CLINIC | Age: 56
End: 2021-06-04

## 2021-06-04 DIAGNOSIS — R60.0 BILATERAL LOWER EXTREMITY EDEMA: ICD-10-CM

## 2021-06-04 DIAGNOSIS — M54.2 NECK PAIN: ICD-10-CM

## 2021-06-04 NOTE — TELEPHONE ENCOUNTER
"Pt came into clinic today asking to speak with PCP. She stated she tried to  her medication baclofen and potassium when the pharmacist came on the phone and told her that PCP has called the pharmacy herself and asked to discontinue all her medications. I advised her from what I could see I did not see anything in her chart to confirm that her PCP has called the pharmacy to discontinue anything. She advised me that she does realize she needs to see her PCP to continue on getting her RX, pt stated she has just lost her mother and was going threw a rough time. Pt would like to speak with her PCP. Pt stated she would like to have a refill on those to medications \"Please\"  "

## 2021-06-07 ENCOUNTER — TELEPHONE (OUTPATIENT)
Dept: MEDICAL GROUP | Facility: CLINIC | Age: 56
End: 2021-06-07

## 2021-06-07 RX ORDER — FUROSEMIDE 20 MG/1
TABLET ORAL
Qty: 180 TABLET | Refills: 0 | Status: SHIPPED | OUTPATIENT
Start: 2021-06-07 | End: 2021-08-02

## 2021-06-07 RX ORDER — HYDROXYZINE HYDROCHLORIDE 25 MG/1
TABLET, FILM COATED ORAL
Qty: 120 TABLET | Refills: 0 | Status: SHIPPED | OUTPATIENT
Start: 2021-06-07 | End: 2021-08-02

## 2021-06-07 RX ORDER — PAROXETINE 30 MG/1
TABLET, FILM COATED ORAL
Qty: 90 TABLET | Refills: 0 | Status: SHIPPED | OUTPATIENT
Start: 2021-06-07 | End: 2021-08-02

## 2021-06-07 RX ORDER — GABAPENTIN 300 MG/1
CAPSULE ORAL
Qty: 90 CAPSULE | Refills: 0 | Status: SHIPPED | OUTPATIENT
Start: 2021-06-07 | End: 2021-08-23

## 2021-06-07 RX ORDER — BACLOFEN 20 MG/1
20 TABLET ORAL 3 TIMES DAILY
Qty: 90 TABLET | Refills: 0 | Status: CANCELLED | OUTPATIENT
Start: 2021-06-07

## 2021-06-07 RX ORDER — POTASSIUM CHLORIDE 750 MG/1
TABLET, FILM COATED, EXTENDED RELEASE ORAL
Qty: 60 TABLET | Refills: 0 | Status: SHIPPED | OUTPATIENT
Start: 2021-06-07 | End: 2021-07-02

## 2021-06-07 RX ORDER — BACLOFEN 20 MG/1
TABLET ORAL
Qty: 60 TABLET | Refills: 0 | Status: SHIPPED | OUTPATIENT
Start: 2021-06-07 | End: 2021-07-02

## 2021-06-07 RX ORDER — POTASSIUM CHLORIDE 750 MG/1
10 TABLET, FILM COATED, EXTENDED RELEASE ORAL 2 TIMES DAILY
Qty: 60 TABLET | Refills: 0 | Status: CANCELLED | OUTPATIENT
Start: 2021-06-07

## 2021-06-07 NOTE — TELEPHONE ENCOUNTER
Called pharmacy to reactivate prescriptions.  Staff at pharmacy indicates that [employer] is sending the message.

## 2021-06-07 NOTE — TELEPHONE ENCOUNTER
The pharmacy indicates that [employer] sent the message to deactivate all prescriptions.  Please contact [facility] to see if there is a reason this is occurring.

## 2021-06-07 NOTE — TELEPHONE ENCOUNTER
Was the patient seen in the last year in this department? Yes    Does patient have an active prescription for medications requested? Yes    Received Request Via: Pharmacy    Office Visit on 05/28/2021   Component Date Value   • POC Color 05/28/2021 DARK YELLOW    • POC Appearance 05/28/2021 SLIGHTLY CLOUDY    • POC Leukocyte Esterase 05/28/2021 SMALL    • POC Nitrites 05/28/2021 NEGATIVE    • POC Urobiligen 05/28/2021 NEGATIVE    • POC Protein 05/28/2021 30    • POC Urine PH 05/28/2021 6.0    • POC Blood 05/28/2021 MODERATE    • POC Specific Gravity 05/28/2021 1.030    • POC Ketones 05/28/2021 NEGATIVE    • POC Bilirubin 05/28/2021 SMALL    • POC Glucose 05/28/2021 NEGATIVE    Admission on 05/19/2021, Discharged on 05/20/2021   Component Date Value   • Pathology Request 05/19/2021 Sent to Histo    • WBC 05/20/2021 13.6*   • RBC 05/20/2021 3.89*   • Hemoglobin 05/20/2021 11.6*   • Hematocrit 05/20/2021 35.2*   • MCV 05/20/2021 91.3    • MCH 05/20/2021 29.6    • MCHC 05/20/2021 32.4*   • RDW 05/20/2021 42.9    • Platelet Count 05/20/2021 298    • MPV 05/20/2021 9.9    • Neutrophils-Polys 05/20/2021 77.60*   • Lymphocytes 05/20/2021 11.30*   • Monocytes 05/20/2021 10.30    • Eosinophils 05/20/2021 0.00    • Basophils 05/20/2021 0.40    • Immature Granulocytes 05/20/2021 0.40    • Nucleated RBC 05/20/2021 0.00    • Neutrophils (Absolute) 05/20/2021 10.53*   • Lymphs (Absolute) 05/20/2021 1.54    • Monos (Absolute) 05/20/2021 1.40*   • Eos (Absolute) 05/20/2021 0.00    • Baso (Absolute) 05/20/2021 0.05    • Immature Granulocytes (a* 05/20/2021 0.06    • NRBC (Absolute) 05/20/2021 0.00    Pre-Admission Testing on 05/14/2021   Component Date Value   • WBC 05/14/2021 9.0    • RBC 05/14/2021 4.85    • Hemoglobin 05/14/2021 14.4    • Hematocrit 05/14/2021 43.4    • MCV 05/14/2021 89.5    • MCH 05/14/2021 29.7    • MCHC 05/14/2021 33.2*   • RDW 05/14/2021 44.2    • Platelet Count 05/14/2021 372    • MPV 05/14/2021 9.2    •  Neutrophils-Polys 2021 53.90    • Lymphocytes 2021 33.40    • Monocytes 2021 9.30    • Eosinophils 2021 1.40    • Basophils 2021 1.80    • Immature Granulocytes 2021 0.20    • Nucleated RBC 2021 0.00    • Neutrophils (Absolute) 2021 4.85    • Lymphs (Absolute) 2021 3.01    • Monos (Absolute) 2021 0.84    • Eos (Absolute) 2021 0.13    • Baso (Absolute) 2021 0.16*   • Immature Granulocytes (a* 2021 0.02    • NRBC (Absolute) 2021 0.00    • Sodium 2021 143    • Potassium 2021 3.9    • Chloride 2021 103    • Co2 2021 30    • Glucose 2021 77    • Bun 2021 9    • Creatinine 2021 0.95    • Calcium 2021 9.1    • Anion Gap 2021 10.0    • Color 2021 Yellow    • Character 2021 Clear    • Specific Gravity 2021 1.015    • Ph 2021 6.5    • Glucose 2021 Negative    • Ketones 2021 Negative    • Protein 2021 Negative    • Bilirubin 2021 Negative    • Urobilinogen, Urine 2021 0.2    • Nitrite 2021 Negative    • Leukocyte Esterase 2021 Negative    • Occult Blood 2021 Negative    • Micro Urine Req 2021 see below    • Report 2021                      Value:[facility]    Test Date:  2021  Pt Name:    SMOOTH AGUDELO           Department: [facility]  MRN:        [id number]                      Room:  Gender:     Female                       Technician: CHAZ  :        1965                   Requested By:VIKI SOLIMAN  Order #:    [id number]                    Reading MD: Nolan Natarajan MD    Measurements  Intervals                                Axis  Rate:       103                          P:          83  SC:         124                          QRS:        68  QRSD:       80                           T:          27  QT:         364  QTc:        477    Interpretive Statements  SINUS  TACHYCARDIA  RIGHT ATRIAL ABNORMALITY  No previous ECG available for comparison  Electronically Signed On 5- 15:35:29 PDT by Nolan Natarajan MD     • SARS-CoV-2 Source 05/14/2021 Nasal Swab    • SARS-CoV-2 by PCR 05/14/2021 NotDetected    • GFR If  05/14/2021 >60    • GFR If Non  Ameri* 05/14/2021 >60    Hospital Outpatient Visit on 04/08/2021   Component Date Value   • Sodium 04/08/2021 140    • Potassium 04/08/2021 4.1    • Chloride 04/08/2021 103    • Co2 04/08/2021 31    • Anion Gap 04/08/2021 6.0*   • Glucose 04/08/2021 98    • Bun 04/08/2021 14    • Creatinine 04/08/2021 0.92    • Calcium 04/08/2021 9.2    • AST(SGOT) 04/08/2021 22    • ALT(SGPT) 04/08/2021 19    • Alkaline Phosphatase 04/08/2021 85    • Total Bilirubin 04/08/2021 0.3    • Albumin 04/08/2021 4.2    • Total Protein 04/08/2021 6.7    • Globulin 04/08/2021 2.5    • A-G Ratio 04/08/2021 1.7    • Cholesterol,Tot 04/08/2021 190    • Triglycerides 04/08/2021 65    • HDL 04/08/2021 52    • LDL 04/08/2021 125*   • Fasting Status 04/08/2021 Fasting    • GFR If  04/08/2021 >60    • GFR If Non  Ameri* 04/08/2021 >60    Admission on 01/18/2021, Discharged on 01/19/2021   Component Date Value   • Color 01/18/2021 Yellow    • Character 01/18/2021 Turbid*   • Specific Gravity 01/18/2021 1.020    • Ph 01/18/2021 7.0    • Glucose 01/18/2021 Negative    • Ketones 01/18/2021 Negative    • Protein 01/18/2021 Negative    • Bilirubin 01/18/2021 Negative    • Urobilinogen, Urine 01/18/2021 0.2    • Nitrite 01/18/2021 Negative    • Leukocyte Esterase 01/18/2021 Small*   • Occult Blood 01/18/2021 Negative    • Micro Urine Req 01/18/2021 Microscopic    • WBC 01/18/2021 10-20*   • RBC 01/18/2021 2-5*   • Bacteria 01/18/2021 Moderate*   • Epithelial Cells 01/18/2021 Few    • Hyaline Cast 01/18/2021 3-5*   • Significant Indicator 01/18/2021 NEG    • Source 01/18/2021 UR    • Site 01/18/2021 -    • Culture Result  01/18/2021 Usual skin kianna 10-50,000 cfu/mL    Hospital Outpatient Visit on 11/20/2020   Component Date Value   • HLA-B27 Screen 11/20/2020 Negative    • Sed Rate Westergren 11/20/2020 2    • Rheumatoid Factor -Neph- 11/20/2020 <10    • NT-proBNP 11/20/2020 54    • West Nile Virus Igg 11/20/2020 0.19    • West Nile Virus Igm 11/20/2020 0.06    • Hsv I-Ii Igg Antibodies 11/20/2020 >22.40    • Hsv Igm I-Ii Combination 11/20/2020 0.54    • Rubeola Igg Antibody 11/20/2020 273.0    • Rubeola -Measles- Ab Igm 11/20/2020 0.67    • Mumps Ab Igg 11/20/2020 >300.0    • Mumps Ab Igm 11/20/2020 0.45    • Varicella Zoster IgG Ab 11/20/2020 1506.0    • Varicella Zoster IgM Ab 11/20/2020 0.09    • Anti-Dna -Ds 11/20/2020 None Detected    • SSA 52 (R0)(CHANDRIKA) Ab, IgG 11/20/2020 2    • SSA 60 (R0)(CHANDRIKA) Ab, IgG 11/20/2020 0    • Sjogren'S Anti-Ss-B 11/20/2020 0    • Thompson Antibodies 11/20/2020 0    • Htompson/RNP IgG (CHANDRIKA) 11/20/2020 0    • Anti-Scl-70 11/20/2020 0    • Miscellaneous Lab Result 11/20/2020 SEE NOTE    • SSA 52 (R0)(CHANDRIKA) Ab, IgG 11/20/2020 2    • SSA 60 (R0)(CHANDRIKA) Ab, IgG 11/20/2020 0    • Sjogren'S Anti-Ss-B 11/20/2020 0    • [place], IgE 11/20/2020 <0.10    • Asparagus  F261 11/20/2020 <0.10    • F138 Avocado 11/20/2020 <0.10    • Garcia/Esquivel Yeast IgE 11/20/2020 <0.10    • F204 Banana 11/20/2020 <0.10    • Basil  F269 11/20/2020 <0.10    • [place]   F278 11/20/2020 <0.10    • F27 Beef 11/20/2020 <0.10    • Beet Root, IgE 11/20/2020 <0.10    • Pepper C. annuum, IgE 11/20/2020 <0.10    • Black Pepper  F280 11/20/2020 <0.10    • Mussel F037 11/20/2020 <0.10    • Blueberry  F288 11/20/2020 <0.10    • Brazil Nut  F018 11/20/2020 <0.10    • Broccoli  F260 11/20/2020 <0.10    • Buckwheat  F011 11/20/2020 <0.10    • Cabbage F216 11/20/2020 <0.10    • F31 Carrot 11/20/2020 <0.10    • Cashew Nut  F202 11/20/2020 <0.10    • F299 Sweet Bryce 11/20/2020 <0.10    • Chick Pea  F309 11/20/2020 <0.10    • Chocolate Cacao, IgE F093  11/20/2020 <0.10    • Cinnamon  F220 11/20/2020 <0.10    • Clam  F207 11/20/2020 <0.10    • Coconut 11/20/2020 <0.10    • Codfish Allergen 11/20/2020 <0.10    • Occupational Cotton Seed* 11/20/2020 <0.10    • Crab  F023 11/20/2020 <0.10    • Cucumber  F244 11/20/2020 <0.10    • Dill  F277 11/20/2020 <0.10    • F1 Eggwhite 11/20/2020 <0.10    • Mabel  F270 11/20/2020 <0.10    • Grape Allergen 11/20/2020 <0.10    • Halibut  F303 11/20/2020 <0.10    • Hazelnut/Filbert  F017 11/20/2020 <0.10    • Honeydew/Cantaloupe, IgE 11/20/2020 <0.10    • Lettuce F215 11/20/2020 <0.10    • Pedro Brean/White Henson, I* 11/20/2020 <0.10    • F2 Milk 11/20/2020 <0.10    • Onions  F048 11/20/2020 <0.10    • [place] F033 11/20/2020 <0.10    • Oregano  F283 11/20/2020 <0.10    • F12 Pea 11/20/2020 <0.10    • F013 Peanut 11/20/2020 <0.10    • Pear  F094 11/20/2020 <0.10    • [place]  F255 11/20/2020 <0.10    • Pork  F026 11/20/2020 <0.10    • Potato 11/20/2020 <0.10    • Raspberry, IgE F343 11/20/2020 <0.10    • Sesame Seed 11/20/2020 <0.10    • Shrimp IgE 11/20/2020 <0.10    • F14 Henson Soybean 11/20/2020 <0.10    • Tea  F222 11/20/2020 <0.10    • Tomato IgE 11/20/2020 <0.10    • Trout  F204 11/20/2020 <0.10    • Turkey F284 11/20/2020 <0.10    • [place]  F255 11/20/2020 <0.10    • Watermelon 11/20/2020 <0.10    • Immunocap Score 11/20/2020 See Note    • HSV1 Gly IgG 11/20/2020 33.90*   • HSV2 Gly IgG 11/20/2020 2.63*   ]

## 2021-06-08 ENCOUNTER — TELEPHONE (OUTPATIENT)
Dept: MEDICAL GROUP | Facility: CLINIC | Age: 56
End: 2021-06-08

## 2021-06-08 NOTE — TELEPHONE ENCOUNTER
Called pt to ask patient if she was able to  medication that was sent to pharmacy. LVM for pt to call back. I will be calling [facility] to find out the issue patient was having with her medications. Please advise.

## 2021-06-09 NOTE — TELEPHONE ENCOUNTER
Called pt and spoke to pt regarding her prescriptions. Pt stated that her pharmacy has not gotten back to her regarding any of her medications. I did advise the patient that PCP has called the pharmacy and reactivated all prescriptions. Pt asked if I could call her back so she could call her pharmacy. I waited approximately 15 minutes and called the pt back. Pt then advised me that pharmacy has prescriptions ready for pt to . Please advise.

## 2021-06-10 RX ORDER — BUSPIRONE HYDROCHLORIDE 10 MG/1
10 TABLET ORAL 2 TIMES DAILY
Qty: 90 TABLET | Refills: 1 | Status: SHIPPED | OUTPATIENT
Start: 2021-06-10 | End: 2021-08-23 | Stop reason: SDUPTHER

## 2021-06-11 NOTE — TELEPHONE ENCOUNTER
Was the patient seen in the last year in this department? Yes    Does patient have an active prescription for medications requested? Yes    Received Request Via: Patient    Office Visit on 05/28/2021   Component Date Value   • POC Color 05/28/2021 DARK YELLOW    • POC Appearance 05/28/2021 SLIGHTLY CLOUDY    • POC Leukocyte Esterase 05/28/2021 SMALL    • POC Nitrites 05/28/2021 NEGATIVE    • POC Urobiligen 05/28/2021 NEGATIVE    • POC Protein 05/28/2021 30    • POC Urine PH 05/28/2021 6.0    • POC Blood 05/28/2021 MODERATE    • POC Specific Gravity 05/28/2021 1.030    • POC Ketones 05/28/2021 NEGATIVE    • POC Bilirubin 05/28/2021 SMALL    • POC Glucose 05/28/2021 NEGATIVE    Admission on 05/19/2021, Discharged on 05/20/2021   Component Date Value   • Pathology Request 05/19/2021 Sent to Histo    • WBC 05/20/2021 13.6*   • RBC 05/20/2021 3.89*   • Hemoglobin 05/20/2021 11.6*   • Hematocrit 05/20/2021 35.2*   • MCV 05/20/2021 91.3    • MCH 05/20/2021 29.6    • MCHC 05/20/2021 32.4*   • RDW 05/20/2021 42.9    • Platelet Count 05/20/2021 298    • MPV 05/20/2021 9.9    • Neutrophils-Polys 05/20/2021 77.60*   • Lymphocytes 05/20/2021 11.30*   • Monocytes 05/20/2021 10.30    • Eosinophils 05/20/2021 0.00    • Basophils 05/20/2021 0.40    • Immature Granulocytes 05/20/2021 0.40    • Nucleated RBC 05/20/2021 0.00    • Neutrophils (Absolute) 05/20/2021 10.53*   • Lymphs (Absolute) 05/20/2021 1.54    • Monos (Absolute) 05/20/2021 1.40*   • Eos (Absolute) 05/20/2021 0.00    • Baso (Absolute) 05/20/2021 0.05    • Immature Granulocytes (a* 05/20/2021 0.06    • NRBC (Absolute) 05/20/2021 0.00    Pre-Admission Testing on 05/14/2021   Component Date Value   • WBC 05/14/2021 9.0    • RBC 05/14/2021 4.85    • Hemoglobin 05/14/2021 14.4    • Hematocrit 05/14/2021 43.4    • MCV 05/14/2021 89.5    • MCH 05/14/2021 29.7    • MCHC 05/14/2021 33.2*   • RDW 05/14/2021 44.2    • Platelet Count 05/14/2021 372    • MPV 05/14/2021 9.2    •  Neutrophils-Polys 2021 53.90    • Lymphocytes 2021 33.40    • Monocytes 2021 9.30    • Eosinophils 2021 1.40    • Basophils 2021 1.80    • Immature Granulocytes 2021 0.20    • Nucleated RBC 2021 0.00    • Neutrophils (Absolute) 2021 4.85    • Lymphs (Absolute) 2021 3.01    • Monos (Absolute) 2021 0.84    • Eos (Absolute) 2021 0.13    • Baso (Absolute) 2021 0.16*   • Immature Granulocytes (a* 2021 0.02    • NRBC (Absolute) 2021 0.00    • Sodium 2021 143    • Potassium 2021 3.9    • Chloride 2021 103    • Co2 2021 30    • Glucose 2021 77    • Bun 2021 9    • Creatinine 2021 0.95    • Calcium 2021 9.1    • Anion Gap 2021 10.0    • Color 2021 Yellow    • Character 2021 Clear    • Specific Gravity 2021 1.015    • Ph 2021 6.5    • Glucose 2021 Negative    • Ketones 2021 Negative    • Protein 2021 Negative    • Bilirubin 2021 Negative    • Urobilinogen, Urine 2021 0.2    • Nitrite 2021 Negative    • Leukocyte Esterase 2021 Negative    • Occult Blood 2021 Negative    • Micro Urine Req 2021 see below    • Report 2021                      Value:[facility]    Test Date:  2021  Pt Name:    SMOOTH AGUDELO           Department: [facility]  MRN:        [id number]                      Room:  Gender:     Female                       Technician: CHAZ  :        [id number]                   Requested By:VIKI SOLIMAN  Order #:    [id number]                    Reading MD: Nolan Natarajan MD    Measurements  Intervals                                Axis  Rate:       103                          P:          83  MS:         124                          QRS:        68  QRSD:       80                           T:          27  QT:         364  QTc:        477    Interpretive Statements  SINUS  TACHYCARDIA  RIGHT ATRIAL ABNORMALITY  No previous ECG available for comparison  Electronically Signed On 5- 15:35:29 PDT by Nolan Natarajan MD     • SARS-CoV-2 Source 05/14/2021 Nasal Swab    • SARS-CoV-2 by PCR 05/14/2021 NotDetected    • GFR If  05/14/2021 >60    • GFR If Non  Ameri* 05/14/2021 >60    Hospital Outpatient Visit on 04/08/2021   Component Date Value   • Sodium 04/08/2021 140    • Potassium 04/08/2021 4.1    • Chloride 04/08/2021 103    • Co2 04/08/2021 31    • Anion Gap 04/08/2021 6.0*   • Glucose 04/08/2021 98    • Bun 04/08/2021 14    • Creatinine 04/08/2021 0.92    • Calcium 04/08/2021 9.2    • AST(SGOT) 04/08/2021 22    • ALT(SGPT) 04/08/2021 19    • Alkaline Phosphatase 04/08/2021 85    • Total Bilirubin 04/08/2021 0.3    • Albumin 04/08/2021 4.2    • Total Protein 04/08/2021 6.7    • Globulin 04/08/2021 2.5    • A-G Ratio 04/08/2021 1.7    • Cholesterol,Tot 04/08/2021 190    • Triglycerides 04/08/2021 65    • HDL 04/08/2021 52    • LDL 04/08/2021 125*   • Fasting Status 04/08/2021 Fasting    • GFR If  04/08/2021 >60    • GFR If Non  Ameri* 04/08/2021 >60    Admission on 01/18/2021, Discharged on 01/19/2021   Component Date Value   • Color 01/18/2021 Yellow    • Character 01/18/2021 Turbid*   • Specific Gravity 01/18/2021 1.020    • Ph 01/18/2021 7.0    • Glucose 01/18/2021 Negative    • Ketones 01/18/2021 Negative    • Protein 01/18/2021 Negative    • Bilirubin 01/18/2021 Negative    • Urobilinogen, Urine 01/18/2021 0.2    • Nitrite 01/18/2021 Negative    • Leukocyte Esterase 01/18/2021 Small*   • Occult Blood 01/18/2021 Negative    • Micro Urine Req 01/18/2021 Microscopic    • WBC 01/18/2021 10-20*   • RBC 01/18/2021 2-5*   • Bacteria 01/18/2021 Moderate*   • Epithelial Cells 01/18/2021 Few    • Hyaline Cast 01/18/2021 3-5*   • Significant Indicator 01/18/2021 NEG    • Source 01/18/2021 UR    • Site 01/18/2021 -    • Culture Result  01/18/2021 Usual skin kianna 10-50,000 cfu/mL    Hospital Outpatient Visit on 11/20/2020   Component Date Value   • HLA-B27 Screen 11/20/2020 Negative    • Sed Rate Westergren 11/20/2020 2    • Rheumatoid Factor -Neph- 11/20/2020 <10    • NT-proBNP 11/20/2020 54    • West Nile Virus Igg 11/20/2020 0.19    • West Nile Virus Igm 11/20/2020 0.06    • Hsv I-Ii Igg Antibodies 11/20/2020 >22.40    • Hsv Igm I-Ii Combination 11/20/2020 0.54    • Rubeola Igg Antibody 11/20/2020 273.0    • Rubeola -Measles- Ab Igm 11/20/2020 0.67    • Mumps Ab Igg 11/20/2020 >300.0    • Mumps Ab Igm 11/20/2020 0.45    • Varicella Zoster IgG Ab 11/20/2020 1506.0    • Varicella Zoster IgM Ab 11/20/2020 0.09    • Anti-Dna -Ds 11/20/2020 None Detected    • SSA 52 (R0)(CHANDRIKA) Ab, IgG 11/20/2020 2    • SSA 60 (R0)(CHANDRIKA) Ab, IgG 11/20/2020 0    • Sjogren'S Anti-Ss-B 11/20/2020 0    • Thompson Antibodies 11/20/2020 0    • Thompson/RNP IgG (CHANDRIKA) 11/20/2020 0    • Anti-Scl-70 11/20/2020 0    • Miscellaneous Lab Result 11/20/2020 SEE NOTE    • SSA 52 (R0)(CHANDRIKA) Ab, IgG 11/20/2020 2    • SSA 60 (R0)(CHANDRIKA) Ab, IgG 11/20/2020 0    • Sjogren'S Anti-Ss-B 11/20/2020 0    • [place], IgE 11/20/2020 <0.10    • Asparagus  F261 11/20/2020 <0.10    • F138 Avocado 11/20/2020 <0.10    • Garcia/Esquivel Yeast IgE 11/20/2020 <0.10    • F204 Banana 11/20/2020 <0.10    • Basil  F269 11/20/2020 <0.10    • [place]   F278 11/20/2020 <0.10    • F27 Beef 11/20/2020 <0.10    • Beet Root, IgE 11/20/2020 <0.10    • Pepper C. annuum, IgE 11/20/2020 <0.10    • Black Pepper  F280 11/20/2020 <0.10    • Mussel F037 11/20/2020 <0.10    • Blueberry  F288 11/20/2020 <0.10    • Brazil Nut  F018 11/20/2020 <0.10    • Broccoli  F260 11/20/2020 <0.10    • Buckwheat  F011 11/20/2020 <0.10    • Cabbage F216 11/20/2020 <0.10    • F31 Carrot 11/20/2020 <0.10    • Cashew Nut  F202 11/20/2020 <0.10    • F299 Sweet [place] 11/20/2020 <0.10    • Chick Pea  F309 11/20/2020 <0.10    • Chocolate Cacao, IgE F093  11/20/2020 <0.10    • Cinnamon  F220 11/20/2020 <0.10    • Clam  F207 11/20/2020 <0.10    • Coconut 11/20/2020 <0.10    • Codfish Allergen 11/20/2020 <0.10    • Occupational Cotton Seed* 11/20/2020 <0.10    • Crab  F023 11/20/2020 <0.10    • Cucumber  F244 11/20/2020 <0.10    • Dill  F277 11/20/2020 <0.10    • F1 Eggwhite 11/20/2020 <0.10    • Mabel  F270 11/20/2020 <0.10    • Grape Allergen 11/20/2020 <0.10    • Halibut  F303 11/20/2020 <0.10    • Hazelnut/Filbert  F017 11/20/2020 <0.10    • Honeydew/Cantaloupe, IgE 11/20/2020 <0.10    • Lettuce F215 11/20/2020 <0.10    • Pedro Brean/White Henson, I* 11/20/2020 <0.10    • F2 Milk 11/20/2020 <0.10    • Onions  F048 11/20/2020 <0.10    • [place] F033 11/20/2020 <0.10    • Oregano  F283 11/20/2020 <0.10    • F12 Pea 11/20/2020 <0.10    • F013 Peanut 11/20/2020 <0.10    • Pear  F094 11/20/2020 <0.10    • [place]  F255 11/20/2020 <0.10    • Pork  F026 11/20/2020 <0.10    • Potato 11/20/2020 <0.10    • Raspberry, IgE F343 11/20/2020 <0.10    • Sesame Seed 11/20/2020 <0.10    • Shrimp IgE 11/20/2020 <0.10    • F14 Henson Soybean 11/20/2020 <0.10    • Tea  F222 11/20/2020 <0.10    • Tomato IgE 11/20/2020 <0.10    • Trout  F204 11/20/2020 <0.10    • Turkey F284 11/20/2020 <0.10    • [place]  F255 11/20/2020 <0.10    • Watermelon 11/20/2020 <0.10    • Immunocap Score 11/20/2020 See Note    • HSV1 Gly IgG 11/20/2020 33.90*   • HSV2 Gly IgG 11/20/2020 2.63*   ]

## 2021-07-02 RX ORDER — BACLOFEN 20 MG/1
TABLET ORAL
Qty: 60 TABLET | Refills: 0 | Status: SHIPPED | OUTPATIENT
Start: 2021-07-02 | End: 2021-08-02

## 2021-07-02 RX ORDER — POTASSIUM CHLORIDE 750 MG/1
TABLET, FILM COATED, EXTENDED RELEASE ORAL
Qty: 60 TABLET | Refills: 0 | Status: SHIPPED | OUTPATIENT
Start: 2021-07-02 | End: 2021-08-02

## 2021-07-02 NOTE — TELEPHONE ENCOUNTER
Was the patient seen in the last year in this department? Yes   LOV 05/28/2021    Does patient have an active prescription for medications requested? Yes    Received Request Via: Pharmacy    Office Visit on 05/28/2021   Component Date Value   • POC Color 05/28/2021 DARK YELLOW    • POC Appearance 05/28/2021 SLIGHTLY CLOUDY    • POC Leukocyte Esterase 05/28/2021 SMALL    • POC Nitrites 05/28/2021 NEGATIVE    • POC Urobiligen 05/28/2021 NEGATIVE    • POC Protein 05/28/2021 30    • POC Urine PH 05/28/2021 6.0    • POC Blood 05/28/2021 MODERATE    • POC Specific Gravity 05/28/2021 1.030    • POC Ketones 05/28/2021 NEGATIVE    • POC Bilirubin 05/28/2021 SMALL    • POC Glucose 05/28/2021 NEGATIVE    Admission on 05/19/2021, Discharged on 05/20/2021   Component Date Value   • Pathology Request 05/19/2021 Sent to Histo    • WBC 05/20/2021 13.6*   • RBC 05/20/2021 3.89*   • Hemoglobin 05/20/2021 11.6*   • Hematocrit 05/20/2021 35.2*   • MCV 05/20/2021 91.3    • MCH 05/20/2021 29.6    • MCHC 05/20/2021 32.4*   • RDW 05/20/2021 42.9    • Platelet Count 05/20/2021 298    • MPV 05/20/2021 9.9    • Neutrophils-Polys 05/20/2021 77.60*   • Lymphocytes 05/20/2021 11.30*   • Monocytes 05/20/2021 10.30    • Eosinophils 05/20/2021 0.00    • Basophils 05/20/2021 0.40    • Immature Granulocytes 05/20/2021 0.40    • Nucleated RBC 05/20/2021 0.00    • Neutrophils (Absolute) 05/20/2021 10.53*   • Lymphs (Absolute) 05/20/2021 1.54    • Monos (Absolute) 05/20/2021 1.40*   • Eos (Absolute) 05/20/2021 0.00    • Baso (Absolute) 05/20/2021 0.05    • Immature Granulocytes (a* 05/20/2021 0.06    • NRBC (Absolute) 05/20/2021 0.00    Pre-Admission Testing on 05/14/2021   Component Date Value   • WBC 05/14/2021 9.0    • RBC 05/14/2021 4.85    • Hemoglobin 05/14/2021 14.4    • Hematocrit 05/14/2021 43.4    • MCV 05/14/2021 89.5    • MCH 05/14/2021 29.7    • MCHC 05/14/2021 33.2*   • RDW 05/14/2021 44.2    • Platelet Count 05/14/2021 372    • MPV  2021 9.2    • Neutrophils-Polys 2021 53.90    • Lymphocytes 2021 33.40    • Monocytes 2021 9.30    • Eosinophils 2021 1.40    • Basophils 2021 1.80    • Immature Granulocytes 2021 0.20    • Nucleated RBC 2021 0.00    • Neutrophils (Absolute) 2021 4.85    • Lymphs (Absolute) 2021 3.01    • Monos (Absolute) 2021 0.84    • Eos (Absolute) 2021 0.13    • Baso (Absolute) 2021 0.16*   • Immature Granulocytes (a* 2021 0.02    • NRBC (Absolute) 2021 0.00    • Sodium 2021 143    • Potassium 2021 3.9    • Chloride 2021 103    • Co2 2021 30    • Glucose 2021 77    • Bun 2021 9    • Creatinine 2021 0.95    • Calcium 2021 9.1    • Anion Gap 2021 10.0    • Color 2021 Yellow    • Character 2021 Clear    • Specific Gravity 2021 1.015    • Ph 2021 6.5    • Glucose 2021 Negative    • Ketones 2021 Negative    • Protein 2021 Negative    • Bilirubin 2021 Negative    • Urobilinogen, Urine 2021 0.2    • Nitrite 2021 Negative    • Leukocyte Esterase 2021 Negative    • Occult Blood 2021 Negative    • Micro Urine Req 2021 see below    • Report 2021                      Value:[facility]    Test Date:  2021  Pt Name:    SMOOTH AGUDELO           Department: [facility]  MRN:        [id number]                      Room:  Gender:     Female                       Technician: CHAZ  :        1965                   Requested By:VIKI SOLIMAN  Order #:    [id number]                    Reading MD: Nolan Natarajan MD    Measurements  Intervals                                Axis  Rate:       103                          P:          83  ME:         124                          QRS:        68  QRSD:       80                           T:          27  QT:         364  QTc:        477    Interpretive  Statements  SINUS TACHYCARDIA  RIGHT ATRIAL ABNORMALITY  No previous ECG available for comparison  Electronically Signed On 5- 15:35:29 PDT by Nolan Natarajan MD     • SARS-CoV-2 Source 05/14/2021 Nasal Swab    • SARS-CoV-2 by PCR 05/14/2021 NotDetected    • GFR If  05/14/2021 >60    • GFR If Non  Ameri* 05/14/2021 >60    Hospital Outpatient Visit on 04/08/2021   Component Date Value   • Sodium 04/08/2021 140    • Potassium 04/08/2021 4.1    • Chloride 04/08/2021 103    • Co2 04/08/2021 31    • Anion Gap 04/08/2021 6.0*   • Glucose 04/08/2021 98    • Bun 04/08/2021 14    • Creatinine 04/08/2021 0.92    • Calcium 04/08/2021 9.2    • AST(SGOT) 04/08/2021 22    • ALT(SGPT) 04/08/2021 19    • Alkaline Phosphatase 04/08/2021 85    • Total Bilirubin 04/08/2021 0.3    • Albumin 04/08/2021 4.2    • Total Protein 04/08/2021 6.7    • Globulin 04/08/2021 2.5    • A-G Ratio 04/08/2021 1.7    • Cholesterol,Tot 04/08/2021 190    • Triglycerides 04/08/2021 65    • HDL 04/08/2021 52    • LDL 04/08/2021 125*   • Fasting Status 04/08/2021 Fasting    • GFR If  04/08/2021 >60    • GFR If Non  Ameri* 04/08/2021 >60    Admission on 01/18/2021, Discharged on 01/19/2021   Component Date Value   • Color 01/18/2021 Yellow    • Character 01/18/2021 Turbid*   • Specific Gravity 01/18/2021 1.020    • Ph 01/18/2021 7.0    • Glucose 01/18/2021 Negative    • Ketones 01/18/2021 Negative    • Protein 01/18/2021 Negative    • Bilirubin 01/18/2021 Negative    • Urobilinogen, Urine 01/18/2021 0.2    • Nitrite 01/18/2021 Negative    • Leukocyte Esterase 01/18/2021 Small*   • Occult Blood 01/18/2021 Negative    • Micro Urine Req 01/18/2021 Microscopic    • WBC 01/18/2021 10-20*   • RBC 01/18/2021 2-5*   • Bacteria 01/18/2021 Moderate*   • Epithelial Cells 01/18/2021 Few    • Hyaline Cast 01/18/2021 3-5*   • Significant Indicator 01/18/2021 NEG    • Source 01/18/2021 UR    • Site 01/18/2021 -    •  Culture Result 01/18/2021 Usual skin kianna 10-50,000 cfu/mL    Hospital Outpatient Visit on 11/20/2020   Component Date Value   • HLA-B27 Screen 11/20/2020 Negative    • Sed Rate Westergren 11/20/2020 2    • Rheumatoid Factor -Neph- 11/20/2020 <10    • NT-proBNP 11/20/2020 54    • West Nile Virus Igg 11/20/2020 0.19    • West Nile Virus Igm 11/20/2020 0.06    • Hsv I-Ii Igg Antibodies 11/20/2020 >22.40    • Hsv Igm I-Ii Combination 11/20/2020 0.54    • Rubeola Igg Antibody 11/20/2020 273.0    • Rubeola -Measles- Ab Igm 11/20/2020 0.67    • Mumps Ab Igg 11/20/2020 >300.0    • Mumps Ab Igm 11/20/2020 0.45    • Varicella Zoster IgG Ab 11/20/2020 1506.0    • Varicella Zoster IgM Ab 11/20/2020 0.09    • Anti-Dna -Ds 11/20/2020 None Detected    • SSA 52 (R0)(CHANDRIKA) Ab, IgG 11/20/2020 2    • SSA 60 (R0)(CHANDRIKA) Ab, IgG 11/20/2020 0    • Sjogren'S Anti-Ss-B 11/20/2020 0    • Thompson Antibodies 11/20/2020 0    • Thompson/RNP IgG (CHANDRIKA) 11/20/2020 0    • Anti-Scl-70 11/20/2020 0    • Miscellaneous Lab Result 11/20/2020 SEE NOTE    • SSA 52 (R0)(CHANDRIKA) Ab, IgG 11/20/2020 2    • SSA 60 (R0)(CHANDRIKA) Ab, IgG 11/20/2020 0    • Sjogren'S Anti-Ss-B 11/20/2020 0    • [place], IgE 11/20/2020 <0.10    • Asparagus  F261 11/20/2020 <0.10    • F138 Avocado 11/20/2020 <0.10    • Garcia/Esquivel Yeast IgE 11/20/2020 <0.10    • F204 Banana 11/20/2020 <0.10    • Basil  F269 11/20/2020 <0.10    • [place]   F278 11/20/2020 <0.10    • F27 Beef 11/20/2020 <0.10    • Beet Root, IgE 11/20/2020 <0.10    • Pepper C. annuum, IgE 11/20/2020 <0.10    • Black Pepper  F280 11/20/2020 <0.10    • Mussel F037 11/20/2020 <0.10    • Blueberry  F288 11/20/2020 <0.10    • Brazil Nut  F018 11/20/2020 <0.10    • Broccoli  F260 11/20/2020 <0.10    • Buckwheat  F011 11/20/2020 <0.10    • Cabbage F216 11/20/2020 <0.10    • F31 Carrot 11/20/2020 <0.10    • Cashew Nut  F202 11/20/2020 <0.10    • F299 Sweet [place] 11/20/2020 <0.10    • Chick Pea  F309 11/20/2020 <0.10    • Chocolate  Cacao, IgE F093 11/20/2020 <0.10    • Cinnamon  F220 11/20/2020 <0.10    • Clam  F207 11/20/2020 <0.10    • Coconut 11/20/2020 <0.10    • Codfish Allergen 11/20/2020 <0.10    • Occupational Cotton Seed* 11/20/2020 <0.10    • Crab  F023 11/20/2020 <0.10    • Cucumber  F244 11/20/2020 <0.10    • Dill  F277 11/20/2020 <0.10    • F1 Eggwhite 11/20/2020 <0.10    • Mabel  F270 11/20/2020 <0.10    • Grape Allergen 11/20/2020 <0.10    • Halibut  F303 11/20/2020 <0.10    • Hazelnut/Filbert  F017 11/20/2020 <0.10    • Honeydew/Cantaloupe, IgE 11/20/2020 <0.10    • Lettuce F215 11/20/2020 <0.10    • Pedro Brean/White Henson, I* 11/20/2020 <0.10    • F2 Milk 11/20/2020 <0.10    • Onions  F048 11/20/2020 <0.10    • [place] F033 11/20/2020 <0.10    • Oregano  F283 11/20/2020 <0.10    • F12 Pea 11/20/2020 <0.10    • F013 Peanut 11/20/2020 <0.10    • Pear  F094 11/20/2020 <0.10    • [place]  F255 11/20/2020 <0.10    • Pork  F026 11/20/2020 <0.10    • Potato 11/20/2020 <0.10    • Raspberry, IgE F343 11/20/2020 <0.10    • Sesame Seed 11/20/2020 <0.10    • Shrimp IgE 11/20/2020 <0.10    • F14 Henson Soybean 11/20/2020 <0.10    • Tea  F222 11/20/2020 <0.10    • Tomato IgE 11/20/2020 <0.10    • Trout  F204 11/20/2020 <0.10    • Turkey F284 11/20/2020 <0.10    • [place]  F255 11/20/2020 <0.10    • Watermelon 11/20/2020 <0.10    • Immunocap Score 11/20/2020 See Note    • HSV1 Gly IgG 11/20/2020 33.90*   • HSV2 Gly IgG 11/20/2020 2.63*   ]

## 2021-07-31 NOTE — TELEPHONE ENCOUNTER
Was the patient seen in the last year in this department? Yes   LOV 01/18/2021    Does patient have an active prescription for medications requested? Yes    Received Request Via: Pharmacy    Office Visit on 05/28/2021   Component Date Value   • POC Color 05/28/2021 DARK YELLOW    • POC Appearance 05/28/2021 SLIGHTLY CLOUDY    • POC Leukocyte Esterase 05/28/2021 SMALL    • POC Nitrites 05/28/2021 NEGATIVE    • POC Urobiligen 05/28/2021 NEGATIVE    • POC Protein 05/28/2021 30    • POC Urine PH 05/28/2021 6.0    • POC Blood 05/28/2021 MODERATE    • POC Specific Gravity 05/28/2021 1.030    • POC Ketones 05/28/2021 NEGATIVE    • POC Bilirubin 05/28/2021 SMALL    • POC Glucose 05/28/2021 NEGATIVE    Admission on 05/19/2021, Discharged on 05/20/2021   Component Date Value   • Pathology Request 05/19/2021 Sent to Histo    • WBC 05/20/2021 13.6*   • RBC 05/20/2021 3.89*   • Hemoglobin 05/20/2021 11.6*   • Hematocrit 05/20/2021 35.2*   • MCV 05/20/2021 91.3    • MCH 05/20/2021 29.6    • MCHC 05/20/2021 32.4*   • RDW 05/20/2021 42.9    • Platelet Count 05/20/2021 298    • MPV 05/20/2021 9.9    • Neutrophils-Polys 05/20/2021 77.60*   • Lymphocytes 05/20/2021 11.30*   • Monocytes 05/20/2021 10.30    • Eosinophils 05/20/2021 0.00    • Basophils 05/20/2021 0.40    • Immature Granulocytes 05/20/2021 0.40    • Nucleated RBC 05/20/2021 0.00    • Neutrophils (Absolute) 05/20/2021 10.53*   • Lymphs (Absolute) 05/20/2021 1.54    • Monos (Absolute) 05/20/2021 1.40*   • Eos (Absolute) 05/20/2021 0.00    • Baso (Absolute) 05/20/2021 0.05    • Immature Granulocytes (a* 05/20/2021 0.06    • NRBC (Absolute) 05/20/2021 0.00    Pre-Admission Testing on 05/14/2021   Component Date Value   • WBC 05/14/2021 9.0    • RBC 05/14/2021 4.85    • Hemoglobin 05/14/2021 14.4    • Hematocrit 05/14/2021 43.4    • MCV 05/14/2021 89.5    • MCH 05/14/2021 29.7    • MCHC 05/14/2021 33.2*   • RDW 05/14/2021 44.2    • Platelet Count 05/14/2021 372    • MPV  2021 9.2    • Neutrophils-Polys 2021 53.90    • Lymphocytes 2021 33.40    • Monocytes 2021 9.30    • Eosinophils 2021 1.40    • Basophils 2021 1.80    • Immature Granulocytes 2021 0.20    • Nucleated RBC 2021 0.00    • Neutrophils (Absolute) 2021 4.85    • Lymphs (Absolute) 2021 3.01    • Monos (Absolute) 2021 0.84    • Eos (Absolute) 2021 0.13    • Baso (Absolute) 2021 0.16*   • Immature Granulocytes (a* 2021 0.02    • NRBC (Absolute) 2021 0.00    • Sodium 2021 143    • Potassium 2021 3.9    • Chloride 2021 103    • Co2 2021 30    • Glucose 2021 77    • Bun 2021 9    • Creatinine 2021 0.95    • Calcium 2021 9.1    • Anion Gap 2021 10.0    • Color 2021 Yellow    • Character 2021 Clear    • Specific Gravity 2021 1.015    • Ph 2021 6.5    • Glucose 2021 Negative    • Ketones 2021 Negative    • Protein 2021 Negative    • Bilirubin 2021 Negative    • Urobilinogen, Urine 2021 0.2    • Nitrite 2021 Negative    • Leukocyte Esterase 2021 Negative    • Occult Blood 2021 Negative    • Micro Urine Req 2021 see below    • Report 2021                      [facility]    Test Date:  2021  Pt Name:    SMOOTH AGUDELO           Department: [facility]  MRN:        [id number]                      Room:  Gender:     Female                       Technician: CHAZ  :        [id number]                   Requested By:VIKI SOLIMAN  Order #:    [id number]                    Reading MD: Nolan Natarajan MD    Measurements  Intervals                                Axis  Rate:       103                          P:          83  DC:         124                          QRS:        68  QRSD:       80                           T:          27  QT:         364  QTc:        477    Interpretive  Statements  SINUS TACHYCARDIA  RIGHT ATRIAL ABNORMALITY  No previous ECG available for comparison  Electronically Signed On 5- 15:35:29 PDT by Nolan Natarajan MD     • SARS-CoV-2 Source 05/14/2021 Nasal Swab    • SARS-CoV-2 by PCR 05/14/2021 NotDetected    • GFR If  05/14/2021 >60    • GFR If Non  Ameri* 05/14/2021 >60    Hospital Outpatient Visit on 04/08/2021   Component Date Value   • Sodium 04/08/2021 140    • Potassium 04/08/2021 4.1    • Chloride 04/08/2021 103    • Co2 04/08/2021 31    • Anion Gap 04/08/2021 6.0*   • Glucose 04/08/2021 98    • Bun 04/08/2021 14    • Creatinine 04/08/2021 0.92    • Calcium 04/08/2021 9.2    • AST(SGOT) 04/08/2021 22    • ALT(SGPT) 04/08/2021 19    • Alkaline Phosphatase 04/08/2021 85    • Total Bilirubin 04/08/2021 0.3    • Albumin 04/08/2021 4.2    • Total Protein 04/08/2021 6.7    • Globulin 04/08/2021 2.5    • A-G Ratio 04/08/2021 1.7    • Cholesterol,Tot 04/08/2021 190    • Triglycerides 04/08/2021 65    • HDL 04/08/2021 52    • LDL 04/08/2021 125*   • Fasting Status 04/08/2021 Fasting    • GFR If  04/08/2021 >60    • GFR If Non  Ameri* 04/08/2021 >60    Admission on 01/18/2021, Discharged on 01/19/2021   Component Date Value   • Color 01/18/2021 Yellow    • Character 01/18/2021 Turbid*   • Specific Gravity 01/18/2021 1.020    • Ph 01/18/2021 7.0    • Glucose 01/18/2021 Negative    • Ketones 01/18/2021 Negative    • Protein 01/18/2021 Negative    • Bilirubin 01/18/2021 Negative    • Urobilinogen, Urine 01/18/2021 0.2    • Nitrite 01/18/2021 Negative    • Leukocyte Esterase 01/18/2021 Small*   • Occult Blood 01/18/2021 Negative    • Micro Urine Req 01/18/2021 Microscopic    • WBC 01/18/2021 10-20*   • RBC 01/18/2021 2-5*   • Bacteria 01/18/2021 Moderate*   • Epithelial Cells 01/18/2021 Few    • Hyaline Cast 01/18/2021 3-5*   • Significant Indicator 01/18/2021 NEG    • Source 01/18/2021 UR    • Site 01/18/2021 -    •  Culture Result 01/18/2021 Usual skin kianna 10-50,000 cfu/mL    Hospital Outpatient Visit on 11/20/2020   Component Date Value   • HLA-B27 Screen 11/20/2020 Negative    • Sed Rate Westergren 11/20/2020 2    • Rheumatoid Factor -Neph- 11/20/2020 <10    • NT-proBNP 11/20/2020 54    • West Nile Virus Igg 11/20/2020 0.19    • West Nile Virus Igm 11/20/2020 0.06    • Hsv I-Ii Igg Antibodies 11/20/2020 >22.40    • Hsv Igm I-Ii Combination 11/20/2020 0.54    • Rubeola Igg Antibody 11/20/2020 273.0    • Rubeola -Measles- Ab Igm 11/20/2020 0.67    • Mumps Ab Igg 11/20/2020 >300.0    • Mumps Ab Igm 11/20/2020 0.45    • Varicella Zoster IgG Ab 11/20/2020 1506.0    • Varicella Zoster IgM Ab 11/20/2020 0.09    • Anti-Dna -Ds 11/20/2020 None Detected    • SSA 52 (R0)(CHANDRIKA) Ab, IgG 11/20/2020 2    • SSA 60 (R0)(CHANDRIKA) Ab, IgG 11/20/2020 0    • Sjogren'S Anti-Ss-B 11/20/2020 0    • Thompson Antibodies 11/20/2020 0    • Thompson/RNP IgG (CHANDRIKA) 11/20/2020 0    • Anti-Scl-70 11/20/2020 0    • Miscellaneous Lab Result 11/20/2020 SEE NOTE    • SSA 52 (R0)(CHANDRIKA) Ab, IgG 11/20/2020 2    • SSA 60 (R0)(CHANDRIKA) Ab, IgG 11/20/2020 0    • Sjogren'S Anti-Ss-B 11/20/2020 0    • [place], IgE 11/20/2020 <0.10    • Asparagus  F261 11/20/2020 <0.10    • F138 Avocado 11/20/2020 <0.10    • Garcia/Esquivel Yeast IgE 11/20/2020 <0.10    • F204 Banana 11/20/2020 <0.10    • Basil  F269 11/20/2020 <0.10    • [place]   F278 11/20/2020 <0.10    • F27 Beef 11/20/2020 <0.10    • Beet Root, IgE 11/20/2020 <0.10    • Pepper C. annuum, IgE 11/20/2020 <0.10    • Black Pepper  F280 11/20/2020 <0.10    • Mussel F037 11/20/2020 <0.10    • Blueberry  F288 11/20/2020 <0.10    • Brazil Nut  F018 11/20/2020 <0.10    • Broccoli  F260 11/20/2020 <0.10    • Buckwheat  F011 11/20/2020 <0.10    • Cabbage F216 11/20/2020 <0.10    • F31 Carrot 11/20/2020 <0.10    • Cashew Nut  F202 11/20/2020 <0.10    • F299 Sweet [place] 11/20/2020 <0.10    • Chick Pea  F309 11/20/2020 <0.10    • Chocolate  Cacao, IgE F093 11/20/2020 <0.10    • Cinnamon  F220 11/20/2020 <0.10    • Clam  F207 11/20/2020 <0.10    • Coconut 11/20/2020 <0.10    • Codfish Allergen 11/20/2020 <0.10    • Occupational Cotton Seed* 11/20/2020 <0.10    • Crab  F023 11/20/2020 <0.10    • Cucumber  F244 11/20/2020 <0.10    • Dill  F277 11/20/2020 <0.10    • F1 Eggwhite 11/20/2020 <0.10    • Mabel  F270 11/20/2020 <0.10    • Grape Allergen 11/20/2020 <0.10    • Halibut  F303 11/20/2020 <0.10    • Hazelnut/Filbert  F017 11/20/2020 <0.10    • Honeydew/Cantaloupe, IgE 11/20/2020 <0.10    • Lettuce F215 11/20/2020 <0.10    • Pedro Brean/White Henson, I* 11/20/2020 <0.10    • F2 Milk 11/20/2020 <0.10    • Onions  F048 11/20/2020 <0.10    • [place] F033 11/20/2020 <0.10    • Oregano  F283 11/20/2020 <0.10    • F12 Pea 11/20/2020 <0.10    • F013 Peanut 11/20/2020 <0.10    • Pear  F094 11/20/2020 <0.10    • [place]  F255 11/20/2020 <0.10    • Pork  F026 11/20/2020 <0.10    • Potato 11/20/2020 <0.10    • Raspberry, IgE F343 11/20/2020 <0.10    • Sesame Seed 11/20/2020 <0.10    • Shrimp IgE 11/20/2020 <0.10    • F14 Henson Soybean 11/20/2020 <0.10    • Tea  F222 11/20/2020 <0.10    • Tomato IgE 11/20/2020 <0.10    • Trout  F204 11/20/2020 <0.10    • Turkey F284 11/20/2020 <0.10    • [place]  F255 11/20/2020 <0.10    • Watermelon 11/20/2020 <0.10    • Immunocap Score 11/20/2020 See Note    • HSV1 Gly IgG 11/20/2020 33.90*   • HSV2 Gly IgG 11/20/2020 2.63*   ]

## 2021-08-02 RX ORDER — FUROSEMIDE 20 MG/1
TABLET ORAL
Qty: 180 TABLET | Refills: 0 | Status: SHIPPED | OUTPATIENT
Start: 2021-08-02 | End: 2021-08-23 | Stop reason: SDUPTHER

## 2021-08-09 NOTE — TELEPHONE ENCOUNTER
Was the patient seen in the last year in this department? Yes    Does patient have an active prescription for medications requested? Yes    Received Request Via: Patient    Office Visit on 05/28/2021   Component Date Value   • POC Color 05/28/2021 DARK YELLOW    • POC Appearance 05/28/2021 SLIGHTLY CLOUDY    • POC Leukocyte Esterase 05/28/2021 SMALL    • POC Nitrites 05/28/2021 NEGATIVE    • POC Urobiligen 05/28/2021 NEGATIVE    • POC Protein 05/28/2021 30    • POC Urine PH 05/28/2021 6.0    • POC Blood 05/28/2021 MODERATE    • POC Specific Gravity 05/28/2021 1.030    • POC Ketones 05/28/2021 NEGATIVE    • POC Bilirubin 05/28/2021 SMALL    • POC Glucose 05/28/2021 NEGATIVE    Admission on 05/19/2021, Discharged on 05/20/2021   Component Date Value   • Pathology Request 05/19/2021 Sent to Histo    • WBC 05/20/2021 13.6*   • RBC 05/20/2021 3.89*   • Hemoglobin 05/20/2021 11.6*   • Hematocrit 05/20/2021 35.2*   • MCV 05/20/2021 91.3    • MCH 05/20/2021 29.6    • MCHC 05/20/2021 32.4*   • RDW 05/20/2021 42.9    • Platelet Count 05/20/2021 298    • MPV 05/20/2021 9.9    • Neutrophils-Polys 05/20/2021 77.60*   • Lymphocytes 05/20/2021 11.30*   • Monocytes 05/20/2021 10.30    • Eosinophils 05/20/2021 0.00    • Basophils 05/20/2021 0.40    • Immature Granulocytes 05/20/2021 0.40    • Nucleated RBC 05/20/2021 0.00    • Neutrophils (Absolute) 05/20/2021 10.53*   • Lymphs (Absolute) 05/20/2021 1.54    • Monos (Absolute) 05/20/2021 1.40*   • Eos (Absolute) 05/20/2021 0.00    • Baso (Absolute) 05/20/2021 0.05    • Immature Granulocytes (a* 05/20/2021 0.06    • NRBC (Absolute) 05/20/2021 0.00    Pre-Admission Testing on 05/14/2021   Component Date Value   • WBC 05/14/2021 9.0    • RBC 05/14/2021 4.85    • Hemoglobin 05/14/2021 14.4    • Hematocrit 05/14/2021 43.4    • MCV 05/14/2021 89.5    • MCH 05/14/2021 29.7    • MCHC 05/14/2021 33.2*   • RDW 05/14/2021 44.2    • Platelet Count 05/14/2021 372    • MPV 05/14/2021 9.2    •  Neutrophils-Polys 2021 53.90    • Lymphocytes 2021 33.40    • Monocytes 2021 9.30    • Eosinophils 2021 1.40    • Basophils 2021 1.80    • Immature Granulocytes 2021 0.20    • Nucleated RBC 2021 0.00    • Neutrophils (Absolute) 2021 4.85    • Lymphs (Absolute) 2021 3.01    • Monos (Absolute) 2021 0.84    • Eos (Absolute) 2021 0.13    • Baso (Absolute) 2021 0.16*   • Immature Granulocytes (a* 2021 0.02    • NRBC (Absolute) 2021 0.00    • Sodium 2021 143    • Potassium 2021 3.9    • Chloride 2021 103    • Co2 2021 30    • Glucose 2021 77    • Bun 2021 9    • Creatinine 2021 0.95    • Calcium 2021 9.1    • Anion Gap 2021 10.0    • Color 2021 Yellow    • Character 2021 Clear    • Specific Gravity 2021 1.015    • Ph 2021 6.5    • Glucose 2021 Negative    • Ketones 2021 Negative    • Protein 2021 Negative    • Bilirubin 2021 Negative    • Urobilinogen, Urine 2021 0.2    • Nitrite 2021 Negative    • Leukocyte Esterase 2021 Negative    • Occult Blood 2021 Negative    • Micro Urine Req 2021 see below    • Report 2021                      Value:[facility]    Test Date:  2021  Pt Name:    SMOOTH AGUDELO           Department: [facility]  MRN:        [id number]                      Room:  Gender:     Female                       Technician: CHAZ  :        1965                   Requested By:VIKI SOLIMAN  Order #:    [id number]                    Reading MD: Nolan Natarajan MD    Measurements  Intervals                                Axis  Rate:       103                          P:          83  OR:         124                          QRS:        68  QRSD:       80                           T:          27  QT:         364  QTc:        477    Interpretive Statements  SINUS  TACHYCARDIA  RIGHT ATRIAL ABNORMALITY  No previous ECG available for comparison  Electronically Signed On 5- 15:35:29 PDT by Nolan Natarajan MD     • SARS-CoV-2 Source 05/14/2021 Nasal Swab    • SARS-CoV-2 by PCR 05/14/2021 NotDetected    • GFR If  05/14/2021 >60    • GFR If Non  Ameri* 05/14/2021 >60    Hospital Outpatient Visit on 04/08/2021   Component Date Value   • Sodium 04/08/2021 140    • Potassium 04/08/2021 4.1    • Chloride 04/08/2021 103    • Co2 04/08/2021 31    • Anion Gap 04/08/2021 6.0*   • Glucose 04/08/2021 98    • Bun 04/08/2021 14    • Creatinine 04/08/2021 0.92    • Calcium 04/08/2021 9.2    • AST(SGOT) 04/08/2021 22    • ALT(SGPT) 04/08/2021 19    • Alkaline Phosphatase 04/08/2021 85    • Total Bilirubin 04/08/2021 0.3    • Albumin 04/08/2021 4.2    • Total Protein 04/08/2021 6.7    • Globulin 04/08/2021 2.5    • A-G Ratio 04/08/2021 1.7    • Cholesterol,Tot 04/08/2021 190    • Triglycerides 04/08/2021 65    • HDL 04/08/2021 52    • LDL 04/08/2021 125*   • Fasting Status 04/08/2021 Fasting    • GFR If  04/08/2021 >60    • GFR If Non  Ameri* 04/08/2021 >60    Admission on 01/18/2021, Discharged on 01/19/2021   Component Date Value   • Color 01/18/2021 Yellow    • Character 01/18/2021 Turbid*   • Specific Gravity 01/18/2021 1.020    • Ph 01/18/2021 7.0    • Glucose 01/18/2021 Negative    • Ketones 01/18/2021 Negative    • Protein 01/18/2021 Negative    • Bilirubin 01/18/2021 Negative    • Urobilinogen, Urine 01/18/2021 0.2    • Nitrite 01/18/2021 Negative    • Leukocyte Esterase 01/18/2021 Small*   • Occult Blood 01/18/2021 Negative    • Micro Urine Req 01/18/2021 Microscopic    • WBC 01/18/2021 10-20*   • RBC 01/18/2021 2-5*   • Bacteria 01/18/2021 Moderate*   • Epithelial Cells 01/18/2021 Few    • Hyaline Cast 01/18/2021 3-5*   • Significant Indicator 01/18/2021 NEG    • Source 01/18/2021 UR    • Site 01/18/2021 -    • Culture Result  01/18/2021 Usual skin kianna 10-50,000 cfu/mL    Hospital Outpatient Visit on 11/20/2020   Component Date Value   • HLA-B27 Screen 11/20/2020 Negative    • Sed Rate Westergren 11/20/2020 2    • Rheumatoid Factor -Neph- 11/20/2020 <10    • NT-proBNP 11/20/2020 54    • West Nile Virus Igg 11/20/2020 0.19    • West Nile Virus Igm 11/20/2020 0.06    • Hsv I-Ii Igg Antibodies 11/20/2020 >22.40    • Hsv Igm I-Ii Combination 11/20/2020 0.54    • Rubeola Igg Antibody 11/20/2020 273.0    • Rubeola -Measles- Ab Igm 11/20/2020 0.67    • Mumps Ab Igg 11/20/2020 >300.0    • Mumps Ab Igm 11/20/2020 0.45    • Varicella Zoster IgG Ab 11/20/2020 1506.0    • Varicella Zoster IgM Ab 11/20/2020 0.09    • Anti-Dna -Ds 11/20/2020 None Detected    • SSA 52 (R0)(CHANDRIKA) Ab, IgG 11/20/2020 2    • SSA 60 (R0)(CHANDRIKA) Ab, IgG 11/20/2020 0    • Sjogren'S Anti-Ss-B 11/20/2020 0    • Thompson Antibodies 11/20/2020 0    • Thompson/RNP IgG (CHANDRIKA) 11/20/2020 0    • Anti-Scl-70 11/20/2020 0    • Miscellaneous Lab Result 11/20/2020 SEE NOTE    • SSA 52 (R0)(CHANDRIKA) Ab, IgG 11/20/2020 2    • SSA 60 (R0)(CHANDRIKA) Ab, IgG 11/20/2020 0    • Sjogren'S Anti-Ss-B 11/20/2020 0    • [place], IgE 11/20/2020 <0.10    • Asparagus  F261 11/20/2020 <0.10    • F138 Avocado 11/20/2020 <0.10    • Garcia/Esquivel Yeast IgE 11/20/2020 <0.10    • F204 Banana 11/20/2020 <0.10    • Basil  F269 11/20/2020 <0.10    • [place]   F278 11/20/2020 <0.10    • F27 Beef 11/20/2020 <0.10    • Beet Root, IgE 11/20/2020 <0.10    • Pepper C. annuum, IgE 11/20/2020 <0.10    • Black Pepper  F280 11/20/2020 <0.10    • Mussel F037 11/20/2020 <0.10    • Blueberry  F288 11/20/2020 <0.10    • Brazil Nut  F018 11/20/2020 <0.10    • Broccoli  F260 11/20/2020 <0.10    • Buckwheat  F011 11/20/2020 <0.10    • Cabbage F216 11/20/2020 <0.10    • F31 Carrot 11/20/2020 <0.10    • Cashew Nut  F202 11/20/2020 <0.10    • F299 Sweet [place] 11/20/2020 <0.10    • Chick Pea  F309 11/20/2020 <0.10    • Chocolate Cacao, IgE F093  11/20/2020 <0.10    • Cinnamon  F220 11/20/2020 <0.10    • Clam  F207 11/20/2020 <0.10    • Coconut 11/20/2020 <0.10    • Codfish Allergen 11/20/2020 <0.10    • Occupational Cotton Seed* 11/20/2020 <0.10    • Crab  F023 11/20/2020 <0.10    • Cucumber  F244 11/20/2020 <0.10    • Dill  F277 11/20/2020 <0.10    • F1 Eggwhite 11/20/2020 <0.10    • Mabel  F270 11/20/2020 <0.10    • Grape Allergen 11/20/2020 <0.10    • Halibut  F303 11/20/2020 <0.10    • Hazelnut/Filbert  F017 11/20/2020 <0.10    • Honeydew/Cantaloupe, IgE 11/20/2020 <0.10    • Lettuce F215 11/20/2020 <0.10    • Pedro Brean/White Henson, I* 11/20/2020 <0.10    • F2 Milk 11/20/2020 <0.10    • Onions  F048 11/20/2020 <0.10    • [place] F033 11/20/2020 <0.10    • Oregano  F283 11/20/2020 <0.10    • F12 Pea 11/20/2020 <0.10    • F013 Peanut 11/20/2020 <0.10    • Pear  F094 11/20/2020 <0.10    • [place]  F255 11/20/2020 <0.10    • Pork  F026 11/20/2020 <0.10    • Potato 11/20/2020 <0.10    • Raspberry, IgE F343 11/20/2020 <0.10    • Sesame Seed 11/20/2020 <0.10    • Shrimp IgE 11/20/2020 <0.10    • F14 Henson Soybean 11/20/2020 <0.10    • Tea  F222 11/20/2020 <0.10    • Tomato IgE 11/20/2020 <0.10    • Trout  F204 11/20/2020 <0.10    • Turkey F284 11/20/2020 <0.10    • [place]  F255 11/20/2020 <0.10    • Watermelon 11/20/2020 <0.10    • Immunocap Score 11/20/2020 See Note    • HSV1 Gly IgG 11/20/2020 33.90*   • HSV2 Gly IgG 11/20/2020 2.63*   ]

## 2021-08-10 RX ORDER — LORATADINE 10 MG/1
10 TABLET ORAL DAILY
Qty: 30 TABLET | Refills: 0 | OUTPATIENT
Start: 2021-08-10

## 2021-08-11 RX ORDER — GABAPENTIN 300 MG/1
300 CAPSULE ORAL 3 TIMES DAILY
Qty: 90 CAPSULE | Refills: 0 | OUTPATIENT
Start: 2021-08-11

## 2021-08-12 NOTE — TELEPHONE ENCOUNTER
Received request via: Pharmacy    Was the patient seen in the last year in this department? Yes    Does the patient have an active prescription (recently filled or refills available) for medication(s) requested? No         01/18/2021 last OV

## 2021-08-23 ENCOUNTER — OFFICE VISIT (OUTPATIENT)
Dept: MEDICAL GROUP | Facility: CLINIC | Age: 56
End: 2021-08-23
Payer: MEDICAID

## 2021-08-23 VITALS
HEIGHT: 70 IN | TEMPERATURE: 97.5 F | OXYGEN SATURATION: 98 % | HEART RATE: 94 BPM | BODY MASS INDEX: 30.15 KG/M2 | WEIGHT: 210.6 LBS | SYSTOLIC BLOOD PRESSURE: 104 MMHG | DIASTOLIC BLOOD PRESSURE: 68 MMHG

## 2021-08-23 DIAGNOSIS — G60.9 IDIOPATHIC PERIPHERAL NEUROPATHY: ICD-10-CM

## 2021-08-23 DIAGNOSIS — R20.2 PARESTHESIA OF BOTH FEET: ICD-10-CM

## 2021-08-23 DIAGNOSIS — I73.9 CLAUDICATION (HCC): ICD-10-CM

## 2021-08-23 DIAGNOSIS — F17.200 TOBACCO DEPENDENCE: ICD-10-CM

## 2021-08-23 DIAGNOSIS — K21.9 GASTROESOPHAGEAL REFLUX DISEASE, UNSPECIFIED WHETHER ESOPHAGITIS PRESENT: ICD-10-CM

## 2021-08-23 DIAGNOSIS — R60.0 BILATERAL LOWER EXTREMITY EDEMA: ICD-10-CM

## 2021-08-23 DIAGNOSIS — F41.9 ANXIETY: ICD-10-CM

## 2021-08-23 DIAGNOSIS — I83.893 VARICOSE VEINS OF LEG WITH EDEMA, BILATERAL: ICD-10-CM

## 2021-08-23 PROCEDURE — 99214 OFFICE O/P EST MOD 30 MIN: CPT | Performed by: PHYSICIAN ASSISTANT

## 2021-08-23 RX ORDER — ALBUTEROL SULFATE 90 UG/1
2 AEROSOL, METERED RESPIRATORY (INHALATION) EVERY 6 HOURS PRN
Qty: 8.5 G | Refills: 5 | Status: SHIPPED | OUTPATIENT
Start: 2021-08-23 | End: 2022-02-02 | Stop reason: SDUPTHER

## 2021-08-23 RX ORDER — PAROXETINE 30 MG/1
30 TABLET, FILM COATED ORAL
Qty: 90 TABLET | Refills: 2 | Status: SHIPPED | OUTPATIENT
Start: 2021-08-23 | End: 2022-02-02 | Stop reason: SDUPTHER

## 2021-08-23 RX ORDER — FUROSEMIDE 20 MG/1
20 TABLET ORAL 3 TIMES DAILY
Qty: 180 TABLET | Refills: 0 | Status: SHIPPED | OUTPATIENT
Start: 2021-08-23 | End: 2021-09-24

## 2021-08-23 RX ORDER — HYDROXYZINE HYDROCHLORIDE 25 MG/1
TABLET, FILM COATED ORAL
Qty: 120 TABLET | Refills: 1 | Status: SHIPPED | OUTPATIENT
Start: 2021-08-23 | End: 2022-02-02 | Stop reason: SDUPTHER

## 2021-08-23 RX ORDER — IBUPROFEN 800 MG/1
TABLET ORAL
Qty: 90 TABLET | Refills: 2 | Status: SHIPPED | OUTPATIENT
Start: 2021-08-23 | End: 2022-02-02 | Stop reason: SDUPTHER

## 2021-08-23 RX ORDER — GABAPENTIN 400 MG/1
400 CAPSULE ORAL 3 TIMES DAILY
Qty: 90 CAPSULE | Refills: 5 | Status: SHIPPED | OUTPATIENT
Start: 2021-08-23 | End: 2022-02-02

## 2021-08-23 RX ORDER — BUSPIRONE HYDROCHLORIDE 10 MG/1
10 TABLET ORAL 2 TIMES DAILY
Qty: 90 TABLET | Refills: 1 | Status: SHIPPED | OUTPATIENT
Start: 2021-08-23 | End: 2022-02-02 | Stop reason: SDUPTHER

## 2021-08-23 RX ORDER — BACLOFEN 20 MG/1
20 TABLET ORAL 2 TIMES DAILY
Qty: 60 TABLET | Refills: 0 | Status: SHIPPED | OUTPATIENT
Start: 2021-08-23 | End: 2021-09-15

## 2021-08-23 RX ORDER — PANTOPRAZOLE SODIUM 40 MG/1
40 TABLET, DELAYED RELEASE ORAL DAILY
Qty: 90 TABLET | Refills: 2 | Status: SHIPPED | OUTPATIENT
Start: 2021-08-23 | End: 2022-02-02 | Stop reason: SDUPTHER

## 2021-08-23 RX ORDER — POTASSIUM CHLORIDE 750 MG/1
10 TABLET, FILM COATED, EXTENDED RELEASE ORAL 2 TIMES DAILY
Qty: 180 TABLET | Refills: 2 | Status: SHIPPED | OUTPATIENT
Start: 2021-08-23 | End: 2022-02-02 | Stop reason: SDUPTHER

## 2021-08-23 ASSESSMENT — FIBROSIS 4 INDEX: FIB4 SCORE: 0.97

## 2021-08-23 NOTE — PROGRESS NOTES
cc: Paresthesia    Subjective:     Bhavana Liu is a 56 y.o. female presenting for paresthesia    Patient presents to the office for paresthesia.  Patient is needing a gabapentin refill.  Patient did not schedule an appointment with podiatry when originally referred.  She states that she is having worsening neuropathy.  She states that she is considering disability for this.      Patient states that she did see gynecology.  She did have a vaginal hysterectomy repair along with anterior and posterior repair and vaginal suspension.      Patient has been having pain in her legs.  She has been having pain in her legs for years.  She is having a harder time to walk especially when she is grocery shopping.        Review of systems:  See above.   Denies any symptoms unless previously indicated.        Current Outpatient Medications:   •  gabapentin (NEURONTIN) 400 MG Cap, Take 1 Capsule by mouth 3 times a day., Disp: 90 Capsule, Rfl: 5  •  potassium chloride ER (KLOR-CON) 10 MEQ tablet, Take 1 Tablet by mouth 2 times a day., Disp: 180 Tablet, Rfl: 2  •  PARoxetine (PAXIL) 30 MG Tab, Take 1 Tablet by mouth every day., Disp: 90 Tablet, Rfl: 2  •  ibuprofen (MOTRIN) 800 MG Tab, TAKE 1 TABLET BY MOUTH EVERY 8 HOURS AS NEEDED, Disp: 90 Tablet, Rfl: 2  •  hydrOXYzine HCl (ATARAX) 25 MG Tab, TAKE ONE TABLET BY MOUTH THREE TIMES A DAY AS NEEDED FOR ANXIETY, Disp: 120 Tablet, Rfl: 1  •  furosemide (LASIX) 20 MG Tab, Take 1 Tablet by mouth 3 times a day., Disp: 180 Tablet, Rfl: 0  •  baclofen (LIORESAL) 20 MG tablet, Take 1 Tablet by mouth 2 times a day., Disp: 60 Tablet, Rfl: 0  •  busPIRone (BUSPAR) 10 MG Tab tablet, Take 1 Tablet by mouth 2 times a day., Disp: 90 Tablet, Rfl: 1  •  albuterol 108 (90 Base) MCG/ACT Aero Soln inhalation aerosol, Inhale 2 Puffs every 6 hours as needed for Shortness of Breath., Disp: 8.5 g, Rfl: 5  •  pantoprazole (PROTONIX) 40 MG Tablet Delayed Response, Take 1 Tablet by mouth every  "day., Disp: 90 Tablet, Rfl: 2  •  cyanocobalamin (VITAMIN B-12) 500 MCG Tab, Take 500 mcg by mouth every day., Disp: , Rfl:   •  Garcinia Cambogia-Chromium 500-200 MG-MCG Tab, Take  by mouth., Disp: , Rfl:     Allergies, past medical history, past surgical history, family history, social history reviewed and updated    Objective:     Vitals: /68   Pulse 94   Temp 36.4 °C (97.5 °F) (Temporal)   Ht 1.765 m (5' 9.5\")   Wt 95.5 kg (210 lb 9.6 oz)   LMP 10/28/2012   SpO2 98%   BMI 30.65 kg/m²   General: Alert, pleasant, NAD  EYES:   PERRL, EOMI, no icterus or pallor.  Conjunctivae and lids normal.   HENT:  Normocephalic.  External ears normal.  Neck supple.     Respiratory: Normal respiratory effort.    Abdomen: obese  Skin: Warm, dry, no rashes.  Musculoskeletal: Gait is normal.  Moves all extremities well.    Extremities: normal range of motion all extremities.   Neurological: No tremors, sensation grossly intact, CN2-12 intact.  Psych:  Affect/mood is normal, judgement is good, memory is intact, grooming is appropriate.    Assessment/Plan:     Bhavana was seen today for medication refill and other.    Diagnoses and all orders for this visit:    Paresthesia of both feet  -     REFERRAL TO PODIATRY  -     gabapentin (NEURONTIN) 400 MG Cap; Take 1 Capsule by mouth 3 times a day.  -     ibuprofen (MOTRIN) 800 MG Tab; TAKE 1 TABLET BY MOUTH EVERY 8 HOURS AS NEEDED  Idiopathic peripheral neuropathy  -     REFERRAL TO PODIATRY  -     gabapentin (NEURONTIN) 400 MG Cap; Take 1 Capsule by mouth 3 times a day.  -     ibuprofen (MOTRIN) 800 MG Tab; TAKE 1 TABLET BY MOUTH EVERY 8 HOURS AS NEEDED  Claudication (HCC)  -     US-EXTREMITY ARTERY LOWER BILAT W/JIN (COMBO); Future  Bilateral lower extremity edema  -     potassium chloride ER (KLOR-CON) 10 MEQ tablet; Take 1 Tablet by mouth 2 times a day.  -     furosemide (LASIX) 20 MG Tab; Take 1 Tablet by mouth 3 times a day.  -     baclofen (LIORESAL) 20 MG tablet; Take " 1 Tablet by mouth 2 times a day.  Varicose veins of leg with edema, bilateral  -     potassium chloride ER (KLOR-CON) 10 MEQ tablet; Take 1 Tablet by mouth 2 times a day.  -     furosemide (LASIX) 20 MG Tab; Take 1 Tablet by mouth 3 times a day.  -     baclofen (LIORESAL) 20 MG tablet; Take 1 Tablet by mouth 2 times a day.  Tobacco dependence  -     albuterol 108 (90 Base) MCG/ACT Aero Soln inhalation aerosol; Inhale 2 Puffs every 6 hours as needed for Shortness of Breath.      Symptoms are worsening.  My concern is for claudication.  Therefore we will obtain an arterial ultrasound.  We will increase the gabapentin to 400 mg 3 times a day.  We will refill the remainder of her medications that are to.  We will also put in a new referral to podiatry.  Plan will be to follow-up in 4 to 6 weeks with test results, sooner if needed.    Gastroesophageal reflux disease, unspecified whether esophagitis present  -     pantoprazole (PROTONIX) 40 MG Tablet Delayed Response; Take 1 Tablet by mouth every day.    Medications refilled.    Anxiety  -     PARoxetine (PAXIL) 30 MG Tab; Take 1 Tablet by mouth every day.  -     hydrOXYzine HCl (ATARAX) 25 MG Tab; TAKE ONE TABLET BY MOUTH THREE TIMES A DAY AS NEEDED FOR ANXIETY  -     busPIRone (BUSPAR) 10 MG Tab tablet; Take 1 Tablet by mouth 2 times a day.    Medications refilled.        Return in about 4 weeks (around 9/20/2021), or if symptoms worsen or fail to improve, for 4-6 weeks.    Please note that this dictation was created using voice recognition software. I have made every reasonable attempt to correct obvious errors, but expect that there are errors of grammar and possible content that I did not discover before finalizing note.

## 2021-08-27 NOTE — TELEPHONE ENCOUNTER
Was the patient seen in the last year in this department? Yes    Does patient have an active prescription for medications requested? Yes    Received Request Via: Pharmacy    Office Visit on 05/28/2021   Component Date Value   • POC Color 05/28/2021 DARK YELLOW    • POC Appearance 05/28/2021 SLIGHTLY CLOUDY    • POC Leukocyte Esterase 05/28/2021 SMALL    • POC Nitrites 05/28/2021 NEGATIVE    • POC Urobiligen 05/28/2021 NEGATIVE    • POC Protein 05/28/2021 30    • POC Urine PH 05/28/2021 6.0    • POC Blood 05/28/2021 MODERATE    • POC Specific Gravity 05/28/2021 1.030    • POC Ketones 05/28/2021 NEGATIVE    • POC Bilirubin 05/28/2021 SMALL    • POC Glucose 05/28/2021 NEGATIVE    Admission on 05/19/2021, Discharged on 05/20/2021   Component Date Value   • Pathology Request 05/19/2021 Sent to Histo    • WBC 05/20/2021 13.6*   • RBC 05/20/2021 3.89*   • Hemoglobin 05/20/2021 11.6*   • Hematocrit 05/20/2021 35.2*   • MCV 05/20/2021 91.3    • MCH 05/20/2021 29.6    • MCHC 05/20/2021 32.4*   • RDW 05/20/2021 42.9    • Platelet Count 05/20/2021 298    • MPV 05/20/2021 9.9    • Neutrophils-Polys 05/20/2021 77.60*   • Lymphocytes 05/20/2021 11.30*   • Monocytes 05/20/2021 10.30    • Eosinophils 05/20/2021 0.00    • Basophils 05/20/2021 0.40    • Immature Granulocytes 05/20/2021 0.40    • Nucleated RBC 05/20/2021 0.00    • Neutrophils (Absolute) 05/20/2021 10.53*   • Lymphs (Absolute) 05/20/2021 1.54    • Monos (Absolute) 05/20/2021 1.40*   • Eos (Absolute) 05/20/2021 0.00    • Baso (Absolute) 05/20/2021 0.05    • Immature Granulocytes (a* 05/20/2021 0.06    • NRBC (Absolute) 05/20/2021 0.00    Pre-Admission Testing on 05/14/2021   Component Date Value   • WBC 05/14/2021 9.0    • RBC 05/14/2021 4.85    • Hemoglobin 05/14/2021 14.4    • Hematocrit 05/14/2021 43.4    • MCV 05/14/2021 89.5    • MCH 05/14/2021 29.7    • MCHC 05/14/2021 33.2*   • RDW 05/14/2021 44.2    • Platelet Count 05/14/2021 372    • MPV 05/14/2021 9.2    •  Neutrophils-Polys 2021 53.90    • Lymphocytes 2021 33.40    • Monocytes 2021 9.30    • Eosinophils 2021 1.40    • Basophils 2021 1.80    • Immature Granulocytes 2021 0.20    • Nucleated RBC 2021 0.00    • Neutrophils (Absolute) 2021 4.85    • Lymphs (Absolute) 2021 3.01    • Monos (Absolute) 2021 0.84    • Eos (Absolute) 2021 0.13    • Baso (Absolute) 2021 0.16*   • Immature Granulocytes (a* 2021 0.02    • NRBC (Absolute) 2021 0.00    • Sodium 2021 143    • Potassium 2021 3.9    • Chloride 2021 103    • Co2 2021 30    • Glucose 2021 77    • Bun 2021 9    • Creatinine 2021 0.95    • Calcium 2021 9.1    • Anion Gap 2021 10.0    • Color 2021 Yellow    • Character 2021 Clear    • Specific Gravity 2021 1.015    • Ph 2021 6.5    • Glucose 2021 Negative    • Ketones 2021 Negative    • Protein 2021 Negative    • Bilirubin 2021 Negative    • Urobilinogen, Urine 2021 0.2    • Nitrite 2021 Negative    • Leukocyte Esterase 2021 Negative    • Occult Blood 2021 Negative    • Micro Urine Req 2021 see below    • Report 2021                      Value:[facility]    Test Date:  2021  Pt Name:    SMOOTH AGUDELO           Department: [facility]  MRN:        [id number]                      Room:  Gender:     Female                       Technician: CHAZ  :        1965                   Requested By:VIKI SOLMIAN  Order #:    [id number]                    Reading MD: Nolan Natarajan MD    Measurements  Intervals                                Axis  Rate:       103                          P:          83  VA:         124                          QRS:        68  QRSD:       80                           T:          27  QT:         364  QTc:        477    Interpretive Statements  SINUS  TACHYCARDIA  RIGHT ATRIAL ABNORMALITY  No previous ECG available for comparison  Electronically Signed On 5- 15:35:29 PDT by Nolan Natarajan MD     • SARS-CoV-2 Source 05/14/2021 Nasal Swab    • SARS-CoV-2 by PCR 05/14/2021 NotDetected    • GFR If  05/14/2021 >60    • GFR If Non  Ameri* 05/14/2021 >60    Hospital Outpatient Visit on 04/08/2021   Component Date Value   • Sodium 04/08/2021 140    • Potassium 04/08/2021 4.1    • Chloride 04/08/2021 103    • Co2 04/08/2021 31    • Anion Gap 04/08/2021 6.0*   • Glucose 04/08/2021 98    • Bun 04/08/2021 14    • Creatinine 04/08/2021 0.92    • Calcium 04/08/2021 9.2    • AST(SGOT) 04/08/2021 22    • ALT(SGPT) 04/08/2021 19    • Alkaline Phosphatase 04/08/2021 85    • Total Bilirubin 04/08/2021 0.3    • Albumin 04/08/2021 4.2    • Total Protein 04/08/2021 6.7    • Globulin 04/08/2021 2.5    • A-G Ratio 04/08/2021 1.7    • Cholesterol,Tot 04/08/2021 190    • Triglycerides 04/08/2021 65    • HDL 04/08/2021 52    • LDL 04/08/2021 125*   • Fasting Status 04/08/2021 Fasting    • GFR If  04/08/2021 >60    • GFR If Non  Ameri* 04/08/2021 >60    Admission on 01/18/2021, Discharged on 01/19/2021   Component Date Value   • Color 01/18/2021 Yellow    • Character 01/18/2021 Turbid*   • Specific Gravity 01/18/2021 1.020    • Ph 01/18/2021 7.0    • Glucose 01/18/2021 Negative    • Ketones 01/18/2021 Negative    • Protein 01/18/2021 Negative    • Bilirubin 01/18/2021 Negative    • Urobilinogen, Urine 01/18/2021 0.2    • Nitrite 01/18/2021 Negative    • Leukocyte Esterase 01/18/2021 Small*   • Occult Blood 01/18/2021 Negative    • Micro Urine Req 01/18/2021 Microscopic    • WBC 01/18/2021 10-20*   • RBC 01/18/2021 2-5*   • Bacteria 01/18/2021 Moderate*   • Epithelial Cells 01/18/2021 Few    • Hyaline Cast 01/18/2021 3-5*   • Significant Indicator 01/18/2021 NEG    • Source 01/18/2021 UR    • Site 01/18/2021 -    • Culture Result  01/18/2021 Usual skin kianna 10-50,000 cfu/mL    Hospital Outpatient Visit on 11/20/2020   Component Date Value   • HLA-B27 Screen 11/20/2020 Negative    • Sed Rate Westergren 11/20/2020 2    • Rheumatoid Factor -Neph- 11/20/2020 <10    • NT-proBNP 11/20/2020 54    • West Nile Virus Igg 11/20/2020 0.19    • West Nile Virus Igm 11/20/2020 0.06    • Hsv I-Ii Igg Antibodies 11/20/2020 >22.40    • Hsv Igm I-Ii Combination 11/20/2020 0.54    • Rubeola Igg Antibody 11/20/2020 273.0    • Rubeola -Measles- Ab Igm 11/20/2020 0.67    • Mumps Ab Igg 11/20/2020 >300.0    • Mumps Ab Igm 11/20/2020 0.45    • Varicella Zoster IgG Ab 11/20/2020 1506.0    • Varicella Zoster IgM Ab 11/20/2020 0.09    • Anti-Dna -Ds 11/20/2020 None Detected    • SSA 52 (R0)(CHANDRIKA) Ab, IgG 11/20/2020 2    • SSA 60 (R0)(CHANDRIKA) Ab, IgG 11/20/2020 0    • Sjogren'S Anti-Ss-B 11/20/2020 0    • Thompson Antibodies 11/20/2020 0    • Thompson/RNP IgG (CHANDRIKA) 11/20/2020 0    • Anti-Scl-70 11/20/2020 0    • Miscellaneous Lab Result 11/20/2020 SEE NOTE    • SSA 52 (R0)(CHANDRIKA) Ab, IgG 11/20/2020 2    • SSA 60 (R0)(CHANDRIKA) Ab, IgG 11/20/2020 0    • Sjogren'S Anti-Ss-B 11/20/2020 0    • [place], IgE 11/20/2020 <0.10    • Asparagus  F261 11/20/2020 <0.10    • F138 Avocado 11/20/2020 <0.10    • Garcia/Esquivel Yeast IgE 11/20/2020 <0.10    • F204 Banana 11/20/2020 <0.10    • Basil  F269 11/20/2020 <0.10    • [place]   F278 11/20/2020 <0.10    • F27 Beef 11/20/2020 <0.10    • Beet Root, IgE 11/20/2020 <0.10    • Pepper C. annuum, IgE 11/20/2020 <0.10    • Black Pepper  F280 11/20/2020 <0.10    • Mussel F037 11/20/2020 <0.10    • Blueberry  F288 11/20/2020 <0.10    • Brazil Nut  F018 11/20/2020 <0.10    • Broccoli  F260 11/20/2020 <0.10    • Buckwheat  F011 11/20/2020 <0.10    • Cabbage F216 11/20/2020 <0.10    • F31 Carrot 11/20/2020 <0.10    • Cashew Nut  F202 11/20/2020 <0.10    • F299 Sweet [place] 11/20/2020 <0.10    • Chick Pea  F309 11/20/2020 <0.10    • Chocolate Cacao, IgE F093  11/20/2020 <0.10    • Cinnamon  F220 11/20/2020 <0.10    • Clam  F207 11/20/2020 <0.10    • Coconut 11/20/2020 <0.10    • Codfish Allergen 11/20/2020 <0.10    • Occupational Cotton Seed* 11/20/2020 <0.10    • Crab  F023 11/20/2020 <0.10    • Cucumber  F244 11/20/2020 <0.10    • Dill  F277 11/20/2020 <0.10    • F1 Eggwhite 11/20/2020 <0.10    • Mabel  F270 11/20/2020 <0.10    • Grape Allergen 11/20/2020 <0.10    • Halibut  F303 11/20/2020 <0.10    • Hazelnut/Filbert  F017 11/20/2020 <0.10    • Honeydew/Cantaloupe, IgE 11/20/2020 <0.10    • Lettuce F215 11/20/2020 <0.10    • Pedro Brean/White Henson, I* 11/20/2020 <0.10    • F2 Milk 11/20/2020 <0.10    • Onions  F048 11/20/2020 <0.10    • [place] F033 11/20/2020 <0.10    • Oregano  F283 11/20/2020 <0.10    • F12 Pea 11/20/2020 <0.10    • F013 Peanut 11/20/2020 <0.10    • Pear  F094 11/20/2020 <0.10    • [place]  F255 11/20/2020 <0.10    • Pork  F026 11/20/2020 <0.10    • Potato 11/20/2020 <0.10    • Raspberry, IgE F343 11/20/2020 <0.10    • Sesame Seed 11/20/2020 <0.10    • Shrimp IgE 11/20/2020 <0.10    • F14 Henson Soybean 11/20/2020 <0.10    • Tea  F222 11/20/2020 <0.10    • Tomato IgE 11/20/2020 <0.10    • Trout  F204 11/20/2020 <0.10    • Turkey F284 11/20/2020 <0.10    • [place]  F255 11/20/2020 <0.10    • Watermelon 11/20/2020 <0.10    • Immunocap Score 11/20/2020 See Note    • HSV1 Gly IgG 11/20/2020 33.90*   • HSV2 Gly IgG 11/20/2020 2.63*   ]

## 2021-08-30 RX ORDER — LORATADINE 10 MG/1
TABLET ORAL
Qty: 90 TABLET | Refills: 1 | Status: SHIPPED | OUTPATIENT
Start: 2021-08-30 | End: 2022-02-02 | Stop reason: SDUPTHER

## 2021-10-08 ENCOUNTER — TELEPHONE (OUTPATIENT)
Dept: MEDICAL GROUP | Facility: CLINIC | Age: 56
End: 2021-10-08

## 2021-10-08 NOTE — TELEPHONE ENCOUNTER
VOICEMAIL  1. Caller Name: Bhavana Liu                      Call Back Number: [phone] (home)     2. Message: Called to advise. LVM     3. Patient approves office to leave a detailed voicemail/MyChart message: N\A

## 2022-01-03 ENCOUNTER — TELEPHONE (OUTPATIENT)
Dept: MEDICAL GROUP | Facility: CLINIC | Age: 57
End: 2022-01-03

## 2022-01-04 NOTE — TELEPHONE ENCOUNTER
Patient needs to be seen for further evaluation.  If unable to be seen here, can go to urgent care.

## 2022-01-04 NOTE — TELEPHONE ENCOUNTER
VOICEMAIL  1. Caller Name: Bhavana Liu                        Call Back Number: [phone] (home)       2. Message: pt lvm stating that she has bronchitis and needs antibiotics. She states that she has chronic bronchitis and knows the symptoms. She is getting covid tested as well but states that she knows that its not covid. Verified [facility] pharmacy     3. Patient approves office to leave a detailed voicemail/MyChart message: N\A

## 2022-02-02 ENCOUNTER — OFFICE VISIT (OUTPATIENT)
Dept: MEDICAL GROUP | Facility: CLINIC | Age: 57
End: 2022-02-02
Payer: MEDICAID

## 2022-02-02 VITALS
SYSTOLIC BLOOD PRESSURE: 116 MMHG | TEMPERATURE: 98.5 F | HEART RATE: 103 BPM | RESPIRATION RATE: 16 BRPM | HEIGHT: 70 IN | WEIGHT: 219 LBS | BODY MASS INDEX: 31.35 KG/M2 | OXYGEN SATURATION: 95 % | DIASTOLIC BLOOD PRESSURE: 62 MMHG

## 2022-02-02 DIAGNOSIS — I83.893 VARICOSE VEINS OF LEG WITH EDEMA, BILATERAL: ICD-10-CM

## 2022-02-02 DIAGNOSIS — K21.9 GASTROESOPHAGEAL REFLUX DISEASE, UNSPECIFIED WHETHER ESOPHAGITIS PRESENT: ICD-10-CM

## 2022-02-02 DIAGNOSIS — F41.9 ANXIETY: ICD-10-CM

## 2022-02-02 DIAGNOSIS — Z11.59 ENCOUNTER FOR HEPATITIS C SCREENING TEST FOR LOW RISK PATIENT: ICD-10-CM

## 2022-02-02 DIAGNOSIS — F17.200 TOBACCO DEPENDENCE: ICD-10-CM

## 2022-02-02 DIAGNOSIS — G60.9 IDIOPATHIC PERIPHERAL NEUROPATHY: ICD-10-CM

## 2022-02-02 DIAGNOSIS — R60.0 BILATERAL LOWER EXTREMITY EDEMA: ICD-10-CM

## 2022-02-02 DIAGNOSIS — L29.9 ITCHING: ICD-10-CM

## 2022-02-02 DIAGNOSIS — R20.2 PARESTHESIA OF BOTH FEET: ICD-10-CM

## 2022-02-02 PROCEDURE — 99214 OFFICE O/P EST MOD 30 MIN: CPT | Performed by: PHYSICIAN ASSISTANT

## 2022-02-02 RX ORDER — FUROSEMIDE 20 MG/1
20 TABLET ORAL 3 TIMES DAILY
Qty: 180 TABLET | Refills: 2 | Status: SHIPPED | OUTPATIENT
Start: 2022-02-02 | End: 2022-07-26

## 2022-02-02 RX ORDER — PAROXETINE 30 MG/1
30 TABLET, FILM COATED ORAL
Qty: 90 TABLET | Refills: 2 | Status: SHIPPED | OUTPATIENT
Start: 2022-02-02 | End: 2022-08-16 | Stop reason: SDUPTHER

## 2022-02-02 RX ORDER — IBUPROFEN 800 MG/1
TABLET ORAL
Qty: 90 TABLET | Refills: 2 | Status: ON HOLD | OUTPATIENT
Start: 2022-02-02 | End: 2022-04-27

## 2022-02-02 RX ORDER — POTASSIUM CHLORIDE 750 MG/1
10 TABLET, FILM COATED, EXTENDED RELEASE ORAL 2 TIMES DAILY
Qty: 180 TABLET | Refills: 2 | Status: SHIPPED | OUTPATIENT
Start: 2022-02-02 | End: 2022-08-16 | Stop reason: SDUPTHER

## 2022-02-02 RX ORDER — LORATADINE 10 MG/1
10 TABLET, ORALLY DISINTEGRATING ORAL DAILY
Qty: 30 TABLET | Refills: 5 | Status: SHIPPED | OUTPATIENT
Start: 2022-02-02 | End: 2022-07-26

## 2022-02-02 RX ORDER — ALBUTEROL SULFATE 90 UG/1
2 AEROSOL, METERED RESPIRATORY (INHALATION) EVERY 6 HOURS PRN
Qty: 8.5 G | Refills: 5 | Status: SHIPPED | OUTPATIENT
Start: 2022-02-02 | End: 2022-08-16 | Stop reason: SDUPTHER

## 2022-02-02 RX ORDER — GABAPENTIN 300 MG/1
600 CAPSULE ORAL 3 TIMES DAILY
Qty: 180 CAPSULE | Refills: 3 | Status: SHIPPED | OUTPATIENT
Start: 2022-02-02 | End: 2022-03-21

## 2022-02-02 RX ORDER — HYDROXYZINE HYDROCHLORIDE 25 MG/1
TABLET, FILM COATED ORAL
Qty: 120 TABLET | Refills: 1 | Status: SHIPPED | OUTPATIENT
Start: 2022-02-02 | End: 2022-07-26

## 2022-02-02 RX ORDER — BACLOFEN 20 MG/1
20 TABLET ORAL 2 TIMES DAILY
Qty: 60 TABLET | Refills: 4 | Status: SHIPPED | OUTPATIENT
Start: 2022-02-02 | End: 2022-03-14 | Stop reason: SDUPTHER

## 2022-02-02 RX ORDER — BUSPIRONE HYDROCHLORIDE 10 MG/1
10 TABLET ORAL 2 TIMES DAILY
Qty: 90 TABLET | Refills: 1 | Status: SHIPPED | OUTPATIENT
Start: 2022-02-02 | End: 2022-07-26

## 2022-02-02 RX ORDER — PANTOPRAZOLE SODIUM 40 MG/1
40 TABLET, DELAYED RELEASE ORAL DAILY
Qty: 90 TABLET | Refills: 2 | Status: SHIPPED | OUTPATIENT
Start: 2022-02-02 | End: 2022-08-16 | Stop reason: SDUPTHER

## 2022-02-02 ASSESSMENT — FIBROSIS 4 INDEX: FIB4 SCORE: 0.97

## 2022-02-02 NOTE — PATIENT INSTRUCTIONS
[facility]  Dr. Campa Card  [address]  [place] [address]  ?Phone: [phone]    If you have to go to [facility] for Podiatry    Please contact [facility] at [phone] to make a transportation reservation from [place] to [place] and back to [place] once you have made your appointment. [facility] provides rides FREE of charge to eligible Medicaid members.

## 2022-02-17 ASSESSMENT — ENCOUNTER SYMPTOMS
TREMORS: 0
SENSORY CHANGE: 1
HEMOPTYSIS: 0
DEPRESSION: 0
TINGLING: 1
INSOMNIA: 0
ORTHOPNEA: 0
HEADACHES: 0
PND: 0
SEIZURES: 0
LOSS OF CONSCIOUSNESS: 0
ABDOMINAL PAIN: 0
COUGH: 1
WHEEZING: 0
SHORTNESS OF BREATH: 1
CLAUDICATION: 0
CONSTITUTIONAL NEGATIVE: 1
MEMORY LOSS: 0
NERVOUS/ANXIOUS: 1
VOMITING: 0
MUSCULOSKELETAL NEGATIVE: 1
HEARTBURN: 1
PALPITATIONS: 0
DIZZINESS: 0
DIARRHEA: 0
CONSTIPATION: 0
EYES NEGATIVE: 1
NAUSEA: 0
SPUTUM PRODUCTION: 0
SPEECH CHANGE: 0

## 2022-02-17 ASSESSMENT — VISUAL ACUITY: OU: 1

## 2022-02-17 ASSESSMENT — LIFESTYLE VARIABLES: SUBSTANCE_ABUSE: 0

## 2022-02-17 NOTE — PROGRESS NOTES
Subjective   Blessing Liu is a 56 y.o. female who presents with Medication Refill    1. Bilateral lower extremity edema  Patient is here today for refills on all of her medications. This is a chronic condition for this patient. Current medications are being taken as directed with no new complaints. She is due for lab work. Refill sent to pharmacy. Will review results when received. Follow up in 6 weeks with PCP.   proBrain Natriuretic Peptide, NT; Future   Comp Metabolic Panel; Future   baclofen (LIORESAL) 20 MG tablet; Take 1 Tablet by mouth 2 times a day.  Dispense: 60 Tablet; Refill: 4   furosemide (LASIX) 20 MG Tab; Take 1 Tablet by mouth 3 times a day.  Dispense: 180 Tablet; Refill: 2   potassium chloride ER (KLOR-CON) 10 MEQ tablet; Take 1 Tablet by mouth 2 times a day.  Dispense: 180 Tablet; Refill: 2    2. Paresthesia of both feet  The patient's current medical issue is moderately controlled on the current therapy with no new symptoms or worsening. Requesting refills. Due labs.   proBrain Natriuretic Peptide, NT; Future   Comp Metabolic Panel; Future   gabapentin (NEURONTIN) 300 MG Cap; Take 2 Capsules by mouth 3 times a day.  Dispense: 180 Capsule; Refill: 3   ibuprofen (MOTRIN) 800 MG Tab; TAKE 1 TABLET BY MOUTH EVERY 8 HOURS AS NEEDED  Dispense: 90 Tablet; Refill: 2    3. Idiopathic peripheral neuropathy  The patient's current medical issue is moderately controlled on the current therapy with no new symptoms or worsening. Requesting refill.   proBrain Natriuretic Peptide, NT; Future   Comp Metabolic Panel; Future   gabapentin (NEURONTIN) 300 MG Cap; Take 2 Capsules by mouth 3 times a day.  Dispense: 180 Capsule; Refill: 3   ibuprofen (MOTRIN) 800 MG Tab; TAKE 1 TABLET BY MOUTH EVERY 8 HOURS AS NEEDED  Dispense: 90 Tablet; Refill: 2    4. Anxiety  The patient's current medical issue is well controlled on the current therapy with no new symptoms or worsening. States that she does well with  this combination of medications. Is requesting refill today.   hydrOXYzine HCl (ATARAX) 25 MG Tab; TAKE ONE TABLET BY MOUTH THREE TIMES A DAY AS NEEDED FOR ANXIETY  Dispense: 120 Tablet; Refill: 1   busPIRone (BUSPAR) 10 MG Tab tablet; Take 1 Tablet by mouth 2 times a day.  Dispense: 90 Tablet; Refill: 1   PARoxetine (PAXIL) 30 MG Tab; Take 1 Tablet by mouth every day.  Dispense: 90 Tablet; Refill: 2    5. Varicose veins of leg with edema, bilateral  Chronic condition. Stable. Requesting refill of medication. Denies new or worsening symptoms. Taking medications as prescribed. Denies missed doses.   baclofen (LIORESAL) 20 MG tablet; Take 1 Tablet by mouth 2 times a day.  Dispense: 60 Tablet; Refill: 4   furosemide (LASIX) 20 MG Tab; Take 1 Tablet by mouth 3 times a day.  Dispense: 180 Tablet; Refill: 2   potassium chloride ER (KLOR-CON) 10 MEQ tablet; Take 1 Tablet by mouth 2 times a day.  Dispense: 180 Tablet; Refill: 2    6. Gastroesophageal reflux disease, unspecified whether esophagitis present  Patient is currently being treated for gerd, taking meds with no new symptoms or side effects, is trying to modify diet with decreased acidic foods, caffeine, and alcohol.  Controlled   pantoprazole (PROTONIX) 40 MG Tablet Delayed Response; Take 1 Tablet by mouth every day.  Dispense: 90 Tablet; Refill: 2    7. Tobacco dependence   albuterol 108 (90 Base) MCG/ACT Aero Soln inhalation aerosol; Inhale 2 Puffs every 6 hours as needed for Shortness of Breath.  Dispense: 8.5 g; Refill: 5    8. Itching   loratadine (CLARITIN REDITABS) 10 MG dissolvable tablet; Take 1 Tablet by mouth every day.  Dispense: 30 Tablet; Refill: 5    9. Encounter for hepatitis C screening test for low risk patient   HCV Scrn ( [id number] [employer]); Future    Past Medical History:  2017: Abnormal drug screen  No date: Adjustment disorder with anxious mood  No date: Arrhythmia      Comment:  history of  No date: Arthritis  No date: Bilateral leg  edema  No date: Dyslipidemia, goal LDL below 130  No date: GERD (gastroesophageal reflux disease)  No date: Heart burn  No date: Hemorrhagic disorder (HCC)      Comment:  pt tends to bleed easy   No date: Menopausal symptoms  12/8/2017: Methamphetamine use (HCC)  Past Surgical History:  5/19/2021: VAGINAL HYSTERECTOMY TOTAL      Comment:  Procedure: HYSTERECTOMY, TOTAL, VAGINAL;  Surgeon:                Melissa Suazo M.D.;  Location: SURGERY SAME DAY                [facility];  Service: Gynecology  5/19/2021: ANTERIOR AND POSTERIOR REPAIR      Comment:  Procedure: COLPORRHAPHY, COMBINED ANTEROPOSTERIOR;                 Surgeon: Melissa Suazo M.D.;  Location: SURGERY SAME               DAY [facility];  Service: Gynecology  5/19/2021: VAGINAL SUSPENSION      Comment:  Procedure: COLPOPEXY;  Surgeon: Melissa Suazo M.D.;               Location: SURGERY SAME DAY [facility];  Service: Gynecology  5/19/2021: CYSTOSCOPY      Comment:  Procedure: CYSTOSCOPY;  Surgeon: Melissa Suazo M.D.;  Location: SURGERY SAME DAY [facility];  Service:                Gynecology  Social History    Tobacco Use      Smoking status: Current Every Day Smoker        Packs/day: 0.50        Years: 25.00        Pack years: 12.5        Types: Cigarettes      Smokeless tobacco: Never Used      Tobacco comment: getting close, she down to 1/4 ppd, still trying to quit    Vaping Use      Vaping Use: Former        Quit date: 6/2/2020    Alcohol use: No      Alcohol/week: 0.0 oz      Comment: none at all    Drug use: Yes      Comment: Meth occ    Review of patient's family history indicates:  Problem: Diabetes      Relation: Mother          Age of Onset: (Not Specified)          Comment: type 2, diet controlled  Problem: Heart Disease      Relation: Mother          Age of Onset: (Not Specified)          Comment: pacemaker  Problem: Cancer      Relation: Maternal Grandmother          Age of Onset: (Not Specified)          Comment:  uterine  Problem: Genitourinary () Problems      Relation: Sister          Age of Onset: (Not Specified)          Comment: tumor, benign  Problem: Heart Disease      Relation: Father          Age of Onset: (Not Specified)          Comment: CHF  Problem: Non-contributory      Relation: Father          Age of Onset: (Not Specified)          Comment: emphysema  Problem: Arthritis      Relation: Brother          Age of Onset: (Not Specified)          Comment: hip replacement  Problem: Non-contributory      Relation: Sister          Age of Onset: (Not Specified)          Comment: encephalitis      Current Outpatient Medications: •  gabapentin (NEURONTIN) 300 MG Cap, Take 2 Capsules by mouth 3 times a day., Disp: 180 Capsule, Rfl: 3•  hydrOXYzine HCl (ATARAX) 25 MG Tab, TAKE ONE TABLET BY MOUTH THREE TIMES A DAY AS NEEDED FOR ANXIETY, Disp: 120 Tablet, Rfl: 1•  busPIRone (BUSPAR) 10 MG Tab tablet, Take 1 Tablet by mouth 2 times a day., Disp: 90 Tablet, Rfl: 1•  loratadine (CLARITIN REDITABS) 10 MG dissolvable tablet, Take 1 Tablet by mouth every day., Disp: 30 Tablet, Rfl: 5•  baclofen (LIORESAL) 20 MG tablet, Take 1 Tablet by mouth 2 times a day., Disp: 60 Tablet, Rfl: 4•  furosemide (LASIX) 20 MG Tab, Take 1 Tablet by mouth 3 times a day., Disp: 180 Tablet, Rfl: 2•  ibuprofen (MOTRIN) 800 MG Tab, TAKE 1 TABLET BY MOUTH EVERY 8 HOURS AS NEEDED, Disp: 90 Tablet, Rfl: 2•  potassium chloride ER (KLOR-CON) 10 MEQ tablet, Take 1 Tablet by mouth 2 times a day., Disp: 180 Tablet, Rfl: 2•  pantoprazole (PROTONIX) 40 MG Tablet Delayed Response, Take 1 Tablet by mouth every day., Disp: 90 Tablet, Rfl: 2•  PARoxetine (PAXIL) 30 MG Tab, Take 1 Tablet by mouth every day., Disp: 90 Tablet, Rfl: 2•  albuterol 108 (90 Base) MCG/ACT Aero Soln inhalation aerosol, Inhale 2 Puffs every 6 hours as needed for Shortness of Breath., Disp: 8.5 g, Rfl: 5•  cyanocobalamin (VITAMIN B-12) 500 MCG Tab, Take 500 mcg by mouth every day., Disp: , Rfl:  "•  Garcinia Cambogia-Chromium 500-200 MG-MCG Tab, Take  by mouth., Disp: , Rfl:     Patient was instructed on the use of medications, either prescriptions or OTC and informed on when the appropriate follow up time period should be. In addition, patient was also instructed that should any acute worsening occur that they should notify this clinic asap or call 911.      Review of Systems   Constitutional: Negative.    HENT: Negative.    Eyes: Negative.    Respiratory: Positive for cough and shortness of breath. Negative for hemoptysis, sputum production and wheezing.    Cardiovascular: Positive for leg swelling. Negative for chest pain, palpitations, orthopnea, claudication and PND.   Gastrointestinal: Positive for heartburn. Negative for abdominal pain, constipation, diarrhea, nausea and vomiting.   Genitourinary: Negative.    Musculoskeletal: Negative.    Skin: Positive for itching. Negative for rash.   Neurological: Positive for tingling and sensory change (both feet). Negative for dizziness, tremors, speech change, seizures, loss of consciousness and headaches.   Endo/Heme/Allergies: Negative.    Psychiatric/Behavioral: Negative for depression, memory loss, substance abuse and suicidal ideas. The patient is nervous/anxious. The patient does not have insomnia.      Objective     /62 (BP Location: Right arm, Patient Position: Sitting, BP Cuff Size: Large adult)   Pulse (!) 103   Temp 36.9 °C (98.5 °F) (Temporal)   Resp 16   Ht 1.765 m (5' 9.5\") Comment: stated by pt  Wt 99.3 kg (219 lb) Comment: with shoes on  LMP 10/28/2012   SpO2 95%   BMI 31.88 kg/m²      Physical Exam  Vitals and nursing note reviewed.   Constitutional:       General: She is not in acute distress.     Appearance: Normal appearance. She is well-developed and well-groomed. She is obese. She is not ill-appearing.   HENT:      Head: Normocephalic and atraumatic.      Nose: Nose normal.      Mouth/Throat:      Lips: Pink. No lesions.     "  Mouth: Mucous membranes are moist.   Eyes:      General: Lids are normal. Vision grossly intact. Gaze aligned appropriately.      Extraocular Movements: Extraocular movements intact.      Conjunctiva/sclera: Conjunctivae normal.      Pupils: Pupils are equal, round, and reactive to light.   Neck:      Thyroid: No thyromegaly.      Vascular: No carotid bruit or JVD.      Trachea: Trachea and phonation normal.   Cardiovascular:      Rate and Rhythm: Normal rate and regular rhythm.      Heart sounds: Normal heart sounds. No murmur heard.    No friction rub. No gallop.   Pulmonary:      Effort: Pulmonary effort is normal.      Breath sounds: Normal breath sounds. No wheezing, rhonchi or rales.   Musculoskeletal:         General: Normal range of motion.      Cervical back: Normal range of motion and neck supple.      Right lower le+ Pitting Edema present.      Left lower le+ Pitting Edema present.   Lymphadenopathy:      Cervical: No cervical adenopathy.   Skin:     General: Skin is warm and dry.      Capillary Refill: Capillary refill takes less than 2 seconds.      Findings: No lesion or rash.   Neurological:      Mental Status: She is alert and oriented to person, place, and time.      Cranial Nerves: Cranial nerves are intact.   Psychiatric:         Attention and Perception: Attention and perception normal.         Mood and Affect: Affect normal. Mood is anxious.         Speech: Speech normal.         Behavior: Behavior normal. Behavior is cooperative.         Thought Content: Thought content normal.         Judgment: Judgment normal.       Assessment & Plan      1. Bilateral lower extremity edema  - proBrain Natriuretic Peptide, NT; Future  - Comp Metabolic Panel; Future  - baclofen (LIORESAL) 20 MG tablet; Take 1 Tablet by mouth 2 times a day.  Dispense: 60 Tablet; Refill: 4  - furosemide (LASIX) 20 MG Tab; Take 1 Tablet by mouth 3 times a day.  Dispense: 180 Tablet; Refill: 2  - potassium chloride ER  (KLOR-CON) 10 MEQ tablet; Take 1 Tablet by mouth 2 times a day.  Dispense: 180 Tablet; Refill: 2    2. Paresthesia of both feet  - proBrain Natriuretic Peptide, NT; Future  - Comp Metabolic Panel; Future  - gabapentin (NEURONTIN) 300 MG Cap; Take 2 Capsules by mouth 3 times a day.  Dispense: 180 Capsule; Refill: 3  - ibuprofen (MOTRIN) 800 MG Tab; TAKE 1 TABLET BY MOUTH EVERY 8 HOURS AS NEEDED  Dispense: 90 Tablet; Refill: 2    3. Idiopathic peripheral neuropathy  - proBrain Natriuretic Peptide, NT; Future  - Comp Metabolic Panel; Future  - gabapentin (NEURONTIN) 300 MG Cap; Take 2 Capsules by mouth 3 times a day.  Dispense: 180 Capsule; Refill: 3  - ibuprofen (MOTRIN) 800 MG Tab; TAKE 1 TABLET BY MOUTH EVERY 8 HOURS AS NEEDED  Dispense: 90 Tablet; Refill: 2    4. Anxiety  - hydrOXYzine HCl (ATARAX) 25 MG Tab; TAKE ONE TABLET BY MOUTH THREE TIMES A DAY AS NEEDED FOR ANXIETY  Dispense: 120 Tablet; Refill: 1  - busPIRone (BUSPAR) 10 MG Tab tablet; Take 1 Tablet by mouth 2 times a day.  Dispense: 90 Tablet; Refill: 1  - PARoxetine (PAXIL) 30 MG Tab; Take 1 Tablet by mouth every day.  Dispense: 90 Tablet; Refill: 2    5. Varicose veins of leg with edema, bilateral  - baclofen (LIORESAL) 20 MG tablet; Take 1 Tablet by mouth 2 times a day.  Dispense: 60 Tablet; Refill: 4  - furosemide (LASIX) 20 MG Tab; Take 1 Tablet by mouth 3 times a day.  Dispense: 180 Tablet; Refill: 2  - potassium chloride ER (KLOR-CON) 10 MEQ tablet; Take 1 Tablet by mouth 2 times a day.  Dispense: 180 Tablet; Refill: 2    6. Gastroesophageal reflux disease, unspecified whether esophagitis present  - pantoprazole (PROTONIX) 40 MG Tablet Delayed Response; Take 1 Tablet by mouth every day.  Dispense: 90 Tablet; Refill: 2    7. Tobacco dependence  - albuterol 108 (90 Base) MCG/ACT Aero Soln inhalation aerosol; Inhale 2 Puffs every 6 hours as needed for Shortness of Breath.  Dispense: 8.5 g; Refill: 5    8. Itching  - loratadine (CLARITIN REDITABS) 10  MG dissolvable tablet; Take 1 Tablet by mouth every day.  Dispense: 30 Tablet; Refill: 5    9. Encounter for hepatitis C screening test for low risk patient  - HCV Scrn ( [id number] [employer]); Future

## 2022-03-05 ENCOUNTER — HOSPITAL ENCOUNTER (OUTPATIENT)
Dept: LAB | Facility: MEDICAL CENTER | Age: 57
End: 2022-03-05
Attending: PHYSICIAN ASSISTANT
Payer: MEDICAID

## 2022-03-05 DIAGNOSIS — R20.2 PARESTHESIA OF BOTH FEET: ICD-10-CM

## 2022-03-05 DIAGNOSIS — Z11.59 ENCOUNTER FOR HEPATITIS C SCREENING TEST FOR LOW RISK PATIENT: ICD-10-CM

## 2022-03-05 DIAGNOSIS — R60.0 BILATERAL LOWER EXTREMITY EDEMA: ICD-10-CM

## 2022-03-05 DIAGNOSIS — G60.9 IDIOPATHIC PERIPHERAL NEUROPATHY: ICD-10-CM

## 2022-03-05 LAB
ALBUMIN SERPL BCP-MCNC: 4.2 G/DL (ref 3.2–4.9)
ALBUMIN/GLOB SERPL: 1.8 G/DL
ALP SERPL-CCNC: 74 U/L (ref 30–99)
ALT SERPL-CCNC: 14 U/L (ref 2–50)
ANION GAP SERPL CALC-SCNC: 10 MMOL/L (ref 7–16)
AST SERPL-CCNC: 20 U/L (ref 12–45)
BILIRUB SERPL-MCNC: 0.4 MG/DL (ref 0.1–1.5)
BUN SERPL-MCNC: 16 MG/DL (ref 8–22)
CALCIUM SERPL-MCNC: 9.2 MG/DL (ref 8.5–10.5)
CHLORIDE SERPL-SCNC: 106 MMOL/L (ref 96–112)
CO2 SERPL-SCNC: 26 MMOL/L (ref 20–33)
CREAT SERPL-MCNC: 0.83 MG/DL (ref 0.5–1.4)
GLOBULIN SER CALC-MCNC: 2.3 G/DL (ref 1.9–3.5)
GLUCOSE SERPL-MCNC: 101 MG/DL (ref 65–99)
NT-PROBNP SERPL IA-MCNC: 67 PG/ML (ref 0–125)
POTASSIUM SERPL-SCNC: 4.1 MMOL/L (ref 3.6–5.5)
PROT SERPL-MCNC: 6.5 G/DL (ref 6–8.2)
SODIUM SERPL-SCNC: 142 MMOL/L (ref 135–145)

## 2022-03-05 PROCEDURE — G0472 HEP C SCREEN HIGH RISK/OTHER: HCPCS

## 2022-03-05 PROCEDURE — 83880 ASSAY OF NATRIURETIC PEPTIDE: CPT

## 2022-03-05 PROCEDURE — 36415 COLL VENOUS BLD VENIPUNCTURE: CPT

## 2022-03-05 PROCEDURE — 80053 COMPREHEN METABOLIC PANEL: CPT

## 2022-03-06 LAB — HCV AB SER QL: NORMAL

## 2022-03-14 ENCOUNTER — OFFICE VISIT (OUTPATIENT)
Dept: MEDICAL GROUP | Facility: CLINIC | Age: 57
End: 2022-03-14
Payer: MEDICAID

## 2022-03-14 VITALS
BODY MASS INDEX: 31.78 KG/M2 | TEMPERATURE: 97.9 F | DIASTOLIC BLOOD PRESSURE: 70 MMHG | OXYGEN SATURATION: 97 % | WEIGHT: 222 LBS | RESPIRATION RATE: 16 BRPM | HEART RATE: 99 BPM | HEIGHT: 70 IN | SYSTOLIC BLOOD PRESSURE: 112 MMHG

## 2022-03-14 DIAGNOSIS — G60.9 IDIOPATHIC PERIPHERAL NEUROPATHY: ICD-10-CM

## 2022-03-14 DIAGNOSIS — I83.893 VARICOSE VEINS OF LEG WITH EDEMA, BILATERAL: ICD-10-CM

## 2022-03-14 DIAGNOSIS — M25.511 CHRONIC PAIN OF BOTH SHOULDERS: ICD-10-CM

## 2022-03-14 DIAGNOSIS — M79.641 PAIN IN BOTH HANDS: ICD-10-CM

## 2022-03-14 DIAGNOSIS — R20.2 PARESTHESIA OF BOTH FEET: ICD-10-CM

## 2022-03-14 DIAGNOSIS — G89.29 CHRONIC PAIN OF BOTH SHOULDERS: ICD-10-CM

## 2022-03-14 DIAGNOSIS — M54.2 NECK PAIN: ICD-10-CM

## 2022-03-14 DIAGNOSIS — M79.642 PAIN IN BOTH HANDS: ICD-10-CM

## 2022-03-14 DIAGNOSIS — M25.512 CHRONIC PAIN OF BOTH SHOULDERS: ICD-10-CM

## 2022-03-14 DIAGNOSIS — M25.50 MULTIPLE JOINT PAIN: ICD-10-CM

## 2022-03-14 DIAGNOSIS — R60.0 BILATERAL LOWER EXTREMITY EDEMA: ICD-10-CM

## 2022-03-14 PROCEDURE — 99214 OFFICE O/P EST MOD 30 MIN: CPT | Performed by: PHYSICIAN ASSISTANT

## 2022-03-14 RX ORDER — BACLOFEN 20 MG/1
20 TABLET ORAL 3 TIMES DAILY
Qty: 90 TABLET | Refills: 3 | Status: SHIPPED | OUTPATIENT
Start: 2022-03-14 | End: 2022-07-07 | Stop reason: SDUPTHER

## 2022-03-14 ASSESSMENT — FIBROSIS 4 INDEX: FIB4 SCORE: 1.02

## 2022-03-14 NOTE — PROGRESS NOTES
cc:  Test results    Subjective:     Bhavana Liu is a 56 y.o. female presenting for test results      Patient presents to the office for test results.  Patient wanted further testing for chf.  She states that a friend had swelling of the feet and she wanted to make sure that she did not have a heart issue.    Patient indicates that she has not yet made an appointment with podiatry.  She is looking for a local provider who takes her insurance.  The referral previously placed as .     Patient states that she is having numbness in her right chest neck and face.  She states that it will have it on both sides but worse on right.   She states that there has been no change with the baclofen.         Review of systems:  See above.   Denies any symptoms unless previously indicated.        Current Outpatient Medications:   •  baclofen (LIORESAL) 20 MG tablet, Take 1 Tablet by mouth 3 times a day., Disp: 90 Tablet, Rfl: 3  •  gabapentin (NEURONTIN) 300 MG Cap, Take 2 Capsules by mouth 3 times a day., Disp: 180 Capsule, Rfl: 3  •  hydrOXYzine HCl (ATARAX) 25 MG Tab, TAKE ONE TABLET BY MOUTH THREE TIMES A DAY AS NEEDED FOR ANXIETY, Disp: 120 Tablet, Rfl: 1  •  busPIRone (BUSPAR) 10 MG Tab tablet, Take 1 Tablet by mouth 2 times a day., Disp: 90 Tablet, Rfl: 1  •  loratadine (CLARITIN REDITABS) 10 MG dissolvable tablet, Take 1 Tablet by mouth every day., Disp: 30 Tablet, Rfl: 5  •  furosemide (LASIX) 20 MG Tab, Take 1 Tablet by mouth 3 times a day., Disp: 180 Tablet, Rfl: 2  •  ibuprofen (MOTRIN) 800 MG Tab, TAKE 1 TABLET BY MOUTH EVERY 8 HOURS AS NEEDED, Disp: 90 Tablet, Rfl: 2  •  potassium chloride ER (KLOR-CON) 10 MEQ tablet, Take 1 Tablet by mouth 2 times a day., Disp: 180 Tablet, Rfl: 2  •  pantoprazole (PROTONIX) 40 MG Tablet Delayed Response, Take 1 Tablet by mouth every day., Disp: 90 Tablet, Rfl: 2  •  PARoxetine (PAXIL) 30 MG Tab, Take 1 Tablet by mouth every day., Disp: 90 Tablet, Rfl: 2  •   "albuterol 108 (90 Base) MCG/ACT Aero Soln inhalation aerosol, Inhale 2 Puffs every 6 hours as needed for Shortness of Breath., Disp: 8.5 g, Rfl: 5  •  cyanocobalamin (VITAMIN B-12) 500 MCG Tab, Take 500 mcg by mouth every day., Disp: , Rfl:   •  Garcinia Cambogia-Chromium 500-200 MG-MCG Tab, Take  by mouth., Disp: , Rfl:     Allergies, past medical history, past surgical history, family history, social history reviewed and updated    Objective:     Vitals: /70 (BP Location: Left arm, Patient Position: Sitting, BP Cuff Size: Large adult)   Pulse 99   Temp 36.6 °C (97.9 °F) (Temporal)   Resp 16   Ht 1.765 m (5' 9.5\") Comment: stated by pt  Wt 101 kg (222 lb) Comment: with shoes on  LMP 10/28/2012   SpO2 97%   BMI 32.31 kg/m²   General: Alert, pleasant, NAD  EYES:   PERRL, EOMI, no icterus or pallor.  Conjunctivae and lids normal.   HENT:  Normocephalic.  External ears normal. Tympanic membranes pearly, opaque.  No nasal drainage present.  Neck supple.    Respiratory: Normal respiratory effort.    Abdomen: obese  Skin: Warm, dry, no rashes.  Musculoskeletal: Gait is normal.  Moves all extremities well.  Decreased motor strength bilaterally with neck flexion extension and lateral flexion bilaterally.  3/5 motor strength with shoulder flexion extension abduction adduction 3/5 motor strength bilaterally with elbow flexion extension.. 3/5 motor strength with wrist flexion extension.  Equal motor  strength bilaterally with 2+ radial pulses.  Neurological: No tremors, sensation grossly intact,  CN2-12 intact.  Psych:  Affect/mood is normal, judgement is good, memory is intact, grooming is appropriate.    Assessment/Plan:     Bhavana was seen today for results.    Diagnoses and all orders for this visit:    Idiopathic peripheral neuropathy  -     Referral to Podiatry  Paresthesia of both feet  -     Referral to Podiatry  Bilateral lower extremity edema  -     baclofen (LIORESAL) 20 MG tablet; Take 1 Tablet " by mouth 3 times a day.  Varicose veins of leg with edema, bilateral  -     baclofen (LIORESAL) 20 MG tablet; Take 1 Tablet by mouth 3 times a day.  Pain in both hands  -     CBC WITH DIFFERENTIAL; Future  -     KRIS REFLEXIVE PROFILE; Future  -     RHEUMATOID ARTHRITIS FACTOR; Future  -     Sed Rate; Future  -     CRP HIGH SENSITIVE (CARDIAC); Future  -     CCP; Future  Multiple joint pain  -     CBC WITH DIFFERENTIAL; Future  -     KRIS REFLEXIVE PROFILE; Future  -     RHEUMATOID ARTHRITIS FACTOR; Future  -     Sed Rate; Future  -     CRP HIGH SENSITIVE (CARDIAC); Future  -     CCP; Future  Chronic pain of both shoulders  -     DX-SHOULDER 2+ LEFT; Future  -     DX-SHOULDER 2+ RIGHT; Future  -     DX-CERVICAL SPINE-2 OR 3 VIEWS; Future  -     MR-CERVICAL SPINE-W/O; Future  -     Referral to Orthopedics  Neck pain  -     DX-SHOULDER 2+ LEFT; Future  -     DX-SHOULDER 2+ RIGHT; Future  -     DX-CERVICAL SPINE-2 OR 3 VIEWS; Future  -     MR-CERVICAL SPINE-W/O; Future  -     Referral to Orthopedics    Symptoms are worsening.  Will obtain labs.  Will obtain x-rays of the shoulders as well as the neck.  We will also obtain an MRI of the cervical spine.  Will refer to orthopedics as well as to podiatry.  We will plan to follow-up in 4 to 6 weeks, sooner if needed.  Will increase baclofen to 3 times a day.      Return in about 4 weeks (around 4/11/2022), or if symptoms worsen or fail to improve, for 4-6 weeks.    Please note that this dictation was created using voice recognition software. I have made every reasonable attempt to correct obvious errors, but expect that there are errors of grammar and possible content that I did not discover before finalizing note.

## 2022-03-19 ENCOUNTER — HOSPITAL ENCOUNTER (OUTPATIENT)
Dept: RADIOLOGY | Facility: MEDICAL CENTER | Age: 57
End: 2022-03-19
Attending: PHYSICIAN ASSISTANT
Payer: MEDICAID

## 2022-03-19 ENCOUNTER — HOSPITAL ENCOUNTER (OUTPATIENT)
Dept: LAB | Facility: MEDICAL CENTER | Age: 57
End: 2022-03-19
Attending: PHYSICIAN ASSISTANT
Payer: MEDICAID

## 2022-03-19 DIAGNOSIS — M25.511 CHRONIC PAIN OF BOTH SHOULDERS: ICD-10-CM

## 2022-03-19 DIAGNOSIS — M54.2 NECK PAIN: ICD-10-CM

## 2022-03-19 DIAGNOSIS — M79.642 PAIN IN BOTH HANDS: ICD-10-CM

## 2022-03-19 DIAGNOSIS — M25.50 MULTIPLE JOINT PAIN: ICD-10-CM

## 2022-03-19 DIAGNOSIS — G89.29 CHRONIC PAIN OF BOTH SHOULDERS: ICD-10-CM

## 2022-03-19 DIAGNOSIS — M25.512 CHRONIC PAIN OF BOTH SHOULDERS: ICD-10-CM

## 2022-03-19 DIAGNOSIS — M79.641 PAIN IN BOTH HANDS: ICD-10-CM

## 2022-03-19 LAB
BASOPHILS # BLD AUTO: 2 % (ref 0–1.8)
BASOPHILS # BLD: 0.14 K/UL (ref 0–0.12)
CRP SERPL HS-MCNC: 0.9 MG/L (ref 0–3)
EOSINOPHIL # BLD AUTO: 0.18 K/UL (ref 0–0.51)
EOSINOPHIL NFR BLD: 2.6 % (ref 0–6.9)
ERYTHROCYTE [DISTWIDTH] IN BLOOD BY AUTOMATED COUNT: 43.3 FL (ref 35.9–50)
ERYTHROCYTE [SEDIMENTATION RATE] IN BLOOD BY WESTERGREN METHOD: 6 MM/HOUR (ref 0–25)
HCT VFR BLD AUTO: 43.1 % (ref 37–47)
HGB BLD-MCNC: 14.1 G/DL (ref 12–16)
IMM GRANULOCYTES # BLD AUTO: 0.02 K/UL (ref 0–0.11)
IMM GRANULOCYTES NFR BLD AUTO: 0.3 % (ref 0–0.9)
LYMPHOCYTES # BLD AUTO: 2.69 K/UL (ref 1–4.8)
LYMPHOCYTES NFR BLD: 39.2 % (ref 22–41)
MCH RBC QN AUTO: 29.6 PG (ref 27–33)
MCHC RBC AUTO-ENTMCNC: 32.7 G/DL (ref 33.6–35)
MCV RBC AUTO: 90.4 FL (ref 81.4–97.8)
MONOCYTES # BLD AUTO: 0.76 K/UL (ref 0–0.85)
MONOCYTES NFR BLD AUTO: 11.1 % (ref 0–13.4)
NEUTROPHILS # BLD AUTO: 3.07 K/UL (ref 2–7.15)
NEUTROPHILS NFR BLD: 44.8 % (ref 44–72)
NRBC # BLD AUTO: 0 K/UL
NRBC BLD-RTO: 0 /100 WBC
PLATELET # BLD AUTO: 384 K/UL (ref 164–446)
PMV BLD AUTO: 9.4 FL (ref 9–12.9)
RBC # BLD AUTO: 4.77 M/UL (ref 4.2–5.4)
RHEUMATOID FACT SER IA-ACNC: <10 IU/ML (ref 0–14)
WBC # BLD AUTO: 6.9 K/UL (ref 4.8–10.8)

## 2022-03-19 PROCEDURE — 86141 C-REACTIVE PROTEIN HS: CPT

## 2022-03-19 PROCEDURE — 85025 COMPLETE CBC W/AUTO DIFF WBC: CPT

## 2022-03-19 PROCEDURE — 86431 RHEUMATOID FACTOR QUANT: CPT

## 2022-03-19 PROCEDURE — 36415 COLL VENOUS BLD VENIPUNCTURE: CPT

## 2022-03-19 PROCEDURE — 85652 RBC SED RATE AUTOMATED: CPT

## 2022-03-19 PROCEDURE — 86200 CCP ANTIBODY: CPT

## 2022-03-19 PROCEDURE — 86038 ANTINUCLEAR ANTIBODIES: CPT

## 2022-03-19 PROCEDURE — 72141 MRI NECK SPINE W/O DYE: CPT

## 2022-03-21 ENCOUNTER — TELEPHONE (OUTPATIENT)
Dept: MEDICAL GROUP | Facility: CLINIC | Age: 57
End: 2022-03-21
Payer: MEDICAID

## 2022-03-21 ENCOUNTER — OFFICE VISIT (OUTPATIENT)
Dept: MEDICAL GROUP | Facility: CLINIC | Age: 57
End: 2022-03-21
Payer: MEDICAID

## 2022-03-21 VITALS
RESPIRATION RATE: 20 BRPM | OXYGEN SATURATION: 96 % | DIASTOLIC BLOOD PRESSURE: 78 MMHG | SYSTOLIC BLOOD PRESSURE: 118 MMHG | HEIGHT: 70 IN | WEIGHT: 221 LBS | HEART RATE: 95 BPM | TEMPERATURE: 97.9 F | BODY MASS INDEX: 31.64 KG/M2

## 2022-03-21 DIAGNOSIS — M48.02 FORAMINAL STENOSIS OF CERVICAL REGION: ICD-10-CM

## 2022-03-21 DIAGNOSIS — G95.89 MYELOMALACIA (HCC): ICD-10-CM

## 2022-03-21 DIAGNOSIS — M24.28 LIGAMENTUM FLAVUM HYPERTROPHY: ICD-10-CM

## 2022-03-21 DIAGNOSIS — B02.8 HERPES ZOSTER WITH COMPLICATION: ICD-10-CM

## 2022-03-21 DIAGNOSIS — M54.2 NECK PAIN: ICD-10-CM

## 2022-03-21 DIAGNOSIS — M48.00 CENTRAL STENOSIS OF SPINAL CANAL: ICD-10-CM

## 2022-03-21 PROBLEM — B02.9 SHINGLES: Status: ACTIVE | Noted: 2022-03-21

## 2022-03-21 PROCEDURE — 99214 OFFICE O/P EST MOD 30 MIN: CPT | Performed by: PHYSICIAN ASSISTANT

## 2022-03-21 RX ORDER — GABAPENTIN 800 MG/1
800 TABLET ORAL 3 TIMES DAILY
Qty: 90 TABLET | Refills: 0 | Status: SHIPPED | OUTPATIENT
Start: 2022-03-21 | End: 2022-04-29 | Stop reason: SDUPTHER

## 2022-03-21 RX ORDER — ACYCLOVIR 800 MG/1
800 TABLET ORAL
Qty: 35 TABLET | Refills: 0 | Status: SHIPPED | OUTPATIENT
Start: 2022-03-21 | End: 2022-03-28

## 2022-03-21 RX ORDER — ACYCLOVIR 800 MG/1
800 TABLET ORAL
Qty: 35 TABLET | Refills: 0 | Status: SHIPPED | OUTPATIENT
Start: 2022-03-21 | End: 2022-03-21 | Stop reason: SDUPTHER

## 2022-03-21 ASSESSMENT — FIBROSIS 4 INDEX: FIB4 SCORE: 0.79

## 2022-03-21 ASSESSMENT — PATIENT HEALTH QUESTIONNAIRE - PHQ9
CLINICAL INTERPRETATION OF PHQ2 SCORE: 4
5. POOR APPETITE OR OVEREATING: 0 - NOT AT ALL
SUM OF ALL RESPONSES TO PHQ QUESTIONS 1-9: 9

## 2022-03-21 NOTE — PROGRESS NOTES
IMPRESSION:     1.  Posterior disc bulge and ligament flavum thickening at C3-C4 contacts and deforms the ventral and dorsal spinal cord.  2.  Mildly increased T2 signal at this level most consistent with myelomalacia.  3.  Suboptimal evaluation due to motion degraded exam, however there appears to be moderate left and severe right neural foraminal narrowing at this level.  4.  Other multilevel degenerative changes as described level by level above.  5.  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

## 2022-03-21 NOTE — TELEPHONE ENCOUNTER
Phone Number Called: [phone] (home)       Call outcome: Did not leave a detailed message. Requested patient to call back.    Message: lvm for pt to call back

## 2022-03-21 NOTE — TELEPHONE ENCOUNTER
----- Message from Diamond Moreno P.A.-C. sent at 3/21/2022  7:05 AM PDT -----  Patient needs a sooner appointment.  Please let her know she has softening of the spinal cord, disc bulging and narrowing around the spinal cord.  I am sending an at risk referral to neurosurgery so that options can be discussed.

## 2022-03-22 ENCOUNTER — TELEPHONE (OUTPATIENT)
Dept: MEDICAL GROUP | Facility: CLINIC | Age: 57
End: 2022-03-22
Payer: MEDICAID

## 2022-03-22 LAB
CCP IGG SERPL-ACNC: 2 UNITS (ref 0–19)
NUCLEAR IGG SER QL IA: NORMAL

## 2022-03-22 NOTE — PROGRESS NOTES
cc:  Mri results     Subjective:     Bhavana Liu is a 56 y.o. female presenting for MRI results       Patient presents to the office for MRI results. Patient had the MRI on Saturday.  She states that she has had to pull over since having the  MRI and she has fallen asleep for hours in her car. She has been in pain since the MRI.  She declines to go to the ER at this time.       Patient also would like for me to look at her neck today.  She states that she has a blistery type rash that is painful with burning and tingling that started this morning when she woke up.    Review of systems:  See above.   Denies any symptoms unless previously indicated.        Current Outpatient Medications:   •  gabapentin (NEURONTIN) 800 MG tablet, Take 1 Tablet by mouth 3 times a day., Disp: 90 Tablet, Rfl: 0  •  acyclovir (ZOVIRAX) 800 MG Tab, Take 1 Tablet by mouth 5 Times a Day for 7 days., Disp: 35 Tablet, Rfl: 0  •  baclofen (LIORESAL) 20 MG tablet, Take 1 Tablet by mouth 3 times a day., Disp: 90 Tablet, Rfl: 3  •  hydrOXYzine HCl (ATARAX) 25 MG Tab, TAKE ONE TABLET BY MOUTH THREE TIMES A DAY AS NEEDED FOR ANXIETY, Disp: 120 Tablet, Rfl: 1  •  busPIRone (BUSPAR) 10 MG Tab tablet, Take 1 Tablet by mouth 2 times a day., Disp: 90 Tablet, Rfl: 1  •  loratadine (CLARITIN REDITABS) 10 MG dissolvable tablet, Take 1 Tablet by mouth every day., Disp: 30 Tablet, Rfl: 5  •  furosemide (LASIX) 20 MG Tab, Take 1 Tablet by mouth 3 times a day., Disp: 180 Tablet, Rfl: 2  •  ibuprofen (MOTRIN) 800 MG Tab, TAKE 1 TABLET BY MOUTH EVERY 8 HOURS AS NEEDED, Disp: 90 Tablet, Rfl: 2  •  potassium chloride ER (KLOR-CON) 10 MEQ tablet, Take 1 Tablet by mouth 2 times a day., Disp: 180 Tablet, Rfl: 2  •  pantoprazole (PROTONIX) 40 MG Tablet Delayed Response, Take 1 Tablet by mouth every day., Disp: 90 Tablet, Rfl: 2  •  PARoxetine (PAXIL) 30 MG Tab, Take 1 Tablet by mouth every day., Disp: 90 Tablet, Rfl: 2  •  albuterol 108 (90 Base)  "MCG/ACT Aero Soln inhalation aerosol, Inhale 2 Puffs every 6 hours as needed for Shortness of Breath., Disp: 8.5 g, Rfl: 5  •  cyanocobalamin (VITAMIN B-12) 500 MCG Tab, Take 500 mcg by mouth every day., Disp: , Rfl:   •  Garcinia Cambogia-Chromium 500-200 MG-MCG Tab, Take  by mouth., Disp: , Rfl:     Allergies, past medical history, past surgical history, family history, social history reviewed and updated    Objective:     Vitals: /78 (BP Location: Left arm, Patient Position: Sitting, BP Cuff Size: Large adult)   Pulse 95   Temp 36.6 °C (97.9 °F) (Temporal)   Resp 20   Ht 1.765 m (5' 9.5\")   Wt 100 kg (221 lb)   LMP 10/28/2012   SpO2 96%   BMI 32.17 kg/m²   General: Alert, pleasant, NAD  EYES:   PERRL, EOMI, no icterus or pallor.  Conjunctivae and lids normal.   HENT:  Normocephalic.  External ears normal. .  Neck supple.     Respiratory: Normal respiratory effort.    Abdomen: obese  Skin: Warm, dry, blistering rash posterior right neck with 1 cm blister between shoulder and neck.   Musculoskeletal: Gait is normal.  Moves all extremities well.    Extremities: normal range of motion all extremities..   Neurological: No tremors, sensation grossly intact,  gait is normal, CN2-12 intact.  Psych:  Affect/mood is normal, judgement is good, memory is intact, grooming is appropriate.    1.  Posterior disc bulge and ligament flavum thickening at C3-C4 contacts and deforms the ventral and dorsal spinal cord.  2.  Mildly increased T2 signal at this level most consistent with myelomalacia.  3.  Suboptimal evaluation due to motion degraded exam, however there appears to be moderate left and severe right neural foraminal narrowing at this level.  4.  Other multilevel degenerative changes as described level by level above.  5.  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      Assessment/Plan:     Bhavana was seen today for follow-up.    Diagnoses and all orders for this visit:    Ligamentum flavum " hypertrophy  Myelomalacia (HCC)  Central stenosis of spinal canal  Foraminal stenosis of cervical region  Neck pain    An urgent referral was submitted to neurosurgery this morning.  This has been approved and I have provided patient with contact information.  She will call tomorrow to set up an appointment.  Patient declined ER.    Herpes zoster with complication  -     Discontinue: acyclovir (ZOVIRAX) 800 MG Tab; Take 1 Tablet by mouth 5 Times a Day for 7 days.  -     gabapentin (NEURONTIN) 800 MG tablet; Take 1 Tablet by mouth 3 times a day.  -     acyclovir (ZOVIRAX) 800 MG Tab; Take 1 Tablet by mouth 5 Times a Day for 7 days.    Quite a significant shingles outbreak.  We will start patient on acyclovir and follow-up in 1 week for recheck.  ER precautions given.  This chart was reviewed by myself, Luciano Wynn M.D., MSc. No face to face patient contact was done. I do not have any additions to the note by this mid level at this time.        Return in about 1 week (around 3/28/2022), or if symptoms worsen or fail to improve.    Please note that this dictation was created using voice recognition software. I have made every reasonable attempt to correct obvious errors, but expect that there are errors of grammar and possible content that I did not discover before finalizing note.

## 2022-03-28 ENCOUNTER — OFFICE VISIT (OUTPATIENT)
Dept: MEDICAL GROUP | Facility: CLINIC | Age: 57
End: 2022-03-28
Payer: MEDICAID

## 2022-03-28 VITALS
OXYGEN SATURATION: 95 % | HEIGHT: 69 IN | HEART RATE: 95 BPM | WEIGHT: 219.8 LBS | TEMPERATURE: 97.5 F | RESPIRATION RATE: 16 BRPM | SYSTOLIC BLOOD PRESSURE: 118 MMHG | DIASTOLIC BLOOD PRESSURE: 72 MMHG | BODY MASS INDEX: 32.56 KG/M2

## 2022-03-28 DIAGNOSIS — G95.89 MYELOMALACIA (HCC): ICD-10-CM

## 2022-03-28 DIAGNOSIS — M25.511 CHRONIC PAIN OF BOTH SHOULDERS: ICD-10-CM

## 2022-03-28 DIAGNOSIS — I83.893 VARICOSE VEINS OF LEG WITH EDEMA, BILATERAL: ICD-10-CM

## 2022-03-28 DIAGNOSIS — M24.28 LIGAMENTUM FLAVUM HYPERTROPHY: ICD-10-CM

## 2022-03-28 DIAGNOSIS — G89.29 CHRONIC PAIN OF BOTH SHOULDERS: ICD-10-CM

## 2022-03-28 DIAGNOSIS — M48.02 FORAMINAL STENOSIS OF CERVICAL REGION: ICD-10-CM

## 2022-03-28 DIAGNOSIS — M48.00 CENTRAL STENOSIS OF SPINAL CANAL: ICD-10-CM

## 2022-03-28 DIAGNOSIS — L03.316 CELLULITIS OF UMBILICUS: ICD-10-CM

## 2022-03-28 DIAGNOSIS — M25.512 CHRONIC PAIN OF BOTH SHOULDERS: ICD-10-CM

## 2022-03-28 DIAGNOSIS — B37.9 CANDIDA INFECTION: ICD-10-CM

## 2022-03-28 DIAGNOSIS — B02.9 HERPES ZOSTER WITHOUT COMPLICATION: ICD-10-CM

## 2022-03-28 PROBLEM — L03.90 CELLULITIS: Status: ACTIVE | Noted: 2022-03-28

## 2022-03-28 PROCEDURE — 99214 OFFICE O/P EST MOD 30 MIN: CPT | Performed by: PHYSICIAN ASSISTANT

## 2022-03-28 RX ORDER — FLUCONAZOLE 200 MG/1
TABLET ORAL
Qty: 2 TABLET | Refills: 1 | Status: SHIPPED | OUTPATIENT
Start: 2022-03-28 | End: 2022-04-11

## 2022-03-28 RX ORDER — SULFAMETHOXAZOLE AND TRIMETHOPRIM 800; 160 MG/1; MG/1
1 TABLET ORAL 2 TIMES DAILY
Qty: 20 TABLET | Refills: 0 | Status: ON HOLD | OUTPATIENT
Start: 2022-03-28 | End: 2022-04-27

## 2022-03-28 ASSESSMENT — FIBROSIS 4 INDEX: FIB4 SCORE: 0.79

## 2022-03-28 NOTE — PROGRESS NOTES
"cc:  shingles    Subjective:     Bhavana Liu is a 56 y.o. female presenting for shingles      Patient presents to the office for shingles.  She states that her son \"popped\" the blister and she is doing better.   She states that she still has a few days of medication left and will finish the medication completely.    Patient has an rash with an odor under her right breast for 5-6 days.  The rash is also quite painful and she is requesting a medication to help.    Patient continues to have problems with swelling of her lower legs and feet.  She has an appointment coming up with podiatry.  At one point she did try compression stockings which did help with the swelling, but as soon as they came off, the swelling returned.  Medications have not helped with the swelling.  She has had a past EKG but is never had an echo.    Patient recently had an MRI and has a neurosurgery appointment scheduled in 2 days.  MRI showed ligamentum flavum hypertrophy, foraminal stenosis of the cervical spine, myelomalacia, and central canal stenosis.  Patient continues to have symptoms of lightheadedness dizziness, bilateral shoulder pain, neck pain and headaches.  We are currently awaiting neurosurgery's evaluation and recommendations.    Patient states that she had tried to clean her umbilical area with a qtip.  She started to get a foul odor and then had an irritation.  She used tweezers and found bits of qtip in the umbilicus  She states that she has collected 2 specimens.  She is also found that pulling on the Q-tip ends causes the cotton tip to be removed easily.  She is requesting this to be further documented for potential future reference.  She would like to have her umbilicus evaluated at this time.    Review of systems:  See above.   Denies any symptoms unless previously indicated.        Current Outpatient Medications:   •  sulfamethoxazole-trimethoprim (BACTRIM DS) 800-160 MG tablet, Take 1 Tablet by mouth 2 " times a day., Disp: 20 Tablet, Rfl: 0  •  fluconazole (DIFLUCAN) 200 MG Tab, Take one tab weekly for 2 weeks, Disp: 2 Tablet, Rfl: 1  •  gabapentin (NEURONTIN) 800 MG tablet, Take 1 Tablet by mouth 3 times a day., Disp: 90 Tablet, Rfl: 0  •  acyclovir (ZOVIRAX) 800 MG Tab, Take 1 Tablet by mouth 5 Times a Day for 7 days., Disp: 35 Tablet, Rfl: 0  •  baclofen (LIORESAL) 20 MG tablet, Take 1 Tablet by mouth 3 times a day., Disp: 90 Tablet, Rfl: 3  •  hydrOXYzine HCl (ATARAX) 25 MG Tab, TAKE ONE TABLET BY MOUTH THREE TIMES A DAY AS NEEDED FOR ANXIETY, Disp: 120 Tablet, Rfl: 1  •  busPIRone (BUSPAR) 10 MG Tab tablet, Take 1 Tablet by mouth 2 times a day., Disp: 90 Tablet, Rfl: 1  •  loratadine (CLARITIN REDITABS) 10 MG dissolvable tablet, Take 1 Tablet by mouth every day., Disp: 30 Tablet, Rfl: 5  •  furosemide (LASIX) 20 MG Tab, Take 1 Tablet by mouth 3 times a day., Disp: 180 Tablet, Rfl: 2  •  ibuprofen (MOTRIN) 800 MG Tab, TAKE 1 TABLET BY MOUTH EVERY 8 HOURS AS NEEDED, Disp: 90 Tablet, Rfl: 2  •  potassium chloride ER (KLOR-CON) 10 MEQ tablet, Take 1 Tablet by mouth 2 times a day., Disp: 180 Tablet, Rfl: 2  •  pantoprazole (PROTONIX) 40 MG Tablet Delayed Response, Take 1 Tablet by mouth every day., Disp: 90 Tablet, Rfl: 2  •  PARoxetine (PAXIL) 30 MG Tab, Take 1 Tablet by mouth every day., Disp: 90 Tablet, Rfl: 2  •  albuterol 108 (90 Base) MCG/ACT Aero Soln inhalation aerosol, Inhale 2 Puffs every 6 hours as needed for Shortness of Breath., Disp: 8.5 g, Rfl: 5  •  cyanocobalamin (VITAMIN B-12) 500 MCG Tab, Take 500 mcg by mouth every day., Disp: , Rfl:   •  Garcinia Cambogia-Chromium 500-200 MG-MCG Tab, Take  by mouth., Disp: , Rfl:     Allergies, past medical history, past surgical history, family history, social history reviewed and updated    Objective:     Vitals: /72 (BP Location: Left arm, Patient Position: Sitting, BP Cuff Size: Adult)   Pulse 95   Temp 36.4 °C (97.5 °F) (Temporal)   Resp 16   Ht  "1.753 m (5' 9\")   Wt 99.7 kg (219 lb 12.8 oz)   LMP 10/28/2012   SpO2 95%   BMI 32.46 kg/m²   General: Alert, pleasant, NAD  EYES:   PERRL, EOMI, no icterus or pallor.  Conjunctivae and lids normal.   HENT:  Normocephalic.  External ears normal.   Neck supple.  Respiratory: Normal respiratory effort.  Clear to auscultation bilaterally.  Abdomen: obese irritation and dry skin in the umbilicus.  Erythema present with a mild foul odor.  Skin: Warm, dry, no rashes.  Musculoskeletal: Gait is normal.    Neurological: No tremors, sensation grossly intact,  CN2-12 intact.  Psych:  Affect/mood is normal, judgement is good, memory is intact, grooming is appropriate.    Assessment/Plan:     Bhavana was seen today for follow-up.    Diagnoses and all orders for this visit:    Herpes zoster without complication    Almost completely resolved.  Recommend patient finish medication completely.  Follow-up as needed.    Candida infection  -     fluconazole (DIFLUCAN) 200 MG Tab; Take one tab weekly for 2 weeks    Follow-up in 1 week.  Medication use discussed with patient.    Varicose veins of leg with edema, bilateral  -     EC-ECHOCARDIOGRAM COMPLETE W/O CONT; Future    We will obtain an echo to make sure swelling is not cardiac related.  Patient will keep her appointment with podiatry.    Ligamentum flavum hypertrophy  Foraminal stenosis of cervical region  Myelomalacia (HCC)  Chronic pain of both shoulders  Central stenosis of spinal canal    Patient has a neurosurgery appointment in 2 days.  We will follow any recommendations they may have.    Cellulitis of umbilicus  -     sulfamethoxazole-trimethoprim (BACTRIM DS) 800-160 MG tablet; Take 1 Tablet by mouth 2 times a day.    I am not seeing any further debris at this time although it is difficult to thoroughly visualize the area.  We will try patient on Bactrim to clear up infection.        No follow-ups on file.    Please note that this dictation was created using voice " recognition software. I have made every reasonable attempt to correct obvious errors, but expect that there are errors of grammar and possible content that I did not discover before finalizing note.

## 2022-04-04 ENCOUNTER — APPOINTMENT (OUTPATIENT)
Dept: RADIOLOGY | Facility: IMAGING CENTER | Age: 57
End: 2022-04-04
Attending: PHYSICIAN ASSISTANT
Payer: MEDICAID

## 2022-04-04 ENCOUNTER — APPOINTMENT (OUTPATIENT)
Dept: URGENT CARE | Facility: PHYSICIAN GROUP | Age: 57
End: 2022-04-04
Payer: MEDICAID

## 2022-04-04 DIAGNOSIS — M25.512 CHRONIC PAIN OF BOTH SHOULDERS: ICD-10-CM

## 2022-04-04 DIAGNOSIS — G89.29 CHRONIC PAIN OF BOTH SHOULDERS: ICD-10-CM

## 2022-04-04 DIAGNOSIS — M25.511 CHRONIC PAIN OF BOTH SHOULDERS: ICD-10-CM

## 2022-04-04 DIAGNOSIS — M54.2 NECK PAIN: ICD-10-CM

## 2022-04-04 PROCEDURE — 73030 X-RAY EXAM OF SHOULDER: CPT | Mod: TC,FY,LT | Performed by: FAMILY MEDICINE

## 2022-04-04 PROCEDURE — 72040 X-RAY EXAM NECK SPINE 2-3 VW: CPT | Mod: TC,FY | Performed by: FAMILY MEDICINE

## 2022-04-08 ENCOUNTER — TELEPHONE (OUTPATIENT)
Dept: MEDICAL GROUP | Facility: CLINIC | Age: 57
End: 2022-04-08
Payer: MEDICAID

## 2022-04-08 NOTE — TELEPHONE ENCOUNTER
Phone Number Called: [phone] (home)     Call outcome: Spoke to patient regarding message below.    Message: Called pt to get appointment scheduled for surgical clearance. Pt does have appointment on Monday for shingles. Will I need to schedule a different appointment that week?   Please advise.

## 2022-04-11 ENCOUNTER — OFFICE VISIT (OUTPATIENT)
Dept: MEDICAL GROUP | Facility: CLINIC | Age: 57
End: 2022-04-11
Payer: MEDICAID

## 2022-04-11 VITALS
TEMPERATURE: 97.2 F | HEIGHT: 70 IN | DIASTOLIC BLOOD PRESSURE: 78 MMHG | WEIGHT: 216 LBS | BODY MASS INDEX: 30.92 KG/M2 | HEART RATE: 99 BPM | RESPIRATION RATE: 14 BRPM | OXYGEN SATURATION: 96 % | SYSTOLIC BLOOD PRESSURE: 118 MMHG

## 2022-04-11 DIAGNOSIS — G95.89 MYELOMALACIA (HCC): ICD-10-CM

## 2022-04-11 DIAGNOSIS — M48.00 CENTRAL STENOSIS OF SPINAL CANAL: ICD-10-CM

## 2022-04-11 DIAGNOSIS — M54.12 CERVICAL RADICULOPATHY: ICD-10-CM

## 2022-04-11 DIAGNOSIS — Z12.11 COLON CANCER SCREENING: ICD-10-CM

## 2022-04-11 DIAGNOSIS — G62.9 NEUROPATHY: ICD-10-CM

## 2022-04-11 PROCEDURE — 99213 OFFICE O/P EST LOW 20 MIN: CPT | Performed by: PHYSICIAN ASSISTANT

## 2022-04-11 ASSESSMENT — FIBROSIS 4 INDEX: FIB4 SCORE: 0.79

## 2022-04-11 NOTE — PROGRESS NOTES
REASON FOR VISIT: Pre-Op Consultation  Consultation Requested by: Dr. Law Moya  Procedure date and type: Cervical spine fusion posterior approach C3-C4.  Posterior inferior C2 to superior C5 laminectomy   4-    History of condition for which surgery is planned:formanal stenosis cervical region, myelomalacia    Current chronic conditions: No problem-specific Assessment & Plan notes found for this encounter.    Past medical history:  has a past medical history of Abnormal drug screen (12/8/2017), Adjustment disorder with anxious mood, Arrhythmia, Arthritis, Bilateral leg edema, Dyslipidemia, goal LDL below 130, GERD (gastroesophageal reflux disease), Heart burn, Hemorrhagic disorder ([facility]), Menopausal symptoms, Methamphetamine use (HCC) (12/8/2017), and Shingles (3/21/2022).    She has no past medical history of Asthma, CAD (coronary artery disease), Cancer ([facility]), Chronic obstructive pulmonary disease ([facility]), Congestive heart failure ([facility]), Diabetes ([facility]), Hypertension, Liver disease, Patient denies medical problems, Renal disorder, Seizure disorder ([facility]), or Stroke ([facility]).. Negative for: CAD, SBE, CVA, TIA, DVT, PE, bleeding requiring transfusion, intubation.  Surgical and anesthetic history:  has a past surgical history that includes vaginal hysterectomy total (5/19/2021); anterior and posterior repair (5/19/2021); vaginal suspension (5/19/2021); and cystoscopy (5/19/2021). Prior surgery without complication, bleeding, reaction to anesthetic, prolonged recovery  Habits:   Social History     Tobacco Use   • Smoking status: Current Every Day Smoker     Packs/day: 0.50     Years: 25.00     Pack years: 12.50     Types: Cigarettes   • Smokeless tobacco: Never Used   • Tobacco comment: 1/2 pack/ day    Vaping Use   • Vaping Use: Former   • Quit date: 6/2/2020   Substance Use Topics   • Alcohol use: No     Alcohol/week: 0.0 oz     Comment: none at all   • Drug use: Yes     Comment: Meth occ     Allergies: Patient  has no known allergies. No known allergy to Anesthetic, or Latex.     Current medicines:   Current Outpatient Medications   Medication Sig Dispense Refill   • sulfamethoxazole-trimethoprim (BACTRIM DS) 800-160 MG tablet Take 1 Tablet by mouth 2 times a day. 20 Tablet 0   • gabapentin (NEURONTIN) 800 MG tablet Take 1 Tablet by mouth 3 times a day. 90 Tablet 0   • baclofen (LIORESAL) 20 MG tablet Take 1 Tablet by mouth 3 times a day. 90 Tablet 3   • hydrOXYzine HCl (ATARAX) 25 MG Tab TAKE ONE TABLET BY MOUTH THREE TIMES A DAY AS NEEDED FOR ANXIETY 120 Tablet 1   • busPIRone (BUSPAR) 10 MG Tab tablet Take 1 Tablet by mouth 2 times a day. 90 Tablet 1   • loratadine (CLARITIN REDITABS) 10 MG dissolvable tablet Take 1 Tablet by mouth every day. 30 Tablet 5   • furosemide (LASIX) 20 MG Tab Take 1 Tablet by mouth 3 times a day. 180 Tablet 2   • potassium chloride ER (KLOR-CON) 10 MEQ tablet Take 1 Tablet by mouth 2 times a day. 180 Tablet 2   • pantoprazole (PROTONIX) 40 MG Tablet Delayed Response Take 1 Tablet by mouth every day. 90 Tablet 2   • PARoxetine (PAXIL) 30 MG Tab Take 1 Tablet by mouth every day. 90 Tablet 2   • albuterol 108 (90 Base) MCG/ACT Aero Soln inhalation aerosol Inhale 2 Puffs every 6 hours as needed for Shortness of Breath. 8.5 g 5   • cyanocobalamin (VITAMIN B-12) 500 MCG Tab Take 500 mcg by mouth every day.     • Garcinia Cambogia-Chromium 500-200 MG-MCG Tab Take  by mouth.     • fluconazole (DIFLUCAN) 200 MG Tab Take one tab weekly for 2 weeks (Patient not taking: Reported on 4/11/2022) 2 Tablet 1   • ibuprofen (MOTRIN) 800 MG Tab TAKE 1 TABLET BY MOUTH EVERY 8 HOURS AS NEEDED (Patient not taking: Reported on 4/11/2022) 90 Tablet 2     No current facility-administered medications for this visit.     Anticoagulant:  NSAIDs patient has stopped   Herbals: none           ROS: denies any other symptoms unless indicated.  No fever, chills, nausea.  She does have numbness in arms.  Neck pain.   "  Functional Status:using a cane.    PHYSICAL EXAMINATION:  VITAL SIGNS: Pulse 99   Temp 36.2 °C (97.2 °F) (Temporal)   Resp 14   Ht 1.765 m (5' 9.5\")   Wt 98 kg (216 lb)   SpO2 96%  Body mass index is 31.44 kg/m².  HEENT: EOMI, PERRL. Oropharynx pink, moist. Normal airway. Neck supple, no cervical lymphadenopathy.  LUNGS: CTAB good excursion.   HEART: RRR no murmur.  2+ radial pulses.  ABDOMEN: soft, nondistended, nontender, normal BS. No HSM.  LOWER EXTREMITIES: warm and well perfused with no edema, no cyanosis.      Labs: patient scheduled to have labs, cxr and ekg:    IMPRESSION:  1. Central stenosis of spinal canal  2. Myelomalacia  3. Cervical radiculopathy  4. Neuropathy  5. Colon cancer screen    PLAN:  1. Chronic medical conditions: Stable and controlled. Continue current medicines.   2. Avoid drugs that potentiate bleeding as advised by surgeon  3. Discontinue all herbal supplements 2 weeks prior to surgery.  4. Need for SBE prophylaxis: No  5. Patient requested cologuard for colonoscopy screen  6. This patient is considered: Low risk for cardiopulmonary complications for this planned surgery.      "

## 2022-04-15 ENCOUNTER — NON-PROVIDER VISIT (OUTPATIENT)
Dept: URGENT CARE | Facility: PHYSICIAN GROUP | Age: 57
End: 2022-04-15
Payer: MEDICAID

## 2022-04-15 ENCOUNTER — APPOINTMENT (OUTPATIENT)
Dept: RADIOLOGY | Facility: IMAGING CENTER | Age: 57
End: 2022-04-15
Attending: PHYSICIAN ASSISTANT
Payer: MEDICAID

## 2022-04-15 DIAGNOSIS — M54.12 CERVICAL RADICULOPATHY: ICD-10-CM

## 2022-04-15 DIAGNOSIS — M48.00 CENTRAL STENOSIS OF SPINAL CANAL: ICD-10-CM

## 2022-04-15 DIAGNOSIS — G95.89 MYELOMALACIA (HCC): ICD-10-CM

## 2022-04-15 DIAGNOSIS — G62.9 NEUROPATHY: ICD-10-CM

## 2022-04-15 PROCEDURE — 72050 X-RAY EXAM NECK SPINE 4/5VWS: CPT | Mod: TC,FY | Performed by: PHYSICIAN ASSISTANT

## 2022-04-18 ENCOUNTER — PRE-ADMISSION TESTING (OUTPATIENT)
Dept: ADMISSIONS | Facility: MEDICAL CENTER | Age: 57
DRG: 472 | End: 2022-04-18
Attending: NEUROLOGICAL SURGERY
Payer: MEDICAID

## 2022-04-18 ENCOUNTER — APPOINTMENT (OUTPATIENT)
Dept: RADIOLOGY | Facility: MEDICAL CENTER | Age: 57
End: 2022-04-18
Attending: PHYSICIAN ASSISTANT
Payer: MEDICAID

## 2022-04-18 ENCOUNTER — HOSPITAL ENCOUNTER (OUTPATIENT)
Dept: RADIOLOGY | Facility: MEDICAL CENTER | Age: 57
DRG: 472 | End: 2022-04-18
Attending: NEUROLOGICAL SURGERY | Admitting: NEUROLOGICAL SURGERY
Payer: MEDICAID

## 2022-04-18 DIAGNOSIS — Z01.810 PRE-OPERATIVE CARDIOVASCULAR EXAMINATION: ICD-10-CM

## 2022-04-18 DIAGNOSIS — Z01.812 PRE-OPERATIVE LABORATORY EXAMINATION: ICD-10-CM

## 2022-04-18 DIAGNOSIS — Z01.811 PRE-OPERATIVE RESPIRATORY EXAMINATION: ICD-10-CM

## 2022-04-18 LAB
ANION GAP SERPL CALC-SCNC: 11 MMOL/L (ref 7–16)
APTT PPP: 27.5 SEC (ref 24.7–36)
BASOPHILS # BLD AUTO: 1.4 % (ref 0–1.8)
BASOPHILS # BLD: 0.1 K/UL (ref 0–0.12)
BUN SERPL-MCNC: 19 MG/DL (ref 8–22)
CALCIUM SERPL-MCNC: 9.2 MG/DL (ref 8.5–10.5)
CHLORIDE SERPL-SCNC: 103 MMOL/L (ref 96–112)
CO2 SERPL-SCNC: 27 MMOL/L (ref 20–33)
CREAT SERPL-MCNC: 0.79 MG/DL (ref 0.5–1.4)
EKG IMPRESSION: NORMAL
EOSINOPHIL # BLD AUTO: 0.14 K/UL (ref 0–0.51)
EOSINOPHIL NFR BLD: 1.9 % (ref 0–6.9)
ERYTHROCYTE [DISTWIDTH] IN BLOOD BY AUTOMATED COUNT: 44.1 FL (ref 35.9–50)
GFR SERPLBLD CREATININE-BSD FMLA CKD-EPI: 87 ML/MIN/1.73 M 2
GLUCOSE SERPL-MCNC: 110 MG/DL (ref 65–99)
HCT VFR BLD AUTO: 41.3 % (ref 37–47)
HGB BLD-MCNC: 13.9 G/DL (ref 12–16)
IMM GRANULOCYTES # BLD AUTO: 0.02 K/UL (ref 0–0.11)
IMM GRANULOCYTES NFR BLD AUTO: 0.3 % (ref 0–0.9)
INR PPP: 0.99 (ref 0.87–1.13)
LYMPHOCYTES # BLD AUTO: 2.56 K/UL (ref 1–4.8)
LYMPHOCYTES NFR BLD: 35.2 % (ref 22–41)
MCH RBC QN AUTO: 29.8 PG (ref 27–33)
MCHC RBC AUTO-ENTMCNC: 33.7 G/DL (ref 33.6–35)
MCV RBC AUTO: 88.4 FL (ref 81.4–97.8)
MONOCYTES # BLD AUTO: 0.89 K/UL (ref 0–0.85)
MONOCYTES NFR BLD AUTO: 12.2 % (ref 0–13.4)
NEUTROPHILS # BLD AUTO: 3.56 K/UL (ref 2–7.15)
NEUTROPHILS NFR BLD: 49 % (ref 44–72)
NRBC # BLD AUTO: 0 K/UL
NRBC BLD-RTO: 0 /100 WBC
PLATELET # BLD AUTO: 362 K/UL (ref 164–446)
PMV BLD AUTO: 9.3 FL (ref 9–12.9)
POTASSIUM SERPL-SCNC: 4 MMOL/L (ref 3.6–5.5)
PROTHROMBIN TIME: 12.8 SEC (ref 12–14.6)
RBC # BLD AUTO: 4.67 M/UL (ref 4.2–5.4)
SODIUM SERPL-SCNC: 141 MMOL/L (ref 135–145)
WBC # BLD AUTO: 7.3 K/UL (ref 4.8–10.8)

## 2022-04-18 PROCEDURE — 80048 BASIC METABOLIC PNL TOTAL CA: CPT

## 2022-04-18 PROCEDURE — 85730 THROMBOPLASTIN TIME PARTIAL: CPT

## 2022-04-18 PROCEDURE — 36415 COLL VENOUS BLD VENIPUNCTURE: CPT

## 2022-04-18 PROCEDURE — 85025 COMPLETE CBC W/AUTO DIFF WBC: CPT

## 2022-04-18 PROCEDURE — 71046 X-RAY EXAM CHEST 2 VIEWS: CPT

## 2022-04-18 PROCEDURE — 93005 ELECTROCARDIOGRAM TRACING: CPT

## 2022-04-18 PROCEDURE — 93010 ELECTROCARDIOGRAM REPORT: CPT | Performed by: INTERNAL MEDICINE

## 2022-04-18 PROCEDURE — 85610 PROTHROMBIN TIME: CPT

## 2022-04-18 RX ORDER — DOCUSATE SODIUM 100 MG/1
100 CAPSULE, LIQUID FILLED ORAL 2 TIMES DAILY
COMMUNITY
End: 2022-11-17 | Stop reason: SDUPTHER

## 2022-04-18 RX ORDER — ACETAMINOPHEN 500 MG
1500 TABLET ORAL 2 TIMES DAILY
Status: ON HOLD | COMMUNITY
End: 2022-04-27

## 2022-04-18 ASSESSMENT — FIBROSIS 4 INDEX: FIB4 SCORE: 0.79

## 2022-04-25 ENCOUNTER — APPOINTMENT (OUTPATIENT)
Dept: RADIOLOGY | Facility: MEDICAL CENTER | Age: 57
DRG: 472 | End: 2022-04-25
Attending: NEUROLOGICAL SURGERY
Payer: MEDICAID

## 2022-04-25 ENCOUNTER — ANESTHESIA (OUTPATIENT)
Dept: SURGERY | Facility: MEDICAL CENTER | Age: 57
DRG: 472 | End: 2022-04-25
Payer: MEDICAID

## 2022-04-25 ENCOUNTER — ANESTHESIA EVENT (OUTPATIENT)
Dept: SURGERY | Facility: MEDICAL CENTER | Age: 57
DRG: 472 | End: 2022-04-25
Payer: MEDICAID

## 2022-04-25 ENCOUNTER — HOSPITAL ENCOUNTER (INPATIENT)
Facility: MEDICAL CENTER | Age: 57
LOS: 2 days | DRG: 472 | End: 2022-04-27
Attending: NEUROLOGICAL SURGERY | Admitting: NEUROLOGICAL SURGERY
Payer: MEDICAID

## 2022-04-25 DIAGNOSIS — M54.12 CERVICAL RADICULOPATHY: ICD-10-CM

## 2022-04-25 DIAGNOSIS — M48.02 CERVICAL STENOSIS OF SPINAL CANAL: ICD-10-CM

## 2022-04-25 PROCEDURE — G0378 HOSPITAL OBSERVATION PER HR: HCPCS

## 2022-04-25 PROCEDURE — 700111 HCHG RX REV CODE 636 W/ 250 OVERRIDE (IP): Performed by: NURSE PRACTITIONER

## 2022-04-25 PROCEDURE — A9270 NON-COVERED ITEM OR SERVICE: HCPCS | Performed by: NURSE PRACTITIONER

## 2022-04-25 PROCEDURE — 72020 X-RAY EXAM OF SPINE 1 VIEW: CPT

## 2022-04-25 PROCEDURE — 95864 NEEDLE EMG 4 EXTREMITIES: CPT | Performed by: NEUROLOGICAL SURGERY

## 2022-04-25 PROCEDURE — 770001 HCHG ROOM/CARE - MED/SURG/GYN PRIV*

## 2022-04-25 PROCEDURE — 500367 HCHG DRAIN KIT, HEMOVAC: Performed by: NEUROLOGICAL SURGERY

## 2022-04-25 PROCEDURE — 0RG1071 FUSION OF CERVICAL VERTEBRAL JOINT WITH AUTOLOGOUS TISSUE SUBSTITUTE, POSTERIOR APPROACH, POSTERIOR COLUMN, OPEN APPROACH: ICD-10-PCS | Performed by: NEUROLOGICAL SURGERY

## 2022-04-25 PROCEDURE — 95938 SOMATOSENSORY TESTING: CPT | Performed by: NEUROLOGICAL SURGERY

## 2022-04-25 PROCEDURE — 160041 HCHG SURGERY MINUTES - EA ADDL 1 MIN LEVEL 4: Performed by: NEUROLOGICAL SURGERY

## 2022-04-25 PROCEDURE — 160035 HCHG PACU - 1ST 60 MINS PHASE I: Performed by: NEUROLOGICAL SURGERY

## 2022-04-25 PROCEDURE — 700111 HCHG RX REV CODE 636 W/ 250 OVERRIDE (IP): Performed by: ANESTHESIOLOGY

## 2022-04-25 PROCEDURE — A9270 NON-COVERED ITEM OR SERVICE: HCPCS | Performed by: ANESTHESIOLOGY

## 2022-04-25 PROCEDURE — 700102 HCHG RX REV CODE 250 W/ 637 OVERRIDE(OP): Performed by: ANESTHESIOLOGY

## 2022-04-25 PROCEDURE — 500075 HCHG BLADE, CLIPPER NEURO: Performed by: NEUROLOGICAL SURGERY

## 2022-04-25 PROCEDURE — 160009 HCHG ANES TIME/MIN: Performed by: NEUROLOGICAL SURGERY

## 2022-04-25 PROCEDURE — 501838 HCHG SUTURE GENERAL: Performed by: NEUROLOGICAL SURGERY

## 2022-04-25 PROCEDURE — 700101 HCHG RX REV CODE 250: Performed by: NURSE PRACTITIONER

## 2022-04-25 PROCEDURE — 4A11X4G MONITORING OF PERIPHERAL NERVOUS ELECTRICAL ACTIVITY, INTRAOPERATIVE, EXTERNAL APPROACH: ICD-10-PCS | Performed by: NEUROLOGICAL SURGERY

## 2022-04-25 PROCEDURE — 700105 HCHG RX REV CODE 258: Performed by: NEUROLOGICAL SURGERY

## 2022-04-25 PROCEDURE — 00670 ANES XTNSV SP&SPI CORD PX: CPT | Performed by: ANESTHESIOLOGY

## 2022-04-25 PROCEDURE — C1713 ANCHOR/SCREW BN/BN,TIS/BN: HCPCS | Performed by: NEUROLOGICAL SURGERY

## 2022-04-25 PROCEDURE — 160036 HCHG PACU - EA ADDL 30 MINS PHASE I: Performed by: NEUROLOGICAL SURGERY

## 2022-04-25 PROCEDURE — 502000 HCHG MISC OR IMPLANTS RC 0278: Performed by: NEUROLOGICAL SURGERY

## 2022-04-25 PROCEDURE — 160029 HCHG SURGERY MINUTES - 1ST 30 MINS LEVEL 4: Performed by: NEUROLOGICAL SURGERY

## 2022-04-25 PROCEDURE — 700102 HCHG RX REV CODE 250 W/ 637 OVERRIDE(OP): Performed by: NURSE PRACTITIONER

## 2022-04-25 PROCEDURE — 95870 NDL EMG LMTD STD MUSC 1 XTR: CPT | Performed by: NEUROLOGICAL SURGERY

## 2022-04-25 PROCEDURE — 700101 HCHG RX REV CODE 250: Performed by: ANESTHESIOLOGY

## 2022-04-25 PROCEDURE — 00NW0ZZ RELEASE CERVICAL SPINAL CORD, OPEN APPROACH: ICD-10-PCS | Performed by: NEUROLOGICAL SURGERY

## 2022-04-25 PROCEDURE — 95940 IONM IN OPERATNG ROOM 15 MIN: CPT | Performed by: NEUROLOGICAL SURGERY

## 2022-04-25 PROCEDURE — 700111 HCHG RX REV CODE 636 W/ 250 OVERRIDE (IP): Performed by: NEUROLOGICAL SURGERY

## 2022-04-25 PROCEDURE — 95937 NEUROMUSCULAR JUNCTION TEST: CPT | Performed by: NEUROLOGICAL SURGERY

## 2022-04-25 PROCEDURE — 110454 HCHG SHELL REV 250: Performed by: NEUROLOGICAL SURGERY

## 2022-04-25 PROCEDURE — 160002 HCHG RECOVERY MINUTES (STAT): Performed by: NEUROLOGICAL SURGERY

## 2022-04-25 PROCEDURE — 01N10ZZ RELEASE CERVICAL NERVE, OPEN APPROACH: ICD-10-PCS | Performed by: NEUROLOGICAL SURGERY

## 2022-04-25 PROCEDURE — 160048 HCHG OR STATISTICAL LEVEL 1-5: Performed by: NEUROLOGICAL SURGERY

## 2022-04-25 PROCEDURE — 700101 HCHG RX REV CODE 250: Performed by: NEUROLOGICAL SURGERY

## 2022-04-25 PROCEDURE — 95939 C MOTOR EVOKED UPR&LWR LIMBS: CPT | Performed by: NEUROLOGICAL SURGERY

## 2022-04-25 DEVICE — IMPLANTABLE DEVICE: Type: IMPLANTABLE DEVICE | Site: SPINE CERVICAL | Status: FUNCTIONAL

## 2022-04-25 RX ORDER — BUSPIRONE HYDROCHLORIDE 10 MG/1
10 TABLET ORAL EVERY EVENING
Status: DISCONTINUED | OUTPATIENT
Start: 2022-04-25 | End: 2022-04-27 | Stop reason: HOSPADM

## 2022-04-25 RX ORDER — BISACODYL 10 MG
10 SUPPOSITORY, RECTAL RECTAL
Status: DISCONTINUED | OUTPATIENT
Start: 2022-04-25 | End: 2022-04-27 | Stop reason: HOSPADM

## 2022-04-25 RX ORDER — PROCHLORPERAZINE EDISYLATE 5 MG/ML
5-10 INJECTION INTRAMUSCULAR; INTRAVENOUS EVERY 4 HOURS PRN
Status: DISCONTINUED | OUTPATIENT
Start: 2022-04-25 | End: 2022-04-27 | Stop reason: HOSPADM

## 2022-04-25 RX ORDER — PROMETHAZINE HYDROCHLORIDE 25 MG/1
12.5-25 SUPPOSITORY RECTAL EVERY 4 HOURS PRN
Status: DISCONTINUED | OUTPATIENT
Start: 2022-04-25 | End: 2022-04-27 | Stop reason: HOSPADM

## 2022-04-25 RX ORDER — ONDANSETRON 2 MG/ML
INJECTION INTRAMUSCULAR; INTRAVENOUS PRN
Status: DISCONTINUED | OUTPATIENT
Start: 2022-04-25 | End: 2022-04-25 | Stop reason: SURG

## 2022-04-25 RX ORDER — OXYCODONE HCL 5 MG/5 ML
5 SOLUTION, ORAL ORAL
Status: COMPLETED | OUTPATIENT
Start: 2022-04-25 | End: 2022-04-25

## 2022-04-25 RX ORDER — HYDROMORPHONE HYDROCHLORIDE 1 MG/ML
0.2 INJECTION, SOLUTION INTRAMUSCULAR; INTRAVENOUS; SUBCUTANEOUS
Status: DISCONTINUED | OUTPATIENT
Start: 2022-04-25 | End: 2022-04-25 | Stop reason: HOSPADM

## 2022-04-25 RX ORDER — DOCUSATE SODIUM 100 MG/1
100 CAPSULE, LIQUID FILLED ORAL 2 TIMES DAILY
Status: DISCONTINUED | OUTPATIENT
Start: 2022-04-25 | End: 2022-04-27 | Stop reason: HOSPADM

## 2022-04-25 RX ORDER — PROMETHAZINE HYDROCHLORIDE 25 MG/1
12.5-25 TABLET ORAL EVERY 4 HOURS PRN
Status: DISCONTINUED | OUTPATIENT
Start: 2022-04-25 | End: 2022-04-27 | Stop reason: HOSPADM

## 2022-04-25 RX ORDER — KETAMINE HYDROCHLORIDE 50 MG/ML
INJECTION, SOLUTION INTRAMUSCULAR; INTRAVENOUS PRN
Status: DISCONTINUED | OUTPATIENT
Start: 2022-04-25 | End: 2022-04-25 | Stop reason: SURG

## 2022-04-25 RX ORDER — HYDRALAZINE HYDROCHLORIDE 20 MG/ML
10 INJECTION INTRAMUSCULAR; INTRAVENOUS
Status: DISCONTINUED | OUTPATIENT
Start: 2022-04-25 | End: 2022-04-27 | Stop reason: HOSPADM

## 2022-04-25 RX ORDER — MIDAZOLAM HYDROCHLORIDE 1 MG/ML
INJECTION INTRAMUSCULAR; INTRAVENOUS PRN
Status: DISCONTINUED | OUTPATIENT
Start: 2022-04-25 | End: 2022-04-25 | Stop reason: SURG

## 2022-04-25 RX ORDER — ONDANSETRON 2 MG/ML
4 INJECTION INTRAMUSCULAR; INTRAVENOUS EVERY 4 HOURS PRN
Status: DISCONTINUED | OUTPATIENT
Start: 2022-04-25 | End: 2022-04-27 | Stop reason: HOSPADM

## 2022-04-25 RX ORDER — CEFAZOLIN SODIUM 1 G/3ML
INJECTION, POWDER, FOR SOLUTION INTRAMUSCULAR; INTRAVENOUS PRN
Status: DISCONTINUED | OUTPATIENT
Start: 2022-04-25 | End: 2022-04-25 | Stop reason: SURG

## 2022-04-25 RX ORDER — HALOPERIDOL 5 MG/ML
1 INJECTION INTRAMUSCULAR
Status: DISCONTINUED | OUTPATIENT
Start: 2022-04-25 | End: 2022-04-25 | Stop reason: HOSPADM

## 2022-04-25 RX ORDER — PAROXETINE HYDROCHLORIDE 20 MG/1
30 TABLET, FILM COATED ORAL
Status: DISCONTINUED | OUTPATIENT
Start: 2022-04-25 | End: 2022-04-27 | Stop reason: HOSPADM

## 2022-04-25 RX ORDER — DEXAMETHASONE SODIUM PHOSPHATE 4 MG/ML
INJECTION, SOLUTION INTRA-ARTICULAR; INTRALESIONAL; INTRAMUSCULAR; INTRAVENOUS; SOFT TISSUE PRN
Status: DISCONTINUED | OUTPATIENT
Start: 2022-04-25 | End: 2022-04-25 | Stop reason: SURG

## 2022-04-25 RX ORDER — OXYCODONE HCL 5 MG/5 ML
10 SOLUTION, ORAL ORAL
Status: COMPLETED | OUTPATIENT
Start: 2022-04-25 | End: 2022-04-25

## 2022-04-25 RX ORDER — VASOPRESSIN 20 U/ML
INJECTION PARENTERAL PRN
Status: DISCONTINUED | OUTPATIENT
Start: 2022-04-25 | End: 2022-04-25 | Stop reason: SURG

## 2022-04-25 RX ORDER — DIAZEPAM 5 MG/1
5 TABLET ORAL EVERY 4 HOURS PRN
Status: DISCONTINUED | OUTPATIENT
Start: 2022-04-25 | End: 2022-04-27 | Stop reason: HOSPADM

## 2022-04-25 RX ORDER — POLYETHYLENE GLYCOL 3350 17 G/17G
1 POWDER, FOR SOLUTION ORAL 2 TIMES DAILY PRN
Status: DISCONTINUED | OUTPATIENT
Start: 2022-04-25 | End: 2022-04-27 | Stop reason: HOSPADM

## 2022-04-25 RX ORDER — PHENYLEPHRINE HCL IN 0.9% NACL 0.5 MG/5ML
SYRINGE (ML) INTRAVENOUS PRN
Status: DISCONTINUED | OUTPATIENT
Start: 2022-04-25 | End: 2022-04-25 | Stop reason: SURG

## 2022-04-25 RX ORDER — ALBUTEROL SULFATE 90 UG/1
2 AEROSOL, METERED RESPIRATORY (INHALATION) EVERY 6 HOURS PRN
Status: DISCONTINUED | OUTPATIENT
Start: 2022-04-25 | End: 2022-04-26

## 2022-04-25 RX ORDER — ONDANSETRON 2 MG/ML
4 INJECTION INTRAMUSCULAR; INTRAVENOUS
Status: COMPLETED | OUTPATIENT
Start: 2022-04-25 | End: 2022-04-25

## 2022-04-25 RX ORDER — ONDANSETRON 4 MG/1
4 TABLET, ORALLY DISINTEGRATING ORAL EVERY 4 HOURS PRN
Status: DISCONTINUED | OUTPATIENT
Start: 2022-04-25 | End: 2022-04-27 | Stop reason: HOSPADM

## 2022-04-25 RX ORDER — GABAPENTIN 400 MG/1
800 CAPSULE ORAL 3 TIMES DAILY
Status: DISCONTINUED | OUTPATIENT
Start: 2022-04-25 | End: 2022-04-27 | Stop reason: HOSPADM

## 2022-04-25 RX ORDER — AMOXICILLIN 250 MG
1 CAPSULE ORAL NIGHTLY
Status: DISCONTINUED | OUTPATIENT
Start: 2022-04-25 | End: 2022-04-27 | Stop reason: HOSPADM

## 2022-04-25 RX ORDER — CEFAZOLIN SODIUM 1 G/3ML
INJECTION, POWDER, FOR SOLUTION INTRAMUSCULAR; INTRAVENOUS
Status: DISCONTINUED | OUTPATIENT
Start: 2022-04-25 | End: 2022-04-25 | Stop reason: HOSPADM

## 2022-04-25 RX ORDER — HYDROMORPHONE HYDROCHLORIDE 1 MG/ML
0.4 INJECTION, SOLUTION INTRAMUSCULAR; INTRAVENOUS; SUBCUTANEOUS
Status: DISCONTINUED | OUTPATIENT
Start: 2022-04-25 | End: 2022-04-25 | Stop reason: HOSPADM

## 2022-04-25 RX ORDER — SODIUM CHLORIDE AND POTASSIUM CHLORIDE 150; 900 MG/100ML; MG/100ML
INJECTION, SOLUTION INTRAVENOUS CONTINUOUS
Status: DISCONTINUED | OUTPATIENT
Start: 2022-04-25 | End: 2022-04-26

## 2022-04-25 RX ORDER — PANTOPRAZOLE SODIUM 40 MG/1
40 TABLET, DELAYED RELEASE ORAL DAILY
Status: DISCONTINUED | OUTPATIENT
Start: 2022-04-25 | End: 2022-04-25

## 2022-04-25 RX ORDER — HYDROMORPHONE HYDROCHLORIDE 1 MG/ML
0.1 INJECTION, SOLUTION INTRAMUSCULAR; INTRAVENOUS; SUBCUTANEOUS
Status: DISCONTINUED | OUTPATIENT
Start: 2022-04-25 | End: 2022-04-25 | Stop reason: HOSPADM

## 2022-04-25 RX ORDER — FUROSEMIDE 40 MG/1
40 TABLET ORAL DAILY
Status: DISCONTINUED | OUTPATIENT
Start: 2022-04-26 | End: 2022-04-27 | Stop reason: HOSPADM

## 2022-04-25 RX ORDER — BUPIVACAINE HYDROCHLORIDE AND EPINEPHRINE 5; 5 MG/ML; UG/ML
INJECTION, SOLUTION EPIDURAL; INTRACAUDAL; PERINEURAL
Status: DISCONTINUED | OUTPATIENT
Start: 2022-04-25 | End: 2022-04-25 | Stop reason: HOSPADM

## 2022-04-25 RX ORDER — LABETALOL HYDROCHLORIDE 5 MG/ML
10 INJECTION, SOLUTION INTRAVENOUS
Status: DISCONTINUED | OUTPATIENT
Start: 2022-04-25 | End: 2022-04-27 | Stop reason: HOSPADM

## 2022-04-25 RX ORDER — CYCLOBENZAPRINE HCL 10 MG
10 TABLET ORAL EVERY 8 HOURS PRN
Status: DISCONTINUED | OUTPATIENT
Start: 2022-04-25 | End: 2022-04-27 | Stop reason: HOSPADM

## 2022-04-25 RX ORDER — LORATADINE 10 MG/1
10 TABLET ORAL EVERY EVENING
Status: DISCONTINUED | OUTPATIENT
Start: 2022-04-25 | End: 2022-04-27 | Stop reason: HOSPADM

## 2022-04-25 RX ORDER — SODIUM CHLORIDE, SODIUM LACTATE, POTASSIUM CHLORIDE, CALCIUM CHLORIDE 600; 310; 30; 20 MG/100ML; MG/100ML; MG/100ML; MG/100ML
INJECTION, SOLUTION INTRAVENOUS CONTINUOUS
Status: DISCONTINUED | OUTPATIENT
Start: 2022-04-25 | End: 2022-04-25 | Stop reason: HOSPADM

## 2022-04-25 RX ORDER — DIPHENHYDRAMINE HYDROCHLORIDE 50 MG/ML
12.5 INJECTION INTRAMUSCULAR; INTRAVENOUS
Status: DISCONTINUED | OUTPATIENT
Start: 2022-04-25 | End: 2022-04-25 | Stop reason: HOSPADM

## 2022-04-25 RX ORDER — HYDROXYZINE 50 MG/1
25 TABLET, FILM COATED ORAL EVERY 8 HOURS PRN
Status: DISCONTINUED | OUTPATIENT
Start: 2022-04-25 | End: 2022-04-27 | Stop reason: HOSPADM

## 2022-04-25 RX ORDER — METHOCARBAMOL 750 MG/1
750 TABLET, FILM COATED ORAL EVERY 8 HOURS PRN
Status: DISCONTINUED | OUTPATIENT
Start: 2022-04-25 | End: 2022-04-27 | Stop reason: HOSPADM

## 2022-04-25 RX ORDER — BACLOFEN 10 MG/1
20 TABLET ORAL 3 TIMES DAILY
Status: DISCONTINUED | OUTPATIENT
Start: 2022-04-25 | End: 2022-04-27 | Stop reason: HOSPADM

## 2022-04-25 RX ORDER — ROCURONIUM BROMIDE 10 MG/ML
INJECTION, SOLUTION INTRAVENOUS PRN
Status: DISCONTINUED | OUTPATIENT
Start: 2022-04-25 | End: 2022-04-25 | Stop reason: SURG

## 2022-04-25 RX ORDER — ACETAMINOPHEN 500 MG
1000 TABLET ORAL ONCE
Status: COMPLETED | OUTPATIENT
Start: 2022-04-25 | End: 2022-04-25

## 2022-04-25 RX ORDER — HYDROMORPHONE HYDROCHLORIDE 2 MG/ML
INJECTION, SOLUTION INTRAMUSCULAR; INTRAVENOUS; SUBCUTANEOUS PRN
Status: DISCONTINUED | OUTPATIENT
Start: 2022-04-25 | End: 2022-04-25 | Stop reason: SURG

## 2022-04-25 RX ORDER — AMOXICILLIN 250 MG
1 CAPSULE ORAL
Status: DISCONTINUED | OUTPATIENT
Start: 2022-04-25 | End: 2022-04-27 | Stop reason: HOSPADM

## 2022-04-25 RX ORDER — SODIUM CHLORIDE, SODIUM LACTATE, POTASSIUM CHLORIDE, CALCIUM CHLORIDE 600; 310; 30; 20 MG/100ML; MG/100ML; MG/100ML; MG/100ML
INJECTION, SOLUTION INTRAVENOUS CONTINUOUS
Status: ACTIVE | OUTPATIENT
Start: 2022-04-25 | End: 2022-04-25

## 2022-04-25 RX ORDER — CEFAZOLIN SODIUM 2 G/100ML
2 INJECTION, SOLUTION INTRAVENOUS EVERY 8 HOURS
Status: COMPLETED | OUTPATIENT
Start: 2022-04-25 | End: 2022-04-26

## 2022-04-25 RX ORDER — FUROSEMIDE 20 MG/1
20 TABLET ORAL EVERY EVENING
Status: DISCONTINUED | OUTPATIENT
Start: 2022-04-25 | End: 2022-04-27 | Stop reason: HOSPADM

## 2022-04-25 RX ORDER — ENEMA 19; 7 G/133ML; G/133ML
1 ENEMA RECTAL
Status: DISCONTINUED | OUTPATIENT
Start: 2022-04-25 | End: 2022-04-27 | Stop reason: HOSPADM

## 2022-04-25 RX ORDER — POTASSIUM CHLORIDE 750 MG/1
10 TABLET, FILM COATED, EXTENDED RELEASE ORAL 2 TIMES DAILY
Status: DISCONTINUED | OUTPATIENT
Start: 2022-04-25 | End: 2022-04-27 | Stop reason: HOSPADM

## 2022-04-25 RX ORDER — OMEPRAZOLE 20 MG/1
20 CAPSULE, DELAYED RELEASE ORAL DAILY
Status: DISCONTINUED | OUTPATIENT
Start: 2022-04-26 | End: 2022-04-27 | Stop reason: HOSPADM

## 2022-04-25 RX ADMIN — ONDANSETRON 4 MG: 2 INJECTION INTRAMUSCULAR; INTRAVENOUS at 15:14

## 2022-04-25 RX ADMIN — VASOPRESSIN 5 UNITS: 20 INJECTION PARENTERAL at 11:26

## 2022-04-25 RX ADMIN — EPHEDRINE SULFATE 10 MG: 50 INJECTION, SOLUTION INTRAVENOUS at 10:55

## 2022-04-25 RX ADMIN — Medication 200 MCG: at 10:55

## 2022-04-25 RX ADMIN — EPHEDRINE SULFATE 10 MG: 50 INJECTION, SOLUTION INTRAVENOUS at 11:26

## 2022-04-25 RX ADMIN — Medication 200 MCG: at 11:21

## 2022-04-25 RX ADMIN — VASOPRESSIN 4 UNITS: 20 INJECTION PARENTERAL at 11:14

## 2022-04-25 RX ADMIN — CEFAZOLIN SODIUM 2 G: 2 INJECTION, SOLUTION INTRAVENOUS at 17:41

## 2022-04-25 RX ADMIN — BUSPIRONE HYDROCHLORIDE 10 MG: 10 TABLET ORAL at 17:28

## 2022-04-25 RX ADMIN — ONDANSETRON 4 MG: 2 INJECTION INTRAMUSCULAR; INTRAVENOUS at 11:59

## 2022-04-25 RX ADMIN — Medication 200 MCG: at 10:45

## 2022-04-25 RX ADMIN — SENNOSIDES AND DOCUSATE SODIUM 1 TABLET: 50; 8.6 TABLET ORAL at 21:08

## 2022-04-25 RX ADMIN — KETAMINE HYDROCHLORIDE 50 MG: 50 INJECTION INTRAMUSCULAR; INTRAVENOUS at 10:37

## 2022-04-25 RX ADMIN — ROCURONIUM BROMIDE 25 MG: 10 INJECTION, SOLUTION INTRAVENOUS at 10:23

## 2022-04-25 RX ADMIN — SODIUM CHLORIDE, POTASSIUM CHLORIDE, SODIUM LACTATE AND CALCIUM CHLORIDE: 600; 310; 30; 20 INJECTION, SOLUTION INTRAVENOUS at 10:06

## 2022-04-25 RX ADMIN — PAROXETINE HYDROCHLORIDE 30 MG: 20 TABLET, FILM COATED ORAL at 21:08

## 2022-04-25 RX ADMIN — SODIUM CHLORIDE, POTASSIUM CHLORIDE, SODIUM LACTATE AND CALCIUM CHLORIDE: 600; 310; 30; 20 INJECTION, SOLUTION INTRAVENOUS at 11:21

## 2022-04-25 RX ADMIN — DOCUSATE SODIUM 100 MG: 100 CAPSULE, LIQUID FILLED ORAL at 17:28

## 2022-04-25 RX ADMIN — LORATADINE 10 MG: 10 TABLET ORAL at 17:28

## 2022-04-25 RX ADMIN — VASOPRESSIN 5 UNITS: 20 INJECTION PARENTERAL at 11:35

## 2022-04-25 RX ADMIN — EPHEDRINE SULFATE 50 MG: 50 INJECTION, SOLUTION INTRAVENOUS at 10:45

## 2022-04-25 RX ADMIN — DEXAMETHASONE SODIUM PHOSPHATE 8 MG: 4 INJECTION, SOLUTION INTRA-ARTICULAR; INTRALESIONAL; INTRAMUSCULAR; INTRAVENOUS; SOFT TISSUE at 10:52

## 2022-04-25 RX ADMIN — FENTANYL CITRATE 100 MCG: 50 INJECTION, SOLUTION INTRAMUSCULAR; INTRAVENOUS at 10:23

## 2022-04-25 RX ADMIN — GABAPENTIN 800 MG: 400 CAPSULE ORAL at 17:28

## 2022-04-25 RX ADMIN — ACETAMINOPHEN 1000 MG: 500 TABLET ORAL at 17:28

## 2022-04-25 RX ADMIN — KETAMINE HYDROCHLORIDE 50 MG: 50 INJECTION INTRAMUSCULAR; INTRAVENOUS at 10:52

## 2022-04-25 RX ADMIN — Medication: at 17:33

## 2022-04-25 RX ADMIN — FUROSEMIDE 20 MG: 20 TABLET ORAL at 17:28

## 2022-04-25 RX ADMIN — HYDROMORPHONE HYDROCHLORIDE 1 MG: 2 INJECTION INTRAMUSCULAR; INTRAVENOUS; SUBCUTANEOUS at 10:37

## 2022-04-25 RX ADMIN — METHOCARBAMOL 750 MG: 750 TABLET ORAL at 17:28

## 2022-04-25 RX ADMIN — POTASSIUM CHLORIDE AND SODIUM CHLORIDE: 900; 150 INJECTION, SOLUTION INTRAVENOUS at 17:24

## 2022-04-25 RX ADMIN — POTASSIUM CHLORIDE 10 MEQ: 750 TABLET, FILM COATED, EXTENDED RELEASE ORAL at 17:28

## 2022-04-25 RX ADMIN — CEFAZOLIN 2 G: 330 INJECTION, POWDER, FOR SOLUTION INTRAMUSCULAR; INTRAVENOUS at 10:32

## 2022-04-25 RX ADMIN — OXYCODONE HYDROCHLORIDE 10 MG: 5 SOLUTION ORAL at 15:14

## 2022-04-25 RX ADMIN — VASOPRESSIN 2 UNITS: 20 INJECTION PARENTERAL at 11:04

## 2022-04-25 RX ADMIN — EPHEDRINE SULFATE 10 MG: 50 INJECTION, SOLUTION INTRAVENOUS at 11:35

## 2022-04-25 RX ADMIN — MIDAZOLAM HYDROCHLORIDE 2 MG: 1 INJECTION, SOLUTION INTRAMUSCULAR; INTRAVENOUS at 10:23

## 2022-04-25 RX ADMIN — BACLOFEN 20 MG: 10 TABLET ORAL at 17:28

## 2022-04-25 RX ADMIN — PROPOFOL 200 MG: 10 INJECTION, EMULSION INTRAVENOUS at 10:23

## 2022-04-25 RX ADMIN — VASOPRESSIN 2 UNITS: 20 INJECTION PARENTERAL at 11:21

## 2022-04-25 ASSESSMENT — PAIN DESCRIPTION - PAIN TYPE
TYPE: SURGICAL PAIN

## 2022-04-25 ASSESSMENT — LIFESTYLE VARIABLES
ON A TYPICAL DAY WHEN YOU DRINK ALCOHOL HOW MANY DRINKS DO YOU HAVE: 0
HAVE YOU EVER FELT YOU SHOULD CUT DOWN ON YOUR DRINKING: NO
AVERAGE NUMBER OF DAYS PER WEEK YOU HAVE A DRINK CONTAINING ALCOHOL: 0
HOW MANY TIMES IN THE PAST YEAR HAVE YOU HAD 5 OR MORE DRINKS IN A DAY: 0
TOTAL SCORE: 0
EVER HAD A DRINK FIRST THING IN THE MORNING TO STEADY YOUR NERVES TO GET RID OF A HANGOVER: NO
EVER FELT BAD OR GUILTY ABOUT YOUR DRINKING: NO
TOTAL SCORE: 0
CONSUMPTION TOTAL: NEGATIVE
HAVE PEOPLE ANNOYED YOU BY CRITICIZING YOUR DRINKING: NO
TOTAL SCORE: 0
ALCOHOL_USE: NO

## 2022-04-25 ASSESSMENT — COGNITIVE AND FUNCTIONAL STATUS - GENERAL
MOBILITY SCORE: 18
SUGGESTED CMS G CODE MODIFIER DAILY ACTIVITY: CJ
MOVING FROM LYING ON BACK TO SITTING ON SIDE OF FLAT BED: A LITTLE
DRESSING REGULAR LOWER BODY CLOTHING: A LITTLE
SUGGESTED CMS G CODE MODIFIER MOBILITY: CK
TOILETING: A LITTLE
WALKING IN HOSPITAL ROOM: A LITTLE
HELP NEEDED FOR BATHING: A LITTLE
DRESSING REGULAR UPPER BODY CLOTHING: A LITTLE
CLIMB 3 TO 5 STEPS WITH RAILING: A LITTLE
STANDING UP FROM CHAIR USING ARMS: A LITTLE
TURNING FROM BACK TO SIDE WHILE IN FLAT BAD: A LITTLE
MOVING TO AND FROM BED TO CHAIR: A LITTLE
DAILY ACTIVITIY SCORE: 20

## 2022-04-25 ASSESSMENT — FIBROSIS 4 INDEX
FIB4 SCORE: 0.84
FIB4 SCORE: 0.84

## 2022-04-25 ASSESSMENT — PATIENT HEALTH QUESTIONNAIRE - PHQ9
1. LITTLE INTEREST OR PLEASURE IN DOING THINGS: NOT AT ALL
2. FEELING DOWN, DEPRESSED, IRRITABLE, OR HOPELESS: NOT AT ALL
SUM OF ALL RESPONSES TO PHQ9 QUESTIONS 1 AND 2: 0

## 2022-04-25 NOTE — ANESTHESIA POSTPROCEDURE EVALUATION
Patient: Bhavana Liu    Procedure Summary     Date: 04/25/22 Room / Location: [facility] 05 / SURGERY [facility]    Anesthesia Start: 1020 Anesthesia Stop: 1215    Procedures:       FUSION, SPINE, CERVICAL, POSTERIOR APPROACH - C3-4 (Left Spine Cervical)      LAMINECTOMY, SPINE, CERVICAL, POSTERIOR APPROACH - INFERIOR C2 TO SUPERIOR C5 (Left Spine Cervical) Diagnosis: (SPINAL STENOSIS IN CERVICAL REGION)    Surgeons: Law Moya M.D. Responsible Provider: Manas Hull M.D.    Anesthesia Type: general ASA Status: 2          Final Anesthesia Type: general  Last vitals  BP   Blood Pressure: 112/78    Temp   36.4 °C (97.5 °F)    Pulse   91   Resp   16    SpO2   95 %      Anesthesia Post Evaluation    Patient location during evaluation: PACU  Patient participation: complete - patient participated  Level of consciousness: awake and alert    Airway patency: patent  Anesthetic complications: no  Cardiovascular status: hemodynamically stable  Respiratory status: acceptable  Hydration status: euvolemic    PONV: none          No complications documented.     Nurse Pain Score: 3 (NPRS)

## 2022-04-25 NOTE — PROGRESS NOTES
Med rec updated and complete, per pt   Allergies reviewed, per pt   Pt had a list of medications that she read to this writer

## 2022-04-25 NOTE — OR NURSING
Pre-op assessment complete, VSS stable. Updated on POC. Call light within reach. Denies needs at this time.

## 2022-04-25 NOTE — PROGRESS NOTES
4 Eyes Skin Assessment Completed by TARIK Moore and TARIK Howell.    Head WDL  Ears WDL  Nose WDL  Mouth WDL  Neck Incision posterior covered in gauze/hypafix tape CDI with hemovac in place  Breast/Chest WDL  Shoulder Blades WDL  Spine WDL  (R) Arm/Elbow/Hand WDL  (L) Arm/Elbow/Hand WDL  Abdomen WDL  Groin WDL  Scrotum/Coccyx/Buttocks Redness and Blanching  (R) Leg WDL  (L) Leg WDL  (R) Heel/Foot/Toe Redness and Blanching  (L) Heel/Foot/Toe Redness and Blanching          Devices In Places Pulse Ox, SCD's, Nasal Cannula and DINA's      Interventions In Place NC W/Ear Foams, Pillows, Heels Loaded W/Pillows and Pressure Redistribution Mattress    Possible Skin Injury No    Pictures Uploaded Into Epic N/A  Wound Consult Placed N/A  RN Wound Prevention Protocol Ordered No

## 2022-04-25 NOTE — OR NURSING
1213 Pt arrived from OR via [facility]. Report given by anesthesia and RN. 97.3. sleeping, perrla, opa, 8L mask, even non labored breathing, lungs clear airway patent. SR. Skin pink warm dry. Posterior neck dressing cdi, no drainage, no hematoma. x1 hemovac to compression, sanguinous drainage. PIV patent.     1220 soft collar placed    1240 MD Moya at bedside. Pt still sleeping, even non labored breathing. Skin pink warm dry. Neck dressing cdi, no drainage, no hematoma. Soft collar removed by MD Moya.     1247 arousable, gag reflex intact, opa removed, spontaneous even non labored breathing.      1300 arousable to voice and makes eye contact, smiles, falls back to sleep immediately.     1309 drowsy, clear voice, follows commands. strong bilateral hand grasp. Lifts both arms and legs. Strong dorsi flex/ext, wiggles toes. Denies numbness tingling.     1318 DENIES pain, nausea, sob, chest pain.     1323 Taylor PERAZAN at bedside. Pt moves x4 extremities.     1331 resting with eyes closed, even non labored breathing. Skin pink warm dry. Dressing cdi.    1345 no changes.     1347 Denies pain, nausea. Drowsy, answers questions appropriately.     1408 a&ox4, neuro intact. Cms intact. Continues to rest with eyes closed, face relaxed. Even non labored breathing. hemovac 40cc sanguinous    1413 hand off to miguel RN    1443 resumed care of pt. Updated Phill, spouse. O2 tank full for transfer . Glasses in chart    1451 awake, clear speech, follows commands. drinking water. Personal belongings with pt.     1514 c/o pain and nausea, treated per mar    1549 sleeping, even non labored breathing.     1608 report given to Teresa MONTANEZ [id number]. VSS pt ready for transfer to room   ]  1611 update Phill, spouse. Pt drinking juice.     1633 awake, alert, even non labored breathing, smiling and talking. Transferred to room.

## 2022-04-25 NOTE — ANESTHESIA TIME REPORT
Anesthesia Start and Stop Event Times     Date Time Event    4/25/2022 1018 Ready for Procedure     1020 Anesthesia Start     1215 Anesthesia Stop        Responsible Staff  04/25/22    Name Role Begin End    Manas Hull M.D. Anesth 1020 1215        Overtime Reason:  no overtime (within assigned shift)    Comments:

## 2022-04-25 NOTE — ANESTHESIA PREPROCEDURE EVALUATION
Case: [id number] Date/Time: 04/25/22 1015    Procedures:       FUSION, SPINE, CERVICAL, POSTERIOR APPROACH - C3-4      LAMINECTOMY, SPINE, CERVICAL, POSTERIOR APPROACH - INFERIOR C2 TO SUPERIOR C5    Pre-op diagnosis: SPINAL STENOSIS IN CERVICAL REGION    Location: [facility] OR 05 / SURGERY [facility]    Surgeons: Law Moya M.D.          Relevant Problems   NEURO   (positive) Migraine headache      CARDIAC   (positive) Migraine headache   (positive) Varicose veins of leg with edema, bilateral      GI   (positive) GERD (gastroesophageal reflux disease)       Physical Exam    Airway   Mallampati: II  TM distance: >3 FB  Neck ROM: full       Cardiovascular - normal exam  Rhythm: regular  Rate: normal  (-) murmur     Dental - normal exam           Pulmonary - normal exam  Breath sounds clear to auscultation     Abdominal    Neurological - normal exam                 Anesthesia Plan    ASA 2       Plan - general       Airway plan will be ETT        Plan Factors:   Patient was previously instructed to abstain from smoking on day of procedure.  Patient did not smoke on day of procedure.      Induction: intravenous    Postoperative Plan: Postoperative administration of opioids is intended.    Pertinent diagnostic labs and testing reviewed    Informed Consent:    Anesthetic plan and risks discussed with patient.    Use of blood products discussed with: patient whom consented to blood products.

## 2022-04-25 NOTE — OP REPORT
DATE OF SERVICE:  04/25/2022     PREOPERATIVE DIAGNOSIS:  Cervical stenosis with myelopathy.     POSTOPERATIVE DIAGNOSIS:  Cervical stenosis with myelopathy.     OPERATIONS:    1.  Decompressive cervical laminectomy, medial facetectomies inferior C2 to   superior C5, decompression of cervical spinal cord, proximal left C4 and C5   nerve roots.  2.  Posterior C3-4 fusion with local bone graft.  3.  Harvesting and preparation of local bone graft.  4.  C3-C4 nonsegmental lateral mass instrumentation ([employer] Infinity   system).     SURGEON:  Law Moya MD     ASSISTANT:  JAVIER Her     ANESTHESIA:  General endotracheal.     ANESTHESIOLOGIST:  Manas Hull MD     PREPARATION:  ChloraPrep.     MEDICATIONS:  The patient given Ancef prior to incision.     INDICATIONS:  This woman with myelopathy, weakness, clumsiness in her hands,   numbness in her hands and gait difficulties.  She had an MRI, which showed   stenosis primarily affecting the cord at 3-4 with subtle signal change within   the cord.  The patient was felt to be a candidate for operative decompression   and stabilization to prevent further loss of function and optimize the   potential for the return of function.  The patient understood major risks and   complications such as paralysis, neurologic worsening relatively uncommon,   small risk of wound infection, spinal fluid leak, biggest risk is persistent   deficits for which further surgery may or may not be indicated.  The chance   she will require another operation is 10-15%.  The patient is understanding   and agreed to proceed and signed consent.     NEED FOR SURGICAL ASSISTANCE:  Surgical assistant required throughout the case   for retraction, suction, irrigation, cleaning of instruments to keep the case   moving forward to minimize operative time.     DESCRIPTION OF PROCEDURE:  The patient was brought to the operating room.    Peripheral venous lines in place.  General anesthesia  was induced.  The   patient intubated.  No Martinez catheter was placed.  I asked Dr. Hull to   maintain a mean arterial pressure 80-85 throughout the case.  The patient was   placed prone on the OSI table using 6 posts.  Arms were wrapped at her sides.    Pressure points were padded.  Head was maintained in 3-point fixation with   the [place] head mart in a neutral position.  Shoulders were taped down. A   little bit of the inferior aspect of the hairline was shaved.  I then doubly   prepped the posterior neck with ChloraPrep and draped.  Planned incision was   marked from C2 down to C6.  Skin was infiltrated with local and incised with   scalpel using electrocautery, dissection deep in the midline down to and   through deep fascia using a subperiosteal dissection.  Paravertebral muscles   dissected off spinous process and lamina of C2 through C5 bilaterally.  Using   large Leksell, spinous processes of C3 and 4 were removed.  These were   cleansed of soft tissues and placed through the bone mill.  Next, the lamina,   all of C3, all of C4, superior aspect of C5, inferior aspect of C2 was drilled   to paper thin shelf of bone.  This thin shelf of bone was then elevated up   away from the thecal sac with micro curettes and then resected with a large   pituitary rongeur.  Lateral gutters were decompressed along with the medial   facets with a 1 and 2 mm Kerrisons.  I did proximal foraminotomies over the   left C4 and C5 roots.  SSEPs and MEPs were stable beginning to the end of the   case.  All bone dust was collected with the [employer] Bone Press.  Larger pieces   cleansed of soft tissue, were placed to the bone mill. Contents of the bone   press in the bone mill were admixed together.  Next, I placed lateral mass   screws bilaterally at C3 and C4.  Initial  hole begun with the micro   drill bit aiming parallel to the lateral mass laterally 40 degrees roughly at   the midportion of the lateral mass.  We placed  3.5x14 mm screws.  Good bone   purchase was obtained at all 4 sites.  A 25 mm lordosis kiesha was placed in the   heads of the screws, locking nuts were tightened to factory torque setting x4.    Next, the lateral masses at 3 and 4 were decorticated bilaterally lateral to   the instrumentation along with the facet joint.  The bone graft was placed   posterolaterally over the decorticated lateral facet joints.  Throughout the   case, wound was irrigated with antibiotic irrigation.  Muscle bleeders   controlled with bipolar electrocautery.  Final x-ray showed satisfactory   appearance of the hardware at C3-4.  A medium Hemovac drain was placed in   depth of the wound, brought out through separate stab incision.  Deep fascia   was closed with #1 Vicryl, subcutaneous fascia with #1 Vicryl, subcuticular   layer closed with 3-0 Vicryl, and skin edges approximated with quarter-inch   Steri-Strips.  Drain was sutured in place at the exit site with a #1 Vicryl   connected to closed drainage system.  Sterile dressings were placed.  The   patient laid supine on the bed, extubated, taken to recovery room in   satisfactory condition.     ESTIMATED BLOOD LOSS:  200 mL.        ______________________________  MD PANFILO GARAY/AISHA    DD:  04/25/2022 12:31  DT:  04/25/2022 13:41    Job#:  [id number]    CC:Manas Hull MD(User)

## 2022-04-25 NOTE — ANESTHESIA PROCEDURE NOTES
Airway    Date/Time: 4/25/2022 10:24 AM  Performed by: Manas Hull M.D.  Authorized by: Manas Hull M.D.     Location:  OR  Urgency:  Elective  Indications for Airway Management:  Anesthesia      Spontaneous Ventilation: absent    Sedation Level:  Deep  Preoxygenated: Yes    Patient Position:  Sniffing  Final Airway Type:  Endotracheal airway  Final Endotracheal Airway:  ETT  Cuffed: Yes    Technique Used for Successful ETT Placement:  Direct laryngoscopy    Insertion Site:  Oral  Blade Type:  Dilma  Laryngoscope Blade/Videolaryngoscope Blade Size:  3  ETT Size (mm):  7.0  Measured from:  Teeth  ETT to Teeth (cm):  22  Placement Verified by: auscultation and capnometry    Cormack-Lehane Classification:  Grade I - full view of glottis  Number of Attempts at Approach:  1

## 2022-04-26 ENCOUNTER — RESEARCH ENCOUNTER (OUTPATIENT)
Dept: RESEARCH | Facility: WORKSITE | Age: 57
End: 2022-04-26
Payer: MEDICAID

## 2022-04-26 DIAGNOSIS — Z00.6 RESEARCH STUDY PATIENT: ICD-10-CM

## 2022-04-26 PROCEDURE — 97165 OT EVAL LOW COMPLEX 30 MIN: CPT

## 2022-04-26 PROCEDURE — 700101 HCHG RX REV CODE 250: Performed by: NURSE PRACTITIONER

## 2022-04-26 PROCEDURE — 770001 HCHG ROOM/CARE - MED/SURG/GYN PRIV*

## 2022-04-26 PROCEDURE — 97161 PT EVAL LOW COMPLEX 20 MIN: CPT

## 2022-04-26 PROCEDURE — 700102 HCHG RX REV CODE 250 W/ 637 OVERRIDE(OP): Performed by: NURSE PRACTITIONER

## 2022-04-26 PROCEDURE — 700111 HCHG RX REV CODE 636 W/ 250 OVERRIDE (IP): Performed by: NURSE PRACTITIONER

## 2022-04-26 PROCEDURE — A9270 NON-COVERED ITEM OR SERVICE: HCPCS | Performed by: NURSE PRACTITIONER

## 2022-04-26 RX ORDER — IPRATROPIUM BROMIDE AND ALBUTEROL SULFATE 2.5; .5 MG/3ML; MG/3ML
3 SOLUTION RESPIRATORY (INHALATION)
Status: DISCONTINUED | OUTPATIENT
Start: 2022-04-26 | End: 2022-04-27 | Stop reason: HOSPADM

## 2022-04-26 RX ORDER — OXYCODONE HYDROCHLORIDE AND ACETAMINOPHEN 5; 325 MG/1; MG/1
1-2 TABLET ORAL EVERY 6 HOURS PRN
Status: DISCONTINUED | OUTPATIENT
Start: 2022-04-26 | End: 2022-04-27 | Stop reason: HOSPADM

## 2022-04-26 RX ADMIN — BACLOFEN 20 MG: 10 TABLET ORAL at 11:54

## 2022-04-26 RX ADMIN — FUROSEMIDE 20 MG: 20 TABLET ORAL at 18:31

## 2022-04-26 RX ADMIN — PAROXETINE HYDROCHLORIDE 30 MG: 20 TABLET, FILM COATED ORAL at 22:23

## 2022-04-26 RX ADMIN — DOCUSATE SODIUM 100 MG: 100 CAPSULE, LIQUID FILLED ORAL at 18:31

## 2022-04-26 RX ADMIN — OXYCODONE HYDROCHLORIDE AND ACETAMINOPHEN 2 TABLET: 5; 325 TABLET ORAL at 15:18

## 2022-04-26 RX ADMIN — GABAPENTIN 800 MG: 400 CAPSULE ORAL at 18:32

## 2022-04-26 RX ADMIN — CEFAZOLIN SODIUM 2 G: 2 INJECTION, SOLUTION INTRAVENOUS at 02:37

## 2022-04-26 RX ADMIN — POTASSIUM CHLORIDE 10 MEQ: 750 TABLET, FILM COATED, EXTENDED RELEASE ORAL at 06:04

## 2022-04-26 RX ADMIN — BACLOFEN 20 MG: 10 TABLET ORAL at 06:02

## 2022-04-26 RX ADMIN — OMEPRAZOLE 20 MG: 20 CAPSULE, DELAYED RELEASE ORAL at 06:03

## 2022-04-26 RX ADMIN — GABAPENTIN 800 MG: 400 CAPSULE ORAL at 11:54

## 2022-04-26 RX ADMIN — SENNOSIDES AND DOCUSATE SODIUM 1 TABLET: 50; 8.6 TABLET ORAL at 22:23

## 2022-04-26 RX ADMIN — BUSPIRONE HYDROCHLORIDE 10 MG: 10 TABLET ORAL at 18:31

## 2022-04-26 RX ADMIN — GABAPENTIN 800 MG: 400 CAPSULE ORAL at 06:04

## 2022-04-26 RX ADMIN — FUROSEMIDE 40 MG: 40 TABLET ORAL at 06:06

## 2022-04-26 RX ADMIN — DIAZEPAM 5 MG: 5 TABLET ORAL at 10:11

## 2022-04-26 RX ADMIN — POTASSIUM CHLORIDE 10 MEQ: 750 TABLET, FILM COATED, EXTENDED RELEASE ORAL at 18:31

## 2022-04-26 RX ADMIN — DOCUSATE SODIUM 100 MG: 100 CAPSULE, LIQUID FILLED ORAL at 06:03

## 2022-04-26 RX ADMIN — POTASSIUM CHLORIDE AND SODIUM CHLORIDE: 900; 150 INJECTION, SOLUTION INTRAVENOUS at 04:29

## 2022-04-26 RX ADMIN — OXYCODONE HYDROCHLORIDE AND ACETAMINOPHEN 1 TABLET: 5; 325 TABLET ORAL at 22:27

## 2022-04-26 ASSESSMENT — COGNITIVE AND FUNCTIONAL STATUS - GENERAL
DAILY ACTIVITIY SCORE: 23
MOVING FROM LYING ON BACK TO SITTING ON SIDE OF FLAT BED: A LITTLE
SUGGESTED CMS G CODE MODIFIER DAILY ACTIVITY: CI
TURNING FROM BACK TO SIDE WHILE IN FLAT BAD: A LITTLE
MOVING TO AND FROM BED TO CHAIR: A LITTLE
MOBILITY SCORE: 18
PERSONAL GROOMING: A LITTLE
STANDING UP FROM CHAIR USING ARMS: A LITTLE
CLIMB 3 TO 5 STEPS WITH RAILING: A LITTLE
SUGGESTED CMS G CODE MODIFIER MOBILITY: CK
WALKING IN HOSPITAL ROOM: A LITTLE

## 2022-04-26 ASSESSMENT — COPD QUESTIONNAIRES
HAVE YOU SMOKED AT LEAST 100 CIGARETTES IN YOUR ENTIRE LIFE: YES
DURING THE PAST 4 WEEKS HOW MUCH DID YOU FEEL SHORT OF BREATH: SOME OF THE TIME
DO YOU EVER COUGH UP ANY MUCUS OR PHLEGM?: YES, EVERY DAY
COPD SCREENING SCORE: 7

## 2022-04-26 ASSESSMENT — PAIN DESCRIPTION - PAIN TYPE
TYPE: SURGICAL PAIN
TYPE: ACUTE PAIN
TYPE: SURGICAL PAIN
TYPE: SURGICAL PAIN

## 2022-04-26 ASSESSMENT — GAIT ASSESSMENTS
GAIT LEVEL OF ASSIST: CONTACT GUARD ASSIST
DISTANCE (FEET): 100
DEVIATION: SHUFFLED GAIT;BRADYKINETIC
ASSISTIVE DEVICE: OTHER (COMMENTS)

## 2022-04-26 ASSESSMENT — ACTIVITIES OF DAILY LIVING (ADL): TOILETING: INDEPENDENT

## 2022-04-26 NOTE — PROGRESS NOTES
Patient arrived to T313 via gurney, ambulated x1 assist to hospital bed     Pt is A&O x4, very drowsy and falls asleep mid conversation   Pain is being managed with current regimen per MAR  Tolerating a regular diet   Dressings in place to posterior neck CDI   Last BM 4/24 per patinet  Up with x1 assist   Bed alarm ON, patient is HIGH fall risk per Loretta Hall  Reviewed plan of care with patient, bed in lowest position and locked, pt resting comfortably now, call light within reach, all needs met at this time. Interventions will be executed per plan of care.

## 2022-04-26 NOTE — CARE PLAN
The patient is Watcher - Medium risk of patient condition declining or worsening    Shift Goals  Clinical Goals: pain control  Patient Goals: Rest    Progress made toward(s) clinical / shift goals:  Plan of care discussed.Educated on PCA pump.Encouraged to call for assistance,call light within reach.Verbalized understanding.Hourly rounding done.  Problem: Fall Risk  Goal: Patient will remain free from falls  4/25/2022 2249 by Ashli Mishra R.N.  Outcome: Progressing  4/25/2022 2245 by Ashli Mishra R.N.  Outcome: Progressing     Problem: Knowledge Deficit - Standard  Goal: Patient and family/care givers will demonstrate understanding of plan of care, disease process/condition, diagnostic tests and medications  4/25/2022 2249 by Ashli Mishra R.N.  Outcome: Progressing  4/25/2022 2245 by LAZ AcostaN.  Outcome: Progressing     Problem: Pain - Standard  Goal: Alleviation of pain or a reduction in pain to the patient’s comfort goal  4/25/2022 2249 by Ashli Mishra R.N.  Outcome: Progressing  4/25/2022 2245 by Ashli Mishra R.N.  Outcome: Progressing       Patient is not progressing towards the following goals:

## 2022-04-26 NOTE — THERAPY
"Occupational Therapy   Initial Evaluation     Patient Name: Bhavana Liu  Age:  56 y.o., Sex:  female  Medical Record #: [id number]  Today's Date: 4/26/2022     Precautions: Fall Risk,Spinal / Back Precautions   Comments: soft collar PRN    Assessment  Patient is 56 y.o. female s/p C2-C5 surgical intervention, decompression and fusion. Reviewed post-op precautions, brace wearing instructions and provided education on compensatory strategies for ADLs. Post-op packet provided for reference of education, pt receptive and demonstrates ADLs at SPV level. Supportive spouse at home able to assist, pt reports no questions or concerns for DC.        Plan    Recommend Occupational Therapy for Evaluation only   DC Equipment Recommendations: None  Discharge Recommendations: Anticipate that the patient will have no further occupational therapy needs after discharge from the hospital     Subjective    \"My head is pounding.\"     Objective     04/26/22 0840   Prior Living Situation   Prior Services Intermittent Physical Support for ADL Per Family   Housing / Facility 1 Story House   Steps Into Home 1   Bathroom Set up Walk In Shower   Equipment Owned None   Lives with - Patient's Self Care Capacity Spouse   Prior Level of ADL Function   Self Feeding Independent   Grooming / Hygiene Independent   Bathing Independent   Dressing Independent   Toileting Independent   Prior Level of IADL Function   Medication Management Independent   Laundry Requires Assist   Kitchen Mobility Independent   Finances Independent   Home Management Requires Assist   Shopping Requires Assist   Prior Level Of Mobility Independent Without Device in Home   Precautions   Precautions Fall Risk;Spinal / Back Precautions    Comments soft collar PRN   Pain 0 - 10 Group   Therapist Pain Assessment Nurse Notified;Post Activity Pain Same as Prior to Activity  (c/o headache, RN aware)   Cognition    Cognition / Consciousness WDL   Level of Consciousness " Alert   Strength Upper Body   Comments functional   Sensation Upper Body   Upper Extremity Sensation  WDL   Comments improved from prior to surgery   Coordination Upper Body   Coordination WDL   Balance Assessment   Sitting Balance (Static) Fair +   Sitting Balance (Dynamic) Fair +   Standing Balance (Static) Fair   Standing Balance (Dynamic) Fair   Weight Shift Sitting Fair   Weight Shift Standing Fair   Bed Mobility    Supine to Sit Supervised   Rolling Supervised   Comments log roll without assist   ADL Assessment   Eating Modified Independent   Grooming Minimal Assist  (hair)   Upper Body Dressing Supervision   Lower Body Dressing Supervision   Toileting Supervision   How much help from another person does the patient currently need...   Putting on and taking off regular lower body clothing? 4   Bathing (including washing, rinsing, and drying)? 4   Toileting, which includes using a toilet, bedpan, or urinal? 4   Putting on and taking off regular upper body clothing? 4   Taking care of personal grooming such as brushing teeth? 3   Eating meals? 4   6 Clicks Daily Activity Score 23   Functional Mobility   Sit to Stand Supervised   Bed, Chair, Wheelchair Transfer Supervised   Toilet Transfers Supervised   Transfer Method Stand Step   Mobility no AD   Activity Tolerance   Sitting in Chair functional   Sitting Edge of Bed functional   Standing functional   Education Group   Education Provided Role of Occupational Therapist;Activities of Daily Living;Back Safety;Weight Bearing Precautions;Brace Wear and Care;Home Safety   Role of Occupational Therapist Patient Response Patient;Acceptance;Explanation;Verbal Demonstration   Back Safety Patient Response Patient;Acceptance;Explanation;Handout;Action Demonstration;Verbal Demonstration   Brace Wear & Care Patient Response Patient;Acceptance;Explanation;Handout;Action Demonstration;Verbal Demonstration   Home Safety Patient Response  Patient;Acceptance;Explanation;Handout;Action Demonstration;Verbal Demonstration   ADL Patient Response Patient;Acceptance;Explanation;Handout;Action Demonstration;Verbal Demonstration   Weight Bearing Precautions Patient Response Patient;Acceptance;Explanation;Handout;Action Demonstration;Verbal Demonstration   Problem List   Problem List None   Anticipated Discharge Equipment and Recommendations   DC Equipment Recommendations None   Discharge Recommendations Anticipate that the patient will have no further occupational therapy needs after discharge from the hospital

## 2022-04-26 NOTE — CARE PLAN
The patient is Stable - Low risk of patient condition declining or worsening    Shift Goals  Clinical Goals: Pain control  Patient Goals: Pain control    Progress made toward(s) clinical / shift goals:    Problem: Fall Risk  Goal: Patient will remain free from falls  Outcome: Progressing  Note: Educated on fall risk and ensured fall precautions in place. Bed in lowest position, treaded socks in place, call light in reach, bed alarm in place, room free of clutter.      Problem: Knowledge Deficit - Standard  Goal: Patient and family/care givers will demonstrate understanding of plan of care, disease process/condition, diagnostic tests and medications  Outcome: Progressing  Note: Patient able to communicate needs effectively. Plan of care updated to patient and on whiteboard. All questions answered at this time.        Patient is not progressing towards the following goals:

## 2022-04-26 NOTE — THERAPY
Physical Therapy   Initial Evaluation     Patient Name: Bhavana Liu  Age:  56 y.o., Sex:  female  Medical Record #: [id number]  Today's Date: 4/26/2022     Precautions  Precautions: Fall Risk;Spinal / Back Precautions   Comments: soft collar PRN    Assessment  Patient is 56 y.o. female POD #1 elective decompressive cervical laminectomy C2-C5 (soft collar PRN). Prior to surgery pt complaining of myelopathy, weakness, and coordination deficits. During today's evaluation, pt completed all transfers with SPV and no AD. CGA required during gait due to unsteadiness which mayight have been due to low SpO2. On 2L pt desaturated to 80%. PT will cont while in acute care setting to address balance, gait, and safety.   Recommend dc home with HHPT, use of SPC, and assistance from family    Plan    Recommend Physical Therapy 5 times per week until therapy goals are met for the following treatments:  Gait Training, Neuro Re-Education / Balance and Self Care/Home Evaluation    DC Equipment Recommendations: None  Discharge Recommendations: Recommend home health for continued physical therapy services          04/26/22 0850   Vitals   O2 (LPM) 2   O2 Delivery Device Nasal Cannula   Vitals Comments desaturated to 80% on 2L with gait   Prior Living Situation   Prior Services Intermittent Physical Support for ADL Per Family   Housing / Facility 1 Story House   Steps Into Home 1   Steps In Home 0   Bathroom Set up Walk In Shower   Equipment Owned Single Point Cane   Lives with - Patient's Self Care Capacity Spouse   Comments son is also going to be home to assist as needed. He took the week off work to assist   Prior Level of Functional Mobility   Bed Mobility Independent   Transfer Status Independent   Ambulation Independent   Distance Ambulation (Feet)   (community)   Assistive Devices Used Single Point Cane   Stairs Independent   History of Falls   History of Falls Yes   Cognition    Level of Consciousness Alert    Comments receptive   Active ROM Lower Body    Active ROM Lower Body  WDL   Strength Lower Body   Lower Body Strength  WDL   Balance Assessment   Sitting Balance (Static) Fair +   Sitting Balance (Dynamic) Fair +   Standing Balance (Static) Fair   Standing Balance (Dynamic) Fair -   Weight Shift Sitting Fair   Weight Shift Standing Fair   Comments IV pole   Gait Analysis   Gait Level Of Assist Contact Guard Assist   Assistive Device Other (Comments)  (IV pole)   Distance (Feet) 100   # of Times Distance was Traveled 1   Deviation Shuffled Gait;Bradykinetic   # of Stairs Climbed 0   Weight Bearing Status no restrictions   Comments unsteady--question due to O2 sat   Bed Mobility    Comments received up with OT and left in chair   Functional Mobility   Sit to Stand Supervised   Bed, Chair, Wheelchair Transfer Supervised   Toilet Transfers Supervised   Transfer Method Stand Step   Mobility in room and hallway   Short Term Goals    Short Term Goal # 1 pt will be able to ambulate 150ft with no AD and SPV in 6tx in order to return home   Education Group   Education Provided Role of Physical Therapist;Spine Precautions   Spine Precautions Patient Response Patient;Acceptance;Explanation;Action Demonstration   Role of Physical Therapist Patient Response Patient;Acceptance;Demonstration;Action Demonstration   Anticipated Discharge Equipment and Recommendations   DC Equipment Recommendations None   Discharge Recommendations Recommend home health for continued physical therapy services

## 2022-04-26 NOTE — DISCHARGE PLANNING
"    Date: 4/26/2022        Anticipated Discharge Disposition: DC home with out-pt follow up      Action: CM met with team for MDR.  Elective surgery.  Pending PT recs.  Patient has Medicaid FSS.  Will not be able to get home care or SNF, if needed.        Barriers to Discharge:      1) Medicaid FFS: can not receive home care with this payor  2) lives in [place], [place]      Plan: Cm to continue to follow for any dc needs.      Alysha Polanco RN, BSN, CM  Case Management  \"[facility]\"  E-mail: [email or web address]  Phone: [phone]    "

## 2022-04-26 NOTE — FACE TO FACE
Face to Face Supporting Documentation - Home Health    The encounter with this patient was in whole or in part the primary reason for home health admission.    Date of encounter:   Patient:                    MRN:                       YOB: 2023  Bhavana Lui  [id number]  1965     Home health to see patient for:  Skilled Nursing care for assessment, interventions & education and Physical Therapy evaluation and treatment    Skilled need for:  Surgical Aftercare postop    Skilled nursing interventions to include:  Comment: post op    Homebound status evidenced by:  Need the aid of supportive devices such as crutches, canes, wheelchairs or walkers or Needs the assistance of another person in order to leave the home. Leaving home requires a considerable and taxing effort. There is a normal inability to leave the home.    Community Physician to provide follow up care: Diamond Moreno P.A.-C.     Optional Interventions? No      I certify the face to face encounter for this home health care referral meets the CMS requirements and the encounter/clinical assessment with the patient was, in whole, or in part, for the medical condition(s) listed above, which is the primary reason for home health care. Based on my clinical findings: the service(s) are medically necessary, support the need for home health care, and the homebound criteria are met.  I certify that this patient has had a face to face encounter by myself.  RAMA Pérez. - NPI: [id number]

## 2022-04-26 NOTE — PROGRESS NOTES
Neurosurgery Progress Note    Subjective:  States arm numbness and gait improved  Eating, ambulating, voiding.  PCA    Exam:  BUE str intact CDI with hvac 70    BP  Min: 102/67  Max: 149/67  Pulse  Av.1  Min: 81  Max: 100  Resp  Av.8  Min: 12  Max: 23  Temp  Av.4 °C (97.5 °F)  Min: 35.8 °C (96.5 °F)  Max: 36.9 °C (98.4 °F)  SpO2  Av.2 %  Min: 93 %  Max: 100 %    No data recorded                      Intake/Output                       22 07 - 22 0659 22 07 - 22 0659      Total [id number] 8705-1990 Total                 Intake    I.V.  1500  -- 1500  12.5  -- 12.5    PCA End of Shift Total Volume (ml) -- -- -- 12.5 -- 12.5    Volume (mL) (lactated ringers infusion) 1500 -- 1500 -- -- --    Total Intake 1500 -- 1500 12.5 -- 12.5       Output    Urine  --  -- --  --  -- --    Number of Times Voided -- 1 x 1 x -- -- --    Drains  40  70 110  --  -- --    Output (mL) (Closed/Suction Drain 1 Posterior;Midline Neck Hemovac) 40 70 110 -- -- --    Blood  100  -- 100  --  -- --    Est. Blood Loss 100 -- 100 -- -- --    Total Output 140 70 210 -- -- --       Net I/O     1360 -70 1290 12.5 -- 12.5            Intake/Output Summary (Last 24 hours) at 2022 1041  Last data filed at 2022 0800  Gross per 24 hour   Intake 1512.5 ml   Output 210 ml   Net 1302.5 ml            • albuterol  2 Puff Q6HRS PRN   • baclofen  20 mg TID   • busPIRone  10 mg Q EVENING   • furosemide  20 mg Q EVENING   • gabapentin  800 mg TID   • hydrOXYzine HCl  25 mg Q8HRS PRN   • loratadine  10 mg Q EVENING   • PARoxetine  30 mg QHS   • potassium chloride ER  10 mEq BID   • Pharmacy Consult Request  1 Each PHARMACY TO DOSE   • MD ALERT...DO NOT ADMINISTER NSAIDS or ASPIRIN unless ORDERED By Neurosurgery  1 Each PRN   • docusate sodium  100 mg BID   • senna-docusate  1 Tablet Nightly   • senna-docusate  1 Tablet Q24HRS PRN   • polyethylene glycol/lytes  1 Packet BID PRN   • magnesium  hydroxide  30 mL QDAY PRN   • bisacodyl  10 mg Q24HRS PRN   • sodium phosphate  1 Each Once PRN   • 0.9 % NaCl with KCl 20 mEq 1,000 mL   Continuous   • morphine   Continuous   • ondansetron  4 mg Q4HRS PRN   • ondansetron  4 mg Q4HRS PRN   • promethazine  12.5-25 mg Q4HRS PRN   • promethazine  12.5-25 mg Q4HRS PRN   • prochlorperazine  5-10 mg Q4HRS PRN   • methocarbamol  750 mg Q8HRS PRN    Or   • cyclobenzaprine  10 mg Q8HRS PRN    Or   • diazePAM  5 mg Q4HRS PRN   • labetalol  10 mg Q HOUR PRN   • hydrALAZINE  10 mg Q HOUR PRN   • furosemide  40 mg DAILY   • omeprazole  20 mg DAILY       Assessment and Plan:    POD #1 Post cervical C2-5 decompression, C3-4 fusion  Prophylactic anticoagulation: no         Start date/time: tbd    Plan:   Watch hvac  DC pca- start orals  Bladder protocol  PT ambulate  IS- resp protocol- wean o2

## 2022-04-27 VITALS
HEART RATE: 92 BPM | RESPIRATION RATE: 16 BRPM | WEIGHT: 239.2 LBS | OXYGEN SATURATION: 92 % | BODY MASS INDEX: 34.24 KG/M2 | DIASTOLIC BLOOD PRESSURE: 72 MMHG | TEMPERATURE: 96.7 F | HEIGHT: 70 IN | SYSTOLIC BLOOD PRESSURE: 104 MMHG

## 2022-04-27 PROCEDURE — 700111 HCHG RX REV CODE 636 W/ 250 OVERRIDE (IP): Performed by: NURSE PRACTITIONER

## 2022-04-27 PROCEDURE — 700102 HCHG RX REV CODE 250 W/ 637 OVERRIDE(OP): Performed by: NURSE PRACTITIONER

## 2022-04-27 PROCEDURE — A9270 NON-COVERED ITEM OR SERVICE: HCPCS | Performed by: NURSE PRACTITIONER

## 2022-04-27 RX ADMIN — GABAPENTIN 800 MG: 400 CAPSULE ORAL at 11:13

## 2022-04-27 RX ADMIN — OMEPRAZOLE 20 MG: 20 CAPSULE, DELAYED RELEASE ORAL at 05:52

## 2022-04-27 RX ADMIN — ONDANSETRON 4 MG: 4 TABLET, ORALLY DISINTEGRATING ORAL at 03:03

## 2022-04-27 RX ADMIN — GABAPENTIN 800 MG: 400 CAPSULE ORAL at 04:16

## 2022-04-27 RX ADMIN — OXYCODONE HYDROCHLORIDE AND ACETAMINOPHEN 1 TABLET: 5; 325 TABLET ORAL at 05:52

## 2022-04-27 RX ADMIN — FUROSEMIDE 40 MG: 40 TABLET ORAL at 05:55

## 2022-04-27 RX ADMIN — DOCUSATE SODIUM 100 MG: 100 CAPSULE, LIQUID FILLED ORAL at 04:09

## 2022-04-27 RX ADMIN — METHOCARBAMOL 750 MG: 750 TABLET ORAL at 00:12

## 2022-04-27 RX ADMIN — BACLOFEN 20 MG: 10 TABLET ORAL at 11:13

## 2022-04-27 RX ADMIN — BACLOFEN 20 MG: 10 TABLET ORAL at 04:18

## 2022-04-27 RX ADMIN — POTASSIUM CHLORIDE 10 MEQ: 750 TABLET, FILM COATED, EXTENDED RELEASE ORAL at 04:16

## 2022-04-27 ASSESSMENT — PAIN DESCRIPTION - PAIN TYPE
TYPE: SURGICAL PAIN
TYPE: ACUTE PAIN;SURGICAL PAIN

## 2022-04-27 NOTE — DISCHARGE INSTRUCTIONS
Discharge Instructions    Discharged to home by car with relative. Discharged via wheelchair, hospital escort: Yes.  Special equipment needed: Oxygen from home provided by family  FWW    Be sure to schedule a follow-up appointment with your primary care doctor or any specialists as instructed.  Follow up with Dr. Moya within 2 weeks  Take medications as prescribed  For any questions or concerns please contact surgeon or PCP     Discharge Plan:        I understand that a diet low in cholesterol, fat, and sodium is recommended for good health. Unless I have been given specific instructions below for another diet, I accept this instruction as my diet prescription.   Other diet: Regular    Special Instructions: None    · Is patient discharged on Warfarin / Coumadin?   No     Depression / Suicide Risk    As you are discharged from this [facility] facility, it is important to learn how to keep safe from harming yourself.    Recognize the warning signs:  · Abrupt changes in personality, positive or negative- including increase in energy   · Giving away possessions  · Change in eating patterns- significant weight changes-  positive or negative  · Change in sleeping patterns- unable to sleep or sleeping all the time   · Unwillingness or inability to communicate  · Depression  · Unusual sadness, discouragement and loneliness  · Talk of wanting to die  · Neglect of personal appearance   · Rebelliousness- reckless behavior  · Withdrawal from people/activities they love  · Confusion- inability to concentrate     If you or a loved one observes any of these behaviors or has concerns about self-harm, here's what you can do:  · Talk about it- your feelings and reasons for harming yourself  · Remove any means that you might use to hurt yourself (examples: pills, rope, extension cords, firearm)  · Get professional help from the community (Mental Health, Substance Abuse, psychological counseling)  · Do not be alone:Call your Safe  Contact- someone whom you trust who will be there for you.  · Call your local CRISIS HOTLINE 865-3059 or 889-301-1734  · Call your local Children's Mobile Crisis Response Team [place] (253) 080-2349 or www.CamioCam  · Call the toll free National Suicide Prevention Hotlines   · National Suicide Prevention Lifeline 587-959-MMVO (6847)  · National Hope Line Network 800-SUICIDE (197-1555)              Cervical Fusion, Care After  This sheet gives you information about how to care for yourself after your procedure. Your health care provider may also give you more specific instructions. If you have problems or questions, contact your health care provider.  What can I expect after the procedure?  After the procedure, it is common to have:  · Incision area pain.  · Numbness.  · Weakness.  · Sore throat.  · Difficulty swallowing.  Follow these instructions at home:  Medicines  · Take over-the-counter and prescription medicines, including pain medicines, only as told by your health care provider.  · If you were prescribed an antibiotic medicine, take it as told by your health care provider. Do not stop taking the antibiotic even if you start to feel better.  If you have a brace:  · Wear the brace as told by your health care provider. Remove it only as told by your health care provider.  · Keep the brace clean.  Incision care    · Follow instructions from your health care provider about how to take care of your incision. Make sure you:  ? Wash your hands with soap and water before you change your bandage (dressing). If soap and water are not available, use hand .  ? Change your dressing as told by your health care provider.  ? Leave stitches (sutures), skin glue, or adhesive strips in place. These skin closures may need to be in place for 2 weeks or longer. If adhesive strip edges start to loosen and curl up, you may trim the loose edges. Do not remove adhesive strips completely unless your health care  provider tells you to do that.  · Keep your incision clean and dry. Do not take baths, swim, or use a hot tub until your health care provider approves.  · Check your incision area every day for signs of infection. Check for:  ? More redness, swelling, or pain.  ? More fluid or blood.  ? Warmth.  ? Pus or a bad smell.  Activity    · Rest and protect your back as much as possible.  · Do not lift anything that is heavier than 10 lb (4.5 kg) or the limit that you are told by your health care provider.  · Do not twist or bend at the waist until your health care provider approves.  · Avoid:  ? Pushing and pulling motions.  ? Lifting anything over your head.  ? Sitting or lying down in the same position for long periods of time.  · Do not exercise until your health care provider approves. Once your health care provider has approved exercise, ask him or her what kinds of exercises you can do to make your back stronger (physical therapy).  · Do not drive until your health care provider approves.  ? Do not drive for 24 hours if you received a medicine to help you relax (sedative).  ? Do not drive or use heavy machinery while taking prescription pain medicine.  General instructions  · Have someone assist you to turn in bed frequently by moving your whole body without twisting your back (log rolling technique).  · Wear compression stockings as told by your health care provider. These stockings help to prevent blood clots and reduce swelling in your legs.  · Do not use any products that contain nicotine or tobacco, such as cigarettes and e-cigarettes. These can delay bone healing. If you need help quitting, ask your health care provider.  · To prevent or treat constipation while you are taking prescription pain medicine, your health care provider may recommend that you:  ? Drink enough fluid to keep your urine clear or pale yellow.  ? Take over-the-counter or prescription medicines.  ? Eat foods that are high in fiber, such as  fresh fruits and vegetables, whole grains, and beans.  ? Limit foods that are high in fat and processed sugars, such as fried and sweet foods.  · Keep all follow-up visits as told by your health care provider. This is important.  Contact a health care provider if:  · You have pain that gets worse or does not get better with medicine.  · You have more redness, swelling, or pain around your incision.  · You have more fluid or blood coming from your incision.  · Your incision feels warm to the touch.  · You have pus or a bad smell coming from your incision.  · You have a fever.  · You have weakness or numbness in your legs that is new or getting worse.  · You have swelling in your calf or leg.  · The edges of your incision break open.  · You vomit or feel nauseous.  · You have trouble controlling urination or bowel movements.  Get help right away if:  · You develop shortness of breath or chest pain.  · You have trouble swallowing.  · You have trouble breathing.  · You develop a cough.  This information is not intended to replace advice given to you by your health care provider. Make sure you discuss any questions you have with your health care provider.  Document Released: 08/01/2005 Document Revised: 11/30/2018 Document Reviewed: 06/28/2017  Elsevier Patient Education © 2020 [employer]

## 2022-04-27 NOTE — PROGRESS NOTES
Neurosurgery Progress Note    Subjective:  States arm numbness and gait improved  Eating, ambulating, voiding  Sedation and lower O2 overnight- smoker  Denies active cough or fevere    Exam:  Patient easily awakened, oriented, answers questions readily  BUE str intact steristrips intact dressing removed    BP  Min: 104/72  Max: 127/85  Pulse  Av.4  Min: 80  Max: 93  Resp  Av.6  Min: 16  Max: 17  Temp  Av.2 °C (97.1 °F)  Min: 35.9 °C (96.7 °F)  Max: 36.3 °C (97.4 °F)  SpO2  Av.4 %  Min: 92 %  Max: 96 %    No data recorded                      Intake/Output                       22 - 2259 22 - 2259      Total 8458-8531 9727-9074 Total                 Intake    I.V.  16.1  -- 16.1  --  -- --    PCA End of Shift Total Volume (ml) 16.1 -- 16.1 -- -- --    Total Intake 16.1 -- 16.1 -- -- --       Output    Drains  30  40 70  --  -- --    Output (mL) ([REMOVED] Closed/Suction Drain 1 Posterior;Midline Neck Hemovac) 30 40 70 -- -- --    Total Output 30 40 70 -- -- --       Net I/O     -14 -40 -54 -- -- --            Intake/Output Summary (Last 24 hours) at 2022 1002  Last data filed at 2022 0408  Gross per 24 hour   Intake 3.55 ml   Output 70 ml   Net -66.45 ml            • oxyCODONE-acetaminophen  1-2 Tablet Q6HRS PRN   • Respiratory Therapy Consult   Continuous RT   • ipratropium-albuterol  3 mL Q4H PRN (RT)   • baclofen  20 mg TID   • busPIRone  10 mg Q EVENING   • furosemide  20 mg Q EVENING   • gabapentin  800 mg TID   • hydrOXYzine HCl  25 mg Q8HRS PRN   • loratadine  10 mg Q EVENING   • PARoxetine  30 mg QHS   • potassium chloride ER  10 mEq BID   • Pharmacy Consult Request  1 Each PHARMACY TO DOSE   • MD ALERT...DO NOT ADMINISTER NSAIDS or ASPIRIN unless ORDERED By Neurosurgery  1 Each PRN   • docusate sodium  100 mg BID   • senna-docusate  1 Tablet Nightly   • senna-docusate  1 Tablet Q24HRS PRN   • polyethylene glycol/lytes  1  Packet BID PRN   • magnesium hydroxide  30 mL QDAY PRN   • bisacodyl  10 mg Q24HRS PRN   • sodium phosphate  1 Each Once PRN   • ondansetron  4 mg Q4HRS PRN   • ondansetron  4 mg Q4HRS PRN   • promethazine  12.5-25 mg Q4HRS PRN   • promethazine  12.5-25 mg Q4HRS PRN   • prochlorperazine  5-10 mg Q4HRS PRN   • methocarbamol  750 mg Q8HRS PRN    Or   • cyclobenzaprine  10 mg Q8HRS PRN    Or   • diazePAM  5 mg Q4HRS PRN   • labetalol  10 mg Q HOUR PRN   • hydrALAZINE  10 mg Q HOUR PRN   • furosemide  40 mg DAILY   • omeprazole  20 mg DAILY       Assessment and Plan:    POD #2 Post cervical C2-5 decompression, C3-4 fusion  Prophylactic anticoagulation: no         Start date/time: tbd    Plan:   Limit sedating medications, dw patient, sedation vs pain control  Wean O2 on 1 liter  Home later today if able to wean O2 and more awake

## 2022-04-27 NOTE — DISCHARGE PLANNING
"  Date: 4/27/2022        Anticipated Discharge Disposition: DC home with family to transport      Action:  Drain removed.  CM spoke with spouse on 4/26/22, and confirmed transport. Patient lives in [place], [place]        Barriers to Discharge:  cannot receive home care due to payor source \"Medicaid FFS\".  MD notified.       Plan: DC home    All agreeable and verbalize understand of dc plan.       Alysha Polanco RN, BSN, CM  Case Management  \"[facility]\"  E-mail: [email or web address]  Phone: [phone]    "

## 2022-04-27 NOTE — CARE PLAN
The patient is Stable - Low risk of patient condition declining or worsening    Shift Goals  Clinical Goals: safety and pain control  Patient Goals: rest    Progress made toward(s) clinical / shift goals:    Problem: Fall Risk  Goal: Patient will remain free from falls  Outcome: Progressing     Problem: Knowledge Deficit - Standard  Goal: Patient and family/care givers will demonstrate understanding of plan of care, disease process/condition, diagnostic tests and medications  Outcome: Progressing     Problem: Pain - Standard  Goal: Alleviation of pain or a reduction in pain to the patient’s comfort goal  Outcome: Progressing       Patient is not progressing towards the following goals:

## 2022-04-27 NOTE — PROGRESS NOTES
Patient more alert this afternoon and able to stay at 92% on room air for >5 minutes, notified CALISTA Carreon.  Patient given discharge information, educated about medications, and following up with upcoming appointments. IV discontinued, medications reconciled with pharmacy, all questions answered at this time. Patient left with all belongings including FWW via W/C at 1345.

## 2022-04-27 NOTE — CARE PLAN
The patient is Stable - Low risk of patient condition declining or worsening    Shift Goals  Clinical Goals: Safety, stay alert  Patient Goals: Pain control

## 2022-04-29 DIAGNOSIS — B02.8 HERPES ZOSTER WITH COMPLICATION: ICD-10-CM

## 2022-04-29 RX ORDER — GABAPENTIN 800 MG/1
800 TABLET ORAL 3 TIMES DAILY
Qty: 90 TABLET | Refills: 0 | Status: SHIPPED | OUTPATIENT
Start: 2022-04-29 | End: 2022-06-07

## 2022-04-29 NOTE — TELEPHONE ENCOUNTER
Received request via: Patient    Was the patient seen in the last year in this department? Yes    Does the patient have an active prescription (recently filled or refills available) for medication(s) requested? No     Last OV: 4/11/22  Last labs: 4/18/22

## 2022-05-05 ENCOUNTER — OFFICE VISIT (OUTPATIENT)
Dept: MEDICAL GROUP | Facility: CLINIC | Age: 57
End: 2022-05-05
Payer: MEDICAID

## 2022-05-05 VITALS
SYSTOLIC BLOOD PRESSURE: 120 MMHG | HEIGHT: 70 IN | DIASTOLIC BLOOD PRESSURE: 72 MMHG | BODY MASS INDEX: 30.43 KG/M2 | TEMPERATURE: 98.3 F | RESPIRATION RATE: 16 BRPM | WEIGHT: 212.6 LBS | OXYGEN SATURATION: 97 % | HEART RATE: 77 BPM

## 2022-05-05 DIAGNOSIS — R20.2 PARESTHESIA OF BOTH FEET: ICD-10-CM

## 2022-05-05 DIAGNOSIS — M54.12 CERVICAL RADICULOPATHY: ICD-10-CM

## 2022-05-05 DIAGNOSIS — M24.28 LIGAMENTUM FLAVUM HYPERTROPHY: ICD-10-CM

## 2022-05-05 DIAGNOSIS — I83.893 VARICOSE VEINS OF LEG WITH EDEMA, BILATERAL: ICD-10-CM

## 2022-05-05 DIAGNOSIS — M48.02 CERVICAL STENOSIS OF SPINAL CANAL: ICD-10-CM

## 2022-05-05 DIAGNOSIS — G62.9 NEUROPATHY: ICD-10-CM

## 2022-05-05 PROCEDURE — 99213 OFFICE O/P EST LOW 20 MIN: CPT | Performed by: PHYSICIAN ASSISTANT

## 2022-05-05 RX ORDER — OXYCODONE HYDROCHLORIDE AND ACETAMINOPHEN 5; 325 MG/1; MG/1
TABLET ORAL
COMMUNITY
Start: 2022-04-27 | End: 2022-11-17

## 2022-05-05 RX ORDER — LORATADINE 10 MG/1
TABLET ORAL
COMMUNITY
Start: 2022-04-11 | End: 2022-05-05

## 2022-05-05 ASSESSMENT — FIBROSIS 4 INDEX: FIB4 SCORE: 0.84

## 2022-05-05 NOTE — PROGRESS NOTES
cc: Postop    Subjective:     Bhavana Liu is a 56 y.o. female presenting for postop    Patient presents to the office for postop.Patient states that she did well after surgery for her cervical spine.  She states that the incision is healing.  She feels it has really helped her symptoms.  She states that since the surgery, her numbness has almost completely resolved.  Podiatry would like for patient to continue with gabapentin 800 mg 3 times a day.  She has been taking the baclofen 4 times a day on some days.  Patient recently had a procedure for cervical stenosis, ligamentum flavum hypertrophy and cervical radiculopathy.    Patient is having less swelling of her feet.  She states this has improved since cutting out one of her herbal supplements.  She states that she did see podiatry and was told to use capsacin for her feet.  It numbs her feet which she is trying to prevent.     Patient continues to have varicosities of the lower legs and feet.  She indicates that she had an appointment with vascular medicine.  However missed the appointment as she fell asleep in her car.  She has rescheduled to have this further evaluated.    Review of systems:  See above.   Denies any symptoms unless previously indicated.        Current Outpatient Medications:   •  oxyCODONE-acetaminophen (PERCOCET) 5-325 MG Tab, , Disp: , Rfl:   •  gabapentin (NEURONTIN) 800 MG tablet, Take 1 Tablet by mouth 3 times a day., Disp: 90 Tablet, Rfl: 0  •  docusate sodium (COLACE) 100 MG Cap, Take 100 mg by mouth 2 times a day., Disp: , Rfl:   •  baclofen (LIORESAL) 20 MG tablet, Take 1 Tablet by mouth 3 times a day., Disp: 90 Tablet, Rfl: 3  •  hydrOXYzine HCl (ATARAX) 25 MG Tab, TAKE ONE TABLET BY MOUTH THREE TIMES A DAY AS NEEDED FOR ANXIETY (Patient taking differently: Take 25 mg by mouth every 8 hours as needed for Anxiety. TAKE ONE TABLET BY MOUTH THREE TIMES A DAY AS NEEDED FOR ANXIETY), Disp: 120 Tablet, Rfl: 1  •  busPIRone  "(BUSPAR) 10 MG Tab tablet, Take 1 Tablet by mouth 2 times a day. (Patient taking differently: Take 10 mg by mouth every evening.), Disp: 90 Tablet, Rfl: 1  •  loratadine (CLARITIN REDITABS) 10 MG dissolvable tablet, Take 1 Tablet by mouth every day. (Patient taking differently: Take 10 mg by mouth every evening.), Disp: 30 Tablet, Rfl: 5  •  furosemide (LASIX) 20 MG Tab, Take 1 Tablet by mouth 3 times a day. (Patient taking differently: Take 20-40 mg by mouth 2 times a day. Pt takes 2 tablets in AM and 1 in the evening), Disp: 180 Tablet, Rfl: 2  •  potassium chloride ER (KLOR-CON) 10 MEQ tablet, Take 1 Tablet by mouth 2 times a day., Disp: 180 Tablet, Rfl: 2  •  pantoprazole (PROTONIX) 40 MG Tablet Delayed Response, Take 1 Tablet by mouth every day., Disp: 90 Tablet, Rfl: 2  •  PARoxetine (PAXIL) 30 MG Tab, Take 1 Tablet by mouth every day. (Patient taking differently: Take 30 mg by mouth at bedtime.), Disp: 90 Tablet, Rfl: 2  •  albuterol 108 (90 Base) MCG/ACT Aero Soln inhalation aerosol, Inhale 2 Puffs every 6 hours as needed for Shortness of Breath., Disp: 8.5 g, Rfl: 5    Allergies, past medical history, past surgical history, family history, social history reviewed and updated    Objective:     Vitals: /72   Pulse 77   Temp 36.8 °C (98.3 °F) (Temporal)   Resp 16   Ht 1.765 m (5' 9.5\")   Wt 96.4 kg (212 lb 9.6 oz)   [facility] 10/28/2012   SpO2 97%   BMI 30.95 kg/m²   General: Alert, pleasant, NAD  EYES:   PERRL, EOMI, no icterus or pallor.  Conjunctivae and lids normal.   HENT:  Normocephalic.  External ears normal.  Neck supple.  Posterior surgical incision healing.  steri stripped.  Respiratory: Normal respiratory effort.  Clear to auscultation bilaterally.  Abdomen: obese  Skin: Warm, dry, no rashes.  Musculoskeletal: Gait is normal.  Moves all extremities well.    Extremities: normal range of motion all extremities.  Varicosities of lower extremities bilaterally.  Neurological: No tremors, " sensation grossly intact, CN2-12 intact.  Psych:  Affect/mood is normal, judgement is good, memory is intact, grooming is appropriate.    Assessment/Plan:     Bhavana was seen today for follow-up and medication refill.    Diagnoses and all orders for this visit:    Cervical stenosis of spinal canal  Ligamentum flavum hypertrophy  Cervical radiculopathy    Improved, but patient is still healing from surgery.  We will continue to monitor and she will make an appointment with her surgeon for 2-week follow-up.    Neuropathy  Paresthesia of both feet    Patient has seen some improvement from stopping garcinia cambogia.  I do recommend that she remain off of this due to potential side effects.    Varicose veins of leg with edema, bilateral    Patient will follow-up with specialist as planned.        Return in about 3 months (around 8/5/2022), or if symptoms worsen or fail to improve.    Please note that this dictation was created using voice recognition software. I have made every reasonable attempt to correct obvious errors, but expect that there are errors of grammar and possible content that I did not discover before finalizing note.

## 2022-05-20 ENCOUNTER — HOSPITAL ENCOUNTER (OUTPATIENT)
Dept: RADIOLOGY | Facility: MEDICAL CENTER | Age: 57
End: 2022-05-20
Attending: NEUROLOGICAL SURGERY
Payer: MEDICAID

## 2022-05-20 DIAGNOSIS — M48.02 SPINAL STENOSIS IN CERVICAL REGION: ICD-10-CM

## 2022-05-20 PROCEDURE — 72050 X-RAY EXAM NECK SPINE 4/5VWS: CPT

## 2022-05-20 NOTE — DISCHARGE SUMMARY
DATE OF ADMISSION:  04/25/2022   DATE OF DISCHARGE:  04/27/2022     OPERATION PERFORMED:  Posterior cervical C2 through C5 decompression with   C3-C4 fusion on 04/25/2022.     HOSPITAL COURSE:  On date of admission, operation was performed.    Postoperatively, the patient did have some issues with sedation as well as   difficulty weaning oxygen.  On day of discharge, she was doing well.  She is   eating, ambulating, and voiding.  They were able to wean her oxygen off. Her   upper extremity strength is grossly intact.  Her incision was intact.     DISCHARGE INSTRUCTIONS:  Given to patient in paper format.     DISCHARGE MEDICATIONS:  I am unable to see at this time.     ASSESSMENT AND PLAN:  1.  Status post above delineated surgery with Dr. Moya on 04/25/2022.  2.  Patient will follow up in 4 weeks with x-rays.  3.  The patient will avoid all NSAIDs and smoking for 4 months.     Should the patient have further questions or concerns, they will not hesitate   to give our office a call.        ______________________________  JAVIER Her        ______________________________  MD GENESIS GARAY/BRANDI    DD:  05/19/2022 14:00  DT:  05/19/2022 18:53    Job#:  [id number]

## 2022-07-07 DIAGNOSIS — R60.0 BILATERAL LOWER EXTREMITY EDEMA: ICD-10-CM

## 2022-07-07 DIAGNOSIS — I83.893 VARICOSE VEINS OF LEG WITH EDEMA, BILATERAL: ICD-10-CM

## 2022-07-07 RX ORDER — BACLOFEN 20 MG/1
20 TABLET ORAL 3 TIMES DAILY
Qty: 90 TABLET | Refills: 3 | Status: SHIPPED | OUTPATIENT
Start: 2022-07-07 | End: 2022-11-17 | Stop reason: SDUPTHER

## 2022-07-07 NOTE — TELEPHONE ENCOUNTER
Received request via: Pharmacy    Was the patient seen in the last year in this department? Yes    Does the patient have an active prescription (recently filled or refills available) for medication(s) requested? No     Last OV  5/5/2022  Last Labs 4/18/2022    Pharmacy called and said the refill had been denied as requested to soon, the pharmacist said she has no further refills on file for the Baclofen

## 2022-07-12 ENCOUNTER — HOSPITAL ENCOUNTER (OUTPATIENT)
Dept: CARDIOLOGY | Facility: MEDICAL CENTER | Age: 57
End: 2022-07-12
Attending: PHYSICIAN ASSISTANT
Payer: MEDICAID

## 2022-07-12 DIAGNOSIS — I83.893 VARICOSE VEINS OF LEG WITH EDEMA, BILATERAL: ICD-10-CM

## 2022-07-12 LAB
LV EJECT FRACT  99904: 65
LV EJECT FRACT MOD 2C 99903: 67.55
LV EJECT FRACT MOD 4C 99902: 52.43
LV EJECT FRACT MOD BP 99901: 59.91

## 2022-07-12 PROCEDURE — 93306 TTE W/DOPPLER COMPLETE: CPT

## 2022-07-12 PROCEDURE — 93306 TTE W/DOPPLER COMPLETE: CPT | Mod: 26 | Performed by: INTERNAL MEDICINE

## 2022-08-11 ENCOUNTER — HOSPITAL ENCOUNTER (OUTPATIENT)
Dept: RADIOLOGY | Facility: MEDICAL CENTER | Age: 57
End: 2022-08-11
Attending: PHYSICIAN ASSISTANT
Payer: MEDICAID

## 2022-08-11 ENCOUNTER — HOSPITAL ENCOUNTER (OUTPATIENT)
Dept: RADIOLOGY | Facility: MEDICAL CENTER | Age: 57
End: 2022-08-11
Attending: NEUROLOGICAL SURGERY
Payer: MEDICAID

## 2022-08-11 DIAGNOSIS — M48.02 SPINAL STENOSIS IN CERVICAL REGION: ICD-10-CM

## 2022-08-11 DIAGNOSIS — I73.9 CLAUDICATION (HCC): ICD-10-CM

## 2022-08-11 PROCEDURE — 93922 UPR/L XTREMITY ART 2 LEVELS: CPT

## 2022-08-11 PROCEDURE — 72040 X-RAY EXAM NECK SPINE 2-3 VW: CPT

## 2022-08-15 ENCOUNTER — APPOINTMENT (OUTPATIENT)
Dept: RADIOLOGY | Facility: MEDICAL CENTER | Age: 57
End: 2022-08-15
Attending: EMERGENCY MEDICINE
Payer: MEDICAID

## 2022-08-15 ENCOUNTER — HOSPITAL ENCOUNTER (EMERGENCY)
Facility: MEDICAL CENTER | Age: 57
End: 2022-08-15
Attending: EMERGENCY MEDICINE
Payer: MEDICAID

## 2022-08-15 VITALS
HEIGHT: 69 IN | RESPIRATION RATE: 17 BRPM | SYSTOLIC BLOOD PRESSURE: 133 MMHG | WEIGHT: 220.24 LBS | DIASTOLIC BLOOD PRESSURE: 78 MMHG | TEMPERATURE: 98 F | OXYGEN SATURATION: 96 % | HEART RATE: 84 BPM | BODY MASS INDEX: 32.62 KG/M2

## 2022-08-15 DIAGNOSIS — S62.101A CLOSED FRACTURE OF RIGHT WRIST, INITIAL ENCOUNTER: ICD-10-CM

## 2022-08-15 DIAGNOSIS — S80.00XA CONTUSION OF KNEE, UNSPECIFIED LATERALITY, INITIAL ENCOUNTER: ICD-10-CM

## 2022-08-15 DIAGNOSIS — S20.211A CHEST WALL CONTUSION, RIGHT, INITIAL ENCOUNTER: ICD-10-CM

## 2022-08-15 DIAGNOSIS — S70.01XA CONTUSION OF RIGHT HIP, INITIAL ENCOUNTER: ICD-10-CM
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
## 2022-08-15 PROCEDURE — A9270 NON-COVERED ITEM OR SERVICE: HCPCS | Performed by: EMERGENCY MEDICINE

## 2022-08-15 PROCEDURE — 700102 HCHG RX REV CODE 250 W/ 637 OVERRIDE(OP): Performed by: EMERGENCY MEDICINE

## 2022-08-15 PROCEDURE — 302875 HCHG BANDAGE ACE 4 OR 6""

## 2022-08-15 PROCEDURE — 29125 APPL SHORT ARM SPLINT STATIC: CPT

## 2022-08-15 PROCEDURE — 99284 EMERGENCY DEPT VISIT MOD MDM: CPT

## 2022-08-15 PROCEDURE — 73110 X-RAY EXAM OF WRIST: CPT | Mod: RT

## 2022-08-15 PROCEDURE — 71046 X-RAY EXAM CHEST 2 VIEWS: CPT

## 2022-08-15 RX ORDER — HYDROCODONE BITARTRATE AND ACETAMINOPHEN 5; 325 MG/1; MG/1
1 TABLET ORAL EVERY 6 HOURS PRN
Qty: 10 TABLET | Refills: 0 | Status: SHIPPED | OUTPATIENT
Start: 2022-08-15 | End: 2022-08-18

## 2022-08-15 RX ORDER — HYDROCODONE BITARTRATE AND ACETAMINOPHEN 5; 325 MG/1; MG/1
1 TABLET ORAL ONCE
Status: COMPLETED | OUTPATIENT
Start: 2022-08-15 | End: 2022-08-15

## 2022-08-15 RX ADMIN — HYDROCODONE BITARTRATE AND ACETAMINOPHEN 1 TABLET: 5; 325 TABLET ORAL at 20:08

## 2022-08-15 ASSESSMENT — FIBROSIS 4 INDEX: FIB4 SCORE: 0.86

## 2022-08-16 ENCOUNTER — OFFICE VISIT (OUTPATIENT)
Dept: MEDICAL GROUP | Facility: CLINIC | Age: 57
End: 2022-08-16
Payer: MEDICAID

## 2022-08-16 VITALS
WEIGHT: 220 LBS | TEMPERATURE: 97.6 F | HEART RATE: 88 BPM | DIASTOLIC BLOOD PRESSURE: 70 MMHG | HEIGHT: 69 IN | BODY MASS INDEX: 32.58 KG/M2 | OXYGEN SATURATION: 96 % | SYSTOLIC BLOOD PRESSURE: 118 MMHG | RESPIRATION RATE: 20 BRPM

## 2022-08-16 DIAGNOSIS — K21.9 GASTROESOPHAGEAL REFLUX DISEASE, UNSPECIFIED WHETHER ESOPHAGITIS PRESENT: ICD-10-CM

## 2022-08-16 DIAGNOSIS — S62.111A CLOSED DISPLACED FRACTURE OF TRIQUETRUM OF RIGHT WRIST, INITIAL ENCOUNTER: ICD-10-CM

## 2022-08-16 DIAGNOSIS — F41.9 ANXIETY: ICD-10-CM

## 2022-08-16 DIAGNOSIS — B35.4 TINEA CORPORIS: ICD-10-CM

## 2022-08-16 DIAGNOSIS — F17.200 TOBACCO DEPENDENCE: ICD-10-CM

## 2022-08-16 DIAGNOSIS — R60.0 BILATERAL LOWER EXTREMITY EDEMA: ICD-10-CM

## 2022-08-16 DIAGNOSIS — I83.893 VARICOSE VEINS OF LEG WITH EDEMA, BILATERAL: ICD-10-CM

## 2022-08-16 DIAGNOSIS — I83.893 VARICOSE VEINS OF BOTH LEGS WITH EDEMA: ICD-10-CM

## 2022-08-16 PROCEDURE — 99214 OFFICE O/P EST MOD 30 MIN: CPT | Performed by: PHYSICIAN ASSISTANT

## 2022-08-16 RX ORDER — POTASSIUM CHLORIDE 750 MG/1
10 TABLET, FILM COATED, EXTENDED RELEASE ORAL 2 TIMES DAILY
Qty: 180 TABLET | Refills: 2 | Status: SHIPPED | OUTPATIENT
Start: 2022-08-16 | End: 2022-11-17 | Stop reason: SDUPTHER

## 2022-08-16 RX ORDER — PANTOPRAZOLE SODIUM 40 MG/1
40 TABLET, DELAYED RELEASE ORAL DAILY
Qty: 90 TABLET | Refills: 2 | Status: SHIPPED | OUTPATIENT
Start: 2022-08-16 | End: 2022-11-17 | Stop reason: SDUPTHER

## 2022-08-16 RX ORDER — PAROXETINE 30 MG/1
30 TABLET, FILM COATED ORAL
Qty: 90 TABLET | Refills: 2 | Status: SHIPPED | OUTPATIENT
Start: 2022-08-16 | End: 2022-11-17 | Stop reason: SDUPTHER

## 2022-08-16 RX ORDER — ALBUTEROL SULFATE 90 UG/1
2 AEROSOL, METERED RESPIRATORY (INHALATION) EVERY 6 HOURS PRN
Qty: 8.5 G | Refills: 5 | Status: SHIPPED | OUTPATIENT
Start: 2022-08-16 | End: 2022-11-17 | Stop reason: SDUPTHER

## 2022-08-16 RX ORDER — KETOCONAZOLE 20 MG/G
CREAM TOPICAL
Qty: 60 G | Refills: 1 | Status: SHIPPED | OUTPATIENT
Start: 2022-08-16 | End: 2022-11-17 | Stop reason: SDUPTHER

## 2022-08-16 ASSESSMENT — FIBROSIS 4 INDEX: FIB4 SCORE: 0.86

## 2022-08-16 NOTE — PROGRESS NOTES
cc:  jin testing    Subjective:     Bhavana Liu is a 57 y.o. female presenting for jin testing      Patient presents to the office for jin testing.  Patient had JIN testing done due to abnormal pain in the legs.  Concern was for atherosclerosis of the arteries of the lower extremities.  Testing was normal.  However, patient continues to have varicosities of lower extremities.  She explains that they can be tortuous and painful.  The longer she stands, the more prominent they become.  She is interested in considering a treatment option.    She does have an appointment with orthopedics to discuss her fracture.  Yesterday, patient took a fall in front of her home.  It was quite significant.  She does have significant bruising on her lower extremities as well as her head and upper extremities.  However in the fall, she did suffer a triquetral fracture in the right wrist.    Patient complains of a rash on her neck.  She states that in the past she has been told that it is a fungal rash.  She is interested in medication for this.    Patient does need medication refills.  She does use an inhaler for history of tobacco dependence, she needs refills for her anxiety and acid reflux as well.    Review of systems:  See above.   Denies any symptoms unless previously indicated.        Current Outpatient Medications:     potassium chloride ER (KLOR-CON) 10 MEQ tablet, Take 1 Tablet by mouth 2 times a day., Disp: 180 Tablet, Rfl: 2    albuterol 108 (90 Base) MCG/ACT Aero Soln inhalation aerosol, Inhale 2 Puffs every 6 hours as needed for Shortness of Breath., Disp: 8.5 g, Rfl: 5    PARoxetine (PAXIL) 30 MG Tab, Take 1 Tablet by mouth every day., Disp: 90 Tablet, Rfl: 2    pantoprazole (PROTONIX) 40 MG Tablet Delayed Response, Take 1 Tablet by mouth every day., Disp: 90 Tablet, Rfl: 2    ketoconazole (NIZORAL) 2 % Cream, Apply a small amount to affected area twice a day for 2-3 weeks., Disp: 60 g, Rfl: 1     "HYDROcodone-acetaminophen (NORCO) 5-325 MG Tab per tablet, Take 1 Tablet by mouth every 6 hours as needed (pain) for up to 3 days., Disp: 10 Tablet, Rfl: 0    furosemide (LASIX) 20 MG Tab, TAKE 1 TABLET BY MOUTH 3 TIMES A DAY., Disp: 270 Tablet, Rfl: 1    loratadine (CLARITIN) 10 MG Tab, TAKE ONE TABLET BY MOUTH EVERY DAY, Disp: 30 Tablet, Rfl: 6    gabapentin (NEURONTIN) 800 MG tablet, TAKE 1 TABLET BY MOUTH 3 TIMES A DAY., Disp: 90 Tablet, Rfl: 4    hydrOXYzine HCl (ATARAX) 25 MG Tab, TAKE ONE TABLET BY MOUTH THREE TIMES A DAY AS NEEDED FOR ANXIETY, Disp: 120 Tablet, Rfl: 3    busPIRone (BUSPAR) 10 MG Tab tablet, TAKE 1 TABLET BY MOUTH 2 TIMES A DAY., Disp: 180 Tablet, Rfl: 1    ibuprofen (MOTRIN) 800 MG Tab, TAKE ONE TABLET BY MOUTH EVERY 8 HOURS AS NEEDED FOR 30 DAYS, Disp: 30 Tablet, Rfl: 4    baclofen (LIORESAL) 20 MG tablet, Take 1 Tablet by mouth 3 times a day., Disp: 90 Tablet, Rfl: 3    oxyCODONE-acetaminophen (PERCOCET) 5-325 MG Tab, , Disp: , Rfl:     docusate sodium (COLACE) 100 MG Cap, Take 100 mg by mouth 2 times a day., Disp: , Rfl:     Allergies, past medical history, past surgical history, family history, social history reviewed and updated    Objective:     Vitals: /70 (BP Location: Left arm, Patient Position: Sitting, BP Cuff Size: Adult long)   Pulse 88   Temp 36.4 °C (97.6 °F) (Temporal)   Resp 20   Ht 1.753 m (5' 9\")   Wt 99.8 kg (220 lb)   LMP 10/28/2012   SpO2 96%   BMI 32.49 kg/m²   General: Alert, pleasant, NAD  EYES:   PERRL, EOMI, no icterus or pallor.  Conjunctivae and lids normal.   HENT:  Normocephalic.  Bruising forehead.  External ears normal.   Neck supple.   Respiratory: Normal respiratory effort.    Abdomen: obese  Skin: Warm, dry, no rashes.  Macular type rash neck.  There is some hyperpigmentation superior to the rash and then decreased pigmentation lower neck.  Musculoskeletal: Gait is normal.  Moves all extremities well.    Extremities: soft cast right wrist.  " Multiple varicosities both lower extremities.  Multiple bruises of upper and lower extremities.  Neurological: No tremors, sensation grossly intact,  CN2-12 intact.  Psych:  Affect/mood is normal, judgement is good, memory is intact, grooming is appropriate.    Assessment/Plan:     Bhavana was seen today for results.    Diagnoses and all orders for this visit:    Varicose veins of both legs with edema  -     Referral to Vascular Surgery  Varicose veins of leg with edema, bilateral  -     potassium chloride ER (KLOR-CON) 10 MEQ tablet; Take 1 Tablet by mouth 2 times a day.  Bilateral lower extremity edema  -     potassium chloride ER (KLOR-CON) 10 MEQ tablet; Take 1 Tablet by mouth 2 times a day.    Will refill medications.  However patient's symptoms persist and she does have multiple varicosities.  Will refer to vascular surgery for further evaluation.  JIN testing is normal.    Closed displaced fracture of triquetrum of right wrist, initial encounter    Patient has an upcoming appointment with orthopedics.    Tinea corporis  -     ketoconazole (NIZORAL) 2 % Cream; Apply a small amount to affected area twice a day for 2-3 weeks.    We will try patient on ketoconazole cream to help with symptoms.    Tobacco dependence  -     albuterol 108 (90 Base) MCG/ACT Aero Soln inhalation aerosol; Inhale 2 Puffs every 6 hours as needed for Shortness of Breath.  Anxiety  -     PARoxetine (PAXIL) 30 MG Tab; Take 1 Tablet by mouth every day.  Gastroesophageal reflux disease, unspecified whether esophagitis present  -     pantoprazole (PROTONIX) 40 MG Tablet Delayed Response; Take 1 Tablet by mouth every day.    Medications refilled at this time.        No follow-ups on file.    Please note that this dictation was created using voice recognition software. I have made every reasonable attempt to correct obvious errors, but expect that there are errors of grammar and possible content that I did not discover before finalizing note.

## 2022-08-16 NOTE — ED TRIAGE NOTES
Chief Complaint   Patient presents with    T-5000 GLF     Pt states she tripped at home, fell to ground and injured bilateral knees, Rt ribs, Rt wrist. Swelling to wrist, bruising to wrist and knees. Pt states she hit her head but denies LOC / thinners.     Pt educated upon triage process and told to inform  staff of any changes in condition so that Pt may be reassessed. No further questions at this time. Pt sitting out in lobby.

## 2022-08-16 NOTE — ED PROVIDER NOTES
ED Provider Note    Scribed for Veronica Menchaca M.D. by Austin Rhodes. 8/15/2022, 7:06 PM.    Primary care provider: Diamond Moreno P.A.-C.  Means of arrival: Walk in   History obtained from: Patient   History limited by: None     CHIEF COMPLAINT  Chief Complaint   Patient presents with    T-5000 GLF     Pt states she tripped at home, fell to ground and injured bilateral knees, Rt ribs, Rt wrist. Swelling to wrist, bruising to wrist and knees. Pt states she hit her head but denies LOC / thinners.       HPI  Bhavana Liu is a 57 y.o. female who presents to the Emergency Department for evaluation of bilateral knee pain following ground level fall onset today ago. Patient reports she was walking down a ramp, but tripped and fell onto her knees. She denies any loss of consciousness, but states she struck her head. She presents associated symptoms of right shoulder pain, right chest wall pain, mild shortness of breath, and right wrist pain. Patient is right handed. Denies abdominal pain or neck pain. No alleviating factors noted at this time. Patient has additional history of neck surgery 4 months ago and Arthritis.     REVIEW OF SYSTEMS  HEENT:  No ear pain,or dizziness,  no loss of consciousness. Headache   EYES: no disharge or vision changes  CARDIAC: right chest wall pain   PULMONARY: no  hematemesis   GI: no vomiting, abdominal pain, or contusions  BACK: no back pain,   Neuro: no weakness, numbness, aphasia, or headache  Musculoskeletal: Bilateral knee pain, right wrist pain, right shoulder pain  SKIN: Multiple contusion present on bilateral knees, right wrist, and right shoulder.     See history of present illness.       PAST MEDICAL HISTORY   has a past medical history of Abnormal drug screen (12/8/2017), Adjustment disorder with anxious mood, Arrhythmia, Arthritis, Bilateral leg edema, Dyslipidemia, goal LDL below 130, GERD (gastroesophageal reflux disease), Heart burn, Hemorrhagic disorder  (HCC), Menopausal symptoms, Methamphetamine use (HCC) (12/8/2017), and Shingles (3/21/2022).    SURGICAL HISTORY   has a past surgical history that includes vaginal hysterectomy total (5/19/2021); anterior and posterior repair (5/19/2021); vaginal suspension (5/19/2021); cystoscopy (5/19/2021); cervical laminectomy posterior (Left, 4/25/2022); and cervical fusion posterior (Left, 4/25/2022).    SOCIAL HISTORY  Social History     Tobacco Use    Smoking status: Every Day     Packs/day: 0.50     Years: 25.00     Pack years: 12.50     Types: Cigarettes    Smokeless tobacco: Never    Tobacco comments:     1/2 pack/ day    Vaping Use    Vaping Use: Former    Quit date: 6/2/2020   Substance Use Topics    Alcohol use: No     Alcohol/week: 0.0 oz     Comment: none at all    Drug use: Yes     Types: Inhaled, Oral     Comment: Meth occ: at least 2 weeks as of 4/25 .  4/18/22- smoked marijuana for the pain.      Social History     Substance and Sexual Activity   Drug Use Yes    Types: Inhaled, Oral    Comment: Meth occ: at least 2 weeks as of 4/25 .  4/18/22- smoked marijuana for the pain.       FAMILY HISTORY  Family History   Problem Relation Age of Onset    Diabetes Mother         type 2, diet controlled    Heart Disease Mother         pacemaker    Cancer Maternal Grandmother         uterine    Genitourinary () Problems Sister         tumor, benign    Heart Disease Father         CHF    Non-contributory Father         emphysema    Arthritis Brother         hip replacement    Non-contributory Sister         encephalitis       CURRENT MEDICATIONS  Current Outpatient Medications   Medication Instructions    albuterol 108 (90 Base) MCG/ACT Aero Soln inhalation aerosol 2 Puffs, Inhalation, EVERY 6 HOURS PRN    baclofen (LIORESAL) 20 mg, Oral, 3 TIMES DAILY    busPIRone (BUSPAR) 10 mg, Oral, 2 TIMES DAILY    docusate sodium (COLACE) 100 mg, Oral, 2 TIMES DAILY    furosemide (LASIX) 20 mg, Oral, 3 TIMES DAILY    gabapentin  "(NEURONTIN) 800 mg, Oral, 3 TIMES DAILY    hydrOXYzine HCl (ATARAX) 25 MG Tab TAKE ONE TABLET BY MOUTH THREE TIMES A DAY AS NEEDED FOR ANXIETY    ibuprofen (MOTRIN) 800 MG Tab TAKE ONE TABLET BY MOUTH EVERY 8 HOURS AS NEEDED FOR 30 DAYS    loratadine (CLARITIN) 10 MG Tab TAKE ONE TABLET BY MOUTH EVERY DAY    oxyCODONE-acetaminophen (PERCOCET) 5-325 MG Tab No dose, route, or frequency recorded.    pantoprazole (PROTONIX) 40 mg, Oral, DAILY    PARoxetine (PAXIL) 30 mg, Oral, EVERY DAY    potassium chloride ER (KLOR-CON) 10 MEQ tablet 10 mEq, Oral, 2 TIMES DAILY           ALLERGIES  No Known Allergies    PHYSICAL EXAM  VITAL SIGNS: /83   Pulse 100   Temp 36.5 °C (97.7 °F) (Temporal)   Resp 18   Ht 1.753 m (5' 9\")   Wt 99.9 kg (220 lb 3.8 oz)   LMP 10/28/2012   SpO2 98%   BMI 32.52 kg/m²     Constitutional: GCS 15, ABC's intact speaking full sentences alert and awake  HENT: Normocephalic, no loose teeth, atraumatic, no navarro signs, or raccoon eyes.   Eyes: PERRLA, EOMI, Conjunctiva normal,   Neck: No C-spine tenderness step-offs or crepitance  Cardiovascular: Normal heart rate, Normal rhythm, No murmurs, No rubs, No gallops.   Thorax & Lungs: Normal breath sounds, No respiratory distress, No wheezing, Right chest wall tenderness with contusions on the lateral right chest along the 4th-5th rib. No bony step off or crepitus.  No subcutaneous emphysema no paradoxical chest wall movements  Abdomen: Bowel sounds normal, Soft, No tenderness, No masses, No pulsatile masses, no abdominal wall contusions  Skin: Warm, Dry, multiple contusions to knees right hip right shoulder right wrist and right chest wall  Back: No CVA tenderness   Extremities: Intact distal pulses, Bilateral knees have contusions that are distally and neurally intact. Right wrist has swelling deformity and decreased range of motion secondary to pain.  Distal to the right wrist the patient's neurovascular intact with normal sensation and " "strength and tendon function   Musculoskeletal: Hip and Pelvis are stable. Contusions to the right lateral hip with no bony step off or crepitus with full range of motion. Right shoulder contusion with full range of motion.    Neurologic: Alert & oriented x 3, Normal motor function, Normal sensory function, No focal deficits noted.     RADIOLOGY  DX-WRIST-COMPLETE 3+ RIGHT   Final Result         1.  Ossific density dorsal to the carpus, likely triquetral fracture.   2.  Small bony fragment is seen adjacent to the trapezoid, could represent small ligamentous avulsion trapezoid fracture      DX-CHEST-2 VIEWS   Final Result         1.  No acute cardiopulmonary disease.        The radiologist's interpretation of all radiological studies have been reviewed by me.    COURSE & MEDICAL DECISION MAKING  Pertinent Labs & Imaging studies reviewed. (See chart for details)    7:06 PM - Patient evaluated at bedside. Ordered DX-wrist, and DX-chest for further evaluation. Differential diagnoses include but not limited to: right rib fracture, right wrist fracture, or contusion. Discussed with patient about administering basic labs and imaging for further evaluation. Informed patient about medication administration for pain control. Patient verbalizes understanding and agreement to this plan of care.     8:37 PM Patient was re-evaluated at bedside. Patient is still in pain and will be treated with Norco 5-325 mg. Discussed radiology as shown above. I discussed with patient plan of care following discharge. Patient was given opportunity to ask questions at this time. Counseled patient on proper prescription medication dosages for pain control and relief. Patient verbalizes understanding and agrees to care of plan.  Patient will be discharged with a prescription for Norco and a referral to Ortho. Her vital signs prior to discharge: /78   Pulse 87   Temp 36.5 °C (97.7 °F) (Temporal)   Resp 18   Ht 1.753 m (5' 9\")   Wt 99.9 kg " (220 lb 3.8 oz)   LMP 10/28/2012   SpO2 95%   BMI 32.52 kg/m²       The patient will return for new or worsening symptoms and is stable at the time of discharge.    The patient is referred to a primary physician for blood pressure management, diabetic screening, and for all other preventative health concerns.    In prescribing controlled substances to this patient, I certify that I have obtained and reviewed the medical history of Bhavana Liu. I have also made a good latisha effort to obtain applicable records from other providers who have treated the patient and records did not demonstrate any increased risk of substance abuse that would prevent me from prescribing controlled substances.     I have conducted a physical exam and documented it. I have reviewed Ms. Liu’s prescription history as maintained by the [place] Prescription Monitoring Program.     I have assessed the patient’s risk for abuse, dependency, and addiction using the validated Opioid Risk Tool available at https://www.mdcalc.com/stpyuo-aybm-nyrj-ort-narcotic-abuse.     Given the above, I believe the benefits of controlled substance therapy outweigh the risks. The reasons for prescribing controlled substances include non-narcotic, oral analgesic alternatives have been inadequate for pain control. Accordingly, I have discussed the risk and benefits, treatment plan, and alternative therapies with the patient.       DISPOSITION:  Patient will be discharged home in stable condition.    FOLLOW UP:  Chad Arriaga M.D.  [address] [facility]  [place] [id number] [phone]    Call in 1 day  for recheck, to establish care      OUTPATIENT MEDICATIONS:  New Prescriptions    HYDROCODONE-ACETAMINOPHEN (NORCO) 5-325 MG TAB PER TABLET    Take 1 Tablet by mouth every 6 hours as needed (pain) for up to 3 days.         FINAL IMPRESSION  1. Fall, initial encounter    2. Closed fracture of right wrist, initial encounter    3. Chest wall  contusion, right, initial encounter    4. Contusion of knee, unspecified laterality, initial encounter    5. Contusion of right hip, initial encounter           I, Austin Rhodes (Scribe), am scribing for, and in the presence of, Veronica Menchaca M.D..    Electronically signed by: Austin Rhodes (Scribe), 8/15/2022    I, Veronica Menchaca M.D. personally performed the services described in this documentation, as scribed by Austin Rhodes in my presence, and it is both accurate and complete.    E    The note accurately reflects work and decisions made by me.  Veronica Menchaca M.D.  8/15/2022  10:39 PM

## 2022-08-22 ENCOUNTER — HOSPITAL ENCOUNTER (OUTPATIENT)
Dept: RADIOLOGY | Facility: MEDICAL CENTER | Age: 57
End: 2022-08-22
Attending: NURSE PRACTITIONER
Payer: MEDICAID

## 2022-08-22 DIAGNOSIS — G95.9 DISEASE OF SPINAL CORD (HCC): ICD-10-CM

## 2022-08-22 PROCEDURE — 72040 X-RAY EXAM NECK SPINE 2-3 VW: CPT

## 2022-08-29 ENCOUNTER — HOSPITAL ENCOUNTER (OUTPATIENT)
Dept: RADIOLOGY | Facility: MEDICAL CENTER | Age: 57
End: 2022-08-29
Attending: PHYSICIAN ASSISTANT
Payer: MEDICAID

## 2022-08-29 DIAGNOSIS — M79.605 PAIN IN BOTH LOWER EXTREMITIES: ICD-10-CM

## 2022-08-29 DIAGNOSIS — M79.604 PAIN IN BOTH LOWER EXTREMITIES: ICD-10-CM

## 2022-08-29 PROCEDURE — 93970 EXTREMITY STUDY: CPT

## 2022-09-19 ENCOUNTER — TELEPHONE (OUTPATIENT)
Dept: MEDICAL GROUP | Facility: CLINIC | Age: 57
End: 2022-09-19
Payer: MEDICAID

## 2022-09-19 DIAGNOSIS — M25.512 CHRONIC PAIN OF BOTH SHOULDERS: ICD-10-CM

## 2022-09-19 DIAGNOSIS — M54.12 CERVICAL RADICULOPATHY: ICD-10-CM

## 2022-09-19 DIAGNOSIS — M48.00 CENTRAL STENOSIS OF SPINAL CANAL: ICD-10-CM

## 2022-09-19 DIAGNOSIS — G89.29 CHRONIC PAIN OF BOTH SHOULDERS: ICD-10-CM

## 2022-09-19 DIAGNOSIS — M25.511 CHRONIC PAIN OF BOTH SHOULDERS: ICD-10-CM

## 2022-09-19 DIAGNOSIS — M25.50 MULTIPLE JOINT PAIN: ICD-10-CM

## 2022-09-19 DIAGNOSIS — M48.02 FORAMINAL STENOSIS OF CERVICAL REGION: ICD-10-CM

## 2022-09-19 DIAGNOSIS — M48.02 CERVICAL STENOSIS OF SPINAL CANAL: ICD-10-CM

## 2022-09-19 RX ORDER — IBUPROFEN 800 MG/1
800 TABLET ORAL EVERY 8 HOURS PRN
Qty: 90 TABLET | Refills: 1 | Status: SHIPPED | OUTPATIENT
Start: 2022-09-19 | End: 2022-11-17 | Stop reason: SDUPTHER

## 2022-09-19 NOTE — TELEPHONE ENCOUNTER
I have changed the prescription but she needs to be very careful as this can cause ulcers and can affect her paxil.

## 2022-09-19 NOTE — TELEPHONE ENCOUNTER
VOICEMAIL  1. Caller Name: Blessing Liu                        Call Back Number: [phone]    2. Message: Pt is requesting her prescription for Ibuprofen 800mg be changed to 3 per day. Pt states that it is currently 1 Q8H PRN, and that she has to go to the pharmacy for a refill every 10 days.     3. Patient approves office to leave a detailed voicemail/MyChart message: yes

## 2022-09-22 NOTE — ADDENDUM NOTE
Addended by: JEREMIE BOONE on: 4/26/2022 10:38 AM     Modules accepted: Orders     Virtual Visit: New Patient   This visit was conducted via Zoom using secure and encrypted videoconferencing technology.   The patient was in their home in the [facility].    The patient's identity was confirmed and verbal consent was obtained for this virtual visit.     Subjective:     CC:   Chief Complaint   Patient presents with    Hypertension    Pulmonary Hypertension    Hyperlipidemia   Pulmonary hypertension and interatrial shunt  Amlodipine and losartan on hold due to lasix 40 mg     Olive Alexandra is a 80 y.o. female presenting to establish care and to discuss the evaluation and management of: Pulmonary hypertension and interatrial shunt by echo 9/5/2022.    Patient was recently discharged from the hospital 9/5/2022 for COVID-pneumonia.  At baseline patient ambulates with a power scooter has home health paid caregivers at her independent living facility.  Per patient, she is on 2 L of oxygen, but feeling quite well.  She denies any chest pain, no lightheadedness, no dizziness, no syncope.  Her breathing is much improved swelling is diminished.    ROS  See HPI    Current medicines (including changes today)  Current Outpatient Medications   Medication Sig Dispense Refill    furosemide (LASIX) 40 MG Tab Take 1 Tablet by mouth every day for 30 days. 30 Tablet 0    potassium chloride SA (K-DUR) 10 MEQ Tab CR Take 1 Tablet by mouth every day for 30 days. 30 Each 0    citalopram (CELEXA) 20 MG Tab Take 1 Tablet by mouth every day. 90 Tablet 3    pravastatin (PRAVACHOL) 20 MG Tab Take 1 Tablet by mouth at bedtime. 90 Tablet 3    azelastine (ASTELIN) 137 MCG/SPRAY nasal spray Administer 1 Spray into affected nostril(S) 2 times a day. 30 mL 1    Multiple Vitamins-Minerals (PRESERVISION AREDS 2) Cap       ursodiol (ACTIGALL) 300 MG Cap Take 300 mg by mouth 2 times a day.      ferrous sulfate 325 (65 Fe) MG tablet Take 1 Tablet by mouth 2 times a day. 180 Tablet 2    ascorbic acid (VITAMIN C) 100 MG tablet    "    vitamin D3, cholecalciferol, 400 UNIT Tab tablet Take 10 mcg by mouth every day.      B Complex-Folic Acid (B COMPLEX-VITAMIN B12 PO) Take 1 Tablet by mouth.       No current facility-administered medications for this visit.       Patient Active Problem List    Diagnosis Date Noted    Acute pulmonary edema (HCC) 09/05/2022    Pulmonary hypertension (HCC) 09/05/2022    Left ventricular to right atrial shunt 09/05/2022    Interatrial cardiac shunt 09/05/2022    Acute hypoxemic respiratory failure due to COVID-19 (HCC) 09/04/2022    Umbilical hernia without obstruction and without gangrene 08/25/2022    Primary biliary cholangitis (HCC) 03/29/2022    Chronic use of opiate for therapeutic purpose 10/05/2021    Prediabetes 10/05/2021    Elevated alkaline phosphatase level 10/05/2021    Bilateral hip pain 10/05/2021    Skin cancer screening 08/23/2021    Macular degeneration of both eyes 08/23/2021    Iron deficiency 08/23/2021    Bilateral chronic knee pain 08/23/2021    Mild depression (HCC) 08/23/2021    Unable to walk 08/23/2021    Partial tear of right rotator cuff 08/23/2021    History of partial surgical removal of colon 03/17/2019    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  She is wearing oxygen per nasal cannula  Skin: No rashes in visible areas.  Eye: Round. Conjunctiva clear, lids normal. No icterus.   ENMT: Lips pink without lesion. Phonation normal.  Neck: Moves freely without pain.  Respiratory: Unlabored respiratory " shortness of breath effort, no cough or audible wheeze  Psych: Alert and oriented x3, normal affect and mood.     BNP 9/4/2022: 3953    Echo cardiogram dated 9/5/2022: My interpretation is normal left and right ventricular systolic function.  There is mild tricuspid regurgitation.  Estimated RV systolic pressure 68 mm capital Hg.  Small amount of bubbles seen in the left atrium and ventricle at rest and moderate bubbles with Valsalva.  No prior echo for comparison.    ECG 9/4/2022: Sinus rhythm, 82 bpm, right bundle branch block, no prior ECG for comparison.    Assessment and Plan:   The following treatment plan was discussed:     1.  Pulmonary hypertension likely secondary to COVID-pneumonia.  Echo was performed while in the hospital when she was being treated for significant COVID-pneumonia.  She is recovering but still on oxygen.  She has no history of tobacco use.  She denies snoring and feels like she sleeps well although can consider checking sleep study for either obstructive sleep apnea or obesity hypoventilation syndrome.  She will be seeing a pulmonologist and will defer to them for that.  Continue Lasix for diuresis at this time.  I discussed with her what pulmonary hypertension is and the likely cause at this time.  I would recommend we repeat a limited echo in January once she is fully recovered from COVID-pneumonia to see if the RV systolic pressure is improved.  I suspect it will be.  If not we will then proceed with full work-up.    2.  Small interatrial shunt, likely consistent with patent foramen ovale.  Discussed with her the other finding of a patent foramen ovale.  Discussed what interatrial shunting can mean.  This can be seen just incidentally in 10 to 20% of the general population.  I suspect its likely incidental finding and not the cause of pulmonary hypertension.  We will also reassess this with limited echo in January.    3.  Prior history of hypertension.  Amlodipine and losartan has been held.  However her  blood pressure remains somewhat low and therefore would not restarted at this time.    I personally performed a substantiated portion of the service with the same patient on the same date of service independently for a total of 30 minutes which includes face-to-face and non-face-to-face time spent on preparing to see the patient, reviewing hospital notes and tests, obtaining history from the patient, performing a medically appropriate exam, counseling and educating the patient, ordering medications/tests/procedures/referrals as clinically indicated, and documenting information in the electronic medical record.   Follow-up: Return in about 16 weeks (around 1/12/2023).

## 2022-11-17 ENCOUNTER — OFFICE VISIT (OUTPATIENT)
Dept: MEDICAL GROUP | Facility: CLINIC | Age: 57
End: 2022-11-17
Payer: MEDICAID

## 2022-11-17 VITALS
TEMPERATURE: 97.9 F | HEIGHT: 68 IN | OXYGEN SATURATION: 97 % | RESPIRATION RATE: 20 BRPM | HEART RATE: 94 BPM | WEIGHT: 212 LBS | BODY MASS INDEX: 32.13 KG/M2 | SYSTOLIC BLOOD PRESSURE: 118 MMHG | DIASTOLIC BLOOD PRESSURE: 70 MMHG

## 2022-11-17 DIAGNOSIS — G89.29 CHRONIC PAIN OF BOTH SHOULDERS: ICD-10-CM

## 2022-11-17 DIAGNOSIS — I83.893 VARICOSE VEINS OF BOTH LEGS WITH EDEMA: ICD-10-CM

## 2022-11-17 DIAGNOSIS — R60.0 BILATERAL LOWER EXTREMITY EDEMA: ICD-10-CM

## 2022-11-17 DIAGNOSIS — M48.00 CENTRAL STENOSIS OF SPINAL CANAL: ICD-10-CM

## 2022-11-17 DIAGNOSIS — B00.9 HERPES SIMPLEX: ICD-10-CM

## 2022-11-17 DIAGNOSIS — B35.4 TINEA CORPORIS: ICD-10-CM

## 2022-11-17 DIAGNOSIS — F17.200 TOBACCO DEPENDENCE: ICD-10-CM

## 2022-11-17 DIAGNOSIS — M25.50 MULTIPLE JOINT PAIN: ICD-10-CM

## 2022-11-17 DIAGNOSIS — M48.02 FORAMINAL STENOSIS OF CERVICAL REGION: ICD-10-CM

## 2022-11-17 DIAGNOSIS — M25.512 CHRONIC PAIN OF BOTH SHOULDERS: ICD-10-CM

## 2022-11-17 DIAGNOSIS — B02.9 HERPES ZOSTER WITHOUT COMPLICATION: ICD-10-CM

## 2022-11-17 DIAGNOSIS — M48.02 CERVICAL STENOSIS OF SPINAL CANAL: ICD-10-CM

## 2022-11-17 DIAGNOSIS — K21.9 GASTROESOPHAGEAL REFLUX DISEASE, UNSPECIFIED WHETHER ESOPHAGITIS PRESENT: ICD-10-CM

## 2022-11-17 DIAGNOSIS — I80.9 THROMBOPHLEBITIS: ICD-10-CM

## 2022-11-17 DIAGNOSIS — L29.9 ITCHING: ICD-10-CM

## 2022-11-17 DIAGNOSIS — F41.9 ANXIETY: ICD-10-CM

## 2022-11-17 DIAGNOSIS — Z23 NEED FOR VACCINATION: ICD-10-CM

## 2022-11-17 DIAGNOSIS — M25.511 CHRONIC PAIN OF BOTH SHOULDERS: ICD-10-CM

## 2022-11-17 DIAGNOSIS — T30.0 BURN: ICD-10-CM

## 2022-11-17 DIAGNOSIS — M54.12 CERVICAL RADICULOPATHY: ICD-10-CM

## 2022-11-17 PROCEDURE — 90686 IIV4 VACC NO PRSV 0.5 ML IM: CPT | Performed by: PHYSICIAN ASSISTANT

## 2022-11-17 PROCEDURE — 90472 IMMUNIZATION ADMIN EACH ADD: CPT | Performed by: PHYSICIAN ASSISTANT

## 2022-11-17 PROCEDURE — 90471 IMMUNIZATION ADMIN: CPT | Performed by: PHYSICIAN ASSISTANT

## 2022-11-17 PROCEDURE — 90746 HEPB VACCINE 3 DOSE ADULT IM: CPT | Performed by: PHYSICIAN ASSISTANT

## 2022-11-17 PROCEDURE — 90677 PCV20 VACCINE IM: CPT | Performed by: PHYSICIAN ASSISTANT

## 2022-11-17 PROCEDURE — 99214 OFFICE O/P EST MOD 30 MIN: CPT | Mod: 25 | Performed by: PHYSICIAN ASSISTANT

## 2022-11-17 RX ORDER — HYDROXYZINE HYDROCHLORIDE 25 MG/1
25 TABLET, FILM COATED ORAL 3 TIMES DAILY PRN
Qty: 120 TABLET | Refills: 3 | Status: SHIPPED | OUTPATIENT
Start: 2022-11-17 | End: 2023-09-05

## 2022-11-17 RX ORDER — ALBUTEROL SULFATE 90 UG/1
2 AEROSOL, METERED RESPIRATORY (INHALATION) EVERY 6 HOURS PRN
Qty: 8.5 G | Refills: 5 | Status: SHIPPED | OUTPATIENT
Start: 2022-11-17 | End: 2023-09-05

## 2022-11-17 RX ORDER — POTASSIUM CHLORIDE 750 MG/1
10 TABLET, FILM COATED, EXTENDED RELEASE ORAL 2 TIMES DAILY
Qty: 180 TABLET | Refills: 2 | Status: SHIPPED | OUTPATIENT
Start: 2022-11-17 | End: 2023-11-29

## 2022-11-17 RX ORDER — FUROSEMIDE 20 MG/1
20 TABLET ORAL 3 TIMES DAILY
Qty: 270 TABLET | Refills: 1 | Status: SHIPPED | OUTPATIENT
Start: 2022-11-17 | End: 2023-07-11

## 2022-11-17 RX ORDER — IBUPROFEN 800 MG/1
800 TABLET ORAL EVERY 8 HOURS PRN
Qty: 90 TABLET | Refills: 1 | Status: SHIPPED | OUTPATIENT
Start: 2022-11-17 | End: 2023-05-04

## 2022-11-17 RX ORDER — GABAPENTIN 800 MG/1
800 TABLET ORAL 3 TIMES DAILY
Qty: 90 TABLET | Refills: 4 | Status: SHIPPED | OUTPATIENT
Start: 2022-11-17 | End: 2023-06-07

## 2022-11-17 RX ORDER — KETOCONAZOLE 20 MG/G
CREAM TOPICAL
Qty: 60 G | Refills: 1 | Status: SHIPPED | OUTPATIENT
Start: 2022-11-17 | End: 2023-06-07

## 2022-11-17 RX ORDER — PANTOPRAZOLE SODIUM 40 MG/1
40 TABLET, DELAYED RELEASE ORAL DAILY
Qty: 90 TABLET | Refills: 2 | Status: SHIPPED | OUTPATIENT
Start: 2022-11-17 | End: 2023-11-20 | Stop reason: SDUPTHER

## 2022-11-17 RX ORDER — VALACYCLOVIR HYDROCHLORIDE 1 G/1
1000 TABLET, FILM COATED ORAL 3 TIMES DAILY
Qty: 21 TABLET | Refills: 0 | Status: SHIPPED | OUTPATIENT
Start: 2022-11-17 | End: 2022-11-24

## 2022-11-17 RX ORDER — PAROXETINE 30 MG/1
30 TABLET, FILM COATED ORAL
Qty: 90 TABLET | Refills: 2 | Status: SHIPPED | OUTPATIENT
Start: 2022-11-17 | End: 2023-11-13

## 2022-11-17 RX ORDER — BUSPIRONE HYDROCHLORIDE 10 MG/1
10 TABLET ORAL 2 TIMES DAILY
Qty: 180 TABLET | Refills: 1 | Status: SHIPPED | OUTPATIENT
Start: 2022-11-17 | End: 2023-09-05

## 2022-11-17 RX ORDER — DOCUSATE SODIUM 100 MG/1
100 CAPSULE, LIQUID FILLED ORAL 2 TIMES DAILY
Qty: 60 CAPSULE | Refills: 2 | Status: SHIPPED | OUTPATIENT
Start: 2022-11-17 | End: 2023-04-11

## 2022-11-17 RX ORDER — BACLOFEN 20 MG/1
20 TABLET ORAL 3 TIMES DAILY
Qty: 90 TABLET | Refills: 3 | Status: SHIPPED | OUTPATIENT
Start: 2022-11-17 | End: 2023-04-05

## 2022-11-17 RX ORDER — LORATADINE 10 MG/1
10 TABLET ORAL
Qty: 30 TABLET | Refills: 6 | Status: SHIPPED | OUTPATIENT
Start: 2022-11-17 | End: 2023-10-30

## 2022-11-17 ASSESSMENT — FIBROSIS 4 INDEX: FIB4 SCORE: 0.86
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
## 2022-11-21 ENCOUNTER — TELEPHONE (OUTPATIENT)
Dept: MEDICAL GROUP | Facility: CLINIC | Age: 57
End: 2022-11-21
Payer: MEDICAID

## 2022-11-21 NOTE — TELEPHONE ENCOUNTER
VOICEMAIL  1. Caller Name: Blessing Liu                        Call Back Number: [phone]    2. Message: Pt states that she was seen for an office visit on 11/17/22. She states Diamondany Moreno prescribed her Docusate Sodium. She would like to know if this is the correct medication that she is supposed to be taking. She is already taking an OTC stool softener. Please advise.     3. Patient approves office to leave a detailed voicemail/MyChart message: yes

## 2022-11-28 NOTE — TELEPHONE ENCOUNTER
When she said she needed refills of everything, I refilled all her meds.  If she does not need then she does not need to take.

## 2023-02-23 ENCOUNTER — OFFICE VISIT (OUTPATIENT)
Dept: MEDICAL GROUP | Facility: CLINIC | Age: 58
End: 2023-02-23
Payer: MEDICAID

## 2023-02-23 VITALS
DIASTOLIC BLOOD PRESSURE: 82 MMHG | TEMPERATURE: 98.6 F | WEIGHT: 219.6 LBS | SYSTOLIC BLOOD PRESSURE: 130 MMHG | HEIGHT: 69 IN | HEART RATE: 97 BPM | RESPIRATION RATE: 18 BRPM | OXYGEN SATURATION: 96 % | BODY MASS INDEX: 32.53 KG/M2

## 2023-02-23 DIAGNOSIS — H66.002 NON-RECURRENT ACUTE SUPPURATIVE OTITIS MEDIA OF LEFT EAR WITHOUT SPONTANEOUS RUPTURE OF TYMPANIC MEMBRANE: ICD-10-CM

## 2023-02-23 DIAGNOSIS — Z23 NEED FOR VACCINATION: ICD-10-CM

## 2023-02-23 DIAGNOSIS — M48.00 CENTRAL STENOSIS OF SPINAL CANAL: ICD-10-CM

## 2023-02-23 DIAGNOSIS — M48.02 FORAMINAL STENOSIS OF CERVICAL REGION: ICD-10-CM

## 2023-02-23 DIAGNOSIS — M54.12 CERVICAL RADICULOPATHY: ICD-10-CM

## 2023-02-23 PROCEDURE — 99214 OFFICE O/P EST MOD 30 MIN: CPT | Performed by: PHYSICIAN ASSISTANT

## 2023-02-23 RX ORDER — AMOXICILLIN 500 MG/1
500 CAPSULE ORAL 3 TIMES DAILY
Qty: 30 CAPSULE | Refills: 0 | Status: SHIPPED | OUTPATIENT
Start: 2023-02-23 | End: 2023-06-30

## 2023-02-23 RX ORDER — FLUCONAZOLE 100 MG/1
TABLET ORAL
Qty: 2 TABLET | Refills: 0 | Status: SHIPPED | OUTPATIENT
Start: 2023-02-23 | End: 2023-06-30

## 2023-02-23 ASSESSMENT — PATIENT HEALTH QUESTIONNAIRE - PHQ9
SUM OF ALL RESPONSES TO PHQ QUESTIONS 1-9: 19
5. POOR APPETITE OR OVEREATING: 3 - NEARLY EVERY DAY
CLINICAL INTERPRETATION OF PHQ2 SCORE: 2

## 2023-02-23 ASSESSMENT — FIBROSIS 4 INDEX: FIB4 SCORE: 0.86

## 2023-02-23 NOTE — PROGRESS NOTES
cc:  ear pain    Subjective:     Bhavana Liu is a 57 y.o. female presenting for ear pain      Patient presents to the office for ear pain.  Patient is complaining of left ear pain.   For the last 2-3 weeks.  She is concerned about an ear infection.    Patient states that neck pain persists.  She states that she has followed up with the surgeon several times.  She has been told to give her neck a year.  She states that the pain will bring her to tears.  She states that she has not been referred to PT and this is concerning for her.  She states that she takes muscle relaxers, anti-inflammatories and tylenol.  She is not taking pain meds.  She cannot hold her head up at night.  She has trouble turning her head at night. She states that she is taking the baclofen 2-3 times a day.    Review of systems:  See above.   Denies any symptoms unless previously indicated.        Current Outpatient Medications:     amoxicillin (AMOXIL) 500 MG Cap, Take 1 Capsule by mouth 3 times a day., Disp: 30 Capsule, Rfl: 0    fluconazole (DIFLUCAN) 100 MG Tab, Take one tab weekly for 2 weeks., Disp: 2 Tablet, Rfl: 0    loratadine (CLARITIN) 10 MG Tab, Take 1 Tablet by mouth every day., Disp: 30 Tablet, Rfl: 6    hydrOXYzine HCl (ATARAX) 25 MG Tab, Take 1 Tablet by mouth 3 times a day as needed for Anxiety., Disp: 120 Tablet, Rfl: 3    ketoconazole (NIZORAL) 2 % Cream, Apply a small amount to affected area twice a day for 2-3 weeks., Disp: 60 g, Rfl: 1    potassium chloride ER (KLOR-CON) 10 MEQ tablet, Take 1 Tablet by mouth 2 times a day., Disp: 180 Tablet, Rfl: 2    busPIRone (BUSPAR) 10 MG Tab tablet, Take 1 Tablet by mouth 2 times a day., Disp: 180 Tablet, Rfl: 1    docusate sodium (COLACE) 100 MG Cap, Take 1 Capsule by mouth 2 times a day., Disp: 60 Capsule, Rfl: 2    albuterol 108 (90 Base) MCG/ACT Aero Soln inhalation aerosol, Inhale 2 Puffs every 6 hours as needed for Shortness of Breath., Disp: 8.5 g, Rfl: 5     "baclofen (LIORESAL) 20 MG tablet, Take 1 Tablet by mouth 3 times a day., Disp: 90 Tablet, Rfl: 3    furosemide (LASIX) 20 MG Tab, Take 1 Tablet by mouth 3 times a day., Disp: 270 Tablet, Rfl: 1    PARoxetine (PAXIL) 30 MG Tab, Take 1 Tablet by mouth every day., Disp: 90 Tablet, Rfl: 2    pantoprazole (PROTONIX) 40 MG Tablet Delayed Response, Take 1 Tablet by mouth every day., Disp: 90 Tablet, Rfl: 2    gabapentin (NEURONTIN) 800 MG tablet, Take 1 Tablet by mouth 3 times a day., Disp: 90 Tablet, Rfl: 4    ibuprofen (MOTRIN) 800 MG Tab, Take 1 Tablet by mouth every 8 hours as needed for Moderate Pain., Disp: 90 Tablet, Rfl: 1    Allergies, past medical history, past surgical history, family history, social history reviewed and updated    Objective:     Vitals: /82 (BP Location: Left arm, Patient Position: Sitting, BP Cuff Size: Adult)   Pulse 97   Temp 37 °C (98.6 °F) (Temporal)   Resp 18   Ht 1.74 m (5' 8.5\")   Wt 99.6 kg (219 lb 9.6 oz)   LMP 10/28/2012   SpO2 96%   BMI 32.90 kg/m²   General: Alert, pleasant, NAD  EYES:   PERRL, EOMI, no icterus or pallor.  Conjunctivae and lids normal.   HENT:  Normocephalic.  External ears normal.  Erythematous ear canal left ear.  Difficult to see tympanic membrane.  No nasal drainage present.  Neck supple.     Respiratory: Normal respiratory effort.    Abdomen: obese  Skin: Warm, dry, no rashes.  Musculoskeletal: Gait is normal.  Moves all extremities well.    Extremities: normal range of motion all extremities.   Neurological: No tremors, sensation grossly intact,  CN2-12 intact.  Psych:  Affect/mood is normal, judgement is good, memory is intact, grooming is appropriate.    Assessment/Plan:     Bhavana was seen today for ear pain.    Diagnoses and all orders for this visit:    Non-recurrent acute suppurative otitis media of left ear without spontaneous rupture of tympanic membrane  -     amoxicillin (AMOXIL) 500 MG Cap; Take 1 Capsule by mouth 3 times a day.  -   "   fluconazole (DIFLUCAN) 100 MG Tab; Take one tab weekly for 2 weeks.    We will start patient on amoxicillin.  Patient is prone to yeast infections so we will also provide Diflucan.  Follow-up if no improvement in symptoms.    Cervical radiculopathy  -     DX-CERVICAL SPINE-2 OR 3 VIEWS; Future  -     MR-CERVICAL SPINE-W/O; Future  Central stenosis of spinal canal  -     DX-CERVICAL SPINE-2 OR 3 VIEWS; Future  -     MR-CERVICAL SPINE-W/O; Future  Foraminal stenosis of cervical region  -     DX-CERVICAL SPINE-2 OR 3 VIEWS; Future  -     MR-CERVICAL SPINE-W/O; Future    Patient will make an appointment with her surgeon.  She indicates that she feels as though something is wrong advised that she discuss this with her surgeon.  We will order x-ray and MRI to evaluate further.    Need for vaccination  -     Hepatitis B Vaccine Adult 20+      Hold until ear infection has been treated.      No follow-ups on file.    Please note that this dictation was created using voice recognition software. I have made every reasonable attempt to correct obvious errors, but expect that there are errors of grammar and possible content that I did not discover before finalizing note.

## 2023-03-12 ENCOUNTER — HOSPITAL ENCOUNTER (OUTPATIENT)
Dept: RADIOLOGY | Facility: MEDICAL CENTER | Age: 58
End: 2023-03-12
Attending: PHYSICIAN ASSISTANT
Payer: MEDICAID

## 2023-03-12 DIAGNOSIS — M48.00 CENTRAL STENOSIS OF SPINAL CANAL: ICD-10-CM

## 2023-03-12 DIAGNOSIS — M48.02 FORAMINAL STENOSIS OF CERVICAL REGION: ICD-10-CM

## 2023-03-12 DIAGNOSIS — M54.12 CERVICAL RADICULOPATHY: ICD-10-CM

## 2023-03-12 PROCEDURE — 72141 MRI NECK SPINE W/O DYE: CPT

## 2023-03-12 PROCEDURE — 72040 X-RAY EXAM NECK SPINE 2-3 VW: CPT

## 2023-04-05 DIAGNOSIS — R60.0 BILATERAL LOWER EXTREMITY EDEMA: ICD-10-CM

## 2023-04-05 DIAGNOSIS — I83.893 VARICOSE VEINS OF BOTH LEGS WITH EDEMA: ICD-10-CM

## 2023-04-05 RX ORDER — BACLOFEN 20 MG/1
20 TABLET ORAL 3 TIMES DAILY
Qty: 90 TABLET | Refills: 0 | Status: SHIPPED | OUTPATIENT
Start: 2023-04-05 | End: 2023-05-04

## 2023-04-06 NOTE — TELEPHONE ENCOUNTER
Was the patient seen in the last year in this department? Yes    Does patient have an active prescription for medications requested? Yes    Received Request Via: Pharmacy    Hospital Outpatient Visit on 2022   Component Date Value    Eject.Frac. MOD BP 2022 59.91     Eject.Frac. MOD 4C 2022 52.43     Eject.Frac. MOD 2C 2022 67.55     Left Ventrical Ejection * 2022 65    Pre-Admission Testing on 2022   Component Date Value    WBC 2022 7.3     RBC 2022 4.67     Hemoglobin 2022 13.9     Hematocrit 2022 41.3     MCV 2022 88.4     MCH 2022 29.8     MCHC 2022 33.7     RDW 2022 44.1     Platelet Count 2022 362     MPV 2022 9.3     Neutrophils-Polys 2022 49.00     Lymphocytes 2022 35.20     Monocytes 2022 12.20     Eosinophils 2022 1.90     Basophils 2022 1.40     Immature Granulocytes 2022 0.30     Nucleated RBC 2022 0.00     Neutrophils (Absolute) 2022 3.56     Lymphs (Absolute) 2022 2.56     Monos (Absolute) 2022 0.89 (H)     Eos (Absolute) 2022 0.14     Baso (Absolute) 2022 0.10     Immature Granulocytes (a* 2022 0.02     NRBC (Absolute) 2022 0.00     Sodium 2022 141     Potassium 2022 4.0     Chloride 2022 103     Co2 2022 27     Glucose 2022 110 (H)     Bun 2022 19     Creatinine 2022 0.79     Calcium 2022 9.2     Anion Gap 2022 11.0     APTT 2022 27.5     PT 2022 12.8     INR 2022 0.99     Report 2022                      Value:[facility]    Test Date:  2022  Pt Name:    SMOOTH AGUDELO           Department: [facility]  MRN:        [id number]                      Room:  Gender:     Female                       Technician: DAVIS  :        [id number]                   Requested By:ERICA LACEY  Order #:    [id number]                    Reading MD: Ivan  MD Madi    Measurements  Intervals                                Axis  Rate:       83                           P:          67  NE:         152                          QRS:        55  QRSD:       86                           T:          20  QT:         392  QTc:        461    Interpretive Statements  SINUS RHYTHM  RIGHT ATRIAL ABNORMALITY  Compared to ECG 05/14/2021 13:58:47  Sinus tachycardia no longer present  Electronically Signed On 4- 23:48:32 PDT by Ivan Barraza MD      GFR (CKD-EPI) 04/18/2022 87    ]

## 2023-04-11 RX ORDER — DOCUSATE SODIUM 100 MG/1
100 CAPSULE, LIQUID FILLED ORAL 2 TIMES DAILY
Qty: 60 CAPSULE | Refills: 2 | Status: SHIPPED | OUTPATIENT
Start: 2023-04-11 | End: 2023-08-17

## 2023-04-11 NOTE — TELEPHONE ENCOUNTER
Received request via: Patient    Was the patient seen in the last year in this department? Yes    Does the patient have an active prescription (recently filled or refills available) for medication(s) requested? No    Does the patient have residential Plus and need 100 day supply (blood pressure, diabetes and cholesterol meds only)? Patient does not have SCP      Last Ov 2/23/23  Last Labs 4/18/22

## 2023-04-20 ENCOUNTER — TELEPHONE (OUTPATIENT)
Dept: MEDICAL GROUP | Facility: CLINIC | Age: 58
End: 2023-04-20
Payer: MEDICAID

## 2023-04-20 NOTE — TELEPHONE ENCOUNTER
Called patient back she understands that [facility] can't give her an antibiotic.  She will try an go to the local ER where she is staying.

## 2023-05-04 DIAGNOSIS — M25.511 CHRONIC PAIN OF BOTH SHOULDERS: ICD-10-CM

## 2023-05-04 DIAGNOSIS — M48.00 CENTRAL STENOSIS OF SPINAL CANAL: ICD-10-CM

## 2023-05-04 DIAGNOSIS — M54.12 CERVICAL RADICULOPATHY: ICD-10-CM

## 2023-05-04 DIAGNOSIS — M25.512 CHRONIC PAIN OF BOTH SHOULDERS: ICD-10-CM

## 2023-05-04 DIAGNOSIS — G89.29 CHRONIC PAIN OF BOTH SHOULDERS: ICD-10-CM

## 2023-05-04 DIAGNOSIS — I83.893 VARICOSE VEINS OF BOTH LEGS WITH EDEMA: ICD-10-CM

## 2023-05-04 DIAGNOSIS — M48.02 FORAMINAL STENOSIS OF CERVICAL REGION: ICD-10-CM

## 2023-05-04 DIAGNOSIS — M48.02 CERVICAL STENOSIS OF SPINAL CANAL: ICD-10-CM

## 2023-05-04 DIAGNOSIS — M25.50 MULTIPLE JOINT PAIN: ICD-10-CM

## 2023-05-04 DIAGNOSIS — R60.0 BILATERAL LOWER EXTREMITY EDEMA: ICD-10-CM

## 2023-05-04 RX ORDER — IBUPROFEN 800 MG/1
800 TABLET ORAL EVERY 8 HOURS PRN
Qty: 90 TABLET | Refills: 1 | Status: SHIPPED | OUTPATIENT
Start: 2023-05-04 | End: 2023-07-11

## 2023-05-04 RX ORDER — BACLOFEN 20 MG/1
TABLET ORAL
Qty: 90 TABLET | Refills: 0 | Status: SHIPPED | OUTPATIENT
Start: 2023-05-04 | End: 2023-06-22

## 2023-06-07 DIAGNOSIS — M48.02 CERVICAL STENOSIS OF SPINAL CANAL: ICD-10-CM

## 2023-06-07 DIAGNOSIS — B35.4 TINEA CORPORIS: ICD-10-CM

## 2023-06-07 DIAGNOSIS — M48.00 CENTRAL STENOSIS OF SPINAL CANAL: ICD-10-CM

## 2023-06-07 DIAGNOSIS — M48.02 FORAMINAL STENOSIS OF CERVICAL REGION: ICD-10-CM

## 2023-06-07 DIAGNOSIS — M54.12 CERVICAL RADICULOPATHY: ICD-10-CM

## 2023-06-07 RX ORDER — GABAPENTIN 800 MG/1
800 TABLET ORAL 3 TIMES DAILY
Qty: 90 TABLET | Refills: 0 | Status: SHIPPED | OUTPATIENT
Start: 2023-06-07 | End: 2023-07-11

## 2023-06-07 RX ORDER — KETOCONAZOLE 20 MG/G
CREAM TOPICAL
Qty: 60 G | Refills: 0 | Status: ON HOLD | OUTPATIENT
Start: 2023-06-07 | End: 2023-07-25

## 2023-06-07 NOTE — TELEPHONE ENCOUNTER
Was the patient seen in the last year in this department? Yes    Does patient have an active prescription for medications requested? Yes    Received Request Via: Pharmacy    Hospital Outpatient Visit on 07/12/2022   Component Date Value    Eject.Kennedic. MOD BP 07/12/2022 59.91     Eject.Frac. MOD 4C 07/12/2022 52.43     Eject.Frac. MOD 2C 07/12/2022 67.55     Left Ventrical Ejection * 07/12/2022 65    ]

## 2023-06-22 DIAGNOSIS — I83.893 VARICOSE VEINS OF BOTH LEGS WITH EDEMA: ICD-10-CM

## 2023-06-22 DIAGNOSIS — R60.0 BILATERAL LOWER EXTREMITY EDEMA: ICD-10-CM

## 2023-06-22 RX ORDER — BACLOFEN 20 MG/1
TABLET ORAL
Qty: 90 TABLET | Refills: 0 | Status: SHIPPED | OUTPATIENT
Start: 2023-06-22 | End: 2023-07-11

## 2023-06-23 ENCOUNTER — APPOINTMENT (OUTPATIENT)
Dept: ADMISSIONS | Facility: MEDICAL CENTER | Age: 58
DRG: 472 | End: 2023-06-23
Attending: NEUROLOGICAL SURGERY
Payer: MEDICAID

## 2023-06-26 ENCOUNTER — TELEPHONE (OUTPATIENT)
Dept: MEDICAL GROUP | Facility: CLINIC | Age: 58
End: 2023-06-26
Payer: MEDICAID

## 2023-06-27 ENCOUNTER — OFFICE VISIT (OUTPATIENT)
Dept: MEDICAL GROUP | Facility: CLINIC | Age: 58
End: 2023-06-27
Payer: MEDICAID

## 2023-06-27 VITALS
SYSTOLIC BLOOD PRESSURE: 118 MMHG | RESPIRATION RATE: 16 BRPM | OXYGEN SATURATION: 97 % | TEMPERATURE: 98.1 F | DIASTOLIC BLOOD PRESSURE: 82 MMHG | BODY MASS INDEX: 32.27 KG/M2 | HEIGHT: 67 IN | WEIGHT: 205.6 LBS | HEART RATE: 74 BPM

## 2023-06-27 DIAGNOSIS — M40.12 OTHER SECONDARY KYPHOSIS, CERVICAL REGION: ICD-10-CM

## 2023-06-27 DIAGNOSIS — M48.02 FORAMINAL STENOSIS OF CERVICAL REGION: ICD-10-CM

## 2023-06-27 DIAGNOSIS — Z01.818 VISIT FOR PRE-OPERATIVE EXAMINATION: ICD-10-CM

## 2023-06-27 DIAGNOSIS — M54.12 CERVICAL RADICULOPATHY: ICD-10-CM

## 2023-06-27 DIAGNOSIS — G95.9 CERVICAL MYELOPATHY (HCC): ICD-10-CM

## 2023-06-27 DIAGNOSIS — M48.00 CENTRAL STENOSIS OF SPINAL CANAL: ICD-10-CM

## 2023-06-27 DIAGNOSIS — M48.02 CERVICAL STENOSIS OF SPINAL CANAL: ICD-10-CM

## 2023-06-27 DIAGNOSIS — G62.9 NEUROPATHY: ICD-10-CM

## 2023-06-27 PROBLEM — M40.202 CERVICAL KYPHOSIS: Status: ACTIVE | Noted: 2023-06-27

## 2023-06-27 PROCEDURE — 99213 OFFICE O/P EST LOW 20 MIN: CPT | Performed by: PHYSICIAN ASSISTANT

## 2023-06-27 PROCEDURE — 3079F DIAST BP 80-89 MM HG: CPT | Performed by: PHYSICIAN ASSISTANT

## 2023-06-27 PROCEDURE — 3074F SYST BP LT 130 MM HG: CPT | Performed by: PHYSICIAN ASSISTANT

## 2023-06-27 PROCEDURE — 93000 ELECTROCARDIOGRAM COMPLETE: CPT | Performed by: PHYSICIAN ASSISTANT

## 2023-06-27 ASSESSMENT — FIBROSIS 4 INDEX: FIB4 SCORE: 0.86

## 2023-06-28 NOTE — PROGRESS NOTES
REASON FOR VISIT: Pre-Op Consultation  Consultation Requested by: Dr. Law Moya  Procedure date and type: C4-7 Anterior cervical discectomy and fusion 2023    History of condition for which surgery is planned: neck pain radiating up the back of her head and kyphotic deformity which developed post op posterior cervical laminectomy     Current chronic conditions: No problem-specific Assessment & Plan notes found for this encounter.    Past medical history:  has a past medical history of Abnormal drug screen (2017), Adjustment disorder with anxious mood, Arrhythmia, Arthritis, Bilateral leg edema, Dyslipidemia, goal LDL below 130, GERD (gastroesophageal reflux disease), Heart burn, Hemorrhagic disorder (HCC), Menopausal symptoms, Methamphetamine use (HCC) (2017), and Shingles (3/21/2022).    She has no past medical history of Asthma, CAD (coronary artery disease), Cancer ([facility]), Chronic obstructive pulmonary disease (HCC), Congestive heart failure ([facility]), Diabetes ([facility]), Hypertension, Liver disease, Patient denies medical problems, Renal disorder, Seizure disorder ([facility]), or Stroke ([facility]).. Negative for: CAD, SBE, CVA, TIA, DVT, PE, bleeding requiring transfusion, intubation.  Surgical and anesthetic history:  has a past surgical history that includes vaginal hysterectomy total (2021); anterior and posterior repair (2021); vaginal suspension (2021); cystoscopy (2021); cervical laminectomy posterior (Left, 2022); and cervical fusion posterior (Left, 2022). Prior surgery without complication, bleeding, reaction to anesthetic, prolonged recovery  Habits:   Social History     Tobacco Use    Smoking status: Former     Packs/day: 0.50     Years: 25.00     Pack years: 12.50     Types: Cigarettes     Quit date: 2023     Years since quittin.0    Smokeless tobacco: Never    Tobacco comments:     1/2 pack/ day    Vaping Use    Vaping Use: Former    Quit date: 2020    Substance Use Topics    Alcohol use: No     Alcohol/week: 0.0 oz     Comment: none at all    Drug use: Not Currently     Types: Inhaled, Oral     Comment: Meth occ: at least 2 weeks as of 4/25 .  4/18/22- smoked marijuana for the pain.     Allergies: Patient has no known allergies. No known allergy to Anesthetic, or Latex.     Current medicines:   Current Outpatient Medications   Medication Sig Dispense Refill    baclofen (LIORESAL) 20 MG tablet TAKE ONE TABLET BY MOUTH THREE TIMES A DAY 90 Tablet 0    gabapentin (NEURONTIN) 800 MG tablet TAKE 1 TABLET BY MOUTH 3 TIMES A DAY. 90 Tablet 0    ketoconazole (NIZORAL) 2 % Cream APPLY A SMALL AMOUNT TO AFFECTED AREA TWICE A DAY FOR 2-3 WEEKS. 60 g 0    ibuprofen (MOTRIN) 800 MG Tab TAKE 1 TABLET BY MOUTH EVERY 8 HOURS AS NEEDED FOR MODERATE PAIN. 90 Tablet 1    docusate sodium (COLACE) 100 MG Cap TAKE 1 CAPSULE BY MOUTH 2 TIMES A DAY. 60 Capsule 2    loratadine (CLARITIN) 10 MG Tab Take 1 Tablet by mouth every day. 30 Tablet 6    hydrOXYzine HCl (ATARAX) 25 MG Tab Take 1 Tablet by mouth 3 times a day as needed for Anxiety. 120 Tablet 3    potassium chloride ER (KLOR-CON) 10 MEQ tablet Take 1 Tablet by mouth 2 times a day. 180 Tablet 2    busPIRone (BUSPAR) 10 MG Tab tablet Take 1 Tablet by mouth 2 times a day. 180 Tablet 1    albuterol 108 (90 Base) MCG/ACT Aero Soln inhalation aerosol Inhale 2 Puffs every 6 hours as needed for Shortness of Breath. 8.5 g 5    furosemide (LASIX) 20 MG Tab Take 1 Tablet by mouth 3 times a day. 270 Tablet 1    PARoxetine (PAXIL) 30 MG Tab Take 1 Tablet by mouth every day. 90 Tablet 2    pantoprazole (PROTONIX) 40 MG Tablet Delayed Response Take 1 Tablet by mouth every day. 90 Tablet 2    amoxicillin (AMOXIL) 500 MG Cap Take 1 Capsule by mouth 3 times a day. (Patient not taking: Reported on 6/27/2023) 30 Capsule 0    fluconazole (DIFLUCAN) 100 MG Tab Take one tab weekly for 2 weeks. (Patient not taking: Reported on 6/27/2023) 2 Tablet 0  "    No current facility-administered medications for this visit.     Anticoagulant:  hold ibuprofen 10 days before procedure.    Herbals: not currently taking any herbals.     ROS: denies any other symptoms including fever, chills, nausea, vomiting, diarrhea.    Functional Status: ambulatory,     PHYSICAL EXAMINATION:  VITAL SIGNS: /82 (BP Location: Left arm, Patient Position: Sitting, BP Cuff Size: Adult)   Pulse 74   Temp 36.7 °C (98.1 °F) (Temporal)   Resp 16   Ht 1.702 m (5' 7\")   Wt 93.3 kg (205 lb 9.6 oz)   SpO2 97%  Body mass index is 32.2 kg/m².  HEENT: EOMI, PERRL. Oropharynx pink, moist. Normal airway. Neck supple, no cervical lymphadenopathy. No carotid thrills or bruits.  No thyromegaly or masses present.    LUNGS: CTAB good excursion.   HEART: RRR no murmur.  ABDOMEN: soft, nondistended, nontender, normal BS. No HSM.  LOWER EXTREMITIES: warm and well perfused with no edema, no cyanosis.      Labs: chest xray and labs pending.  EKG similar to 4-18-22 and 5-14-21.  Right atrial enlargement: Results reviewed and discussed with the patient, questions answered.  ADDENDUM 7-3-2023:  labs are normal/stable.  Patient is low risk.      IMPRESSION:  Visit for pre-operative clearance  Foraminal stenosis of cervical region  Central stenosis of spinal canal  Cervical radiculopathy  Neuropathy  Cervical stenosis of spinal canal  Cervical myelopathy  Cervical kyphosis    PLAN:  Chronic medical conditions: Stable and controlled. Continue current medicines.   Avoid drugs that potentiate bleeding as advised by surgeon  Discontinue all herbal supplements 2 weeks prior to surgery.  Need for SBE prophylaxis: No  This patient is considered: Not Available risk for cardiopulmonary complications for this planned surgery.  Pending further labs and chest xray results.          "

## 2023-06-29 ENCOUNTER — HOSPITAL ENCOUNTER (OUTPATIENT)
Dept: LAB | Facility: MEDICAL CENTER | Age: 58
End: 2023-06-29
Attending: PHYSICIAN ASSISTANT
Payer: MEDICAID

## 2023-06-29 ENCOUNTER — APPOINTMENT (OUTPATIENT)
Dept: RADIOLOGY | Facility: IMAGING CENTER | Age: 58
End: 2023-06-29
Attending: PHYSICIAN ASSISTANT
Payer: MEDICAID

## 2023-06-29 ENCOUNTER — NON-PROVIDER VISIT (OUTPATIENT)
Dept: URGENT CARE | Facility: PHYSICIAN GROUP | Age: 58
End: 2023-06-29
Payer: MEDICAID

## 2023-06-29 DIAGNOSIS — G62.9 NEUROPATHY: ICD-10-CM

## 2023-06-29 DIAGNOSIS — M48.00 CENTRAL STENOSIS OF SPINAL CANAL: ICD-10-CM

## 2023-06-29 DIAGNOSIS — M54.12 CERVICAL RADICULOPATHY: ICD-10-CM

## 2023-06-29 DIAGNOSIS — M48.02 CERVICAL STENOSIS OF SPINAL CANAL: ICD-10-CM

## 2023-06-29 DIAGNOSIS — M48.02 FORAMINAL STENOSIS OF CERVICAL REGION: ICD-10-CM

## 2023-06-29 DIAGNOSIS — Z01.818 VISIT FOR PRE-OPERATIVE EXAMINATION: ICD-10-CM

## 2023-06-29 LAB
ALBUMIN SERPL BCP-MCNC: 4.3 G/DL (ref 3.2–4.9)
ALBUMIN/GLOB SERPL: 1.9 G/DL
ALP SERPL-CCNC: 84 U/L (ref 30–99)
ALT SERPL-CCNC: 18 U/L (ref 2–50)
ANION GAP SERPL CALC-SCNC: 9 MMOL/L (ref 7–16)
APPEARANCE UR: CLEAR
APTT PPP: 26.1 SEC (ref 24.7–36)
AST SERPL-CCNC: 21 U/L (ref 12–45)
BASOPHILS # BLD AUTO: 1.4 % (ref 0–1.8)
BASOPHILS # BLD: 0.08 K/UL (ref 0–0.12)
BILIRUB SERPL-MCNC: 0.4 MG/DL (ref 0.1–1.5)
BILIRUB UR QL STRIP.AUTO: NEGATIVE
BUN SERPL-MCNC: 16 MG/DL (ref 8–22)
CALCIUM ALBUM COR SERPL-MCNC: 9.2 MG/DL (ref 8.5–10.5)
CALCIUM SERPL-MCNC: 9.4 MG/DL (ref 8.5–10.5)
CHLORIDE SERPL-SCNC: 104 MMOL/L (ref 96–112)
CO2 SERPL-SCNC: 26 MMOL/L (ref 20–33)
COLOR UR: YELLOW
CREAT SERPL-MCNC: 0.94 MG/DL (ref 0.5–1.4)
EOSINOPHIL # BLD AUTO: 0.15 K/UL (ref 0–0.51)
EOSINOPHIL NFR BLD: 2.6 % (ref 0–6.9)
ERYTHROCYTE [DISTWIDTH] IN BLOOD BY AUTOMATED COUNT: 41.1 FL (ref 35.9–50)
GFR SERPLBLD CREATININE-BSD FMLA CKD-EPI: 70 ML/MIN/1.73 M 2
GLOBULIN SER CALC-MCNC: 2.3 G/DL (ref 1.9–3.5)
GLUCOSE SERPL-MCNC: 98 MG/DL (ref 65–99)
GLUCOSE UR STRIP.AUTO-MCNC: NEGATIVE MG/DL
HCT VFR BLD AUTO: 40.5 % (ref 37–47)
HGB BLD-MCNC: 13.8 G/DL (ref 12–16)
IMM GRANULOCYTES # BLD AUTO: 0.02 K/UL (ref 0–0.11)
IMM GRANULOCYTES NFR BLD AUTO: 0.3 % (ref 0–0.9)
INR PPP: 1.06 (ref 0.87–1.13)
KETONES UR STRIP.AUTO-MCNC: NEGATIVE MG/DL
LEUKOCYTE ESTERASE UR QL STRIP.AUTO: NEGATIVE
LYMPHOCYTES # BLD AUTO: 2.17 K/UL (ref 1–4.8)
LYMPHOCYTES NFR BLD: 37.9 % (ref 22–41)
MCH RBC QN AUTO: 29.7 PG (ref 27–33)
MCHC RBC AUTO-ENTMCNC: 34.1 G/DL (ref 32.2–35.5)
MCV RBC AUTO: 87.3 FL (ref 81.4–97.8)
MICRO URNS: NORMAL
MONOCYTES # BLD AUTO: 0.73 K/UL (ref 0–0.85)
MONOCYTES NFR BLD AUTO: 12.8 % (ref 0–13.4)
NEUTROPHILS # BLD AUTO: 2.57 K/UL (ref 1.82–7.42)
NEUTROPHILS NFR BLD: 45 % (ref 44–72)
NITRITE UR QL STRIP.AUTO: NEGATIVE
NRBC # BLD AUTO: 0 K/UL
NRBC BLD-RTO: 0 /100 WBC (ref 0–0.2)
PH UR STRIP.AUTO: 6.5 [PH] (ref 5–8)
PLATELET # BLD AUTO: 295 K/UL (ref 164–446)
PMV BLD AUTO: 9.7 FL (ref 9–12.9)
POTASSIUM SERPL-SCNC: 4 MMOL/L (ref 3.6–5.5)
PROT SERPL-MCNC: 6.6 G/DL (ref 6–8.2)
PROT UR QL STRIP: NEGATIVE MG/DL
PROTHROMBIN TIME: 13.7 SEC (ref 12–14.6)
RBC # BLD AUTO: 4.64 M/UL (ref 4.2–5.4)
RBC UR QL AUTO: NEGATIVE
SODIUM SERPL-SCNC: 139 MMOL/L (ref 135–145)
SP GR UR STRIP.AUTO: 1.02
UROBILINOGEN UR STRIP.AUTO-MCNC: 0.2 MG/DL
WBC # BLD AUTO: 5.7 K/UL (ref 4.8–10.8)

## 2023-06-29 PROCEDURE — 85730 THROMBOPLASTIN TIME PARTIAL: CPT

## 2023-06-29 PROCEDURE — 80053 COMPREHEN METABOLIC PANEL: CPT

## 2023-06-29 PROCEDURE — 71046 X-RAY EXAM CHEST 2 VIEWS: CPT | Mod: TC,FY | Performed by: PHYSICIAN ASSISTANT

## 2023-06-29 PROCEDURE — 85025 COMPLETE CBC W/AUTO DIFF WBC: CPT

## 2023-06-29 PROCEDURE — 81003 URINALYSIS AUTO W/O SCOPE: CPT

## 2023-06-29 PROCEDURE — 36415 COLL VENOUS BLD VENIPUNCTURE: CPT

## 2023-06-29 PROCEDURE — 85610 PROTHROMBIN TIME: CPT

## 2023-06-30 ENCOUNTER — PRE-ADMISSION TESTING (OUTPATIENT)
Dept: ADMISSIONS | Facility: MEDICAL CENTER | Age: 58
DRG: 472 | End: 2023-06-30
Attending: NEUROLOGICAL SURGERY
Payer: MEDICAID

## 2023-06-30 RX ORDER — UBIDECARENONE 75 MG
100 CAPSULE ORAL DAILY
Status: ON HOLD | COMMUNITY
End: 2023-07-25

## 2023-07-25 ENCOUNTER — ANESTHESIA EVENT (OUTPATIENT)
Dept: SURGERY | Facility: MEDICAL CENTER | Age: 58
DRG: 472 | End: 2023-07-25
Payer: MEDICAID

## 2023-07-25 ENCOUNTER — ANESTHESIA (OUTPATIENT)
Dept: SURGERY | Facility: MEDICAL CENTER | Age: 58
DRG: 472 | End: 2023-07-25
Payer: MEDICAID

## 2023-07-25 ENCOUNTER — APPOINTMENT (OUTPATIENT)
Dept: RADIOLOGY | Facility: MEDICAL CENTER | Age: 58
DRG: 472 | End: 2023-07-25
Attending: NEUROLOGICAL SURGERY
Payer: MEDICAID

## 2023-07-25 ENCOUNTER — HOSPITAL ENCOUNTER (INPATIENT)
Facility: MEDICAL CENTER | Age: 58
LOS: 2 days | DRG: 472 | End: 2023-07-27
Attending: NEUROLOGICAL SURGERY | Admitting: NEUROLOGICAL SURGERY
Payer: MEDICAID

## 2023-07-25 PROCEDURE — 502000 HCHG MISC OR IMPLANTS RC 0278: Performed by: NEUROLOGICAL SURGERY

## 2023-07-25 PROCEDURE — 00600 ANES PX CRV SPINE&CORD NOS: CPT | Performed by: STUDENT IN AN ORGANIZED HEALTH CARE EDUCATION/TRAINING PROGRAM

## 2023-07-25 PROCEDURE — 00NW0ZZ RELEASE CERVICAL SPINAL CORD, OPEN APPROACH: ICD-10-PCS | Performed by: NEUROLOGICAL SURGERY

## 2023-07-25 PROCEDURE — 0RG2070 FUSION OF 2 OR MORE CERVICAL VERTEBRAL JOINTS WITH AUTOLOGOUS TISSUE SUBSTITUTE, ANTERIOR APPROACH, ANTERIOR COLUMN, OPEN APPROACH: ICD-10-PCS | Performed by: NEUROLOGICAL SURGERY

## 2023-07-25 PROCEDURE — A9270 NON-COVERED ITEM OR SERVICE: HCPCS | Performed by: PHYSICIAN ASSISTANT

## 2023-07-25 PROCEDURE — 700111 HCHG RX REV CODE 636 W/ 250 OVERRIDE (IP): Mod: JZ | Performed by: PHYSICIAN ASSISTANT

## 2023-07-25 PROCEDURE — 160002 HCHG RECOVERY MINUTES (STAT): Performed by: NEUROLOGICAL SURGERY

## 2023-07-25 PROCEDURE — 160029 HCHG SURGERY MINUTES - 1ST 30 MINS LEVEL 4: Performed by: NEUROLOGICAL SURGERY

## 2023-07-25 PROCEDURE — 160041 HCHG SURGERY MINUTES - EA ADDL 1 MIN LEVEL 4: Performed by: NEUROLOGICAL SURGERY

## 2023-07-25 PROCEDURE — 95938 SOMATOSENSORY TESTING: CPT | Performed by: NEUROLOGICAL SURGERY

## 2023-07-25 PROCEDURE — 700111 HCHG RX REV CODE 636 W/ 250 OVERRIDE (IP): Performed by: STUDENT IN AN ORGANIZED HEALTH CARE EDUCATION/TRAINING PROGRAM

## 2023-07-25 PROCEDURE — 700105 HCHG RX REV CODE 258: Mod: JZ | Performed by: NEUROLOGICAL SURGERY

## 2023-07-25 PROCEDURE — 72040 X-RAY EXAM NECK SPINE 2-3 VW: CPT

## 2023-07-25 PROCEDURE — 700105 HCHG RX REV CODE 258: Performed by: PHYSICIAN ASSISTANT

## 2023-07-25 PROCEDURE — 95940 IONM IN OPERATNG ROOM 15 MIN: CPT | Performed by: NEUROLOGICAL SURGERY

## 2023-07-25 PROCEDURE — 700101 HCHG RX REV CODE 250: Performed by: STUDENT IN AN ORGANIZED HEALTH CARE EDUCATION/TRAINING PROGRAM

## 2023-07-25 PROCEDURE — 700111 HCHG RX REV CODE 636 W/ 250 OVERRIDE (IP)

## 2023-07-25 PROCEDURE — A9270 NON-COVERED ITEM OR SERVICE: HCPCS | Performed by: STUDENT IN AN ORGANIZED HEALTH CARE EDUCATION/TRAINING PROGRAM

## 2023-07-25 PROCEDURE — 700102 HCHG RX REV CODE 250 W/ 637 OVERRIDE(OP): Performed by: STUDENT IN AN ORGANIZED HEALTH CARE EDUCATION/TRAINING PROGRAM

## 2023-07-25 PROCEDURE — 95864 NEEDLE EMG 4 EXTREMITIES: CPT | Performed by: NEUROLOGICAL SURGERY

## 2023-07-25 PROCEDURE — 110454 HCHG SHELL REV 250: Performed by: NEUROLOGICAL SURGERY

## 2023-07-25 PROCEDURE — C1713 ANCHOR/SCREW BN/BN,TIS/BN: HCPCS | Performed by: NEUROLOGICAL SURGERY

## 2023-07-25 PROCEDURE — 700111 HCHG RX REV CODE 636 W/ 250 OVERRIDE (IP): Mod: JZ | Performed by: NEUROLOGICAL SURGERY

## 2023-07-25 PROCEDURE — 160036 HCHG PACU - EA ADDL 30 MINS PHASE I: Performed by: NEUROLOGICAL SURGERY

## 2023-07-25 PROCEDURE — 95939 C MOTOR EVOKED UPR&LWR LIMBS: CPT | Performed by: NEUROLOGICAL SURGERY

## 2023-07-25 PROCEDURE — 700102 HCHG RX REV CODE 250 W/ 637 OVERRIDE(OP): Performed by: PHYSICIAN ASSISTANT

## 2023-07-25 PROCEDURE — 160048 HCHG OR STATISTICAL LEVEL 1-5: Performed by: NEUROLOGICAL SURGERY

## 2023-07-25 PROCEDURE — 700101 HCHG RX REV CODE 250: Performed by: NEUROLOGICAL SURGERY

## 2023-07-25 PROCEDURE — 160035 HCHG PACU - 1ST 60 MINS PHASE I: Performed by: NEUROLOGICAL SURGERY

## 2023-07-25 PROCEDURE — 160009 HCHG ANES TIME/MIN: Performed by: NEUROLOGICAL SURGERY

## 2023-07-25 PROCEDURE — 0RT30ZZ RESECTION OF CERVICAL VERTEBRAL DISC, OPEN APPROACH: ICD-10-PCS | Performed by: NEUROLOGICAL SURGERY

## 2023-07-25 PROCEDURE — 700101 HCHG RX REV CODE 250: Performed by: PHYSICIAN ASSISTANT

## 2023-07-25 PROCEDURE — 700105 HCHG RX REV CODE 258: Performed by: STUDENT IN AN ORGANIZED HEALTH CARE EDUCATION/TRAINING PROGRAM

## 2023-07-25 PROCEDURE — 700105 HCHG RX REV CODE 258

## 2023-07-25 PROCEDURE — 110371 HCHG SHELL REV 272: Performed by: NEUROLOGICAL SURGERY

## 2023-07-25 PROCEDURE — 770001 HCHG ROOM/CARE - MED/SURG/GYN PRIV*

## 2023-07-25 PROCEDURE — 95937 NEUROMUSCULAR JUNCTION TEST: CPT | Performed by: NEUROLOGICAL SURGERY

## 2023-07-25 DEVICE — SCREW 4.0X15 SELF DRILL VAR (1TX12+2TCX12=36): Type: IMPLANTABLE DEVICE | Site: NECK | Status: FUNCTIONAL

## 2023-07-25 DEVICE — CAGE SPINAL ENDOSKELETON TC TITANIUM NANO SPACER MEDIUM 6D 6MM (1EA): Type: IMPLANTABLE DEVICE | Site: NECK | Status: FUNCTIONAL

## 2023-07-25 DEVICE — PUTTY GRAFTON 1CC: Type: IMPLANTABLE DEVICE | Site: NECK | Status: FUNCTIONAL

## 2023-07-25 DEVICE — IMPLANTABLE DEVICE: Type: IMPLANTABLE DEVICE | Site: NECK | Status: FUNCTIONAL

## 2023-07-25 DEVICE — SCREW ATL TRNS 4.0X15 SELF TAP VAR (1TX10+2TCX10=30): Type: IMPLANTABLE DEVICE | Site: NECK | Status: FUNCTIONAL

## 2023-07-25 RX ORDER — POLYETHYLENE GLYCOL 3350 17 G/17G
1 POWDER, FOR SOLUTION ORAL 2 TIMES DAILY PRN
Status: DISCONTINUED | OUTPATIENT
Start: 2023-07-25 | End: 2023-07-27 | Stop reason: HOSPADM

## 2023-07-25 RX ORDER — MIDAZOLAM HYDROCHLORIDE 1 MG/ML
INJECTION INTRAMUSCULAR; INTRAVENOUS PRN
Status: DISCONTINUED | OUTPATIENT
Start: 2023-07-25 | End: 2023-07-25 | Stop reason: SURG

## 2023-07-25 RX ORDER — POTASSIUM CHLORIDE 750 MG/1
10 TABLET, FILM COATED, EXTENDED RELEASE ORAL 2 TIMES DAILY
Status: DISCONTINUED | OUTPATIENT
Start: 2023-07-25 | End: 2023-07-27 | Stop reason: HOSPADM

## 2023-07-25 RX ORDER — LIDOCAINE HYDROCHLORIDE 20 MG/ML
INJECTION, SOLUTION EPIDURAL; INFILTRATION; INTRACAUDAL; PERINEURAL PRN
Status: DISCONTINUED | OUTPATIENT
Start: 2023-07-25 | End: 2023-07-25 | Stop reason: SURG

## 2023-07-25 RX ORDER — OMEPRAZOLE 20 MG/1
20 CAPSULE, DELAYED RELEASE ORAL DAILY
Status: DISCONTINUED | OUTPATIENT
Start: 2023-07-25 | End: 2023-07-27 | Stop reason: HOSPADM

## 2023-07-25 RX ORDER — DIAZEPAM 5 MG/1
5 TABLET ORAL EVERY 4 HOURS PRN
Status: DISCONTINUED | OUTPATIENT
Start: 2023-07-25 | End: 2023-07-27 | Stop reason: HOSPADM

## 2023-07-25 RX ORDER — EPHEDRINE SULFATE 50 MG/ML
5 INJECTION, SOLUTION INTRAVENOUS
Status: DISCONTINUED | OUTPATIENT
Start: 2023-07-25 | End: 2023-07-25 | Stop reason: HOSPADM

## 2023-07-25 RX ORDER — MORPHINE SULFATE 4 MG/ML
4 INJECTION INTRAVENOUS ONCE
Status: COMPLETED | OUTPATIENT
Start: 2023-07-25 | End: 2023-07-25

## 2023-07-25 RX ORDER — SUCCINYLCHOLINE CHLORIDE 20 MG/ML
INJECTION INTRAMUSCULAR; INTRAVENOUS PRN
Status: DISCONTINUED | OUTPATIENT
Start: 2023-07-25 | End: 2023-07-25 | Stop reason: SURG

## 2023-07-25 RX ORDER — BISACODYL 10 MG
10 SUPPOSITORY, RECTAL RECTAL
Status: DISCONTINUED | OUTPATIENT
Start: 2023-07-25 | End: 2023-07-27 | Stop reason: HOSPADM

## 2023-07-25 RX ORDER — ENEMA 19; 7 G/133ML; G/133ML
1 ENEMA RECTAL
Status: DISCONTINUED | OUTPATIENT
Start: 2023-07-25 | End: 2023-07-27 | Stop reason: HOSPADM

## 2023-07-25 RX ORDER — ONDANSETRON 4 MG/1
4 TABLET, ORALLY DISINTEGRATING ORAL EVERY 4 HOURS PRN
Status: DISCONTINUED | OUTPATIENT
Start: 2023-07-25 | End: 2023-07-27 | Stop reason: HOSPADM

## 2023-07-25 RX ORDER — FUROSEMIDE 20 MG/1
20 TABLET ORAL 3 TIMES DAILY
Status: DISCONTINUED | OUTPATIENT
Start: 2023-07-25 | End: 2023-07-27 | Stop reason: HOSPADM

## 2023-07-25 RX ORDER — PROCHLORPERAZINE EDISYLATE 5 MG/ML
5-10 INJECTION INTRAMUSCULAR; INTRAVENOUS EVERY 4 HOURS PRN
Status: DISCONTINUED | OUTPATIENT
Start: 2023-07-25 | End: 2023-07-27 | Stop reason: HOSPADM

## 2023-07-25 RX ORDER — BUSPIRONE HYDROCHLORIDE 10 MG/1
10 TABLET ORAL
Status: DISCONTINUED | OUTPATIENT
Start: 2023-07-25 | End: 2023-07-27 | Stop reason: HOSPADM

## 2023-07-25 RX ORDER — REMIFENTANIL HYDROCHLORIDE 1 MG/ML
INJECTION, POWDER, LYOPHILIZED, FOR SOLUTION INTRAVENOUS
Status: DISCONTINUED | OUTPATIENT
Start: 2023-07-25 | End: 2023-07-25 | Stop reason: SURG

## 2023-07-25 RX ORDER — OXYCODONE HCL 10 MG/1
10 TABLET, FILM COATED, EXTENDED RELEASE ORAL ONCE
Status: COMPLETED | OUTPATIENT
Start: 2023-07-25 | End: 2023-07-25

## 2023-07-25 RX ORDER — HALOPERIDOL 5 MG/ML
1 INJECTION INTRAMUSCULAR
Status: DISCONTINUED | OUTPATIENT
Start: 2023-07-25 | End: 2023-07-25 | Stop reason: HOSPADM

## 2023-07-25 RX ORDER — CEFAZOLIN SODIUM 1 G/3ML
INJECTION, POWDER, FOR SOLUTION INTRAMUSCULAR; INTRAVENOUS PRN
Status: DISCONTINUED | OUTPATIENT
Start: 2023-07-25 | End: 2023-07-25 | Stop reason: SURG

## 2023-07-25 RX ORDER — BACLOFEN 10 MG/1
20 TABLET ORAL 3 TIMES DAILY
Status: DISCONTINUED | OUTPATIENT
Start: 2023-07-25 | End: 2023-07-27 | Stop reason: HOSPADM

## 2023-07-25 RX ORDER — ACETAMINOPHEN 10 MG/ML
1 INJECTION, SOLUTION INTRAVENOUS ONCE
Status: COMPLETED | OUTPATIENT
Start: 2023-07-25 | End: 2023-07-25

## 2023-07-25 RX ORDER — METHOCARBAMOL 750 MG/1
750 TABLET, FILM COATED ORAL EVERY 8 HOURS PRN
Status: DISCONTINUED | OUTPATIENT
Start: 2023-07-25 | End: 2023-07-27 | Stop reason: HOSPADM

## 2023-07-25 RX ORDER — CEFAZOLIN SODIUM 1 G/3ML
INJECTION, POWDER, FOR SOLUTION INTRAMUSCULAR; INTRAVENOUS
Status: DISCONTINUED | OUTPATIENT
Start: 2023-07-25 | End: 2023-07-25 | Stop reason: HOSPADM

## 2023-07-25 RX ORDER — HYDROXYZINE HYDROCHLORIDE 25 MG/1
25 TABLET, FILM COATED ORAL 3 TIMES DAILY PRN
Status: DISCONTINUED | OUTPATIENT
Start: 2023-07-25 | End: 2023-07-27 | Stop reason: HOSPADM

## 2023-07-25 RX ORDER — ONDANSETRON 2 MG/ML
4 INJECTION INTRAMUSCULAR; INTRAVENOUS
Status: DISCONTINUED | OUTPATIENT
Start: 2023-07-25 | End: 2023-07-25 | Stop reason: HOSPADM

## 2023-07-25 RX ORDER — ACETAMINOPHEN 500 MG
1000 TABLET ORAL EVERY 6 HOURS
Status: DISCONTINUED | OUTPATIENT
Start: 2023-07-25 | End: 2023-07-27 | Stop reason: HOSPADM

## 2023-07-25 RX ORDER — DOCUSATE SODIUM 100 MG/1
100 CAPSULE, LIQUID FILLED ORAL 2 TIMES DAILY
Status: DISCONTINUED | OUTPATIENT
Start: 2023-07-25 | End: 2023-07-26

## 2023-07-25 RX ORDER — LABETALOL HYDROCHLORIDE 5 MG/ML
10 INJECTION, SOLUTION INTRAVENOUS
Status: DISCONTINUED | OUTPATIENT
Start: 2023-07-25 | End: 2023-07-27 | Stop reason: HOSPADM

## 2023-07-25 RX ORDER — HYDRALAZINE HYDROCHLORIDE 20 MG/ML
5 INJECTION INTRAMUSCULAR; INTRAVENOUS
Status: DISCONTINUED | OUTPATIENT
Start: 2023-07-25 | End: 2023-07-25 | Stop reason: HOSPADM

## 2023-07-25 RX ORDER — SODIUM CHLORIDE, SODIUM LACTATE, POTASSIUM CHLORIDE, CALCIUM CHLORIDE 600; 310; 30; 20 MG/100ML; MG/100ML; MG/100ML; MG/100ML
INJECTION, SOLUTION INTRAVENOUS CONTINUOUS
Status: ACTIVE | OUTPATIENT
Start: 2023-07-25 | End: 2023-07-25

## 2023-07-25 RX ORDER — DIPHENHYDRAMINE HYDROCHLORIDE 50 MG/ML
12.5 INJECTION INTRAMUSCULAR; INTRAVENOUS
Status: DISCONTINUED | OUTPATIENT
Start: 2023-07-25 | End: 2023-07-25 | Stop reason: HOSPADM

## 2023-07-25 RX ORDER — HYDROMORPHONE HYDROCHLORIDE 1 MG/ML
0.2 INJECTION, SOLUTION INTRAMUSCULAR; INTRAVENOUS; SUBCUTANEOUS
Status: DISCONTINUED | OUTPATIENT
Start: 2023-07-25 | End: 2023-07-25 | Stop reason: HOSPADM

## 2023-07-25 RX ORDER — GABAPENTIN 400 MG/1
800 CAPSULE ORAL 3 TIMES DAILY
Status: DISCONTINUED | OUTPATIENT
Start: 2023-07-25 | End: 2023-07-27 | Stop reason: HOSPADM

## 2023-07-25 RX ORDER — DOCUSATE SODIUM 100 MG/1
100 CAPSULE, LIQUID FILLED ORAL 2 TIMES DAILY
Status: DISCONTINUED | OUTPATIENT
Start: 2023-07-25 | End: 2023-07-27 | Stop reason: HOSPADM

## 2023-07-25 RX ORDER — ONDANSETRON 2 MG/ML
INJECTION INTRAMUSCULAR; INTRAVENOUS PRN
Status: DISCONTINUED | OUTPATIENT
Start: 2023-07-25 | End: 2023-07-25 | Stop reason: SURG

## 2023-07-25 RX ORDER — ALBUTEROL SULFATE 90 UG/1
2 AEROSOL, METERED RESPIRATORY (INHALATION) EVERY 6 HOURS PRN
Status: DISCONTINUED | OUTPATIENT
Start: 2023-07-25 | End: 2023-07-27 | Stop reason: HOSPADM

## 2023-07-25 RX ORDER — HYDROMORPHONE HYDROCHLORIDE 1 MG/ML
0.5 INJECTION, SOLUTION INTRAMUSCULAR; INTRAVENOUS; SUBCUTANEOUS
Status: DISCONTINUED | OUTPATIENT
Start: 2023-07-25 | End: 2023-07-25 | Stop reason: HOSPADM

## 2023-07-25 RX ORDER — ACETAMINOPHEN 500 MG
1000 TABLET ORAL EVERY 6 HOURS PRN
Status: DISCONTINUED | OUTPATIENT
Start: 2023-07-30 | End: 2023-07-27 | Stop reason: HOSPADM

## 2023-07-25 RX ORDER — PROMETHAZINE HYDROCHLORIDE 25 MG/1
12.5-25 TABLET ORAL EVERY 4 HOURS PRN
Status: DISCONTINUED | OUTPATIENT
Start: 2023-07-25 | End: 2023-07-27 | Stop reason: HOSPADM

## 2023-07-25 RX ORDER — HYDROMORPHONE HYDROCHLORIDE 1 MG/ML
0.1 INJECTION, SOLUTION INTRAMUSCULAR; INTRAVENOUS; SUBCUTANEOUS
Status: DISCONTINUED | OUTPATIENT
Start: 2023-07-25 | End: 2023-07-25 | Stop reason: HOSPADM

## 2023-07-25 RX ORDER — MORPHINE SULFATE 4 MG/ML
4 INJECTION INTRAVENOUS
Status: DISCONTINUED | OUTPATIENT
Start: 2023-07-25 | End: 2023-07-27 | Stop reason: HOSPADM

## 2023-07-25 RX ORDER — OXYCODONE HCL 5 MG/5 ML
5 SOLUTION, ORAL ORAL
Status: DISCONTINUED | OUTPATIENT
Start: 2023-07-25 | End: 2023-07-25 | Stop reason: HOSPADM

## 2023-07-25 RX ORDER — MEPERIDINE HYDROCHLORIDE 25 MG/ML
6.25 INJECTION INTRAMUSCULAR; INTRAVENOUS; SUBCUTANEOUS
Status: DISCONTINUED | OUTPATIENT
Start: 2023-07-25 | End: 2023-07-25 | Stop reason: HOSPADM

## 2023-07-25 RX ORDER — SODIUM CHLORIDE, SODIUM LACTATE, POTASSIUM CHLORIDE, CALCIUM CHLORIDE 600; 310; 30; 20 MG/100ML; MG/100ML; MG/100ML; MG/100ML
INJECTION, SOLUTION INTRAVENOUS CONTINUOUS
Status: DISCONTINUED | OUTPATIENT
Start: 2023-07-25 | End: 2023-07-25 | Stop reason: HOSPADM

## 2023-07-25 RX ORDER — DIPHENHYDRAMINE HYDROCHLORIDE 50 MG/ML
25 INJECTION INTRAMUSCULAR; INTRAVENOUS EVERY 6 HOURS PRN
Status: DISCONTINUED | OUTPATIENT
Start: 2023-07-25 | End: 2023-07-27 | Stop reason: HOSPADM

## 2023-07-25 RX ORDER — OXYCODONE HCL 5 MG/5 ML
10 SOLUTION, ORAL ORAL
Status: DISCONTINUED | OUTPATIENT
Start: 2023-07-25 | End: 2023-07-25 | Stop reason: HOSPADM

## 2023-07-25 RX ORDER — CYCLOBENZAPRINE HCL 10 MG
10 TABLET ORAL EVERY 8 HOURS PRN
Status: DISCONTINUED | OUTPATIENT
Start: 2023-07-25 | End: 2023-07-27 | Stop reason: HOSPADM

## 2023-07-25 RX ORDER — PANTOPRAZOLE SODIUM 40 MG/1
40 TABLET, DELAYED RELEASE ORAL DAILY
Status: DISCONTINUED | OUTPATIENT
Start: 2023-07-25 | End: 2023-07-25

## 2023-07-25 RX ORDER — DEXTROSE MONOHYDRATE, SODIUM CHLORIDE, AND POTASSIUM CHLORIDE 50; 1.49; 9 G/1000ML; G/1000ML; G/1000ML
INJECTION, SOLUTION INTRAVENOUS CONTINUOUS
Status: DISCONTINUED | OUTPATIENT
Start: 2023-07-25 | End: 2023-07-27 | Stop reason: HOSPADM

## 2023-07-25 RX ORDER — AMOXICILLIN 250 MG
1 CAPSULE ORAL
Status: DISCONTINUED | OUTPATIENT
Start: 2023-07-25 | End: 2023-07-27 | Stop reason: HOSPADM

## 2023-07-25 RX ORDER — PROMETHAZINE HYDROCHLORIDE 25 MG/1
12.5-25 SUPPOSITORY RECTAL EVERY 4 HOURS PRN
Status: DISCONTINUED | OUTPATIENT
Start: 2023-07-25 | End: 2023-07-27 | Stop reason: HOSPADM

## 2023-07-25 RX ORDER — LORATADINE 10 MG/1
10 TABLET ORAL
Status: DISCONTINUED | OUTPATIENT
Start: 2023-07-25 | End: 2023-07-27 | Stop reason: HOSPADM

## 2023-07-25 RX ORDER — DEXAMETHASONE SODIUM PHOSPHATE 4 MG/ML
INJECTION, SOLUTION INTRA-ARTICULAR; INTRALESIONAL; INTRAMUSCULAR; INTRAVENOUS; SOFT TISSUE PRN
Status: DISCONTINUED | OUTPATIENT
Start: 2023-07-25 | End: 2023-07-25 | Stop reason: SURG

## 2023-07-25 RX ORDER — CALCIUM CARBONATE 500 MG/1
500 TABLET, CHEWABLE ORAL 2 TIMES DAILY
Status: DISCONTINUED | OUTPATIENT
Start: 2023-07-25 | End: 2023-07-27 | Stop reason: HOSPADM

## 2023-07-25 RX ORDER — DEXAMETHASONE SODIUM PHOSPHATE 4 MG/ML
4 INJECTION, SOLUTION INTRA-ARTICULAR; INTRALESIONAL; INTRAMUSCULAR; INTRAVENOUS; SOFT TISSUE EVERY 6 HOURS
Status: COMPLETED | OUTPATIENT
Start: 2023-07-25 | End: 2023-07-26

## 2023-07-25 RX ORDER — PAROXETINE 30 MG/1
30 TABLET, FILM COATED ORAL
Status: DISCONTINUED | OUTPATIENT
Start: 2023-07-25 | End: 2023-07-27 | Stop reason: HOSPADM

## 2023-07-25 RX ORDER — HYDROMORPHONE HYDROCHLORIDE 2 MG/ML
INJECTION, SOLUTION INTRAMUSCULAR; INTRAVENOUS; SUBCUTANEOUS PRN
Status: DISCONTINUED | OUTPATIENT
Start: 2023-07-25 | End: 2023-07-25 | Stop reason: SURG

## 2023-07-25 RX ORDER — ONDANSETRON 2 MG/ML
4 INJECTION INTRAMUSCULAR; INTRAVENOUS EVERY 4 HOURS PRN
Status: DISCONTINUED | OUTPATIENT
Start: 2023-07-25 | End: 2023-07-27 | Stop reason: HOSPADM

## 2023-07-25 RX ORDER — DIPHENHYDRAMINE HCL 25 MG
25 TABLET ORAL EVERY 6 HOURS PRN
Status: DISCONTINUED | OUTPATIENT
Start: 2023-07-25 | End: 2023-07-27 | Stop reason: HOSPADM

## 2023-07-25 RX ORDER — AMOXICILLIN 250 MG
1 CAPSULE ORAL NIGHTLY
Status: DISCONTINUED | OUTPATIENT
Start: 2023-07-25 | End: 2023-07-27 | Stop reason: HOSPADM

## 2023-07-25 RX ORDER — BUPIVACAINE HYDROCHLORIDE AND EPINEPHRINE 5; 5 MG/ML; UG/ML
INJECTION, SOLUTION EPIDURAL; INTRACAUDAL; PERINEURAL
Status: DISCONTINUED | OUTPATIENT
Start: 2023-07-25 | End: 2023-07-25 | Stop reason: HOSPADM

## 2023-07-25 RX ADMIN — HYDROMORPHONE HYDROCHLORIDE 1 MG: 2 INJECTION INTRAMUSCULAR; INTRAVENOUS; SUBCUTANEOUS at 08:06

## 2023-07-25 RX ADMIN — LIDOCAINE HYDROCHLORIDE 100 MG: 20 INJECTION, SOLUTION EPIDURAL; INFILTRATION; INTRACAUDAL at 07:40

## 2023-07-25 RX ADMIN — PROPOFOL 100 MG: 10 INJECTION, EMULSION INTRAVENOUS at 07:40

## 2023-07-25 RX ADMIN — ACETAMINOPHEN 1 G: 10 INJECTION INTRAVENOUS at 06:49

## 2023-07-25 RX ADMIN — MIDAZOLAM 2 MG: 1 INJECTION, SOLUTION INTRAMUSCULAR; INTRAVENOUS at 07:34

## 2023-07-25 RX ADMIN — OXYCODONE HYDROCHLORIDE 10 MG: 10 TABLET, FILM COATED, EXTENDED RELEASE ORAL at 06:49

## 2023-07-25 RX ADMIN — SODIUM CHLORIDE 20 MCG/MIN: 900 INJECTION INTRAVENOUS at 08:15

## 2023-07-25 RX ADMIN — POTASSIUM CHLORIDE, DEXTROSE MONOHYDRATE AND SODIUM CHLORIDE: 150; 5; 900 INJECTION, SOLUTION INTRAVENOUS at 15:30

## 2023-07-25 RX ADMIN — CEFAZOLIN 2 G: 1 INJECTION, POWDER, FOR SOLUTION INTRAMUSCULAR; INTRAVENOUS at 07:52

## 2023-07-25 RX ADMIN — DEXAMETHASONE SODIUM PHOSPHATE 4 MG: 4 INJECTION INTRA-ARTICULAR; INTRALESIONAL; INTRAMUSCULAR; INTRAVENOUS; SOFT TISSUE at 20:50

## 2023-07-25 RX ADMIN — BUSPIRONE HYDROCHLORIDE 10 MG: 10 TABLET ORAL at 20:54

## 2023-07-25 RX ADMIN — DOCUSATE SODIUM 100 MG: 100 CAPSULE, LIQUID FILLED ORAL at 17:02

## 2023-07-25 RX ADMIN — DOCUSATE SODIUM 100 MG: 100 CAPSULE, LIQUID FILLED ORAL at 18:00

## 2023-07-25 RX ADMIN — PAROXETINE 30 MG: 30 TABLET, FILM COATED ORAL at 20:50

## 2023-07-25 RX ADMIN — ACETAMINOPHEN 1000 MG: 500 TABLET, FILM COATED ORAL at 23:28

## 2023-07-25 RX ADMIN — PROPOFOL 100 MCG/KG/MIN: 10 INJECTION, EMULSION INTRAVENOUS at 07:50

## 2023-07-25 RX ADMIN — GABAPENTIN 800 MG: 400 CAPSULE ORAL at 17:03

## 2023-07-25 RX ADMIN — CEFAZOLIN 2 G: 2 INJECTION, POWDER, FOR SOLUTION INTRAMUSCULAR; INTRAVENOUS at 23:30

## 2023-07-25 RX ADMIN — DEXAMETHASONE SODIUM PHOSPHATE 4 MG: 4 INJECTION INTRA-ARTICULAR; INTRALESIONAL; INTRAMUSCULAR; INTRAVENOUS; SOFT TISSUE at 15:33

## 2023-07-25 RX ADMIN — PROPOFOL 50 MG: 10 INJECTION, EMULSION INTRAVENOUS at 07:52

## 2023-07-25 RX ADMIN — SUCCINYLCHOLINE CHLORIDE 60 MG: 20 INJECTION, SOLUTION INTRAMUSCULAR; INTRAVENOUS at 07:40

## 2023-07-25 RX ADMIN — REMIFENTANIL HYDROCHLORIDE 0.05 MCG/KG/MIN: 1 INJECTION, POWDER, LYOPHILIZED, FOR SOLUTION INTRAVENOUS at 07:50

## 2023-07-25 RX ADMIN — HYDROMORPHONE HYDROCHLORIDE 1 MG: 2 INJECTION INTRAMUSCULAR; INTRAVENOUS; SUBCUTANEOUS at 07:34

## 2023-07-25 RX ADMIN — LORATADINE 10 MG: 10 TABLET ORAL at 20:50

## 2023-07-25 RX ADMIN — POTASSIUM CHLORIDE 10 MEQ: 750 TABLET, EXTENDED RELEASE ORAL at 17:05

## 2023-07-25 RX ADMIN — DEXAMETHASONE SODIUM PHOSPHATE 8 MG: 4 INJECTION INTRA-ARTICULAR; INTRALESIONAL; INTRAMUSCULAR; INTRAVENOUS; SOFT TISSUE at 07:55

## 2023-07-25 RX ADMIN — FUROSEMIDE 20 MG: 20 TABLET ORAL at 17:02

## 2023-07-25 RX ADMIN — SODIUM CHLORIDE, POTASSIUM CHLORIDE, SODIUM LACTATE AND CALCIUM CHLORIDE: 600; 310; 30; 20 INJECTION, SOLUTION INTRAVENOUS at 07:30

## 2023-07-25 RX ADMIN — CEFAZOLIN 2 G: 2 INJECTION, POWDER, FOR SOLUTION INTRAMUSCULAR; INTRAVENOUS at 16:59

## 2023-07-25 RX ADMIN — ACETAMINOPHEN 1000 MG: 500 TABLET, FILM COATED ORAL at 17:02

## 2023-07-25 RX ADMIN — ANTACID TABLETS 500 MG: 500 TABLET, CHEWABLE ORAL at 17:03

## 2023-07-25 RX ADMIN — Medication: at 15:37

## 2023-07-25 RX ADMIN — OMEPRAZOLE 20 MG: 20 CAPSULE, DELAYED RELEASE ORAL at 15:33

## 2023-07-25 RX ADMIN — BACLOFEN 20 MG: 10 TABLET ORAL at 17:02

## 2023-07-25 RX ADMIN — ONDANSETRON 4 MG: 2 INJECTION INTRAMUSCULAR; INTRAVENOUS at 10:09

## 2023-07-25 RX ADMIN — DOCUSATE SODIUM 100 MG: 100 CAPSULE, LIQUID FILLED ORAL at 17:03

## 2023-07-25 ASSESSMENT — PAIN DESCRIPTION - PAIN TYPE
TYPE: SURGICAL PAIN
TYPE: SURGICAL PAIN

## 2023-07-25 ASSESSMENT — PAIN SCALES - GENERAL: PAIN_LEVEL: 0

## 2023-07-25 ASSESSMENT — FIBROSIS 4 INDEX: FIB4 SCORE: .989949493661166534

## 2023-07-25 NOTE — OP REPORT
DATE OF SERVICE:  07/25/2023     PREOPERATIVE DIAGNOSES:  Cervical stenosis with myelopathy plus a cervical   kyphotic deformity.     POSTOPERATIVE DIAGNOSES:  Cervical stenosis with myelopathy plus a cervical   kyphotic deformity.     OPERATIONS:  1.  Microscopic anterior cervical diskectomies, C4-5, C5-C6 and C6-C7;   bilateral foraminotomies, C4-C5 and C5-C6 plus decompression of cervical   spinal cord at C5-C6.  2.  Anterior cervical fusion, three levels above with Medtronic titanium   cages, lordotic cages and local bone graft.  3.  Harvesting preparation, local bone graft.  4.  Anterior cervical plating, C4-C7 (Medtronic Atlantis Vision Elite system).     SURGEON:  Law Moya MD     ASSISTANT:  Denita Stoddard PA-C     ANESTHESIA:  General endotracheal.     ANESTHESIOLOGIST:  Joao Wells MD     PREPARATION:  ChloraPrep.     MEDICATIONS:  The patient given Ancef prior to incision.     INDICATIONS:  This woman who I did a previous C3-C4 laminectomy and fusion for   focal stenosis at the 3-4 level with myelopathy, we followed her with serial   imaging.  She was developing kyphotic deformity, increasing neck pain plus   recurrent symptoms.  A new MRI showed development of cord compression and   stenosis at C5-C6.  She had foraminal narrowing for exiting 5 and 6 roots and   the kyphotic deformity.  The patient was felt to be a candidate to decompress   the neural elements to prevent further loss of neurologic function and   optimize the potential for neurologic recovery as well as to establish more   normal cervical lordosis to help with her neck pain.  The patient understood   major risks and complications such as quadriplegia due to intraoperative   event, postoperative event such as hematoma, very rare as would be arterial   injury with stroke, complication is nonresponse for which further surgery may   or may not be indicated, small risk of wound infection, spinal fluid leak,   risk of  swallowing difficulties, transient in most people as well as vocal   cord paralysis with hoarseness, again transient in most people, chance to   require another operation on her neck, 15% risk of fusion, hardware failure.    She understood absolute need to avoid any type of smoking to optimize the   potential for fusion.  The patient is understanding and agreed to proceed and   signed consent.     NEED FOR SURGICAL ASSISTANCE:  Surgical assistant required throughout the case   for retraction, suction, irrigation, cleaning of instruments, keep the case   moving forward to minimize operative time.     DESCRIPTION OF PROCEDURE:  The patient was brought to the operating room.    Peripheral venous lines in place.  General anesthesia was induced.  The   patient intubated.  A roll was placed underneath the neck to allow for neck   extension.  Anterior neck was doubly prepped by myself with ChloraPrep and   draped.  Planned incision was marked in a natural skin crease on the right   side, roughly at the level of cricoid cartilage.  This planned incision was   infiltrated with local and incised with a scalpel.  Dissection was carried   down to and through the platysma, which was then undermined superiorly and   inferiorly.  The anterior border of the sternocleidomastoid was developed,   carried down to the middle cervical fascia, which was opened sharply over the   carotid artery pulse.  A medial vein was divided between Hemoclips.  This was   carried down to prevertebral fascia, was opened longitudinally.  Longus colli   muscles were then dissected medial to lateral bilaterally from C4-C7.  She had   a large ventral osteophyte at C6-C7, which was drilled and resected.  Levels   confirmed with intraoperative fluoroscopy.  Using the Shadow-Line retractor   system, large tooth retractor blades were carefully maintained underneath the   longus colli muscles to maintain horizontal distraction, smooth retractor   blades to  maintain vertical distraction.  We began at C4-C5 and worked our way   down.  [place] distraction posts were placed.  Distraction was applied.  Disk   was removed entirely with curettes.  Anterior and posterior osteophytes were   drilled.  All bone dust was collected using the Hensler bone press and #1   Penfield.  Uncovertebral joints were drilled bilaterally to thin shelf of bone   and this was resected with 1 and 2 mm Kerrisons.  Intraoperative SSEPs and   MEPs remained stable beginning and end of the case.  Bony endplates were   slightly decorticated.  A 6-mm titanium cage was packed with bone. The edges   were filled with fibrinous [place] putty.  Cage was tamped into place.   Distraction was released.  We then repeated the same procedure at C5-C6,   again, removed the disk entirely, bone collected for bone grafting.    Uncovertebral joints were resected to decompress the C6 roots bilaterally, as   she had cord compression, resected the posterior annulus as well as the   posterior longitudinal ligament.  The dura bulged into the defect indicating   excellent decompression throughout, minor epidural bleeding was controlled   with Gelfoam powder mixed with thrombin and cottonoid pledgets.  At the C5-C6   level, a 7-mm lordotic cage filled with bone graft was placed, at C6-C7, she   had no stenosis.  The disk was removed. Bony osteophytes were collected and   placed in another 7-mm lordotic cage, which was tamped into place, slightly   countersunk.  Once all 3 levels were done, a 55-mm plate was selected, 15 mm   screws were placed in each vertebral body.  We began centrally at C5 and C6   drawing the vertebral bodies to the plate and then did the C4 and C7 screws at   the end.  Good bone purchase was obtained at all 8 sites.  Locking screw on   the plate was turned 90 degrees at all 4 levels to secure bone screws to the   plate.  Final x-ray showed significant improvement in her kyphotic deformity   and good  positioning of the hardware.  Throughout the case, wound was   irrigated with antibiotic irrigation.  Retractors withdrawn.  Minor bleeders   controlled with bipolar electrocautery.  Medium Hemovac drain was placed in   depth of the wound, brought out through separate stab incision.  The platysma   was closed with 3-0 Vicryl, subcuticular closed with 4-0 Vicryl, and skin   edges approximated with quarter-inch Steri-Strips.  Sterile dressing was   placed.  Drain was sutured in place with 3-0 Vicryl, connected to closed   drainage system.  Final sponge, needle counted, counts correct.  Estimated   blood loss 200 mL.  Again, intraoperative SSEPs and MEPs remained stable at   the beginning and the end of the case.        ______________________________  ERICA LACEY MD    DF/[facility]    DD:  07/25/2023 10:46  DT:  07/25/2023 11:28    Job#:  [id number]    CC:Joao Wells MD

## 2023-07-25 NOTE — PROGRESS NOTES
4 Eyes Skin Assessment Completed by TARIK Daniels and TARIK Jean-Baptiste.    Head WDL  Ears WDL  Nose WDL  Mouth WDL  Neck Incision, Hemovac to suction in place, Dressing CDI.  Breast/Chest WDL  Shoulder Blades WDL  Spine WDL old scar (CDI) to neck area.  (R) Arm/Elbow/Hand WDL  (L) Arm/Elbow/Hand WDL  Abdomen Bruising scattered.  Groin Bruising scattered.  Scrotum/Coccyx/Buttocks WDL, red mosquito bite to left buttocks (per pt)  (R) Leg WDL  (L) Leg WDL  (R) Heel/Foot/Toe WDL  (L) Heel/Foot/Toe WDL          Devices In Places Pulse Ox, SCD's, and Oxy Mask      Interventions In Place N/A    Possible Skin Injury No    Pictures Uploaded Into Epic N/A  Wound Consult Placed N/A  RN Wound Prevention Protocol Ordered No

## 2023-07-25 NOTE — ANESTHESIA PREPROCEDURE EVALUATION
Case: [id number] Date/Time: 07/25/23 0715    Procedure: C4-7 ANTERIOR CERVICAL DISCECTOMY AND FUSION    Pre-op diagnosis: CERVICAL KYPHOSIS    Location: [place] [address] / SURGERY [facility]    Surgeons: Law Moya M.D.          Relevant Problems   NEURO   (positive) Migraine headache      CARDIAC   (positive) Migraine headache   (positive) Thrombophlebitis   (positive) Varicose veins of both legs with edema      GI   (positive) GERD (gastroesophageal reflux disease)       Physical Exam    Airway   Mallampati: II  TM distance: >3 FB  Neck ROM: full       Cardiovascular - normal exam  Rhythm: regular  Rate: normal  (-) murmur     Dental - normal exam           Pulmonary - normal exam  Breath sounds clear to auscultation     Abdominal    Neurological - normal exam                 Anesthesia Plan    ASA 2       Plan - general       Airway plan will be ETT          Induction: intravenous    Postoperative Plan: Postoperative administration of opioids is intended.    Pertinent diagnostic labs and testing reviewed    Informed Consent:    Anesthetic plan and risks discussed with patient.    Use of blood products discussed with: patient whom consented to blood products.

## 2023-07-25 NOTE — ANESTHESIA POSTPROCEDURE EVALUATION
Patient: Bhavana Liu    Procedure Summary     Date: 07/25/23 Room / Location: [facility] 05 / SURGERY [facility]    Anesthesia Start: 0730 Anesthesia Stop: 1027    Procedure: C4-7 ANTERIOR CERVICAL DISCECTOMY AND FUSION (Neck) Diagnosis: (CERVICAL KYPHOSIS)    Surgeons: Law Moya M.D. Responsible Provider: Joao Wells M.D.    Anesthesia Type: general ASA Status: 2          Final Anesthesia Type: general  Last vitals  BP   Blood Pressure: 129/69    Temp   37.1 °C (98.8 °F)    Pulse   91   Resp   20    SpO2   96 %      Anesthesia Post Evaluation    Patient location during evaluation: PACU  Patient participation: complete - patient participated  Level of consciousness: awake and alert  Pain score: 0    Airway patency: patent  Anesthetic complications: no  Cardiovascular status: hemodynamically stable  Respiratory status: acceptable  Hydration status: euvolemic    PONV: none          No notable events documented.     Nurse Pain Score: 0 (NPRS)

## 2023-07-25 NOTE — PROGRESS NOTES
"Pt arrived to floor from PACU. Pt A+Ox4, able to make needs known, pt ambulated stand by from gurney to bed, tolerated well. PIV  20 RFA, Patent. Pain 8/10 to surgical site. Pt states \"I was told I was going to get a PCA.\"  CSMT's and FREEDOM's WNL with base line to left arm numbness and tingling, pt bed remains >30 degrees SCD\"s inplace, Soft collar orders.  Educated pt on call light and TV remote and Incentive Spirometer.  Pt tolerating fluids. Two RN Skin check completed.   1530- Per standing order PCA started, 2 RN sign off. Pt pain 8/10.       "

## 2023-07-25 NOTE — OR NURSING
"1025 Arrived from OR, oral airway in place. Received report from Dr. Wells and Narcisa GARCIA RN, assumed care. VSS. Anterior neck dressing CDI, Hemovac in place. Ice pack applied to surgical site. Appears comfortable, no s/s of pain or nausea. 1028 Rouses to voice, oral airway removed. Moving all extremities spontaneously. 1035 Remains drowsy. 1047 Dr. Moya bedside, patient following some simple commands, left arm strength weak against gravity. 1100   strength equal bilaterally, tongue midline, face symmetrical. Patient able to give thumbs up, track finger and shrug shoulders. Smiles and shakes head no when asked to state name or answer any orientation questions. Left arm strength against gravity remains weak but improved. Denies pain or nausea.1108 Dr. Moya bedside to evaluate neuro, left arm strength slightly improved. 1120 Nods heard yes/no appropriately in response to questions but does not verbalize answers. Tolerating ice chips. 1135 patient verbalizing answers to orientation questions correctly. Remains unable to lift left arm into air, reports arm feels \"heavy\". MD notified. 1152 Reddy GRIGSBY bedside to evaluate left arm. IV Decadron ordered. 1200  updated. 1215 VSS. Patient resting comfortably, continues to deny pain or nausea. Recovery complete. Awaiting room on floor. 1300 Patient continues to rest comfortably, denies pain. Left arm strength against gravity improved. 1355 Report to Frances TERRAZAS RN. 1411 Discharged to room with belongings, glasses in place and oxygen.   "

## 2023-07-25 NOTE — ANESTHESIA TIME REPORT
Anesthesia Start and Stop Event Times     Date Time Event    7/25/2023 0715 Ready for Procedure     0730 Anesthesia Start     1027 Anesthesia Stop        Responsible Staff  07/25/23    Name Role Begin End    Joao Wells M.D. Anesth 0730 1027        Overtime Reason:  no overtime (within assigned shift)    Comments:

## 2023-07-25 NOTE — ANESTHESIA PROCEDURE NOTES
Airway    Date/Time: 7/25/2023 7:41 AM    Performed by: Joao Wells M.D.  Authorized by: Joao Wells M.D.    Location:  OR  Urgency:  Elective  Indications for Airway Management:  Anesthesia      Spontaneous Ventilation: absent    Sedation Level:  Deep  Preoxygenated: Yes    Patient Position:  Sniffing  Mask Difficulty Assessment:  0 - not attempted  Final Airway Type:  Endotracheal airway  Final Endotracheal Airway:  ETT  Cuffed: Yes    Technique Used for Successful ETT Placement:  Direct laryngoscopy    Insertion Site:  Oral  Blade Type:  Hernandez  Laryngoscope Blade/Videolaryngoscope Blade Size:  2  ETT Size (mm):  7.0  Measured from:  Teeth  ETT to Teeth (cm):  20  Placement Verified by: auscultation and capnometry    Cormack-Lehane Classification:  Grade I - full view of glottis  Number of Attempts at Approach:  1

## 2023-07-26 ENCOUNTER — PATIENT OUTREACH (OUTPATIENT)
Dept: SCHEDULING | Facility: IMAGING CENTER | Age: 58
End: 2023-07-26
Payer: MEDICAID

## 2023-07-26 PROCEDURE — 97535 SELF CARE MNGMENT TRAINING: CPT

## 2023-07-26 PROCEDURE — 700102 HCHG RX REV CODE 250 W/ 637 OVERRIDE(OP): Performed by: PHYSICIAN ASSISTANT

## 2023-07-26 PROCEDURE — 700111 HCHG RX REV CODE 636 W/ 250 OVERRIDE (IP): Mod: JZ | Performed by: PHYSICIAN ASSISTANT

## 2023-07-26 PROCEDURE — A9270 NON-COVERED ITEM OR SERVICE: HCPCS | Performed by: PHYSICIAN ASSISTANT

## 2023-07-26 PROCEDURE — 97161 PT EVAL LOW COMPLEX 20 MIN: CPT

## 2023-07-26 PROCEDURE — RXMED WILLOW AMBULATORY MEDICATION CHARGE: Performed by: PHYSICIAN ASSISTANT

## 2023-07-26 PROCEDURE — 770001 HCHG ROOM/CARE - MED/SURG/GYN PRIV*

## 2023-07-26 RX ORDER — HYDROMORPHONE HYDROCHLORIDE 1 MG/ML
0.5 INJECTION, SOLUTION INTRAMUSCULAR; INTRAVENOUS; SUBCUTANEOUS
Status: DISCONTINUED | OUTPATIENT
Start: 2023-07-26 | End: 2023-07-27 | Stop reason: HOSPADM

## 2023-07-26 RX ORDER — OXYCODONE HYDROCHLORIDE 5 MG/1
5 TABLET ORAL EVERY 4 HOURS PRN
Status: DISCONTINUED | OUTPATIENT
Start: 2023-07-26 | End: 2023-07-27 | Stop reason: HOSPADM

## 2023-07-26 RX ORDER — DEXAMETHASONE SODIUM PHOSPHATE 4 MG/ML
4 INJECTION, SOLUTION INTRA-ARTICULAR; INTRALESIONAL; INTRAMUSCULAR; INTRAVENOUS; SOFT TISSUE EVERY 6 HOURS
Status: DISCONTINUED | OUTPATIENT
Start: 2023-07-26 | End: 2023-07-27 | Stop reason: HOSPADM

## 2023-07-26 RX ORDER — OXYCODONE HYDROCHLORIDE 10 MG/1
10 TABLET ORAL EVERY 4 HOURS PRN
Status: DISCONTINUED | OUTPATIENT
Start: 2023-07-26 | End: 2023-07-27 | Stop reason: HOSPADM

## 2023-07-26 RX ADMIN — ACETAMINOPHEN 1000 MG: 500 TABLET, FILM COATED ORAL at 11:16

## 2023-07-26 RX ADMIN — GABAPENTIN 800 MG: 400 CAPSULE ORAL at 17:20

## 2023-07-26 RX ADMIN — OXYCODONE HYDROCHLORIDE 5 MG: 5 TABLET ORAL at 17:21

## 2023-07-26 RX ADMIN — SENNOSIDES AND DOCUSATE SODIUM 1 TABLET: 50; 8.6 TABLET ORAL at 21:25

## 2023-07-26 RX ADMIN — LORATADINE 10 MG: 10 TABLET ORAL at 21:25

## 2023-07-26 RX ADMIN — DEXAMETHASONE SODIUM PHOSPHATE 4 MG: 4 INJECTION, SOLUTION INTRAMUSCULAR; INTRAVENOUS at 23:43

## 2023-07-26 RX ADMIN — DEXAMETHASONE SODIUM PHOSPHATE 4 MG: 4 INJECTION, SOLUTION INTRAMUSCULAR; INTRAVENOUS at 13:07

## 2023-07-26 RX ADMIN — DEXAMETHASONE SODIUM PHOSPHATE 4 MG: 4 INJECTION INTRA-ARTICULAR; INTRALESIONAL; INTRAMUSCULAR; INTRAVENOUS; SOFT TISSUE at 03:26

## 2023-07-26 RX ADMIN — DOCUSATE SODIUM 100 MG: 100 CAPSULE, LIQUID FILLED ORAL at 11:15

## 2023-07-26 RX ADMIN — BACLOFEN 20 MG: 10 TABLET ORAL at 17:20

## 2023-07-26 RX ADMIN — OMEPRAZOLE 20 MG: 20 CAPSULE, DELAYED RELEASE ORAL at 04:55

## 2023-07-26 RX ADMIN — ANTACID TABLETS 500 MG: 500 TABLET, CHEWABLE ORAL at 17:20

## 2023-07-26 RX ADMIN — DOCUSATE SODIUM 100 MG: 100 CAPSULE, LIQUID FILLED ORAL at 04:56

## 2023-07-26 RX ADMIN — POTASSIUM CHLORIDE 10 MEQ: 750 TABLET, EXTENDED RELEASE ORAL at 04:56

## 2023-07-26 RX ADMIN — FUROSEMIDE 20 MG: 20 TABLET ORAL at 11:16

## 2023-07-26 RX ADMIN — PAROXETINE 30 MG: 30 TABLET, FILM COATED ORAL at 21:25

## 2023-07-26 RX ADMIN — DEXAMETHASONE SODIUM PHOSPHATE 4 MG: 4 INJECTION INTRA-ARTICULAR; INTRALESIONAL; INTRAMUSCULAR; INTRAVENOUS; SOFT TISSUE at 08:48

## 2023-07-26 RX ADMIN — ACETAMINOPHEN 1000 MG: 500 TABLET, FILM COATED ORAL at 04:57

## 2023-07-26 RX ADMIN — ANTACID TABLETS 500 MG: 500 TABLET, CHEWABLE ORAL at 04:57

## 2023-07-26 RX ADMIN — POTASSIUM CHLORIDE 10 MEQ: 750 TABLET, EXTENDED RELEASE ORAL at 17:20

## 2023-07-26 RX ADMIN — FUROSEMIDE 20 MG: 20 TABLET ORAL at 17:20

## 2023-07-26 RX ADMIN — OXYCODONE HYDROCHLORIDE 5 MG: 5 TABLET ORAL at 21:27

## 2023-07-26 RX ADMIN — FUROSEMIDE 20 MG: 20 TABLET ORAL at 04:57

## 2023-07-26 RX ADMIN — DEXAMETHASONE SODIUM PHOSPHATE 4 MG: 4 INJECTION, SOLUTION INTRAMUSCULAR; INTRAVENOUS at 17:23

## 2023-07-26 RX ADMIN — OXYCODONE HYDROCHLORIDE 10 MG: 10 TABLET ORAL at 13:07

## 2023-07-26 RX ADMIN — ACETAMINOPHEN 1000 MG: 500 TABLET, FILM COATED ORAL at 17:20

## 2023-07-26 RX ADMIN — BACLOFEN 20 MG: 10 TABLET ORAL at 04:56

## 2023-07-26 RX ADMIN — GABAPENTIN 800 MG: 400 CAPSULE ORAL at 11:15

## 2023-07-26 RX ADMIN — BACLOFEN 20 MG: 10 TABLET ORAL at 11:16

## 2023-07-26 RX ADMIN — DOCUSATE SODIUM 100 MG: 100 CAPSULE, LIQUID FILLED ORAL at 17:20

## 2023-07-26 RX ADMIN — GABAPENTIN 800 MG: 400 CAPSULE ORAL at 04:56

## 2023-07-26 RX ADMIN — BUSPIRONE HYDROCHLORIDE 10 MG: 10 TABLET ORAL at 21:25

## 2023-07-26 ASSESSMENT — COGNITIVE AND FUNCTIONAL STATUS - GENERAL
SUGGESTED CMS G CODE MODIFIER MOBILITY: CH
MOBILITY SCORE: 24

## 2023-07-26 ASSESSMENT — GAIT ASSESSMENTS
DISTANCE (FEET): 150
DEVIATION: NO DEVIATION
GAIT LEVEL OF ASSIST: SUPERVISED

## 2023-07-26 ASSESSMENT — PAIN DESCRIPTION - PAIN TYPE
TYPE: SURGICAL PAIN

## 2023-07-26 NOTE — DISCHARGE SUMMARY
Discharge Summary    CHIEF COMPLAINT ON ADMISSION  No chief complaint on file.      Reason for Admission  Unspecified kyphosis, cervical reg*     Admission Date  7/25/2023    CODE STATUS  Full Code    HPI & HOSPITAL COURSE  This is a 58 y.o. female here with   Cervical radiculopathy.  She presented for elective spine surgery, which proceeded without complication.  Postoperatively she did have 4/5 weakness in the left deltoid and was started on dexamethasone.  She otherwise recovered as anticipated and will be discharged home when meeting criteria.  No notes on file    Therefore, she is discharged in good and stable condition to home with close outpatient follow-up.    The patient met 2-midnight criteria for an inpatient stay at the time of discharge.    Discharge Date   7/27/23    FOLLOW UP ITEMS POST DISCHARGe  Keep scheduled appointment at [facility] neurosurgery group    DISCHARGE DIAGNOSES  Principal Problem:    Cervical radiculopathy (POA: Yes)  Resolved Problems:    * No resolved hospital problems. *      FOLLOW UP  No future appointments.  No follow-up provider specified.    MEDICATIONS ON DISCHARGE     Medication List        START taking these medications        Instructions   cyclobenzaprine 10 mg Tabs  Commonly known as: Flexeril   Take 1 tablet 3 times a day by oral route as needed for 14 days.     famotidine 20 MG Tabs  Commonly known as: Pepcid   Take 1 tablet twice a day by oral route as directed for 5 days.     methylPREDNISolone 4 MG Tbpk  Commonly known as: Medrol   Follow as scheduled per package instructions     oxyCODONE-acetaminophen 5-325 MG Tabs  Commonly known as: Percocet   Take 1 tablet every 4-6 hours by oral route as needed for 7 days.            CONTINUE taking these medications        Instructions   albuterol 108 (90 Base) MCG/ACT Aers inhalation aerosol   Inhale 2 Puffs every 6 hours as needed for Shortness of Breath.  Dose: 2 Puff     baclofen 20 MG tablet  Commonly known as: Lioresal    TAKE ONE TABLET BY MOUTH THREE TIMES A DAY     busPIRone 10 MG Tabs tablet  Commonly known as: Buspar   Take 1 Tablet by mouth 2 times a day.  Dose: 10 mg     docusate sodium 100 MG Caps  Commonly known as: Colace   TAKE 1 CAPSULE BY MOUTH 2 TIMES A DAY.  Dose: 100 mg     pantoprazole 40 MG Tbec  Commonly known as: Protonix   Take 1 Tablet by mouth every day.  Dose: 40 mg            STOP taking these medications      ibuprofen 800 MG Tabs  Commonly known as: Motrin            ASK your doctor about these medications        Instructions   furosemide 20 MG Tabs  Commonly known as: Lasix   TAKE 1 TABLET BY MOUTH 3 TIMES A DAY.  Dose: 20 mg     gabapentin 800 MG tablet  Commonly known as: Neurontin   TAKE ONE TABLET BY MOUTH THREE TIMES A DAY     hydrOXYzine HCl 25 MG Tabs  Commonly known as: Atarax   Take 1 Tablet by mouth 3 times a day as needed for Anxiety.  Dose: 25 mg     loratadine 10 MG Tabs  Commonly known as: Claritin   Take 1 Tablet by mouth every day.  Dose: 10 mg     PARoxetine 30 MG Tabs  Commonly known as: Paxil   Take 1 Tablet by mouth every day.  Dose: 30 mg     potassium chloride ER 10 MEQ tablet  Commonly known as: Klor-Con   Take 1 Tablet by mouth 2 times a day.  Dose: 10 mEq            Rx for Percocet 5/325 mg, Flexeril 10 mg 3 times a day, Medrol Dosepak, Pepcid x5 days were electronically prescribed to [facility] pharmacy meds to beds,    reviewed, low risk per ORT, informed consent obtained      Allergies  No Known Allergies    DIET  Orders Placed This Encounter   Procedures    Diet Order Diet: Regular     Standing Status:   Standing     Number of Occurrences:   1     Order Specific Question:   Diet:     Answer:   Regular [1]       ACTIVITY  Light duty.  Weight bearing as tolerated    CONSULTATIONS       PROCEDURES   OPERATIONS:  1.  Microscopic anterior cervical diskectomies, C4-5, C5-C6 and C6-C7;   bilateral foraminotomies, C4-C5 and C5-C6 plus decompression of cervical   spinal cord at  C5-C6.  2.  Anterior cervical fusion, three levels above with Medtronic titanium   cages, lordotic cages and local bone graft.  3.  Harvesting preparation, local bone graft.  4.  Anterior cervical plating, C4-C7 (Medtronic Atlantis Vision Elite system).     SURGEON:  Law Moya MD   7/25/23    LABORATORY  Lab Results   Component Value Date    SODIUM 139 06/29/2023    POTASSIUM 4.0 06/29/2023    CHLORIDE 104 06/29/2023    CO2 26 06/29/2023    GLUCOSE 98 06/29/2023    BUN 16 06/29/2023    CREATININE 0.94 06/29/2023        Lab Results   Component Value Date    WBC 5.7 06/29/2023    HEMOGLOBIN 13.8 06/29/2023    HEMATOCRIT 40.5 06/29/2023    PLATELETCT 295 06/29/2023        Total time of the discharge process exceeds 30 minutes.

## 2023-07-26 NOTE — THERAPY
"Physical Therapy   Initial Evaluation     Patient Name: Bhavana Liu  Age:  58 y.o., Sex:  female  Medical Record #: [id number]  Today's Date: 7/26/2023     Precautions  Precautions: Cervical Collar    Comments: soft collar PRN    Assessment  Patient is 58 y.o. female that is s/p C4-7 ACDF with Dr. Moya on 7/26. She presented to PT with pain at incision site but she performed mobility without physical assist during evaluation. Additional time required for patient education regarding cervical precautions, log roll, brace wear and care, and pain management techniques; patient verbalized understanding. Recommend outpatient PT following medical clearance. Patient will not be actively followed for physical therapy services at this time, however may be seen if requested by physician for 1 more visit within 30 days to address any discharge or equipment needs.  ?  Plan    Physical Therapy Initial Treatment Plan   Duration: Evaluation only    DC Equipment Recommendations: None  Discharge Recommendations: Recommend outpatient physical therapy services to address higher level deficits       Subjective    \"I had neck surgery last year.\"     Objective       07/26/23 1037   Charge Group   PT Evaluation PT Evaluation Low   PT Self Care / Home Evaluation (Units) 1   Total Time Spent   PT Total Time Yes   PT Evaluation Time Spent (Mins) 15   PT Self Care/Home Evaluation Time Spent (Mins) 10   PT Total Time Spent (Calculated) 25   Initial Contact Note    Initial Contact Note Order Received and Verified, Evaluation Only - Patient Does Not Require Further Acute Physical Therapy at this Time.  However, May Benefit from Post Acute Therapy for Higher Level Functional Deficits.   Precautions   Precautions Cervical Collar     Comments soft collar PRN   Vitals   O2 (LPM) 0   O2 Delivery Device None - Room Air   Pain 0 - 10 Group   Location Neck   Therapist Pain Assessment During Activity;Nurse Notified   Prior Living Situation "   Prior Services None   Housing / Facility 1 Story House   Steps Into Home 1   Steps In Home 0   Equipment Owned Single Point Cane   Lives with - Patient's Self Care Capacity Spouse;Adult Children   Comments Patient reported her  is disabled and she is his primary caregiver. She also has 22 YO child that lives at home and can assist as needed   Prior Level of Functional Mobility   Bed Mobility Independent   Transfer Status Independent   Ambulation Independent   Ambulation Distance community   Assistive Devices Used Single Point Cane   Stairs Independent   Comments Patient reported she used SPC for evening walks   Cognition    Cognition / Consciousness WDL   Level of Consciousness Alert   Comments pleasant, cooperative, demonstrated understanding of education   Active ROM Upper Body   Comments guarded movement of BUE due to pain   Active ROM Lower Body    Comments WFL for mobility   Strength Lower Body   Comments WFL for mobility   Balance Assessment   Sitting Balance (Static) Good   Sitting Balance (Dynamic) Good   Standing Balance (Static) Fair +   Standing Balance (Dynamic) Fair +   Weight Shift Sitting Good   Weight Shift Standing Good   Comments no AD, no LOB   Bed Mobility    Supine to Sit Supervised  (HOB flat, no rail, performed log roll)   Sit to Supine   (NT, left in chair)   Scooting Supervised  (seated)   Rolling Supervised   Gait Analysis   Gait Level Of Assist Supervised   Assistive Device None   Distance (Feet) 150   # of Times Distance was Traveled 1   Deviation No deviation   # of Stairs Climbed 0   Weight Bearing Status no restrictions   Vision Deficits Impacting Mobility NT   Comments patient required one standing rest break due to discomfort in legs, patient reported baseline edema and pain   Functional Mobility   Sit to Stand Supervised   Bed, Chair, Wheelchair Transfer Supervised   Toilet Transfers Supervised   Transfer Method Stand Step   How much difficulty does the patient currently  have...   Turning over in bed (including adjusting bedclothes, sheets and blankets)? 4   Sitting down on and standing up from a chair with arms (e.g., wheelchair, bedside commode, etc.) 4   Moving from lying on back to sitting on the side of the bed? 4   How much help from another person does the patient currently need...   Moving to and from a bed to a chair (including a wheelchair)? 4   Need to walk in a hospital room? 4   Climbing 3-5 steps with a railing? 4   6 clicks Mobility Score 24   Education Group   Education Provided Role of Physical Therapist;Cervical Precautions;Brace Wear and Care   Cervical Precautions Patient Response Patient;Acceptance;Explanation;Verbal Demonstration;Demonstration;Handout   Role of Physical Therapist Patient Response Patient;Acceptance;Explanation;Verbal Demonstration   Brace Wear & Care Patient Response Patient;Acceptance;Explanation;Demonstration;Verbal Demonstration   Physical Therapy Initial Treatment Plan    Duration Evaluation only   Anticipated Discharge Equipment and Recommendations   DC Equipment Recommendations None   Discharge Recommendations Recommend outpatient physical therapy services to address higher level deficits   Interdisciplinary Plan of Care Collaboration   IDT Collaboration with  Nursing   Patient Position at End of Therapy Seated;Call Light within Reach;Tray Table within Reach;Phone within Reach;Family / Friend in Room   Collaboration Comments RN aware of visit, response   Session Information   Date / Session Number  7/26 - DC needs only

## 2023-07-26 NOTE — CARE PLAN
The patient is Watcher - Medium risk of patient condition declining or worsening    Shift Goals  Clinical Goals: Pain management, safety  Patient Goals: Rest, Pain control  Family Goals: Remain updated    Progress made toward(s) clinical / shift goals:    Ambulatory.  Good po intake.  Vitals stable.    Patient is not progressing towards the following goals:      Problem: Pain - Standard  Goal: Alleviation of pain or a reduction in pain to the patient’s comfort goal  Outcome: Progressing  Note: On morphine PCA. Reports adequate pain management. Resting.     Problem: Fall Risk  Goal: Patient will remain free from falls  Outcome: Progressing  Note: At risk for fall. Precautions in place. Calls appropriately.

## 2023-07-26 NOTE — DISCHARGE INSTRUCTIONS
Remove all dressings postoperative day # 3, then leave open to air  OK to shower & pat dry starting postoperative day #3  No soaking in hot tubs, baths, pools, etc.  Avoid repetitive bending, lifting over 10lbs, twisting. We encourage you to take frequent walks as tolerated  Do not drive or consume alcohol while taking narcotic pain medications. Do not take additional acetaminophen (tylenol) without consulting our office.   Do not take NSAIDs (such as ibuprofen or naproxen) or Aspirin unless you have been told otherwise by Dr Moya's team  Follow up at [facility] Neurosurgery office in 2 weeks. Call with questions or concerns @ [phone]     Discharge Instructions    Discharged to home by car with relative. Discharged via wheelchair, hospital escort: Yes.  Special equipment needed: Not Applicable    Be sure to schedule a follow-up appointment with your primary care doctor or any specialists as instructed.     Discharge Plan:        I understand that a diet low in cholesterol, fat, and sodium is recommended for good health. Unless I have been given specific instructions below for another diet, I accept this instruction as my diet prescription.   Other diet: Regular diet    Special Instructions: None    -Is this patient being discharged with medication to prevent blood clots?  No    Is patient discharged on Warfarin / Coumadin?   No

## 2023-07-27 ENCOUNTER — PHARMACY VISIT (OUTPATIENT)
Dept: PHARMACY | Facility: MEDICAL CENTER | Age: 58
End: 2023-07-27
Payer: COMMERCIAL

## 2023-07-27 VITALS
SYSTOLIC BLOOD PRESSURE: 118 MMHG | BODY MASS INDEX: 32.15 KG/M2 | HEART RATE: 82 BPM | DIASTOLIC BLOOD PRESSURE: 74 MMHG | OXYGEN SATURATION: 95 % | RESPIRATION RATE: 16 BRPM | HEIGHT: 67 IN | WEIGHT: 204.81 LBS | TEMPERATURE: 97.7 F

## 2023-07-27 PROCEDURE — 97165 OT EVAL LOW COMPLEX 30 MIN: CPT

## 2023-07-27 PROCEDURE — 97535 SELF CARE MNGMENT TRAINING: CPT

## 2023-07-27 PROCEDURE — 700102 HCHG RX REV CODE 250 W/ 637 OVERRIDE(OP): Performed by: PHYSICIAN ASSISTANT

## 2023-07-27 PROCEDURE — 700111 HCHG RX REV CODE 636 W/ 250 OVERRIDE (IP): Mod: JZ | Performed by: PHYSICIAN ASSISTANT

## 2023-07-27 PROCEDURE — A9270 NON-COVERED ITEM OR SERVICE: HCPCS | Performed by: PHYSICIAN ASSISTANT

## 2023-07-27 RX ADMIN — GABAPENTIN 800 MG: 400 CAPSULE ORAL at 11:49

## 2023-07-27 RX ADMIN — OXYCODONE HYDROCHLORIDE 10 MG: 10 TABLET ORAL at 09:14

## 2023-07-27 RX ADMIN — BACLOFEN 20 MG: 10 TABLET ORAL at 05:03

## 2023-07-27 RX ADMIN — ACETAMINOPHEN 1000 MG: 500 TABLET, FILM COATED ORAL at 05:02

## 2023-07-27 RX ADMIN — ANTACID TABLETS 500 MG: 500 TABLET, CHEWABLE ORAL at 05:04

## 2023-07-27 RX ADMIN — GABAPENTIN 800 MG: 400 CAPSULE ORAL at 05:02

## 2023-07-27 RX ADMIN — FUROSEMIDE 20 MG: 20 TABLET ORAL at 05:03

## 2023-07-27 RX ADMIN — POTASSIUM CHLORIDE 10 MEQ: 750 TABLET, EXTENDED RELEASE ORAL at 05:02

## 2023-07-27 RX ADMIN — ACETAMINOPHEN 1000 MG: 500 TABLET, FILM COATED ORAL at 11:49

## 2023-07-27 RX ADMIN — DEXAMETHASONE SODIUM PHOSPHATE 4 MG: 4 INJECTION, SOLUTION INTRAMUSCULAR; INTRAVENOUS at 05:14

## 2023-07-27 RX ADMIN — DOCUSATE SODIUM 100 MG: 100 CAPSULE, LIQUID FILLED ORAL at 05:02

## 2023-07-27 RX ADMIN — SENNOSIDES AND DOCUSATE SODIUM 1 TABLET: 50; 8.6 TABLET ORAL at 11:50

## 2023-07-27 RX ADMIN — DEXAMETHASONE SODIUM PHOSPHATE 4 MG: 4 INJECTION, SOLUTION INTRAMUSCULAR; INTRAVENOUS at 11:50

## 2023-07-27 RX ADMIN — BACLOFEN 20 MG: 10 TABLET ORAL at 11:49

## 2023-07-27 RX ADMIN — FUROSEMIDE 20 MG: 20 TABLET ORAL at 11:49

## 2023-07-27 RX ADMIN — OMEPRAZOLE 20 MG: 20 CAPSULE, DELAYED RELEASE ORAL at 05:02

## 2023-07-27 RX ADMIN — OXYCODONE HYDROCHLORIDE 10 MG: 10 TABLET ORAL at 05:02

## 2023-07-27 ASSESSMENT — COGNITIVE AND FUNCTIONAL STATUS - GENERAL
TOILETING: A LITTLE
SUGGESTED CMS G CODE MODIFIER DAILY ACTIVITY: CJ
HELP NEEDED FOR BATHING: A LITTLE
DRESSING REGULAR LOWER BODY CLOTHING: A LITTLE
DAILY ACTIVITIY SCORE: 21

## 2023-07-27 ASSESSMENT — PAIN DESCRIPTION - PAIN TYPE
TYPE: SURGICAL PAIN
TYPE: SURGICAL PAIN

## 2023-07-27 ASSESSMENT — ACTIVITIES OF DAILY LIVING (ADL): TOILETING: INDEPENDENT

## 2023-07-27 NOTE — PROGRESS NOTES
PIV Dc'd. Pt transferred to [facility] via wheelchair with transport with pt's belongings without incident. Meds to beds still needs to be delivered to pt.

## 2023-07-27 NOTE — DISCHARGE PLANNING
Case Management Discharge Planning    Admission Date: 7/25/2023  GMLOS: 2.6  ALOS: 2    6-Clicks ADL Score: 21  6-Clicks Mobility Score: 24      Anticipated Discharge Dispo: Discharge Disposition: Discharged to home/self care (01)  Discharge Address: [address] [place] [address]  Discharge Contact Phone Number: [phone]    DME Needed: No    Action(s) Taken: OTHER    Pt discussed in IDT rounds. Patient is medically cleared to dc home. No CM needs.     HCM will no longer follow.     Escalations Completed: None    Medically Clear: Yes

## 2023-07-27 NOTE — CARE PLAN
Problem: Knowledge Deficit - Standard  Goal: Patient and family/care givers will demonstrate understanding of plan of care, disease process/condition, diagnostic tests and medications  Outcome: Progressing     Problem: Pain - Standard  Goal: Alleviation of pain or a reduction in pain to the patient’s comfort goal  Outcome: Progressing     Problem: Fall Risk  Goal: Patient will remain free from falls  Outcome: Progressing   The patient is Stable - Low risk of patient condition declining or worsening    Shift Goals  Clinical Goals: pain control  Patient Goals: rest comfort  Family Goals: Remain updated    Progress made toward(s) clinical / shift goals:  pain controlled, oob    Patient is not progressing towards the following goals:

## 2023-07-27 NOTE — CARE PLAN
The patient is Stable - Low risk of patient condition declining or worsening    Shift Goals  Clinical Goals: Pain management'  Patient Goals: Discharge, sleep comfortably  Family Goals: TATY    Progress made toward(s) clinical / shift goals:    Problem: Knowledge Deficit - Standard  Goal: Patient and family/care givers will demonstrate understanding of plan of care, disease process/condition, diagnostic tests and medications  Outcome: Progressing  Note: Educated pt on POC, monitor VS, pain control, PT/OT, mobility, safety, Dc planning, all questions answered, hourly rounding in porgress     Problem: Pain - Standard  Goal: Alleviation of pain or a reduction in pain to the patient’s comfort goal  Outcome: Progressing  Note: Medicated with oxy 10 per MAR with adequate pain control, hourly rounding in progress       Patient is not progressing towards the following goals:

## 2023-07-27 NOTE — PROGRESS NOTES
Neurosurgery Progress Note    Subjective:  Continued left deltoid weakness/numbness, same as yesterday per pt  Sore throat but swallowing ok      Exam:  A&O  Dressing CDI  Hvac 10/8hr, clearing  Unable to bring left arm to shoulder level, but somewhat able to hold it up once raised up  Left deltoid 2/5, remaining upper extremity strength intact      BP  Min: 94/49  Max: 118/74  Pulse  Av.6  Min: 75  Max: 82  Resp  Avg: 15.9  Min: 15  Max: 16  Temp  Av.5 °C (97.7 °F)  Min: 36.2 °C (97.1 °F)  Max: 36.9 °C (98.4 °F)  SpO2  Av.3 %  Min: 90 %  Max: 96 %    No data recorded                      Intake/Output                         23 - 2359 23 - 2359      Total  Total                 Intake    I.V.  --  1500 1500  --  -- --    Volume (mL) (dextrose 5 % and 0.9 % NaCl with KCl 20 mEq infusion) -- 1500 1500 -- -- --    Total Intake -- 1500 1500 -- -- --       Output    Urine  --  -- --  --  -- --    Number of Times Voided 1 x 1 x 2 x -- -- --    Drains  5  30 35  --  -- --    Output (mL) (Closed/Suction Drain 1 Anterior Neck Hemovac) 5 30 35 -- -- --    Total Output 5 30 35 -- -- --       Net I/O     -5 1470 1465 -- -- --              Intake/Output Summary (Last 24 hours) at 2023 0908  Last data filed at 2023 0400  Gross per 24 hour   Intake 1500 ml   Output 35 ml   Net 1465 ml               oxyCODONE immediate-release  5 mg Q4HRS PRN    Or    oxyCODONE immediate-release  10 mg Q4HRS PRN    HYDROmorphone  0.5 mg Q3HRS PRN    dexamethasone  4 mg Q6HRS    albuterol  2 Puff Q6HRS PRN    baclofen  20 mg TID    busPIRone  10 mg QHS    furosemide  20 mg TID    gabapentin  800 mg TID    hydrOXYzine HCl  25 mg TID PRN    loratadine  10 mg QHS    PARoxetine  30 mg QHS    potassium chloride ER  10 mEq BID    Pharmacy Consult Request  1 Each PHARMACY TO DOSE    MD ALERT...DO NOT ADMINISTER NSAIDS or ASPIRIN unless ORDERED By  Neurosurgery  1 Each PRN    docusate sodium  100 mg BID    senna-docusate  1 Tablet Nightly    senna-docusate  1 Tablet Q24HRS PRN    polyethylene glycol/lytes  1 Packet BID PRN    magnesium hydroxide  30 mL QDAY PRN    bisacodyl  10 mg Q24HRS PRN    sodium phosphate  1 Each Once PRN    dextrose 5 % and 0.9 % NaCl with KCl 20 mEq   Continuous    acetaminophen  1,000 mg Q6HRS    Followed by    [START ON 7/30/2023] acetaminophen  1,000 mg Q6HRS PRN    morphine injection  4 mg Once PRN    diphenhydrAMINE  25 mg Q6HRS PRN    Or    diphenhydrAMINE  25 mg Q6HRS PRN    ondansetron  4 mg Q4HRS PRN    ondansetron  4 mg Q4HRS PRN    promethazine  12.5-25 mg Q4HRS PRN    promethazine  12.5-25 mg Q4HRS PRN    prochlorperazine  5-10 mg Q4HRS PRN    methocarbamol  750 mg Q8HRS PRN    Or    cyclobenzaprine  10 mg Q8HRS PRN    Or    diazePAM  5 mg Q4HRS PRN    labetalol  10 mg Q HOUR PRN    benzocaine-menthol  1 Lozenge Q2HRS PRN    calcium carbonate  500 mg BID    omeprazole  20 mg DAILY       Assessment and Plan:  Hospital day #3  POD # 2 ACDF C4-7  Prophylactic anticoagulation: no         Start date/time: ambulatory    Continued left deltoid weakness  PT cleared to DC homePain controlled with oral medications  Remove hemovac  VSS but on O2  Possible discharge home today if weaned off of O2  Post op medications to meds to bed

## 2023-07-27 NOTE — PROGRESS NOTES
Report received. Assumed care. Pt in bed awake. A/O x4. VSS. Responds appropriately. C/O pain, medicated per MAR, no SOB. Assessment complete. Surgical dressing to neck in place, cdi, with hemovac scant serosanguaneous output, intact. Discussed POC, , pt verbalizes understanding. Explained importance of calling before getting OOB. Call light and belongings within reach. Bed alarm on. Bed in the lowest position. Treaded socks in place. Hourly rounding in progress. Will continue to monitor .

## 2023-07-27 NOTE — PROGRESS NOTES
Bedside report received.  Assessment complete.  A&O x 4. Patient calls appropriately.  Patient ambulates with standby assist w/ FWW.   Patient has 4/10 pain. Medicated per MAR.   Sx incision to R neck, DIP w/ hemovac  Tolerating regular diet. Denies N/V.  + void. Last BM 7/25 (PTA).  Reviewed plan of care with patient. Call light and personal belongings within reach. Hourly rounding in place. All needs met at this time.

## 2023-07-27 NOTE — DISCHARGE PLANNING
"Case Management Discharge Planning    Admission Date: 7/25/2023  GMLOS: 2.6  ALOS: 2    6-Clicks ADL Score:    6-Clicks Mobility Score: 24      Anticipated Discharge Dispo: Discharge Disposition: Discharged to home/self care (01)  Discharge Address: [address] [place] [id number]  Discharge Contact Phone Number: [phone]    DME Needed: No    Action(s) Taken: DC Assessment Complete (See below)      LSW met with patient at bedside to complete dc assessment. Patient confirmed demographic information on facesheet. Patient reported to live in a single story home with spouse/Phill with one step to enter. Patient reported being independent with ADLs/IADLs prior to admission.Patient denied having ACP docs and reported \" I need to get that done\". LSW verbalized understanding. Patient reported a hx of anxiety and depression. Patient reportedly takes Paxil and hydroxyzine to treat anxiety and depression. Patient reported a history of ETOH use. Patient has since stopped drinking since 2009. Patient reported occasional meth use. Patient reported her  does not know about this. LSW verbalized understanding. Patient reported that her son/Shorty can provide transportation once medically cleared.     No other CM needs identified at this time.       Escalations Completed: None    Medically Clear: No    Next Steps: Pending PT/OT eval, medical clearance.     Barriers to Discharge: Medical clearance and Pending PT Evaluation    Is the patient up for discharge tomorrow: No      Care Transition Team Assessment    Information Source  Orientation Level: Oriented X4  Information Given By: Patient  Who is responsible for making decisions for patient? : Patient    Readmission Evaluation  Is this a readmission?: No    Elopement Risk  Legal Hold: No  Ambulatory or Self Mobile in Wheelchair: Yes  Disoriented: No  Psychiatric Symptoms: None  History of Wandering: No  Elopement this Admit: No  Vocalizing Wanting to Leave: " No  Displays Behaviors, Body Language Wanting to Leave: No-Not at Risk for Elopement  Elopement Risk: Not at Risk for Elopement    Interdisciplinary Discharge Planning  Lives with - Patient's Self Care Capacity: Spouse, Adult Children  Housing / Facility: [address]  Prior Services: None    Discharge Preparedness  What is your plan after discharge?: Uncertain - pending medical team collaboration  What are your discharge supports?: Spouse  Prior Functional Level: Ambulatory, Independent with Activities of Daily Living, Independent with Medication Management  Difficulity with ADLs: None  Difficulity with IADLs: None    Functional Assesment  Prior Functional Level: Ambulatory, Independent with Activities of Daily Living, Independent with Medication Management    Finances  Financial Barriers to Discharge: No  Prescription Coverage: Yes    Vision / Hearing Impairment  Right Eye Vision: Impaired, Wears Glasses  Left Eye Vision: Impaired, Wears Glasses         Advance Directive  Advance Directive?: None  Advance Directive offered?: AD Booklet refused         Psychological Assessment  History of Substance Abuse: Alcohol, Methamphetamine  Date Last Used - Alcohol: 2009  Date Last Used - Methamphetamine: Unknown (Patient reported occasional meth use, spouse is not aware.)  History of Psychiatric Problems: Yes (hx of depression and anxiety)  Non-compliant with Treatment: No  Newly Diagnosed Illness: No    Discharge Risks or Barriers  Discharge risks or barriers?: Uninsured / underinsured (medicaid)  Patient risk factors: Substance abuse, Complex medical needs    Anticipated Discharge Information  Discharge Disposition: Discharged to home/self care (01)  Discharge Address: [address] [place] [id number]  Discharge Contact Phone Number: [phone]

## 2023-07-27 NOTE — THERAPY
"Occupational Therapy   Initial Evaluation     Patient Name: Bhavana Liu  Age:  58 y.o., Sex:  female  Medical Record #: [id number]  Today's Date: 7/27/2023     Precautions  Precautions: Fall Risk, Cervical Collar  , Spinal / Back Precautions   Comments: soft c collar PRN per order    Assessment  Patient is 58 y.o. female admitted to [facility] for elective C4-C7 anterior cervical discectomy/fusion. PMHx of GERD, dyslipidemia, neuropathy, tobacco dependence, and adjustment disorder with anxious mood. During OT eval, pt participated in standing and seated ADLs. Pt presented with good postural control, core strength, activity tolerance, pain management, dynamic sitting balance, dynamic standing balance, and endurance while completing ADLs (toileting, UBD, LBD).      Plan  DC Equipment Recommendations: None  Discharge Recommendations: Recommend home health for continued occupational therapy services     Subjective  \"I love spending time with my granddaughter she keeps me on my toes.\"     Objective   07/27/23 0853   Initial Contact Note    Initial Contact Note Order Received and Verified, Occupational Therapy Evaluation in Progress with Full Report to Follow.   Prior Living Situation   Prior Services None   Housing / Facility [address]   Bathroom Set up Walk In Shower;Built-In Shower Chair   Equipment Owned Single Point Cane   Lives with - Patient's Self Care Capacity Adult Children;Spouse  (Pt reports that she lives with her  and 21 y.o son that are both available to assist her as needed. Pt also explained she has a daughter that lives in [place] who would be willing to come assist if needed as well.)   Comments   (Pt explained that her  is disabled and she serves as his primary caretaker.)   Prior Level of ADL Function   Self Feeding Independent   Grooming / Hygiene Independent   Bathing Independent   Dressing Independent   Toileting Independent   Prior Level of IADL Function   Medication " "Management Independent   Laundry Independent   Kitchen Mobility Independent   Finances Independent   Home Management Independent   Shopping Independent   Prior Level Of Mobility Independent With Device in Community;Independent With Device in Home  (Pt reports that she only utilizes the SPC during long walks not throughout the home for functional mobility.)   Driving / Transportation Driving Independent   Leisure Interests Exercise;Family  (Pt explained that she has a 5 y.o granddaughter that lives in [place] with her daughter who she frequently goes to visit and enjoys spending time with her.)   History of Falls   History of Falls No   Precautions   Precautions Fall Risk;Cervical Collar  ;Spinal / Back Precautions    Comments soft c collar PRN per order   Pain 0 - 10 Group   Therapist Pain Assessment Post Activity Pain Same as Prior to Activity;Nurse Notified;7  (Pt reports being in 7/10 pain in cervical region. RN notified and administered pain medication during OT evaluation.)   Cognition    Cognition / Consciousness WDL   Level of Consciousness Alert   Comments Pt is pleasant and cooperative   Active ROM Upper Body   Active ROM Upper Body  X  (RUE WFL, LUE limited due to shoulder pain. Pt explained that her LUE has always \"given her a hard time for years\". However, pt is able to functionally manage BUE during ADLs.)   Strength Upper Body   Upper Body Strength  X   Comments RUE WFL, LUE limited due to shoulder pain   Neurological Concerns   Neurological Concerns No   Coordination Upper Body   Coordination WDL   Balance Assessment   Sitting Balance (Static) Good   Sitting Balance (Dynamic) Good   Standing Balance (Static) Fair +   Standing Balance (Dynamic) Fair +   Weight Shift Sitting Good   Weight Shift Standing Good   Comments no A/D no signs of fatigue or LOB   Bed Mobility    Supine to Sit Supervised  (log roll technique)   Scooting Supervised   ADL Assessment   Grooming   (Pt politely declined to participate " "in brushing her teeth as she stated \"My dentures are glued in and I don't feel like taking them out.\")   Upper Body Dressing Supervision  (donned bra and sleeveless tanktop while sitting EOB)   Lower Body Dressing Supervision  (donned underwear, jogger pants, B socks, and shoes utilizing figure 4 method while sitting EOB.)   Toileting Supervision  (Pt reported that she felt like she was having a BM. Pt was left on toilet w/RN nearby as pt attempted to have a BM.)   How much help from another person does the patient currently need...   Putting on and taking off regular lower body clothing? 3   Bathing (including washing, rinsing, and drying)? 3   Toileting, which includes using a toilet, bedpan, or urinal? 3   Putting on and taking off regular upper body clothing? 4   Taking care of personal grooming such as brushing teeth? 4   Eating meals? 4   6 Clicks Daily Activity Score 21   Functional Mobility   Sit to Stand Supervised   Bed, Chair, Wheelchair Transfer Supervised   Toilet Transfers Supervised   Transfer Method Stand Step   Mobility supine>EOB>toilet  (w/no AD)   Visual Perception   Comments wears glasses   Activity Tolerance   Sitting Edge of Bed +5 mins   Standing +5 mins   Comments Pt left on toilet as she felt the urge for a BM. RN notified.   Education Group   Education Provided Spinal Precautions;Activities of Daily Living;Role of Occupational Therapist;Brace Wear and Care   Role of Occupational Therapist Patient Response Patient;Acceptance;Explanation;Verbal Demonstration   Spinal Precautions Patient Response Patient;Acceptance;Explanation;* * Response * *   Brace Wear & Care Patient Response Patient;Acceptance;Explanation;Handout;Verbal Demonstration;Action Demonstration   ADL Patient Response Patient;Acceptance;Explanation;Handout;Verbal Demonstration;Action Demonstration   Additional Comments Pt received educational handout regarding lumbar spinal precautions during completion of ADLs. Pt verbalized " understanding.   Anticipated Discharge Equipment and Recommendations   DC Equipment Recommendations None   Discharge Recommendations Recommend home health for continued occupational therapy services   Interdisciplinary Plan of Care Collaboration   IDT Collaboration with  Nursing   Patient Position at End of Therapy Seated;Other (Comments)  (Pt left in bathroom on toilet as pt required increased time for BM. RN notified.)   Collaboration Comments RN updated regarding pt's functional status

## 2023-07-27 NOTE — CARE PLAN
The patient is Stable - Low risk of patient condition declining or worsening    Shift Goals  Clinical Goals: pain control, hemovac monitoring  Patient Goals: rest comfort  Family Goals: Remain updated    Progress made toward(s) clinical / shift goals: Patient pain managed with prn and scheduled medications per MAR. Patient hemovac with dressing in place, CDI.

## 2023-08-29 ENCOUNTER — NON-PROVIDER VISIT (OUTPATIENT)
Dept: URGENT CARE | Facility: PHYSICIAN GROUP | Age: 58
End: 2023-08-29
Payer: MEDICAID

## 2023-08-29 ENCOUNTER — APPOINTMENT (OUTPATIENT)
Dept: RADIOLOGY | Facility: IMAGING CENTER | Age: 58
End: 2023-08-29
Attending: NEUROLOGICAL SURGERY
Payer: MEDICAID

## 2023-08-29 DIAGNOSIS — M48.02 SPINAL STENOSIS IN CERVICAL REGION: ICD-10-CM

## 2023-08-29 PROCEDURE — 72040 X-RAY EXAM NECK SPINE 2-3 VW: CPT | Mod: TC,FY | Performed by: NURSE PRACTITIONER

## 2023-09-07 DIAGNOSIS — M48.02 FORAMINAL STENOSIS OF CERVICAL REGION: ICD-10-CM

## 2023-09-07 DIAGNOSIS — M48.02 CERVICAL STENOSIS OF SPINAL CANAL: ICD-10-CM

## 2023-09-07 DIAGNOSIS — M48.00 CENTRAL STENOSIS OF SPINAL CANAL: ICD-10-CM

## 2023-09-07 DIAGNOSIS — M54.12 CERVICAL RADICULOPATHY: ICD-10-CM

## 2023-09-08 RX ORDER — GABAPENTIN 800 MG/1
TABLET ORAL
Qty: 90 TABLET | Refills: 0 | Status: SHIPPED | OUTPATIENT
Start: 2023-09-08 | End: 2023-11-22

## 2023-09-08 NOTE — TELEPHONE ENCOUNTER
Was the patient seen in the last year in this department? Yes    Does patient have an active prescription for medications requested? Yes    Received Request Via: Pharmacy    Hospital Outpatient Visit on 06/29/2023   Component Date Value    APTT 06/29/2023 26.1     PT 06/29/2023 13.7     INR 06/29/2023 1.06     Color 06/29/2023 Yellow     Character 06/29/2023 Clear     Specific Gravity 06/29/2023 1.018     Ph 06/29/2023 6.5     Glucose 06/29/2023 Negative     Ketones 06/29/2023 Negative     Protein 06/29/2023 Negative     Bilirubin 06/29/2023 Negative     Urobilinogen, Urine 06/29/2023 0.2     Nitrite 06/29/2023 Negative     Leukocyte Esterase 06/29/2023 Negative     Occult Blood 06/29/2023 Negative     Micro Urine Req 06/29/2023 see below     Sodium 06/29/2023 139     Potassium 06/29/2023 4.0     Chloride 06/29/2023 104     Co2 06/29/2023 26     Anion Gap 06/29/2023 9.0     Glucose 06/29/2023 98     Bun 06/29/2023 16     Creatinine 06/29/2023 0.94     Calcium 06/29/2023 9.4     AST(SGOT) 06/29/2023 21     ALT(SGPT) 06/29/2023 18     Alkaline Phosphatase 06/29/2023 84     Total Bilirubin 06/29/2023 0.4     Albumin 06/29/2023 4.3     Total Protein 06/29/2023 6.6     Globulin 06/29/2023 2.3     A-G Ratio 06/29/2023 1.9     WBC 06/29/2023 5.7     RBC 06/29/2023 4.64     Hemoglobin 06/29/2023 13.8     Hematocrit 06/29/2023 40.5     MCV 06/29/2023 87.3     MCH 06/29/2023 29.7     MCHC 06/29/2023 34.1     RDW 06/29/2023 41.1     Platelet Count 06/29/2023 295     MPV 06/29/2023 9.7     Neutrophils-Polys 06/29/2023 45.00     Lymphocytes 06/29/2023 37.90     Monocytes 06/29/2023 12.80     Eosinophils 06/29/2023 2.60     Basophils 06/29/2023 1.40     Immature Granulocytes 06/29/2023 0.30     Nucleated RBC 06/29/2023 0.00     Neutrophils (Absolute) 06/29/2023 2.57     Lymphs (Absolute) 06/29/2023 2.17     Monos (Absolute) 06/29/2023 0.73     Eos (Absolute) 06/29/2023 0.15     Baso (Absolute) 06/29/2023 0.08     Immature  Granulocytes (a* 06/29/2023 0.02     NRBC (Absolute) 06/29/2023 0.00     Correct Calcium 06/29/2023 9.2     GFR (CKD-EPI) 06/29/2023 70    ]

## 2023-09-29 ENCOUNTER — TELEPHONE (OUTPATIENT)
Dept: MEDICAL GROUP | Facility: CLINIC | Age: 58
End: 2023-09-29
Payer: MEDICAID

## 2023-09-29 NOTE — TELEPHONE ENCOUNTER
Received message from [facility] pharmacy that pt would like to take Ibuprofen instead of Percocet. Scanned into media.

## 2023-11-08 RX ORDER — IBUPROFEN 800 MG/1
800 TABLET ORAL EVERY 8 HOURS PRN
Qty: 30 TABLET | Refills: 3 | Status: SHIPPED | OUTPATIENT
Start: 2023-11-08 | End: 2023-11-13 | Stop reason: SDUPTHER

## 2023-11-08 NOTE — TELEPHONE ENCOUNTER
Caller Name: Bhavana Liu    Call Back Number: [phone] (home)       How would the patient prefer to be contacted with a response: Phone call OK to leave a detailed message    Pt came into office and stated she needs a refill on her ibuprofen. She stated it has been denied every time and she stopped taking percocet to start ibuprofen, she states she also had Dr. Moya's office fax a notice that she was no longer using percocet in order to start using ibuprofen instead. Pt isnt sure what we need from her to start the medication but she sts she needs it. Informed patient I would send a message to provider and we would call her when we have a response and send refill request.         Received request via: Patient    Was the patient seen in the last year in this department? Yes    Does the patient have an active prescription (recently filled or refills available) for medication(s) requested? No    Does the patient have FDC Plus and need 100 day supply (blood pressure, diabetes and cholesterol meds only)? Patient does not have SCP      Last Ov 6/27/23  Last Labs 6/29/23

## 2023-11-13 ENCOUNTER — OFFICE VISIT (OUTPATIENT)
Dept: MEDICAL GROUP | Facility: CLINIC | Age: 58
End: 2023-11-13
Payer: MEDICAID

## 2023-11-13 VITALS
WEIGHT: 211.8 LBS | OXYGEN SATURATION: 97 % | RESPIRATION RATE: 16 BRPM | HEIGHT: 69 IN | TEMPERATURE: 97.8 F | DIASTOLIC BLOOD PRESSURE: 88 MMHG | BODY MASS INDEX: 31.37 KG/M2 | HEART RATE: 96 BPM | SYSTOLIC BLOOD PRESSURE: 128 MMHG

## 2023-11-13 DIAGNOSIS — M54.2 NECK PAIN: ICD-10-CM

## 2023-11-13 DIAGNOSIS — G95.9 CERVICAL MYELOPATHY (HCC): ICD-10-CM

## 2023-11-13 DIAGNOSIS — B35.3 TINEA PEDIS OF BOTH FEET: ICD-10-CM

## 2023-11-13 DIAGNOSIS — Z23 NEED FOR VACCINATION: ICD-10-CM

## 2023-11-13 DIAGNOSIS — F43.22 ADJUSTMENT DISORDER WITH ANXIOUS MOOD: ICD-10-CM

## 2023-11-13 DIAGNOSIS — J34.89 NASAL SORE: ICD-10-CM

## 2023-11-13 DIAGNOSIS — L30.9 ECZEMA, UNSPECIFIED TYPE: ICD-10-CM

## 2023-11-13 DIAGNOSIS — M48.02 FORAMINAL STENOSIS OF CERVICAL REGION: ICD-10-CM

## 2023-11-13 DIAGNOSIS — M48.02 CERVICAL STENOSIS OF SPINAL CANAL: ICD-10-CM

## 2023-11-13 DIAGNOSIS — Z12.31 ENCOUNTER FOR SCREENING MAMMOGRAM FOR BREAST CANCER: ICD-10-CM

## 2023-11-13 DIAGNOSIS — I83.893 VARICOSE VEINS OF BOTH LEGS WITH EDEMA: ICD-10-CM

## 2023-11-13 DIAGNOSIS — G62.9 NEUROPATHY: ICD-10-CM

## 2023-11-13 DIAGNOSIS — M54.12 CERVICAL RADICULOPATHY: ICD-10-CM

## 2023-11-13 PROBLEM — Z01.818 VISIT FOR PRE-OPERATIVE EXAMINATION: Status: RESOLVED | Noted: 2023-06-27 | Resolved: 2023-11-13

## 2023-11-13 PROCEDURE — 90471 IMMUNIZATION ADMIN: CPT | Performed by: PHYSICIAN ASSISTANT

## 2023-11-13 PROCEDURE — 99214 OFFICE O/P EST MOD 30 MIN: CPT | Mod: 25 | Performed by: PHYSICIAN ASSISTANT

## 2023-11-13 PROCEDURE — 90632 HEPA VACCINE ADULT IM: CPT | Performed by: PHYSICIAN ASSISTANT

## 2023-11-13 PROCEDURE — 3079F DIAST BP 80-89 MM HG: CPT | Performed by: PHYSICIAN ASSISTANT

## 2023-11-13 PROCEDURE — 90686 IIV4 VACC NO PRSV 0.5 ML IM: CPT | Performed by: PHYSICIAN ASSISTANT

## 2023-11-13 PROCEDURE — 3074F SYST BP LT 130 MM HG: CPT | Performed by: PHYSICIAN ASSISTANT

## 2023-11-13 PROCEDURE — 90472 IMMUNIZATION ADMIN EACH ADD: CPT | Performed by: PHYSICIAN ASSISTANT

## 2023-11-13 RX ORDER — KETOCONAZOLE 20 MG/G
CREAM TOPICAL
COMMUNITY
End: 2023-11-13 | Stop reason: SDUPTHER

## 2023-11-13 RX ORDER — SULFAMETHOXAZOLE AND TRIMETHOPRIM 800; 160 MG/1; MG/1
1 TABLET ORAL 2 TIMES DAILY
Qty: 20 TABLET | Refills: 0 | Status: SHIPPED | OUTPATIENT
Start: 2023-11-13

## 2023-11-13 RX ORDER — TRIAMCINOLONE ACETONIDE 1 MG/G
CREAM TOPICAL
Qty: 45 G | Refills: 0 | Status: SHIPPED | OUTPATIENT
Start: 2023-11-13

## 2023-11-13 RX ORDER — PAROXETINE HYDROCHLORIDE 20 MG/1
40 TABLET, FILM COATED ORAL DAILY
Qty: 180 TABLET | Refills: 2 | Status: SHIPPED | OUTPATIENT
Start: 2023-11-13

## 2023-11-13 RX ORDER — KETOCONAZOLE 20 MG/G
CREAM TOPICAL
Qty: 60 G | Refills: 2 | Status: SHIPPED | OUTPATIENT
Start: 2023-11-13

## 2023-11-13 RX ORDER — FLUCONAZOLE 100 MG/1
TABLET ORAL
COMMUNITY
End: 2023-11-22

## 2023-11-13 RX ORDER — IBUPROFEN 800 MG/1
800 TABLET ORAL EVERY 8 HOURS PRN
Qty: 30 TABLET | Refills: 3 | Status: SHIPPED | OUTPATIENT
Start: 2023-11-13 | End: 2023-11-22

## 2023-11-13 RX ORDER — METHOCARBAMOL 500 MG/1
TABLET, FILM COATED ORAL
Qty: 180 TABLET | Refills: 3 | Status: SHIPPED | OUTPATIENT
Start: 2023-11-13 | End: 2023-11-22

## 2023-11-13 ASSESSMENT — FIBROSIS 4 INDEX: FIB4 SCORE: .989949493661166534

## 2023-11-14 NOTE — PROGRESS NOTES
cc:  nasal sore    Subjective:     Bhavana Liu is a 58 y.o. female presenting for nasal sore      Patient presents to the office for nasal sore.  She states that inside it is quite red.  She states that a white film will develop.  She states she has tried neosporin.       Patient indicates that she has  had an anterior fusion since she was last seen and also have varicosities ligated.  She does have some bruising of the leg.  She is still having some pain.  She is still seeing therapy for her neck and arms.      Patient is requesting an increase in her paxil.  She is also requesting that we change her flexeril to robaxin.  She states that her daughter gave her 2 at a time and they seemed to do a better job.      Patient states that the rash on her side has been responding to a steroid cream. She is also requesting a fungal cream for her feet to be refilled.      Review of systems:  See above.   Denies any symptoms unless previously indicated.        Current Outpatient Medications:     sulfamethoxazole-trimethoprim (BACTRIM DS) 800-160 MG tablet, Take 1 Tablet by mouth 2 times a day., Disp: 20 Tablet, Rfl: 0    ibuprofen (MOTRIN) 800 MG Tab, Take 1 Tablet by mouth every 8 hours as needed for Moderate Pain., Disp: 30 Tablet, Rfl: 3    PARoxetine (PAXIL) 20 MG Tab, Take 2 Tablets by mouth every day., Disp: 180 Tablet, Rfl: 2    methocarbamol (ROBAXIN) 500 MG Tab, Take 1-2 tabs 2-3 times a day, Disp: 180 Tablet, Rfl: 3    triamcinolone acetonide (KENALOG) 0.1 % Cream, Use a small amount to the affected area twice a day no more than 10 days., Disp: 45 g, Rfl: 0    ketoconazole (NIZORAL) 2 % Cream, Apply a small amount to affected area no more than 2 weeks., Disp: 60 g, Rfl: 2    loratadine (CLARITIN) 10 MG Tab, TAKE 1 TABLET BY MOUTH EVERY DAY., Disp: 30 Tablet, Rfl: 5    gabapentin (NEURONTIN) 800 MG tablet, TAKE ONE TABLET BY MOUTH THREE TIMES A DAY, Disp: 90 Tablet, Rfl: 0    hydrOXYzine HCl (ATARAX)  "25 MG Tab, TAKE 1 TABLET BY MOUTH 3 TIMES A DAY AS NEEDED FOR ANXIETY., Disp: 30 Tablet, Rfl: 3    busPIRone (BUSPAR) 10 MG Tab tablet, TAKE 1 TABLET BY MOUTH 2 TIMES A DAY., Disp: 60 Tablet, Rfl: 4    VENTOLIN  (90 Base) MCG/ACT Aero Soln inhalation aerosol, INHALE 2 PUFFS BY MOUTH EVERY 6 HOURS AS NEEDED FOR SHORTNESS OF BREATH., Disp: 18 g, Rfl: 4    furosemide (LASIX) 20 MG Tab, TAKE 1 TABLET BY MOUTH 3 TIMES A DAY., Disp: 270 Tablet, Rfl: 1    potassium chloride ER (KLOR-CON) 10 MEQ tablet, Take 1 Tablet by mouth 2 times a day., Disp: 180 Tablet, Rfl: 2    pantoprazole (PROTONIX) 40 MG Tablet Delayed Response, Take 1 Tablet by mouth every day., Disp: 90 Tablet, Rfl: 2    fluconazole (DIFLUCAN) 100 MG Tab, , Disp: , Rfl:     docusate sodium (COLACE) 100 MG Cap, TAKE 1 CAPSULE BY MOUTH 2 TIMES A DAY. (Patient not taking: Reported on 11/13/2023), Disp: 60 Capsule, Rfl: 2    oxyCODONE-acetaminophen (PERCOCET) 5-325 MG Tab, Take 1 tablet every 4-6 hours by oral route as needed for 7 days. (Patient not taking: Reported on 11/13/2023), Disp: 42 Tablet, Rfl: 0    methylPREDNISolone (MEDROL) 4 MG Tablet Therapy Pack, Follow as scheduled per package instructions (Patient not taking: Reported on 11/13/2023), Disp: 21 Tablet, Rfl: 0    famotidine (PEPCID) 20 MG Tab, Take 1 tablet twice a day by oral route as directed for 5 days. (Patient not taking: Reported on 11/13/2023), Disp: 10 Tablet, Rfl: 0    Allergies, past medical history, past surgical history, family history, social history reviewed and updated    Objective:     Vitals: /88 (BP Location: Left arm, Patient Position: Sitting, BP Cuff Size: Adult)   Pulse 96   Temp 36.6 °C (97.8 °F) (Temporal)   Resp 16   Ht 1.753 m (5' 9\")   Wt 96.1 kg (211 lb 12.8 oz)   LMP 10/28/2012   SpO2 97%   BMI 31.28 kg/m²   General: Alert, pleasant, NAD  EYES:   PERRL, EOMI, no icterus or pallor.  Conjunctivae and lids normal.   HENT:  Normocephalic.  External ears " normal.  Neck stiffness.   Erythema in the nasal passageways bilaterally with white scaly area right nare.  Respiratory: Normal respiratory effort.    Abdomen: Not obese  Skin: Warm, dry, no rashes.  Eczema right flank  Musculoskeletal: Gait is normal.  Moves all extremities well.    Extremities: Normal range of motion all extremities.  Bruising upper and lower right leg.  Varicosities improved bilaterally.  Neurological: No tremors, sensation grossly intact,  CN2-12 intact.  Psych:  Affect/mood is normal, judgement is good, memory is intact, grooming is appropriate.    Assessment/Plan:     Bhavana was seen today for sore and medication refill.    Diagnoses and all orders for this visit:    Nasal sore  -     Referral to ENT  -     sulfamethoxazole-trimethoprim (BACTRIM DS) 800-160 MG tablet; Take 1 Tablet by mouth 2 times a day.    We will start patient on Bactrim.  Patient concerned about neoplasm.  Will refer to ENT for further evaluation.    Neck pain  -     ibuprofen (MOTRIN) 800 MG Tab; Take 1 Tablet by mouth every 8 hours as needed for Moderate Pain.  -     methocarbamol (ROBAXIN) 500 MG Tab; Take 1-2 tabs 2-3 times a day  Neuropathy  -     ibuprofen (MOTRIN) 800 MG Tab; Take 1 Tablet by mouth every 8 hours as needed for Moderate Pain.  -     methocarbamol (ROBAXIN) 500 MG Tab; Take 1-2 tabs 2-3 times a day  Cervical radiculopathy  -     ibuprofen (MOTRIN) 800 MG Tab; Take 1 Tablet by mouth every 8 hours as needed for Moderate Pain.  -     methocarbamol (ROBAXIN) 500 MG Tab; Take 1-2 tabs 2-3 times a day  Cervical stenosis of spinal canal  -     ibuprofen (MOTRIN) 800 MG Tab; Take 1 Tablet by mouth every 8 hours as needed for Moderate Pain.  -     methocarbamol (ROBAXIN) 500 MG Tab; Take 1-2 tabs 2-3 times a day  Cervical myelopathy (HCC)  -     ibuprofen (MOTRIN) 800 MG Tab; Take 1 Tablet by mouth every 8 hours as needed for Moderate Pain.  -     methocarbamol (ROBAXIN) 500 MG Tab; Take 1-2 tabs 2-3 times a  day  Foraminal stenosis of cervical region  -     ibuprofen (MOTRIN) 800 MG Tab; Take 1 Tablet by mouth every 8 hours as needed for Moderate Pain.  -     methocarbamol (ROBAXIN) 500 MG Tab; Take 1-2 tabs 2-3 times a day    Medications have been refilled at this time.  Patient will continue to follow-up with specialty as needed.    Adjustment disorder with anxious mood  -     PARoxetine (PAXIL) 20 MG Tab; Take 2 Tablets by mouth every day.    Paxil increased to 20 mg 2 tabs daily.    Varicose veins of both legs with edema    Patient to continue to follow-up with specialty as needed.    Tinea pedis of both feet  -     ketoconazole (NIZORAL) 2 % Cream; Apply a small amount to affected area no more than 2 weeks.  Eczema, unspecified type  -     triamcinolone acetonide (KENALOG) 0.1 % Cream; Use a small amount to the affected area twice a day no more than 10 days.      Medications refilled.    Encounter for screening mammogram for breast cancer  -     MA-SCREENING MAMMO BILAT W/TOMOSYNTHESIS W/CAD; Future    Mammogram ordered.    Need for vaccination  -     INFLUENZA VACCINE QUAD INJ (PF)  -     Hepatitis A Vaccine Adult 19+    Vaccines given.        No follow-ups on file.    Please note that this dictation was created using voice recognition software. I have made every reasonable attempt to correct obvious errors, but expect that there are errors of grammar and possible content that I did not discover before finalizing note.

## 2023-11-20 DIAGNOSIS — K21.9 GASTROESOPHAGEAL REFLUX DISEASE, UNSPECIFIED WHETHER ESOPHAGITIS PRESENT: ICD-10-CM

## 2023-11-20 RX ORDER — PANTOPRAZOLE SODIUM 40 MG/1
40 TABLET, DELAYED RELEASE ORAL DAILY
Qty: 90 TABLET | Refills: 2 | Status: SHIPPED | OUTPATIENT
Start: 2023-11-20

## 2023-11-20 NOTE — TELEPHONE ENCOUNTER
Received request via: Pharmacy    Was the patient seen in the last year in this department? Yes    Does the patient have an active prescription (recently filled or refills available) for medication(s) requested? No    Does the patient have FCI Plus and need 100 day supply (blood pressure, diabetes and cholesterol meds only)? Medication is not for cholesterol, blood pressure or diabetes    Last OV: 11/13/23  Last labs: 6/29/23

## 2023-11-21 ENCOUNTER — APPOINTMENT (OUTPATIENT)
Dept: RADIOLOGY | Facility: MEDICAL CENTER | Age: 58
End: 2023-11-21
Attending: EMERGENCY MEDICINE
Payer: MEDICAID

## 2023-11-21 ENCOUNTER — HOSPITAL ENCOUNTER (OUTPATIENT)
Facility: MEDICAL CENTER | Age: 58
End: 2023-11-23
Attending: EMERGENCY MEDICINE | Admitting: STUDENT IN AN ORGANIZED HEALTH CARE EDUCATION/TRAINING PROGRAM
Payer: MEDICAID

## 2023-11-21 DIAGNOSIS — I83.893 VARICOSE VEINS OF BOTH LEGS WITH EDEMA: ICD-10-CM

## 2023-11-21 DIAGNOSIS — M25.551 ACUTE RIGHT HIP PAIN: ICD-10-CM

## 2023-11-21 DIAGNOSIS — R52 INTRACTABLE PAIN: Primary | ICD-10-CM

## 2023-11-21 DIAGNOSIS — R26.9 GAIT DISTURBANCE: ICD-10-CM

## 2023-11-21 DIAGNOSIS — S70.01XA CONTUSION OF RIGHT HIP, INITIAL ENCOUNTER: ICD-10-CM

## 2023-11-21 DIAGNOSIS — R60.0 BILATERAL LOWER EXTREMITY EDEMA: ICD-10-CM
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
## 2023-11-21 DIAGNOSIS — M40.12 OTHER SECONDARY KYPHOSIS, CERVICAL REGION: ICD-10-CM

## 2023-11-21 PROCEDURE — 700102 HCHG RX REV CODE 250 W/ 637 OVERRIDE(OP): Mod: UD | Performed by: EMERGENCY MEDICINE

## 2023-11-21 PROCEDURE — 96374 THER/PROPH/DIAG INJ IV PUSH: CPT

## 2023-11-21 PROCEDURE — 99285 EMERGENCY DEPT VISIT HI MDM: CPT

## 2023-11-21 PROCEDURE — A9270 NON-COVERED ITEM OR SERVICE: HCPCS | Mod: UD | Performed by: EMERGENCY MEDICINE

## 2023-11-21 PROCEDURE — 73502 X-RAY EXAM HIP UNI 2-3 VIEWS: CPT | Mod: RT

## 2023-11-21 PROCEDURE — 72192 CT PELVIS W/O DYE: CPT

## 2023-11-21 PROCEDURE — 700111 HCHG RX REV CODE 636 W/ 250 OVERRIDE (IP): Mod: UD | Performed by: EMERGENCY MEDICINE

## 2023-11-21 PROCEDURE — 36415 COLL VENOUS BLD VENIPUNCTURE: CPT

## 2023-11-21 RX ORDER — KETOROLAC TROMETHAMINE 30 MG/ML
15 INJECTION, SOLUTION INTRAMUSCULAR; INTRAVENOUS ONCE
Status: COMPLETED | OUTPATIENT
Start: 2023-11-21 | End: 2023-11-21

## 2023-11-21 RX ORDER — HYDROMORPHONE HYDROCHLORIDE 1 MG/ML
1 INJECTION, SOLUTION INTRAMUSCULAR; INTRAVENOUS; SUBCUTANEOUS ONCE
Status: COMPLETED | OUTPATIENT
Start: 2023-11-21 | End: 2023-11-21

## 2023-11-21 RX ORDER — FUROSEMIDE 20 MG/1
20 TABLET ORAL 3 TIMES DAILY
Qty: 270 TABLET | Refills: 1 | OUTPATIENT
Start: 2023-11-21

## 2023-11-21 RX ORDER — METHOCARBAMOL 750 MG/1
1500 TABLET, FILM COATED ORAL ONCE
Status: COMPLETED | OUTPATIENT
Start: 2023-11-21 | End: 2023-11-21

## 2023-11-21 RX ORDER — DOCUSATE SODIUM 100 MG/1
100 CAPSULE, LIQUID FILLED ORAL 2 TIMES DAILY
Qty: 60 CAPSULE | Refills: 3 | Status: SHIPPED | OUTPATIENT
Start: 2023-11-21 | End: 2024-03-06

## 2023-11-21 RX ORDER — GABAPENTIN 400 MG/1
800 CAPSULE ORAL ONCE
Status: COMPLETED | OUTPATIENT
Start: 2023-11-22 | End: 2023-11-21

## 2023-11-21 RX ADMIN — KETOROLAC TROMETHAMINE 15 MG: 30 INJECTION, SOLUTION INTRAMUSCULAR; INTRAVENOUS at 22:37

## 2023-11-21 RX ADMIN — METHOCARBAMOL 1500 MG: 750 TABLET ORAL at 22:36

## 2023-11-21 RX ADMIN — HYDROMORPHONE HYDROCHLORIDE 1 MG: 1 INJECTION, SOLUTION INTRAMUSCULAR; INTRAVENOUS; SUBCUTANEOUS at 18:22

## 2023-11-21 RX ADMIN — GABAPENTIN 800 MG: 400 CAPSULE ORAL at 23:43

## 2023-11-21 ASSESSMENT — PAIN DESCRIPTION - PAIN TYPE
TYPE: ACUTE PAIN

## 2023-11-21 ASSESSMENT — FIBROSIS 4 INDEX: FIB4 SCORE: .989949493661166534

## 2023-11-21 NOTE — TELEPHONE ENCOUNTER
Received request via: Pharmacy    Was the patient seen in the last year in this department? Yes    Does the patient have an active prescription (recently filled or refills available) for medication(s) requested? No    Does the patient have snf Plus and need 100 day supply (blood pressure, diabetes and cholesterol meds only)? Medication is not for cholesterol, blood pressure or diabetes    Last OV: 11/13/23  Last labs: 6/29/23
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
## 2023-11-22 PROBLEM — M25.551 ACUTE RIGHT HIP PAIN: Status: ACTIVE | Noted: 2023-11-22

## 2023-11-22 PROBLEM — R26.89 IMBALANCE: Status: ACTIVE | Noted: 2023-11-22

## 2023-11-22 PROBLEM — M25.552 ACUTE HIP PAIN, LEFT: Status: ACTIVE | Noted: 2023-11-22

## 2023-11-22 PROBLEM — R09.02 HYPOXIA: Status: ACTIVE | Noted: 2023-11-22

## 2023-11-22 LAB
ANION GAP SERPL CALC-SCNC: 7 MMOL/L (ref 7–16)
BASOPHILS # BLD AUTO: 1.3 % (ref 0–1.8)
BASOPHILS # BLD: 0.11 K/UL (ref 0–0.12)
BUN SERPL-MCNC: 17 MG/DL (ref 8–22)
CALCIUM SERPL-MCNC: 8.5 MG/DL (ref 8.5–10.5)
CHLORIDE SERPL-SCNC: 104 MMOL/L (ref 96–112)
CO2 SERPL-SCNC: 27 MMOL/L (ref 20–33)
CREAT SERPL-MCNC: 1.19 MG/DL (ref 0.5–1.4)
EOSINOPHIL # BLD AUTO: 0.19 K/UL (ref 0–0.51)
EOSINOPHIL NFR BLD: 2.2 % (ref 0–6.9)
ERYTHROCYTE [DISTWIDTH] IN BLOOD BY AUTOMATED COUNT: 43.9 FL (ref 35.9–50)
GFR SERPLBLD CREATININE-BSD FMLA CKD-EPI: 53 ML/MIN/1.73 M 2
GLUCOSE SERPL-MCNC: 92 MG/DL (ref 65–99)
HCT VFR BLD AUTO: 34.9 % (ref 37–47)
HGB BLD-MCNC: 11.6 G/DL (ref 12–16)
IMM GRANULOCYTES # BLD AUTO: 0.02 K/UL (ref 0–0.11)
IMM GRANULOCYTES NFR BLD AUTO: 0.2 % (ref 0–0.9)
LYMPHOCYTES # BLD AUTO: 4.15 K/UL (ref 1–4.8)
LYMPHOCYTES NFR BLD: 47.4 % (ref 22–41)
MCH RBC QN AUTO: 30.1 PG (ref 27–33)
MCHC RBC AUTO-ENTMCNC: 33.2 G/DL (ref 32.2–35.5)
MCV RBC AUTO: 90.4 FL (ref 81.4–97.8)
MONOCYTES # BLD AUTO: 0.92 K/UL (ref 0–0.85)
MONOCYTES NFR BLD AUTO: 10.5 % (ref 0–13.4)
NEUTROPHILS # BLD AUTO: 3.37 K/UL (ref 1.82–7.42)
NEUTROPHILS NFR BLD: 38.4 % (ref 44–72)
NRBC # BLD AUTO: 0 K/UL
NRBC BLD-RTO: 0 /100 WBC (ref 0–0.2)
PLATELET # BLD AUTO: 365 K/UL (ref 164–446)
PMV BLD AUTO: 8.7 FL (ref 9–12.9)
POTASSIUM SERPL-SCNC: 4.4 MMOL/L (ref 3.6–5.5)
RBC # BLD AUTO: 3.86 M/UL (ref 4.2–5.4)
SODIUM SERPL-SCNC: 138 MMOL/L (ref 135–145)
VIT B12 SERPL-MCNC: 550 PG/ML (ref 211–911)
WBC # BLD AUTO: 8.8 K/UL (ref 4.8–10.8)

## 2023-11-22 PROCEDURE — A9270 NON-COVERED ITEM OR SERVICE: HCPCS | Mod: UD | Performed by: STUDENT IN AN ORGANIZED HEALTH CARE EDUCATION/TRAINING PROGRAM

## 2023-11-22 PROCEDURE — 99222 1ST HOSP IP/OBS MODERATE 55: CPT | Performed by: STUDENT IN AN ORGANIZED HEALTH CARE EDUCATION/TRAINING PROGRAM

## 2023-11-22 PROCEDURE — 700111 HCHG RX REV CODE 636 W/ 250 OVERRIDE (IP): Mod: UD | Performed by: EMERGENCY MEDICINE

## 2023-11-22 PROCEDURE — 36415 COLL VENOUS BLD VENIPUNCTURE: CPT

## 2023-11-22 PROCEDURE — 82607 VITAMIN B-12: CPT

## 2023-11-22 PROCEDURE — 85025 COMPLETE CBC W/AUTO DIFF WBC: CPT

## 2023-11-22 PROCEDURE — 700111 HCHG RX REV CODE 636 W/ 250 OVERRIDE (IP): Mod: JZ,UD | Performed by: STUDENT IN AN ORGANIZED HEALTH CARE EDUCATION/TRAINING PROGRAM

## 2023-11-22 PROCEDURE — 80048 BASIC METABOLIC PNL TOTAL CA: CPT

## 2023-11-22 PROCEDURE — G0378 HOSPITAL OBSERVATION PER HR: HCPCS

## 2023-11-22 PROCEDURE — 700102 HCHG RX REV CODE 250 W/ 637 OVERRIDE(OP): Mod: UD | Performed by: STUDENT IN AN ORGANIZED HEALTH CARE EDUCATION/TRAINING PROGRAM

## 2023-11-22 PROCEDURE — 96375 TX/PRO/DX INJ NEW DRUG ADDON: CPT

## 2023-11-22 PROCEDURE — 96376 TX/PRO/DX INJ SAME DRUG ADON: CPT

## 2023-11-22 PROCEDURE — 97162 PT EVAL MOD COMPLEX 30 MIN: CPT

## 2023-11-22 RX ORDER — LABETALOL HYDROCHLORIDE 5 MG/ML
10 INJECTION, SOLUTION INTRAVENOUS EVERY 4 HOURS PRN
Status: DISCONTINUED | OUTPATIENT
Start: 2023-11-22 | End: 2023-11-23 | Stop reason: HOSPADM

## 2023-11-22 RX ORDER — HYDROXYZINE HYDROCHLORIDE 25 MG/1
25 TABLET, FILM COATED ORAL SEE ADMIN INSTRUCTIONS
COMMUNITY

## 2023-11-22 RX ORDER — GABAPENTIN 800 MG/1
800 TABLET ORAL 2 TIMES DAILY
COMMUNITY
End: 2024-01-23

## 2023-11-22 RX ORDER — BUSPIRONE HYDROCHLORIDE 10 MG/1
10 TABLET ORAL 2 TIMES DAILY
Status: DISCONTINUED | OUTPATIENT
Start: 2023-11-22 | End: 2023-11-23 | Stop reason: HOSPADM

## 2023-11-22 RX ORDER — HYDROMORPHONE HYDROCHLORIDE 1 MG/ML
1 INJECTION, SOLUTION INTRAMUSCULAR; INTRAVENOUS; SUBCUTANEOUS ONCE
Status: COMPLETED | OUTPATIENT
Start: 2023-11-22 | End: 2023-11-22

## 2023-11-22 RX ORDER — BISACODYL 10 MG
10 SUPPOSITORY, RECTAL RECTAL
Status: DISCONTINUED | OUTPATIENT
Start: 2023-11-22 | End: 2023-11-23 | Stop reason: HOSPADM

## 2023-11-22 RX ORDER — OXYCODONE HYDROCHLORIDE AND ACETAMINOPHEN 5; 325 MG/1; MG/1
1 TABLET ORAL EVERY 4 HOURS PRN
Status: DISCONTINUED | OUTPATIENT
Start: 2023-11-22 | End: 2023-11-23 | Stop reason: HOSPADM

## 2023-11-22 RX ORDER — MORPHINE SULFATE 4 MG/ML
4 INJECTION INTRAVENOUS EVERY 4 HOURS PRN
Status: DISCONTINUED | OUTPATIENT
Start: 2023-11-22 | End: 2023-11-23 | Stop reason: HOSPADM

## 2023-11-22 RX ORDER — PAROXETINE 10 MG/1
40 TABLET, FILM COATED ORAL EVERY EVENING
Status: DISCONTINUED | OUTPATIENT
Start: 2023-11-22 | End: 2023-11-23 | Stop reason: HOSPADM

## 2023-11-22 RX ORDER — FUROSEMIDE 20 MG/1
20-40 TABLET ORAL 2 TIMES DAILY
COMMUNITY
End: 2024-01-23

## 2023-11-22 RX ORDER — POLYETHYLENE GLYCOL 3350 17 G/17G
1 POWDER, FOR SOLUTION ORAL
Status: DISCONTINUED | OUTPATIENT
Start: 2023-11-22 | End: 2023-11-23 | Stop reason: HOSPADM

## 2023-11-22 RX ORDER — ENOXAPARIN SODIUM 100 MG/ML
40 INJECTION SUBCUTANEOUS DAILY
Status: DISCONTINUED | OUTPATIENT
Start: 2023-11-22 | End: 2023-11-23 | Stop reason: HOSPADM

## 2023-11-22 RX ORDER — OMEPRAZOLE 20 MG/1
20 CAPSULE, DELAYED RELEASE ORAL EVERY MORNING
Status: DISCONTINUED | OUTPATIENT
Start: 2023-11-22 | End: 2023-11-23 | Stop reason: HOSPADM

## 2023-11-22 RX ORDER — GABAPENTIN 400 MG/1
800 CAPSULE ORAL 2 TIMES DAILY
Status: DISCONTINUED | OUTPATIENT
Start: 2023-11-22 | End: 2023-11-23 | Stop reason: HOSPADM

## 2023-11-22 RX ORDER — METHOCARBAMOL 500 MG/1
1000 TABLET, FILM COATED ORAL 2 TIMES DAILY
COMMUNITY
End: 2023-12-19

## 2023-11-22 RX ORDER — ACETAMINOPHEN 325 MG/1
650 TABLET ORAL EVERY 6 HOURS PRN
Status: DISCONTINUED | OUTPATIENT
Start: 2023-11-22 | End: 2023-11-23 | Stop reason: HOSPADM

## 2023-11-22 RX ORDER — ACETAMINOPHEN 500 MG
1500 TABLET ORAL 2 TIMES DAILY
Status: ON HOLD | COMMUNITY
End: 2023-11-23 | Stop reason: SDUPTHER

## 2023-11-22 RX ORDER — ALBUTEROL SULFATE 90 UG/1
2 AEROSOL, METERED RESPIRATORY (INHALATION) EVERY 6 HOURS PRN
Status: DISCONTINUED | OUTPATIENT
Start: 2023-11-22 | End: 2023-11-23 | Stop reason: HOSPADM

## 2023-11-22 RX ORDER — HYDROXYZINE 50 MG/1
25 TABLET, FILM COATED ORAL 3 TIMES DAILY PRN
Status: DISCONTINUED | OUTPATIENT
Start: 2023-11-22 | End: 2023-11-23 | Stop reason: HOSPADM

## 2023-11-22 RX ORDER — AMOXICILLIN 250 MG
2 CAPSULE ORAL 2 TIMES DAILY
Status: DISCONTINUED | OUTPATIENT
Start: 2023-11-22 | End: 2023-11-23 | Stop reason: HOSPADM

## 2023-11-22 RX ADMIN — GABAPENTIN 800 MG: 400 CAPSULE ORAL at 17:08

## 2023-11-22 RX ADMIN — GABAPENTIN 800 MG: 400 CAPSULE ORAL at 05:32

## 2023-11-22 RX ADMIN — DOCUSATE SODIUM 50 MG AND SENNOSIDES 8.6 MG 2 TABLET: 8.6; 5 TABLET, FILM COATED ORAL at 17:07

## 2023-11-22 RX ADMIN — HYDROMORPHONE HYDROCHLORIDE 1 MG: 1 INJECTION, SOLUTION INTRAMUSCULAR; INTRAVENOUS; SUBCUTANEOUS at 01:29

## 2023-11-22 RX ADMIN — PAROXETINE HYDROCHLORIDE 40 MG: 10 TABLET, FILM COATED ORAL at 17:08

## 2023-11-22 RX ADMIN — ENOXAPARIN SODIUM 40 MG: 100 INJECTION SUBCUTANEOUS at 17:07

## 2023-11-22 RX ADMIN — OXYCODONE AND ACETAMINOPHEN 1 TABLET: 5; 325 TABLET ORAL at 10:30

## 2023-11-22 RX ADMIN — DOCUSATE SODIUM 50 MG AND SENNOSIDES 8.6 MG 2 TABLET: 8.6; 5 TABLET, FILM COATED ORAL at 05:32

## 2023-11-22 RX ADMIN — OXYCODONE AND ACETAMINOPHEN 1 TABLET: 5; 325 TABLET ORAL at 21:03

## 2023-11-22 RX ADMIN — ACETAMINOPHEN 650 MG: 325 TABLET, FILM COATED ORAL at 09:02

## 2023-11-22 RX ADMIN — BUSPIRONE HYDROCHLORIDE 10 MG: 10 TABLET ORAL at 17:08

## 2023-11-22 RX ADMIN — OXYCODONE AND ACETAMINOPHEN 1 TABLET: 5; 325 TABLET ORAL at 17:07

## 2023-11-22 RX ADMIN — OMEPRAZOLE 20 MG: 20 CAPSULE, DELAYED RELEASE ORAL at 05:32

## 2023-11-22 RX ADMIN — BUSPIRONE HYDROCHLORIDE 10 MG: 10 TABLET ORAL at 05:32

## 2023-11-22 ASSESSMENT — COGNITIVE AND FUNCTIONAL STATUS - GENERAL
MOVING TO AND FROM BED TO CHAIR: A LITTLE
SUGGESTED CMS G CODE MODIFIER MOBILITY: CK
SUGGESTED CMS G CODE MODIFIER MOBILITY: CK
MOVING FROM LYING ON BACK TO SITTING ON SIDE OF FLAT BED: A LOT
TURNING FROM BACK TO SIDE WHILE IN FLAT BAD: A LITTLE
MOVING FROM LYING ON BACK TO SITTING ON SIDE OF FLAT BED: A LOT
TOILETING: A LITTLE
TURNING FROM BACK TO SIDE WHILE IN FLAT BAD: A LITTLE
CLIMB 3 TO 5 STEPS WITH RAILING: A LOT
DAILY ACTIVITIY SCORE: 23
WALKING IN HOSPITAL ROOM: A LITTLE
MOBILITY SCORE: 16
CLIMB 3 TO 5 STEPS WITH RAILING: A LOT
WALKING IN HOSPITAL ROOM: A LITTLE
MOVING TO AND FROM BED TO CHAIR: A LITTLE
STANDING UP FROM CHAIR USING ARMS: A LITTLE
STANDING UP FROM CHAIR USING ARMS: A LITTLE
MOBILITY SCORE: 16
SUGGESTED CMS G CODE MODIFIER DAILY ACTIVITY: CI

## 2023-11-22 ASSESSMENT — PAIN DESCRIPTION - PAIN TYPE
TYPE: ACUTE PAIN

## 2023-11-22 ASSESSMENT — LIFESTYLE VARIABLES
HAVE PEOPLE ANNOYED YOU BY CRITICIZING YOUR DRINKING: NO
ALCOHOL_USE: NO
TOTAL SCORE: 0
ON A TYPICAL DAY WHEN YOU DRINK ALCOHOL HOW MANY DRINKS DO YOU HAVE: 0
CONSUMPTION TOTAL: NEGATIVE
TOTAL SCORE: 0
EVER HAD A DRINK FIRST THING IN THE MORNING TO STEADY YOUR NERVES TO GET RID OF A HANGOVER: NO
DOES PATIENT WANT TO STOP DRINKING: NO
AVERAGE NUMBER OF DAYS PER WEEK YOU HAVE A DRINK CONTAINING ALCOHOL: 0
HAVE YOU EVER FELT YOU SHOULD CUT DOWN ON YOUR DRINKING: NO
EVER FELT BAD OR GUILTY ABOUT YOUR DRINKING: NO
HOW MANY TIMES IN THE PAST YEAR HAVE YOU HAD 5 OR MORE DRINKS IN A DAY: 0
TOTAL SCORE: 0

## 2023-11-22 ASSESSMENT — ENCOUNTER SYMPTOMS
COUGH: 0
CHILLS: 0
RESPIRATORY NEGATIVE: 1
FALLS: 1
EYES NEGATIVE: 1
GASTROINTESTINAL NEGATIVE: 1
NECK PAIN: 1
MYALGIAS: 1
ABDOMINAL PAIN: 0
FEVER: 0
PSYCHIATRIC NEGATIVE: 1
WEAKNESS: 1
DIARRHEA: 0
CARDIOVASCULAR NEGATIVE: 1
VOMITING: 0
SHORTNESS OF BREATH: 0
NAUSEA: 0

## 2023-11-22 ASSESSMENT — GAIT ASSESSMENTS
GAIT LEVEL OF ASSIST: CONTACT GUARD ASSIST
DISTANCE (FEET): 10
ASSISTIVE DEVICE: FRONT WHEEL WALKER
DEVIATION: SHUFFLED GAIT;ANTALGIC;STEP TO

## 2023-11-22 ASSESSMENT — FIBROSIS 4 INDEX: FIB4 SCORE: 0.8

## 2023-11-22 ASSESSMENT — PATIENT HEALTH QUESTIONNAIRE - PHQ9
SUM OF ALL RESPONSES TO PHQ9 QUESTIONS 1 AND 2: 0
1. LITTLE INTEREST OR PLEASURE IN DOING THINGS: NOT AT ALL
1. LITTLE INTEREST OR PLEASURE IN DOING THINGS: NOT AT ALL
2. FEELING DOWN, DEPRESSED, IRRITABLE, OR HOPELESS: NOT AT ALL
2. FEELING DOWN, DEPRESSED, IRRITABLE, OR HOPELESS: NOT AT ALL
SUM OF ALL RESPONSES TO PHQ9 QUESTIONS 1 AND 2: 0

## 2023-11-22 NOTE — ED NOTES
Pt resting with eyes closed with even chest rise and fall, reports no needs at this time, call light available and in reach.

## 2023-11-22 NOTE — THERAPY
Physical Therapy   Initial Evaluation     Patient Name: Bhavana Liu  Age:  58 y.o., Sex:  female  Medical Record #: [id number]  Today's Date: 11/22/2023     Precautions  Precautions: Fall Risk    Assessment  Patient is 58 y.o. female admitted post GLF with L hip pain. PMH includes cervical radiculopathy, anxiety, GERD, obesity. Pt reports frequent falls including several instances where she falls asleep on the toilet and falls forward onto the ground. Pt reports R hip pain during evaluation. She was unable to stand without AD. With FWW, pt was able to complete transfers and gait with CGA. Pt reports her son can assist her as he is not currently working. PT will cont while pt is in acute care setting.     Plan    Physical Therapy Initial Treatment Plan   Treatment Plan : Gait Training, Neuro Re-Education / Balance, Self Care / Home Evaluation, Therapeutic Activities, Therapeutic Exercise  Treatment Frequency: 3 Times per Week  Duration: Until Therapy Goals Met    DC Equipment Recommendations: Front-Wheel Walker  Discharge Recommendations: Recommend outpatient physical therapy services to address higher level deficits          11/22/23 1144   Vitals   O2 (LPM) 2   O2 Delivery Device Nasal Cannula   Pain 0 - 10 Group   Therapist Pain Assessment During Activity;Nurse Notified  (R hip pain)   Prior Living Situation   Prior Services Home-Independent;Home With Outpatient Therapy   Housing / Facility 1 Story House   Steps Into Home 1   Steps In Home 0   Equipment Owned Single Point Cane   Lives with - Patient's Self Care Capacity Parents;Adult Children   Comments pt lives with her son and mother. Son is able to assist and is currently not working   Prior Level of Functional Mobility   Bed Mobility Independent   Transfer Status Independent   Ambulation Independent   Ambulation Distance community   Assistive Devices Used Single Point Cane   History of Falls   History of Falls Yes   Date of Last Fall   (frequent)    Cognition    Cognition / Consciousness X   Level of Consciousness Alert   Comments veyr odd affect, initially very lethargic and having difficulty staying awake. Once pt got to EOB, she became much more alert and cooperative   Active ROM Lower Body    Active ROM Lower Body  WDL   Strength Lower Body   Lower Body Strength  X   Comments functional but weak   Sensation Lower Body   Lower Extremity Sensation   WDL   Other Treatments   Other Treatments Provided cues for use of FWW   Balance Assessment   Sitting Balance (Static) Fair +   Sitting Balance (Dynamic) Fair +   Standing Balance (Static) Fair   Standing Balance (Dynamic) Fair -   Weight Shift Sitting Good   Weight Shift Standing Fair   Comments FWW   Bed Mobility    Supine to Sit Supervised   Sit to Supine Supervised   Scooting Supervised   Rolling Supervised   Gait Analysis   Gait Level Of Assist Contact Guard Assist   Assistive Device Front Wheel Walker   Distance (Feet) 10   # of Times Distance was Traveled 2   Deviation Shuffled Gait;Antalgic;Step To   Weight Bearing Status no restrictions   Comments no overt LOB   Functional Mobility   Sit to Stand Contact Guard Assist   Bed, Chair, Wheelchair Transfer Contact Guard Assist   Toilet Transfers Contact Guard Assist   Mobility in room with FWW   Edema / Skin Assessment   Edema / Skin  Not Assessed   Patient / Family Goals    Patient / Family Goal #1 none stated   Short Term Goals    Short Term Goal # 1 pt will be able to complete functional transfers with FWW and SPV in 6tx in order to decrease fall risk   Short Term Goal # 2 pt will be able to ambulate 100ft with FWW and SPV in 6tx in order to decrease fall risk   Education Group   Education Provided Role of Physical Therapist;Use of Assistive Device   Role of Physical Therapist Patient Response Patient;Acceptance;Demonstration;Action Demonstration   Use of Assistive Device Patient Response Patient;Acceptance;Demonstration;Action Demonstration   Anticipated  Discharge Equipment and Recommendations   DC Equipment Recommendations Front-Wheel Walker   Discharge Recommendations Recommend outpatient physical therapy services to address higher level deficits

## 2023-11-22 NOTE — PROGRESS NOTES
[facility] Medicine Daily Progress Note    Date of Service  11/22/2023    Chief Complaint  Bhavana Liu is a 58 y.o. female admitted 11/21/2023 with GLF and LEFT hip pain    Hospital Course  Ms. Bhavana Liu is a 58 y.o. female with a PMHx of cervical radiculopathy, anxiety, GERD, obesity, who presented 11/21/2023 with fall and hip pain.        Patient was going up some stairs yesterday when she suddenly felt unbalanced and fell backwards and hit her car on her right side.  Complaining of right lower extremity pain and abdominal pain.    In ER, patient noted to have normal vital signs.  Notable lab findings on admission include RBC 3.86, hemoglobin 11.6, hematocrit 34.9, GFR 53.  Imaging in the ED shows no fracture.  Denied hitting her head, no loss of consciousness.  She did not feel dizzy, lightheaded, no palpitations or chest pain prior to the fall.  She does have frequent falls due to imbalance, history of cervical radiculopathy with previous spinal surgeries.  She says her imbalance has been attributed to her spinal issues in the past.  Patient admitted to [facility] medicine for management of care.    On assessment, patient endorses significant left hip pain along with abdominal pain likely from swelling.  Suspect patient imaging results which are all negative for any signs of fracture.  Patient likely from muscular contusion due to the fall.  Continue pain management.        Interval Problem Update  -Patient seen and examined.  Patient continues to complain of hip pain along with abdominal pain from the fall.  Discussed with patient all imaging results which is negative for any signs of fracture.  Continue pain management along with muscle relaxants to help with better management of pain.  Discussed with patient possibly obtaining physical therapy after discharge.  -Plan of care: Continue pain management; obtain PT/OT recommendations for postacute needs  -Disposition: Patient  anticipated to stay overnight for better pain management  -Lab work: Reviewed; expected  -VSS at this time    I have discussed this patient's plan of care and discharge plan at IDT rounds today with Case Management, Nursing, Nursing leadership, and other members of the IDT team.    Consultants/Specialty  NONE    Code Status  Full Code    Disposition  The patient is not medically cleared for discharge to home or a post-acute facility.  Anticipate discharge to: home with close outpatient follow-up    I have placed the appropriate orders for post-discharge needs.    Review of Systems  Review of Systems   Constitutional:  Positive for malaise/fatigue. Negative for chills and fever.   HENT: Negative.     Eyes: Negative.    Respiratory: Negative.     Cardiovascular: Negative.    Gastrointestinal: Negative.    Genitourinary: Negative.    Musculoskeletal:  Positive for falls, joint pain, myalgias and neck pain.   Skin: Negative.    Neurological:  Positive for weakness.   Endo/Heme/Allergies: Negative.    Psychiatric/Behavioral: Negative.          Physical Exam  Temp:  [36.1 °C (97 °F)-36.4 °C (97.6 °F)] 36.3 °C (97.3 °F)  Pulse:  [] 84  Resp:  [16-20] 20  BP: ()/(54-83) 110/65  SpO2:  [89 %-97 %] 92 %    Physical Exam  Vitals and nursing note reviewed.   Constitutional:       Appearance: She is obese.   HENT:      Head: Normocephalic.      Nose: Nose normal.      Mouth/Throat:      Mouth: Mucous membranes are moist.      Pharynx: Oropharynx is clear.   Eyes:      Pupils: Pupils are equal, round, and reactive to light.   Cardiovascular:      Rate and Rhythm: Normal rate and regular rhythm.      Pulses: Normal pulses.      Heart sounds: Normal heart sounds.   Pulmonary:      Effort: Pulmonary effort is normal.      Breath sounds: Normal breath sounds.   Abdominal:      General: Bowel sounds are normal.      Palpations: Abdomen is soft.   Musculoskeletal:         General: Tenderness and signs of injury present.       Cervical back: Normal range of motion and neck supple.   Skin:     General: Skin is dry.      Capillary Refill: Capillary refill takes 2 to 3 seconds.   Neurological:      Mental Status: She is alert. Mental status is at baseline.      Motor: Weakness present.      Gait: Gait abnormal.         Fluids  No intake or output data in the 24 hours ending 11/22/23 0959    Laboratory  Recent Labs     11/22/23  0142   WBC 8.8   RBC 3.86*   HEMOGLOBIN 11.6*   HEMATOCRIT 34.9*   MCV 90.4   MCH 30.1   MCHC 33.2   RDW 43.9   PLATELETCT 365   MPV 8.7*     Recent Labs     11/22/23  0142   SODIUM 138   POTASSIUM 4.4   CHLORIDE 104   CO2 27   GLUCOSE 92   BUN 17   CREATININE 1.19   CALCIUM 8.5                   Imaging  CT-PELVIS W/O PLUS RECONS   Final Result         1.  No acute fracture or dislocation.   2.  Atherosclerotic changes.      Study unavailable for interpretation until 11/21/2023 10:03 PM due to issue with hospital PACS system.      DX-HIP-COMPLETE - UNILATERAL 2+ RIGHT   Final Result      1.  No displaced fracture of the right proximal femur.   2.  Probable degenerative cortical irregularity of the medial right pubic ramus with nondisplaced fracture considered less likely. Recommend correlation with the area of pain.           Assessment/Plan  * Acute right hip pain- (present on admission)  Assessment & Plan  Patient fell backwards and hit a car on her right side, no loss of consciousness and did not hit her head  CT pelvis in the ED reviewed, no acute fracture  Will admit for intractable pain requiring narcotics, needs close hemodynamic and respiratory status monitoring    Imbalance- (present on admission)  Assessment & Plan  Imbalance resulting in frequent falls, chronic issue per patient.  She says this been attributed to her cervical radiculopathy for which she has had spinal surgery in the past  Will check vitamin B12 levels  PT, OT    Hypoxia- (present on admission)  Assessment & Plan  Hypoxic in the ED  following narcotic  administration.  Denying any shortness of breath  She does have a history of mild emphysema, close respiratory status monitoring    Anxiety- (present on admission)  Assessment & Plan  Continue home meds    Gastroesophageal reflux disease without esophagitis- (present on admission)  Assessment & Plan  Continue PPI         VTE prophylaxis:    enoxaparin ppx      I have performed a physical exam and reviewed and updated ROS and Plan today (11/22/2023). In review of yesterday's note (11/21/2023), there are no changes except as documented above.      Please note that this dictation was created using voice recognition software. I have made every reasonable attempt to correct obvious errors, but there may be errors of grammar and possibly content that I did not discover before finalizing the note.    Electronically signed by:  Dr. JEAN Treviño, DNP, APRN, FNP-C  Hospitalist Services  [facility]  [phone]  [email or web address]

## 2023-11-22 NOTE — ED NOTES
Pt provided ice pack. Pt resting with even chest rise and fall, reports no needs at this time, call light available and in reach.

## 2023-11-22 NOTE — ED NOTES
Admission report given to the assigned RN in  T210   for continuity of care and management.  Provided opportunity to asks questions.  Pt is connected to monitor  All pt belongings at bedside

## 2023-11-22 NOTE — ED NOTES
Bedside report received from previous shift.   Assumed patient care. Verified patient identification.  Checked on bed, connected to monitor,  with unlabored respirations. Discussed plan of care.   Vital signs is stable. Denied any new complaints.   Gurney in low position, side rail up for pt safety. Call light within reach.   No needs identified at the moment. Instructed to use call light when needed.    Contraptions: IV gauge 20 Rt AC  Alert and Oriented: x 4  Ambulatory: Needs to road test  Oxygen: 2 LPM on nasal cannula  Pending: Re - evaluation

## 2023-11-22 NOTE — PROGRESS NOTES
4 Eyes Skin Assessment Completed by TARIK Ayala and TARIK Trinidad.    Head WDL  Ears Redness and Blanching  Nose WDL  Mouth WDL  Neck WDL  Breast/Chest Bruising  Shoulder Blades WDL  Spine WDL  (R) Arm/Elbow/Hand Bruising and Scab  (L) Arm/Elbow/Hand Bruising and Scab  Abdomen Scab and Bruising  Groin WDL  Scrotum/Coccyx/Buttocks Bruise on R inner cheeck  (R) Leg Scab and Bruising, incision from previous vascular procedure  (L) Leg Scab and Bruising  (R) Heel/Foot/Toe   (L) Heel/Foot/Toe           Devices In Places Nasal Cannula      Interventions In Place NC W/Ear Foams and Waffle Overlay    Possible Skin Injury Yes    Pictures Uploaded Into Epic Yes  Wound Consult Placed N/A  RN Wound Prevention Protocol Ordered Yes

## 2023-11-22 NOTE — ED TRIAGE NOTES
Chief Complaint   Patient presents with    T-5000 FALL     BIB EMS from home for a fall. Patient was attempting to walk up stairs and lost her footing, falling back onto a car. Patient denied hitting her head, -loc, blood thinners. Patient reporting right lower quadrant abdominal pain that radiates to her back.        Patient received 8mg of morphine, 4 mg zofran and 500 cc of NS via EMS.     Vitals:    11/21/23 1805   BP: (!) (P) 149/72   Pulse: (P) 90   Resp: (P) 18   Temp: (P) 36.4 °C (97.6 °F)   SpO2: (P) 97%

## 2023-11-22 NOTE — H&P
Hospital Medicine History & Physical Note    Date of Service  11/22/2023    Primary Care Physician  Diamond Moreno P.A.-C.    Code Status  Full Code    Chief Complaint  Chief Complaint   Patient presents with    T-5000 FALL     BIB EMS from home for a fall. Patient was attempting to walk up stairs and lost her footing, falling back onto a car. Patient denied hitting her head, -loc, blood thinners. Patient reporting right lower quadrant abdominal pain that radiates to her back.        History of Presenting Illness  Bhavana Liu is a 58 y.o. female who presented 11/21/2023 with fall and hip pain.  PMH of cervical radiculopathy, anxiety, GERD.  Patient was going up some stairs yesterday when she suddenly felt unbalanced and fell backwards and hit her car on her right side.  Complaining of right lower extremity pain.  Imaging in the ED shows no fracture.  Denied hitting her head, no loss of consciousness.  She did not feel dizzy, lightheaded, no palpitations or chest pain prior to the fall.  She does have frequent falls due to imbalance, history of cervical radiculopathy with previous spinal surgeries.  She says her imbalance has been attributed to her spinal issues in the past.    I discussed the plan of care with patient, family, bedside RN, and EDP .    Review of Systems  Review of Systems   Constitutional:  Negative for chills and fever.   Respiratory:  Negative for cough and shortness of breath.    Cardiovascular:  Negative for chest pain.   Gastrointestinal:  Negative for abdominal pain, diarrhea, nausea and vomiting.   Genitourinary:  Negative for dysuria and urgency.   Musculoskeletal:  Positive for falls and joint pain.       Past Medical History   has a past medical history of Abnormal drug screen (12/08/2017), Adjustment disorder with anxious mood, Arrhythmia, Arthritis, Bilateral leg edema, Bowel habit changes, Breath shortness (06/30/2023), Dental disorder, Dyslipidemia, goal LDL below 130,  Emphysema of lung ([facility]), GERD (gastroesophageal reflux disease), Heart burn, Hemorrhagic disorder ([facility]), Indigestion, Menopausal symptoms, Methamphetamine use ([facility]) (12/08/2017), Shingles (03/21/2022), and Urinary incontinence.    Surgical History   has a past surgical history that includes vaginal hysterectomy total (5/19/2021); anterior and posterior repair (5/19/2021); vaginal suspension (5/19/2021); cystoscopy (5/19/2021); cervical laminectomy posterior (Left, 4/25/2022); cervical fusion posterior (Left, 4/25/2022); and cervical disk and fusion anterior (7/25/2023).     Family History  family history includes Arthritis in her brother; Cancer in her maternal grandmother; Diabetes in her mother; Genitourinary () Problems in her sister; Heart Disease in her father and mother; Non-contributory in her father and sister.   Family history reviewed with patient. There is no family history that is pertinent to the chief complaint.     Social History   reports that she quit smoking about 5 months ago. Her smoking use included cigarettes. She started smoking about 25 years ago. She has a 12.5 pack-year smoking history. She has never used smokeless tobacco. She reports current alcohol use. She reports that she does not currently use drugs after having used the following drugs: Inhaled and Oral.    Allergies  No Known Allergies    Medications  Prior to Admission Medications   Prescriptions Last Dose Informant Patient Reported? Taking?   PARoxetine (PAXIL) 20 MG Tab   No No   Sig: Take 2 Tablets by mouth every day.   VENTOLIN  (90 Base) MCG/ACT Aero Soln inhalation aerosol   No No   Sig: INHALE 2 PUFFS BY MOUTH EVERY 6 HOURS AS NEEDED FOR SHORTNESS OF BREATH.   busPIRone (BUSPAR) 10 MG Tab tablet   No No   Sig: TAKE 1 TABLET BY MOUTH 2 TIMES A DAY.   docusate sodium (COLACE) 100 MG Cap   No No   Sig: TAKE ONE CAPSULE BY MOUTH TWO TIMES A DAY   famotidine (PEPCID) 20 MG Tab   No No   Sig: Take 1 tablet twice a day by  oral route as directed for 5 days.   Patient not taking: Reported on 11/13/2023   fluconazole (DIFLUCAN) 100 MG Tab   Yes No   furosemide (LASIX) 20 MG Tab  Patient No No   Sig: TAKE 1 TABLET BY MOUTH 3 TIMES A DAY.   gabapentin (NEURONTIN) 800 MG tablet   No No   Sig: TAKE ONE TABLET BY MOUTH THREE TIMES A DAY   hydrOXYzine HCl (ATARAX) 25 MG Tab   No No   Sig: TAKE 1 TABLET BY MOUTH 3 TIMES A DAY AS NEEDED FOR ANXIETY.   ibuprofen (MOTRIN) 800 MG Tab   No No   Sig: Take 1 Tablet by mouth every 8 hours as needed for Moderate Pain.   ketoconazole (NIZORAL) 2 % Cream   No No   Sig: Apply a small amount to affected area no more than 2 weeks.   loratadine (CLARITIN) 10 MG Tab   No No   Sig: TAKE 1 TABLET BY MOUTH EVERY DAY.   methocarbamol (ROBAXIN) 500 MG Tab   No No   Sig: Take 1-2 tabs 2-3 times a day   oxyCODONE-acetaminophen (PERCOCET) 5-325 MG Tab   No No   Sig: Take 1 tablet every 4-6 hours by oral route as needed for 7 days.   Patient not taking: Reported on 11/13/2023   pantoprazole (PROTONIX) 40 MG Tablet Delayed Response   No No   Sig: Take 1 Tablet by mouth every day.   potassium chloride ER (KLOR-CON) 10 MEQ tablet  Patient No No   Sig: Take 1 Tablet by mouth 2 times a day.   sulfamethoxazole-trimethoprim (BACTRIM DS) 800-160 MG tablet   No No   Sig: Take 1 Tablet by mouth 2 times a day.   triamcinolone acetonide (KENALOG) 0.1 % Cream   No No   Sig: Use a small amount to the affected area twice a day no more than 10 days.      Facility-Administered Medications: None       Physical Exam  Temp:  [36.4 °C (97.6 °F)] 36.4 °C (97.6 °F)  Pulse:  [] 79  Resp:  [16-18] 17  BP: ()/(54-83) 103/56  SpO2:  [89 %-97 %] 91 %  Blood Pressure: 118/58   Temperature: 36.4 °C (97.6 °F)   Pulse: 90   Respiration: 16   Pulse Oximetry: 89 %       Physical Exam  Constitutional:       Appearance: Normal appearance.   HENT:      Head: Normocephalic and atraumatic.      Mouth/Throat:      Mouth: Mucous membranes are  "moist.      Pharynx: No oropharyngeal exudate or posterior oropharyngeal erythema.   Cardiovascular:      Rate and Rhythm: Normal rate and regular rhythm.      Pulses: Normal pulses.      Heart sounds: Normal heart sounds. No murmur heard.  Pulmonary:      Effort: Pulmonary effort is normal. No respiratory distress.      Breath sounds: Normal breath sounds.   Musculoskeletal:         General: Tenderness present. No swelling.   Skin:     General: Skin is warm and dry.   Neurological:      General: No focal deficit present.      Mental Status: She is alert and oriented to person, place, and time.   Psychiatric:         Mood and Affect: Mood normal.         Laboratory:          No results for input(s): \"ALTSGPT\", \"ASTSGOT\", \"ALKPHOSPHAT\", \"TBILIRUBIN\", \"DBILIRUBIN\", \"GAMMAGT\", \"AMYLASE\", \"LIPASE\", \"ALB\", \"PREALBUMIN\", \"GLUCOSE\" in the last 72 hours.      No results for input(s): \"NTPROBNP\" in the last 72 hours.      No results for input(s): \"TROPONINT\" in the last 72 hours.    Imaging:  CT-PELVIS W/O PLUS RECONS   Final Result         1.  No acute fracture or dislocation.   2.  Atherosclerotic changes.      Study unavailable for interpretation until 11/21/2023 10:03 PM due to issue with hospital PACS system.      DX-HIP-COMPLETE - UNILATERAL 2+ RIGHT   Final Result      1.  No displaced fracture of the right proximal femur.   2.  Probable degenerative cortical irregularity of the medial right pubic ramus with nondisplaced fracture considered less likely. Recommend correlation with the area of pain.          X-Ray:  I have personally reviewed the images and compared with prior images. and My impression is: No acute bony abnormality    Assessment/Plan:  Justification for Admission Status  I anticipate this patient is appropriate for observation status at this time because intractable pain    * Acute right hip pain- (present on admission)  Assessment & Plan  Patient fell backwards and hit a car on her right side, no loss " of consciousness and did not hit her head  CT pelvis in the ED reviewed, no acute fracture  Will admit for intractable pain requiring narcotics, needs close hemodynamic and respiratory status monitoring    Imbalance- (present on admission)  Assessment & Plan  Imbalance resulting in frequent falls, chronic issue per patient.  She says this been attributed to her cervical radiculopathy for which she has had spinal surgery in the past  Will check vitamin B12 levels  PT, OT    Hypoxia- (present on admission)  Assessment & Plan  Hypoxic in the ED following narcotic  administration.  Denying any shortness of breath  She does have a history of mild emphysema, close respiratory status monitoring    Anxiety- (present on admission)  Assessment & Plan  Continue home meds    Gastroesophageal reflux disease without esophagitis- (present on admission)  Assessment & Plan  Continue PPI        VTE prophylaxis: enoxaparin ppx

## 2023-11-22 NOTE — ED NOTES
Pt updated. Currently eating and breathing with even chest rise and fall, reports no needs at this time, call light available and in reach.

## 2023-11-22 NOTE — ED NOTES
ERP bedside. Bedside report received from off going nurse apprenperez Doran and RN Marya, assumed care of patient.  POC discussed with patient. Call light within reach, all needs addressed at this time.       Fall risk interventions in place: Place fall risk sign on patient's door, Keep floor surfaces clean and dry, and Accompanied to restroom (all applicable per [place] Fall risk assessment)   Continuous monitoring: Pulse Ox or Blood Pressure  IVF/IV medications: Not Applicable   Oxygen: How many liters 2L  Bedside sitter: Not Applicable   Isolation: Not Applicable

## 2023-11-22 NOTE — ED PROVIDER NOTES
ER Provider Note    Scribed for Marlon Hennessy II, M.D. by Amena Berry. 11/21/2023  6:15 PM    Primary Care Provider: Diamond Moreno P.A.-C.    CHIEF COMPLAINT  Chief Complaint   Patient presents with    T-5000 FALL     BIB EMS from home for a fall. Patient was attempting to walk up stairs and lost her footing, falling back onto a car. Patient denied hitting her head, -loc, blood thinners. Patient reporting right lower quadrant abdominal pain that radiates to her back.      EXTERNAL RECORDS REVIEWED  none    HPI/ROS  LIMITATION TO HISTORY   Select: : None  OUTSIDE HISTORIAN(S):  Son at bedside, describes fall    Bhavana Liu is a 58 y.o. female who presents to the ED complaining of right lower back pain secondary to a fall earlier today. The patient was going up the stairs when she lost her footing and fell backwards onto a car and then the concrete. She reports that she was unable to walk or bear weight on her right leg after the fall and called EMS to bring her into the ED. The patient denies hitting her head or any loss of consciousness.     PAST MEDICAL HISTORY  Past Medical History:   Diagnosis Date    Abnormal drug screen 12/08/2017    Adjustment disorder with anxious mood     Arrhythmia     history of    Arthritis     Bilateral leg edema     Bowel habit changes     constipation    Breath shortness 06/30/2023    with exertion    Dental disorder     upper and lower dentures    Dyslipidemia, goal LDL below 130     Emphysema of lung (HCC)     inhaler    GERD (gastroesophageal reflux disease)     Heart burn     Hemorrhagic disorder (HCC)     pt tends to bleed easy     Indigestion     Menopausal symptoms     Methamphetamine use (HCC) 12/08/2017    Shingles 03/21/2022    Urinary incontinence     occ night time incontinence       SURGICAL HISTORY  Past Surgical History:   Procedure Laterality Date    CERVICAL DISK AND FUSION ANTERIOR  7/25/2023    Procedure: C4-7 ANTERIOR CERVICAL DISCECTOMY  AND FUSION;  Surgeon: Law Moya M.D.;  Location: SURGERY [facility];  Service: Neurosurgery    CERVICAL LAMINECTOMY POSTERIOR Left 4/25/2022    Procedure: LAMINECTOMY, SPINE, CERVICAL, POSTERIOR APPROACH - INFERIOR C2 TO SUPERIOR C5;  Surgeon: Law Moya M.D.;  Location: SURGERY [facility];  Service: Neurosurgery    CERVICAL FUSION POSTERIOR Left 4/25/2022    Procedure: FUSION, SPINE, CERVICAL, POSTERIOR APPROACH - C3-4;  Surgeon: Law Moya M.D.;  Location: SURGERY [facility];  Service: Neurosurgery    VAGINAL HYSTERECTOMY TOTAL  5/19/2021    Procedure: HYSTERECTOMY, TOTAL, VAGINAL;  Surgeon: Melissa Suazo M.D.;  Location: SURGERY SAME DAY [facility];  Service: Gynecology    ANTERIOR AND POSTERIOR REPAIR  5/19/2021    Procedure: COLPORRHAPHY, COMBINED ANTEROPOSTERIOR;  Surgeon: Melissa Suazo M.D.;  Location: SURGERY SAME DAY [facility];  Service: Gynecology    VAGINAL SUSPENSION  5/19/2021    Procedure: COLPOPEXY;  Surgeon: Melissa Suazo M.D.;  Location: SURGERY SAME DAY [facility];  Service: Gynecology    CYSTOSCOPY  5/19/2021    Procedure: CYSTOSCOPY;  Surgeon: Mleissa Suazo M.D.;  Location: SURGERY SAME DAY [facility];  Service: Gynecology       FAMILY HISTORY  Family History   Problem Relation Age of Onset    Diabetes Mother         type 2, diet controlled    Heart Disease Mother         pacemaker    Cancer Maternal Grandmother         uterine    Genitourinary () Problems Sister         tumor, benign    Heart Disease Father         CHF    Non-contributory Father         emphysema    Arthritis Brother         hip replacement    Non-contributory Sister         encephalitis       SOCIAL HISTORY   reports that she quit smoking about 5 months ago. Her smoking use included cigarettes. She started smoking about 25 years ago. She has a 12.5 pack-year smoking history. She has never used smokeless tobacco. She reports current alcohol use. She reports that she does not currently use drugs  "after having used the following drugs: Inhaled and Oral.    CURRENT MEDICATIONS  Previous Medications    BUSPIRONE (BUSPAR) 10 MG TAB TABLET    TAKE 1 TABLET BY MOUTH 2 TIMES A DAY.    DOCUSATE SODIUM (COLACE) 100 MG CAP    TAKE ONE CAPSULE BY MOUTH TWO TIMES A DAY    FAMOTIDINE (PEPCID) 20 MG TAB    Take 1 tablet twice a day by oral route as directed for 5 days.    FLUCONAZOLE (DIFLUCAN) 100 MG TAB        FUROSEMIDE (LASIX) 20 MG TAB    TAKE 1 TABLET BY MOUTH 3 TIMES A DAY.    GABAPENTIN (NEURONTIN) 800 MG TABLET    TAKE ONE TABLET BY MOUTH THREE TIMES A DAY    HYDROXYZINE HCL (ATARAX) 25 MG TAB    TAKE 1 TABLET BY MOUTH 3 TIMES A DAY AS NEEDED FOR ANXIETY.    IBUPROFEN (MOTRIN) 800 MG TAB    Take 1 Tablet by mouth every 8 hours as needed for Moderate Pain.    KETOCONAZOLE (NIZORAL) 2 % CREAM    Apply a small amount to affected area no more than 2 weeks.    LORATADINE (CLARITIN) 10 MG TAB    TAKE 1 TABLET BY MOUTH EVERY DAY.    METHOCARBAMOL (ROBAXIN) 500 MG TAB    Take 1-2 tabs 2-3 times a day    OXYCODONE-ACETAMINOPHEN (PERCOCET) 5-325 MG TAB    Take 1 tablet every 4-6 hours by oral route as needed for 7 days.    PANTOPRAZOLE (PROTONIX) 40 MG TABLET DELAYED RESPONSE    Take 1 Tablet by mouth every day.    PAROXETINE (PAXIL) 20 MG TAB    Take 2 Tablets by mouth every day.    POTASSIUM CHLORIDE ER (KLOR-CON) 10 MEQ TABLET    Take 1 Tablet by mouth 2 times a day.    SULFAMETHOXAZOLE-TRIMETHOPRIM (BACTRIM DS) 800-160 MG TABLET    Take 1 Tablet by mouth 2 times a day.    TRIAMCINOLONE ACETONIDE (KENALOG) 0.1 % CREAM    Use a small amount to the affected area twice a day no more than 10 days.    VENTOLIN  (90 BASE) MCG/ACT AERO SOLN INHALATION AEROSOL    INHALE 2 PUFFS BY MOUTH EVERY 6 HOURS AS NEEDED FOR SHORTNESS OF BREATH.       ALLERGIES  Patient has no known allergies.    PHYSICAL EXAM  BP (!) 149/72   Pulse 90   Temp 36.4 °C (97.6 °F) (Temporal)   Resp 18   Ht 1.753 m (5' 9\")   Wt 95.3 kg (210 lb)   " [facility] 10/28/2012   SpO2 97%   BMI 31.01 kg/m²   Physical Exam     DIAGNOSTIC STUDIES    Labs:   Results for orders placed or performed during the hospital encounter of 06/29/23   APTT   Result Value Ref Range    APTT 26.1 24.7 - 36.0 sec   Prothrombin Time   Result Value Ref Range    PT 13.7 12.0 - 14.6 sec    INR 1.06 0.87 - 1.13   URINALYSIS,CULTURE IF INDICATED    Specimen: Urine   Result Value Ref Range    Color Yellow     Character Clear     Specific Gravity 1.018 <1.035    Ph 6.5 5.0 - 8.0    Glucose Negative Negative mg/dL    Ketones Negative Negative mg/dL    Protein Negative Negative mg/dL    Bilirubin Negative Negative    Urobilinogen, Urine 0.2 Negative    Nitrite Negative Negative    Leukocyte Esterase Negative Negative    Occult Blood Negative Negative    Micro Urine Req see below    Comp Metabolic Panel   Result Value Ref Range    Sodium 139 135 - 145 mmol/L    Potassium 4.0 3.6 - 5.5 mmol/L    Chloride 104 96 - 112 mmol/L    Co2 26 20 - 33 mmol/L    Anion Gap 9.0 7.0 - 16.0    Glucose 98 65 - 99 mg/dL    Bun 16 8 - 22 mg/dL    Creatinine 0.94 0.50 - 1.40 mg/dL    Calcium 9.4 8.5 - 10.5 mg/dL    AST(SGOT) 21 12 - 45 U/L    ALT(SGPT) 18 2 - 50 U/L    Alkaline Phosphatase 84 30 - 99 U/L    Total Bilirubin 0.4 0.1 - 1.5 mg/dL    Albumin 4.3 3.2 - 4.9 g/dL    Total Protein 6.6 6.0 - 8.2 g/dL    Globulin 2.3 1.9 - 3.5 g/dL    A-G Ratio 1.9 g/dL   CBC WITH DIFFERENTIAL   Result Value Ref Range    WBC 5.7 4.8 - 10.8 K/uL    RBC 4.64 4.20 - 5.40 M/uL    Hemoglobin 13.8 12.0 - 16.0 g/dL    Hematocrit 40.5 37.0 - 47.0 %    MCV 87.3 81.4 - 97.8 fL    MCH 29.7 27.0 - 33.0 pg    MCHC 34.1 32.2 - 35.5 g/dL    RDW 41.1 35.9 - 50.0 fL    Platelet Count 295 164 - 446 K/uL    MPV 9.7 9.0 - 12.9 fL    Neutrophils-Polys 45.00 44.00 - 72.00 %    Lymphocytes 37.90 22.00 - 41.00 %    Monocytes 12.80 0.00 - 13.40 %    Eosinophils 2.60 0.00 - 6.90 %    Basophils 1.40 0.00 - 1.80 %    Immature Granulocytes 0.30 0.00 - 0.90 %     Nucleated RBC 0.00 0.00 - 0.20 /100 WBC    Neutrophils (Absolute) 2.57 1.82 - 7.42 K/uL    Lymphs (Absolute) 2.17 1.00 - 4.80 K/uL    Monos (Absolute) 0.73 0.00 - 0.85 K/uL    Eos (Absolute) 0.15 0.00 - 0.51 K/uL    Baso (Absolute) 0.08 0.00 - 0.12 K/uL    Immature Granulocytes (abs) 0.02 0.00 - 0.11 K/uL    NRBC (Absolute) 0.00 K/uL   CORRECTED CALCIUM   Result Value Ref Range    Correct Calcium 9.2 8.5 - 10.5 mg/dL   ESTIMATED GFR   Result Value Ref Range    GFR (CKD-EPI) 70 >60 mL/min/1.73 m 2        Radiology:   The attending emergency physician has independently interpreted the diagnostic imaging associated with this visit and am waiting the final reading from the radiologist.   Preliminary interpretation is a follows: I reviewed XR of hip and see no fracture.   Radiologist interpretation:   CT-PELVIS W/O PLUS RECONS   Final Result         1.  No acute fracture or dislocation.   2.  Atherosclerotic changes.      Study unavailable for interpretation until 11/21/2023 10:03 PM due to issue with hospital PACS system.      DX-HIP-COMPLETE - UNILATERAL 2+ RIGHT   Final Result      1.  No displaced fracture of the right proximal femur.   2.  Probable degenerative cortical irregularity of the medial right pubic ramus with nondisplaced fracture considered less likely. Recommend correlation with the area of pain.           COURSE & MEDICAL DECISION MAKING     ED Observation Status? Yes; I am placing the patient in to an observation status due to a diagnostic uncertainty as well as therapeutic intensity. Patient placed in observation status at 6:24 PM, 11/21/2023.     Observation plan is as follows: Will monitor the patient's symptoms and treat for pain while imaging and lab work results. Will reassess and update the patient after results are reviewed.      Upon Reevaluation, the patient's condition has: not improved; and will be escalated to hospitalization.    Patient discharged from ED Observation status at 11/22/2023  1:11 AM     INITIAL ASSESSMENT, COURSE AND PLAN  Care Narrative:     6:20 PM - The patient is a 58 year old female presenting with right sided hip pain secondary to a fall prior to arrival. Patient seen and examined at bedside. Discussed plan of care, including imaging and reassessment. The patient verbalizes understanding with the plan of care. The patient will be medicated with Dilaudid 1 mg. Ordered for DX-Hip Unilateral with Pelvis to evaluate her symptoms. The patient was given an opportunity to ask questions.      7:21 PM - I reevaluated the patient at bedside. I informed her I reviewed the Hip xray which demonstrated no evidence of hip fracture. Ordered CT-Pelvis w/o for further evaluation.     10:26 PM - I reviewed the patient's CT-Pelvis which demonstrated no hip fracture. Ordered for Toradol and Robaxin and plan to do ambulatory trial.     10:43 PM - Recheck: Patient re-evaluated at beside. I reexamined the patient's hip and she now has full range of motion. Patient informs me that she usually takes 800 mg gabapentin before bed for pain management and would like some tonight. Discussed patient's condition and treatment plan. Patient's lab and radiology results discussed. The patient understood and is in agreement.      1:11 AM  Despite multiple attempts at pain control. Very unsteady with ambulation trial with walker. Continues to have significant pain. Ordered additional dose of dilaudid 1mg IV. Will discuss with hospitalist, admission for further acute pain management.     1:20 AM  I spoke with Dr. Isbell hospitalist. . He will admit.     PROBLEM LIST  #Right hip contusion    DISPOSITION AND DISCUSSIONS  I have discussed management of the patient with the following physicians and MIRELLA's:  Sukhi    Discussion of management with other QHP or appropriate source(s): None     Barriers to care at this time, including but not limited to:  None .       FINAL DIANGOSIS  1. Intractable pain    2. Contusion of right  hip, initial encounter       I, Amena Berry (Scribe), am scribing for, and in the presence of, Marlon Hennessy II, M.D.    Electronically signed by: Amena Berry (Scribabimbola), 11/21/2023    IMarlon II, M.D. personally performed the services described in this documentation, as scribed by Amena Berry in my presence, and it is both accurate and complete.   The note accurately reflects work and decisions made by me.  Marlon Hennessy II, M.D.  11/22/2023  1:13 AM

## 2023-11-22 NOTE — ASSESSMENT & PLAN NOTE
Hypoxic in the ED following narcotic  administration.  Denying any shortness of breath  She does have a history of mild emphysema, close respiratory status monitoring

## 2023-11-22 NOTE — ASSESSMENT & PLAN NOTE
Imbalance resulting in frequent falls, chronic issue per patient.  She says this been attributed to her cervical radiculopathy for which she has had spinal surgery in the past  Will check vitamin B12 levels  PT, OT

## 2023-11-22 NOTE — ED NOTES
Pt provided pillow. Pt resting with even chest rise and fall, reports no needs at this time, call light available and in reach.

## 2023-11-22 NOTE — PROGRESS NOTES
Spoke with Pt sister, Ioana, at bedside. Stated that she would like to be updated throughout pt's hospital stay. [phone].

## 2023-11-22 NOTE — ED NOTES
Removed oxygen inhalation  Noted saturation dropped to 89%  Instructed pt to take a deep breath, saturation return back to 91%

## 2023-11-22 NOTE — ASSESSMENT & PLAN NOTE
Patient fell backwards and hit a car on her right side, no loss of consciousness and did not hit her head  CT pelvis in the ED reviewed, no acute fracture  Will admit for intractable pain requiring narcotics, needs close hemodynamic and respiratory status monitoring

## 2023-11-22 NOTE — PROGRESS NOTES
Assumed care of pt  at 0930. Report received from Scooby MONTANEZ. Pt is A&O x 4. Patient stated that their pain is 6/10 on her right side where she had made contact with a car while falling but declines interventions at this time as she wants to sleep and rest. Pt's pain comfort goal is 0/10.   Pt oriented to new room. Son Shorty Is at bedside. Pt is on 2L NC with even respirations and no signs of respiratory distress. Assessment completed. Skin check completed.     Pt educated on how to use the call light, pt verbalized understanding. Bed in lowest locked position, call light within reach, hourly rounding in place. Labs reviewed, orders reviewed, communication board updated. Pt declines any further needs at this time

## 2023-11-22 NOTE — PROGRESS NOTES
4 Eyes Skin Assessment Completed by TARIK Coleman and TARIK Tovar.    Head WDL  Ears WDL  Nose WDL  Mouth WDL  Neck WDL  Breast/Chest WDL  Shoulder Blades WDL  Spine WDL  (R) Arm/Elbow/Hand WDL  (L) Arm/Elbow/Hand WDL  Abdomen WDL  Groin WDL  Scrotum/Coccyx/Buttocks WDL  (R) Leg WDL  (L) Leg WDL  (R) Heel/Foot/Toe WDL  (L) Heel/Foot/Toe WDL          Devices In Places Pulse Ox      Interventions In Place N/A    Possible Skin Injury No    Pictures Uploaded Into Epic N/A  Wound Consult Placed N/A  RN Wound Prevention Protocol Ordered No

## 2023-11-22 NOTE — THERAPY
Occupational Therapy Contact Note    Patient Name: Bhavana Liu  Age:  58 y.o., Sex:  female  Medical Record #: [id number]  Today's Date: 11/22/2023    OT orders received. Pt actively transferring to . Will complete OT eval when appropriate/able.

## 2023-11-22 NOTE — PROGRESS NOTES
Pt awake,a&ox4,c/o HA,did not eat break fast,try to wean oxygen,on RA 84% on resting,d/b/c encouraged,pt wants to sleep,report given to Lucy MONTANEZ.

## 2023-11-22 NOTE — CARE PLAN
The patient is Stable - Low risk of patient condition declining or worsening    Shift Goals  Clinical Goals: Safety  Patient Goals: Updated POC  Family Goals: Updated POC    Progress made toward(s) clinical / shift goals:      Problem: Pain - Standard  Goal: Alleviation of pain or a reduction in pain to the patient’s comfort goal  Outcome: Progressing   Pt has complained of pain 7/10 in the head. Pt medicated per MAR. Pt comfort goal 3/10. Pt reports decrease of pain to 5/10 but has been able to get rest.     Problem: Fall Risk  Goal: Patient will remain free from falls  Outcome: Progressing   Pt has remained free of falls this shift and has been able to safely mobilize with PT. Fall precautions remain in place. Bed locked in lowest position, socks on, bed alarm in place, wristband on pt.     Patient is not progressing towards the following goals:

## 2023-11-22 NOTE — CARE PLAN
The patient is Stable - Low risk of patient condition declining or worsening         Progress made toward(s) clinical / shift goals:    Problem: Pain - Standard  Goal: Alleviation of pain or a reduction in pain to the patient’s comfort goal  Description: Target End Date:  Prior to discharge or change in level of care    Document on Vitals flowsheet    1.  Document pain using the appropriate pain scale per order or unit policy  2.  Educate and implement non-pharmacologic comfort measures (i.e. relaxation, distraction, massage, cold/heat therapy, etc.)  3.  Pain management medications as ordered  4.  Reassess pain after pain med administration per policy  5.  If opiods administered assess patient's response to pain medication is appropriate per POSS sedation scale  6.  Follow pain management plan developed in collaboration with patient and interdisciplinary team (including palliative care or pain specialists if applicable)  Outcome: Progressing     Problem: Knowledge Deficit - Standard  Goal: Patient and family/care givers will demonstrate understanding of plan of care, disease process/condition, diagnostic tests and medications  Description: Target End Date:  1-3 days or as soon as patient condition allows    Document in Patient Education    1.  Patient and family/caregiver oriented to unit, equipment, visitation policy and means for communicating concern  2.  Complete/review Learning Assessment  3.  Assess knowledge level of disease process/condition, treatment plan, diagnostic tests and medications  4.  Explain disease process/condition, treatment plan, diagnostic tests and medications  Outcome: Progressing

## 2023-11-22 NOTE — ED NOTES
Med rec complete per patient  Allergies reviewed.  Anticoagulants taken in the last 14 days? No   Patients preferred pharmacy: [facility]    Nilsa Guerrero CPhT

## 2023-11-22 NOTE — HOSPITAL COURSE
Ms. Bhavana Liu is a 58 y.o. female with a PMHx of cervical radiculopathy, anxiety, GERD, obesity, who presented 11/21/2023 with fall and hip pain.        Patient was going up some stairs yesterday when she suddenly felt unbalanced and fell backwards and hit her car on her right side.  Complaining of right lower extremity pain and abdominal pain.    In ER, patient noted to have normal vital signs.  Notable lab findings on admission include RBC 3.86, hemoglobin 11.6, hematocrit 34.9, GFR 53.  Imaging in the ED shows no fracture.  Denied hitting her head, no loss of consciousness.  She did not feel dizzy, lightheaded, no palpitations or chest pain prior to the fall.  She does have frequent falls due to imbalance, history of cervical radiculopathy with previous spinal surgeries.  She says her imbalance has been attributed to her spinal issues in the past.  Patient admitted to hospital medicine for management of care.    On assessment, patient endorses significant left hip pain along with abdominal pain likely from swelling.  Suspect patient imaging results which are all negative for any signs of fracture.  Patient likely from muscular contusion due to the fall.  Continue pain management.

## 2023-11-22 NOTE — ED NOTES
Tried to ambulate pt but pt has unsteady gait and still has more pain in the lower back and leg  Notified to the provider

## 2023-11-23 ENCOUNTER — PHARMACY VISIT (OUTPATIENT)
Dept: PHARMACY | Facility: MEDICAL CENTER | Age: 58
End: 2023-11-23
Payer: COMMERCIAL

## 2023-11-23 VITALS
TEMPERATURE: 96.9 F | HEART RATE: 67 BPM | BODY MASS INDEX: 31.58 KG/M2 | DIASTOLIC BLOOD PRESSURE: 62 MMHG | SYSTOLIC BLOOD PRESSURE: 110 MMHG | HEIGHT: 69 IN | RESPIRATION RATE: 18 BRPM | OXYGEN SATURATION: 90 % | WEIGHT: 213.19 LBS

## 2023-11-23 PROBLEM — R26.89 IMBALANCE: Status: RESOLVED | Noted: 2023-11-22 | Resolved: 2023-11-23

## 2023-11-23 PROBLEM — M25.551 ACUTE RIGHT HIP PAIN: Status: RESOLVED | Noted: 2023-11-22 | Resolved: 2023-11-23

## 2023-11-23 PROBLEM — R09.02 HYPOXIA: Status: RESOLVED | Noted: 2023-11-22 | Resolved: 2023-11-23

## 2023-11-23 LAB
ANION GAP SERPL CALC-SCNC: 8 MMOL/L (ref 7–16)
BUN SERPL-MCNC: 12 MG/DL (ref 8–22)
CALCIUM SERPL-MCNC: 8.5 MG/DL (ref 8.5–10.5)
CHLORIDE SERPL-SCNC: 103 MMOL/L (ref 96–112)
CO2 SERPL-SCNC: 26 MMOL/L (ref 20–33)
CREAT SERPL-MCNC: 0.7 MG/DL (ref 0.5–1.4)
ERYTHROCYTE [DISTWIDTH] IN BLOOD BY AUTOMATED COUNT: 44.7 FL (ref 35.9–50)
GFR SERPLBLD CREATININE-BSD FMLA CKD-EPI: 99 ML/MIN/1.73 M 2
GLUCOSE SERPL-MCNC: 123 MG/DL (ref 65–99)
HCT VFR BLD AUTO: 36.5 % (ref 37–47)
HGB BLD-MCNC: 11.7 G/DL (ref 12–16)
MCH RBC QN AUTO: 29.3 PG (ref 27–33)
MCHC RBC AUTO-ENTMCNC: 32.1 G/DL (ref 32.2–35.5)
MCV RBC AUTO: 91.5 FL (ref 81.4–97.8)
PLATELET # BLD AUTO: 347 K/UL (ref 164–446)
PMV BLD AUTO: 8.6 FL (ref 9–12.9)
POTASSIUM SERPL-SCNC: 4.5 MMOL/L (ref 3.6–5.5)
RBC # BLD AUTO: 3.99 M/UL (ref 4.2–5.4)
SODIUM SERPL-SCNC: 137 MMOL/L (ref 135–145)
WBC # BLD AUTO: 6.9 K/UL (ref 4.8–10.8)

## 2023-11-23 PROCEDURE — 97535 SELF CARE MNGMENT TRAINING: CPT

## 2023-11-23 PROCEDURE — 36415 COLL VENOUS BLD VENIPUNCTURE: CPT

## 2023-11-23 PROCEDURE — A9270 NON-COVERED ITEM OR SERVICE: HCPCS | Mod: UD | Performed by: STUDENT IN AN ORGANIZED HEALTH CARE EDUCATION/TRAINING PROGRAM

## 2023-11-23 PROCEDURE — 97530 THERAPEUTIC ACTIVITIES: CPT

## 2023-11-23 PROCEDURE — 700102 HCHG RX REV CODE 250 W/ 637 OVERRIDE(OP): Mod: UD | Performed by: STUDENT IN AN ORGANIZED HEALTH CARE EDUCATION/TRAINING PROGRAM

## 2023-11-23 PROCEDURE — 99239 HOSP IP/OBS DSCHRG MGMT >30: CPT | Performed by: INTERNAL MEDICINE

## 2023-11-23 PROCEDURE — G0378 HOSPITAL OBSERVATION PER HR: HCPCS

## 2023-11-23 PROCEDURE — 94664 DEMO&/EVAL PT USE INHALER: CPT

## 2023-11-23 PROCEDURE — 80048 BASIC METABOLIC PNL TOTAL CA: CPT

## 2023-11-23 PROCEDURE — 85027 COMPLETE CBC AUTOMATED: CPT

## 2023-11-23 PROCEDURE — RXMED WILLOW AMBULATORY MEDICATION CHARGE: Performed by: INTERNAL MEDICINE

## 2023-11-23 RX ORDER — OXYCODONE HYDROCHLORIDE AND ACETAMINOPHEN 5; 325 MG/1; MG/1
1 TABLET ORAL EVERY 6 HOURS PRN
Qty: 20 TABLET | Refills: 0 | Status: SHIPPED | OUTPATIENT
Start: 2023-11-23 | End: 2023-11-28

## 2023-11-23 RX ORDER — ACETAMINOPHEN 500 MG
1000 TABLET ORAL 2 TIMES DAILY
Qty: 30 TABLET | Refills: 0 | Status: SHIPPED | OUTPATIENT
Start: 2023-11-23

## 2023-11-23 RX ADMIN — OXYCODONE AND ACETAMINOPHEN 1 TABLET: 5; 325 TABLET ORAL at 04:57

## 2023-11-23 RX ADMIN — GABAPENTIN 800 MG: 400 CAPSULE ORAL at 04:57

## 2023-11-23 RX ADMIN — OXYCODONE AND ACETAMINOPHEN 1 TABLET: 5; 325 TABLET ORAL at 10:38

## 2023-11-23 RX ADMIN — DOCUSATE SODIUM 50 MG AND SENNOSIDES 8.6 MG 2 TABLET: 8.6; 5 TABLET, FILM COATED ORAL at 04:57

## 2023-11-23 RX ADMIN — BUSPIRONE HYDROCHLORIDE 10 MG: 10 TABLET ORAL at 04:57

## 2023-11-23 RX ADMIN — OMEPRAZOLE 20 MG: 20 CAPSULE, DELAYED RELEASE ORAL at 04:57

## 2023-11-23 ASSESSMENT — COGNITIVE AND FUNCTIONAL STATUS - GENERAL
TURNING FROM BACK TO SIDE WHILE IN FLAT BAD: A LITTLE
MOVING TO AND FROM BED TO CHAIR: A LITTLE
CLIMB 3 TO 5 STEPS WITH RAILING: A LITTLE
SUGGESTED CMS G CODE MODIFIER MOBILITY: CJ
MOBILITY SCORE: 20
MOVING FROM LYING ON BACK TO SITTING ON SIDE OF FLAT BED: A LITTLE

## 2023-11-23 ASSESSMENT — PAIN DESCRIPTION - PAIN TYPE
TYPE: ACUTE PAIN

## 2023-11-23 ASSESSMENT — GAIT ASSESSMENTS
DISTANCE (FEET): 150
GAIT LEVEL OF ASSIST: SUPERVISED
ASSISTIVE DEVICE: FRONT WHEEL WALKER
DEVIATION: ANTALGIC;STEP TO

## 2023-11-23 NOTE — PROGRESS NOTES
Pt A&Ox4. VSS. Pt ambulated to BR using FWW W/ SBA; gait steady shuffle. Pt tolerated activity well. Denied pain at this time. Assessment complete. Call light w/ reach; bed alarm on, non skid socks on. No s/s of distress.

## 2023-11-23 NOTE — THERAPY
Occupational Therapy Contact Note    Patient Name: Bhavana Liu  Age:  58 y.o., Sex:  female  Medical Record #: [id number]  Today's Date: 11/23/2023      Pt cleared for home by physical therapy, 6 clicks score 23/24 for ADLs. Likely no acute OT needs at this time as patient likely at her baseline per PT, will complete order.       Hilton Mo OTD, OTR/L

## 2023-11-23 NOTE — CARE PLAN
The patient is Stable - Low risk of patient condition declining or worsening    Shift Goals  Clinical Goals: Pt will report pain level below 6/10 after PRN administration  Patient Goals: Pt's goal is to be able to ambulate pain free post pain PRN intervention.  Family Goals: Updated POC    Progress made toward(s) clinical / shift goals:      Patient is not progressing towards the following goals:

## 2023-11-23 NOTE — THERAPY
Physical Therapy   Daily Treatment     Patient Name: Bhavana Liu  Age:  58 y.o., Sex:  female  Medical Record #: [id number]  Today's Date: 11/23/2023     Precautions  Precautions: Fall Risk    Assessment    Patient progressing with functional mobility and appears to be near baseline. She reported pain in R hip but it is improved from prior. Provided education regarding progression of pain, acute healing, and safe mobility in home environment. Patient reported no concerns regarding DCing home today. Patient will not be actively followed for physical therapy services at this time, however may be seen if requested by physician for 1 more visit within 30 days to address any discharge or equipment needs.    Plan    Treatment Plan Status: Modify Current Treatment Plan  Type of Treatment: Gait Training, Neuro Re-Education / Balance, Self Care / Home Evaluation, Therapeutic Activities, Therapeutic Exercise  Treatment Frequency: 3 Times per Week  Treatment Duration: Until Therapy Goals Met    DC Equipment Recommendations: Front-Wheel Walker  Discharge Recommendations: Recommend outpatient physical therapy services to address higher level deficits      Subjective    RN cleared patient for therapy, received on toilet with CNA, agreeable to OOB activity     Objective       11/23/23 1121   Charge Group   Charges  Yes   PT Therapeutic Activities (Units) 1   PT Self Care / Home Evaluation (Units) 1   Total Time Spent   PT Total Time Yes   PT Therapeutic Activities Time Spent (Mins) 15   PT Self Care/Home Evaluation Time Spent (Mins) 15   PT Total Time Spent (Calculated) 30   Precautions   Precautions Fall Risk   Vitals   O2 (LPM) 0   O2 Delivery Device None - Room Air   Pain 0 - 10 Group   Location Hip   Location Orientation Right   Therapist Pain Assessment During Activity;Nurse Notified   Cognition    Cognition / Consciousness WDL   Level of Consciousness Alert   Comments pleasant, cooperative. appears to be at  baseline   Active ROM Lower Body    Comments WFL for mobility   Strength Lower Body   Comments WFL for mobility   Balance   Sitting Balance (Static) Fair +   Sitting Balance (Dynamic) Fair +   Standing Balance (Static) Fair   Standing Balance (Dynamic) Fair   Weight Shift Sitting Fair   Weight Shift Standing Fair   Skilled Intervention Verbal Cuing;Compensatory Strategies   Comments no overt LOB. used FWW   Bed Mobility    Supine to Sit   (on toilet upon entry)   Sit to Supine   (left in chair)   Comments appears capable, reported no concerns   Gait Analysis   Gait Level Of Assist Supervised   Assistive Device Front Wheel Walker   Distance (Feet) 150   # of Times Distance was Traveled 1   Deviation Antalgic;Step To   Weight Bearing Status no restrictions   Vision Deficits Impacting Mobility NT   Skilled Intervention Verbal Cuing;Compensatory Strategies   Functional Mobility   Sit to Stand Supervised   Bed, Chair, Wheelchair Transfer Supervised   Transfer Method Stand Step   Skilled Intervention Verbal Cuing;Compensatory Strategies   How much difficulty does the patient currently have...   Turning over in bed (including adjusting bedclothes, sheets and blankets)? 3   Sitting down on and standing up from a chair with arms (e.g., wheelchair, bedside commode, etc.) 3   Moving from lying on back to sitting on the side of the bed? 3   How much help from another person does the patient currently need...   Moving to and from a bed to a chair (including a wheelchair)? 4   Need to walk in a hospital room? 4   Climbing 3-5 steps with a railing? 3   6 clicks Mobility Score 20   Short Term Goals    Short Term Goal # 1 pt will be able to complete functional transfers with FWW and SPV in 6tx in order to decrease fall risk   Goal Outcome # 1 Goal met   Short Term Goal # 2 pt will be able to ambulate 100ft with FWW and SPV in 6tx in order to decrease fall risk   Goal Outcome # 2 Goal met   Physical Therapy Treatment Plan   Physical  Therapy Treatment Plan Modify Current Treatment Plan   Reason For Discharge Discharge Secondary to Goals Met   Anticipated Discharge Equipment and Recommendations   DC Equipment Recommendations Front-Wheel Walker   Discharge Recommendations Recommend outpatient physical therapy services to address higher level deficits   Interdisciplinary Plan of Care Collaboration   IDT Collaboration with  Nursing   Patient Position at End of Therapy Seated;Call Light within Reach;Tray Table within Reach;Phone within Reach   Collaboration Comments RN aware of visit, response   Session Information   Date / Session Number  11/23 - goals met, DC from acute PT

## 2023-11-23 NOTE — RESPIRATORY CARE
"  COPD EDUCATION by COPD CLINICAL EDUCATOR  11/23/2023  at  11:47 AM by Marie Glover, RRT     Patient interviewed by COPD education team.  Patient unable to participate in full program, however a  short intervention has been conducted.  A comprehensive packet including information about COPD, types of treatments to how to best manage their disease         COPD Assessment  COPD Clinical Specialists ONLY  COPD Education Initiated: Yes--Short Intervention (questionable HX of COPD + smoking history but did recently quit . No PFT found at this time to establish DX.)  Is this a COPD exacerbation patient?: No (Per H&P - pt has history of COPD)  DME Equipment Type: None at this time  Physician Name: Diamond Moreno P.A.-C.  Pulmonologist Name: None at this time  $ Demo/Eval of SVN's, MDI's and Aerosols: Yes (reviewed respiratory medications and use)  Interdisciplinary Rounds: Attendance at Rounds (30 Min)    PFT Results    No results found for: \"PFT\"    Meds to Beds  [facility] provides bedside medication delivery for all eligible patients at discharge.  Would you like to opt out of this program for any reason?: No - Stay Opted In     MY COPD ACTION PLAN     It is recommended that patients and physicians /healthcare providers complete this action plan together. This plan should be discussed at each physician visit and updated as needed.    The green, yellow and red zones show groups of symptoms of COPD. This list of symptoms is not comprehensive, and you may experience other symptoms. In the \"Actions\" column, your healthcare provider has recommended actions for you to take based on your symptoms.    Patient Name: Bhavana Liu   YOB: 1965   Last Updated on: 11/23/2023 11:45 AM   Green Zone:  I am doing well today Actions   •  Usual activitiy and exercise level •  Take daily medications   •  Usual amounts of cough and phlegm/mucus •  Use oxygen as prescribed   •  Sleep well at night •  Continue " "regular exercise/diet plan   •  Appetite is good •  At all times avoid cigarette smoke, inhaled irritants     Daily Medications (these medications are taken every day):                Yellow Zone:  I am having a bad day or a COPD flare Actions   •  More breathless than usual •  Continue daily medications   •  I have less energy for my daily activities •  Use quick relief inhaler as ordered   •  Increased or thicker phlegm/mucus •  Use oxygen as prescribed   •  Using quick relief inhaler/nebulizer more often •  Get plenty of rest   •  Swelling of ankles more than usual •  Use pursed lip breathing   •  More coughing than usual •  At all times avoid cigarette smoke, inhaled irritants   •  I feel like I have a \"chest cold\"   •  Poor sleep and my symptoms woke me up   •  My appetite is not good   •  My medicine is not helping    •  Call provider immediately if symptoms don’t improve     Continue daily medications, add rescue medications:   Albuterol 2 Puffs Every 6 hours PRN       Medications to be used during a flare up, (as Discussed with Provider):              Red Zone:  I need urgent medical care Actions   •  Severe shortness of breath even at rest •  Call 911 or seek medical care immediately   •  Not able to do any activity because of breathing    •  Fever or shaking chills    •  Feeling confused or very drowsy     •  Chest pains    •  Coughing up blood                  "

## 2023-11-23 NOTE — DISCHARGE PLANNING
RN CM met with pt at the bedside.  Pt states her son will provide transportation home.  No anticipated needs from care coordination. Bal Mantilla RN notified.

## 2023-11-26 NOTE — DISCHARGE SUMMARY
Discharge Summary    CHIEF COMPLAINT ON ADMISSION  Chief Complaint   Patient presents with    T-5000 FALL     BIB EMS from home for a fall. Patient was attempting to walk up stairs and lost her footing, falling back onto a car. Patient denied hitting her head, -loc, blood thinners. Patient reporting right lower quadrant abdominal pain that radiates to her back.        Reason for Admission  ems     Admission Date  11/21/2023    CODE STATUS  Prior    HPI & HOSPITAL COURSE  Ms. Bhavana Liu is a 58 y.o. female with a PMHx of cervical radiculopathy, anxiety, GERD, obesity, who presented 11/21/2023 with fall and hip pain.         Patient was going up some stairs yesterday when she suddenly felt unbalanced and fell backwards and hit her car on her right side.  Complaining of right lower extremity pain and abdominal pain.     In ER, patient noted to have normal vital signs.  Notable lab findings on admission include RBC 3.86, hemoglobin 11.6, hematocrit 34.9, GFR 53.  Imaging in the ED shows no fracture.  Denied hitting her head, no loss of consciousness.  She did not feel dizzy, lightheaded, no palpitations or chest pain prior to the fall.  She does have frequent falls due to imbalance, history of cervical radiculopathy with previous spinal surgeries.  She says her imbalance has been attributed to her spinal issues in the past.  Patient admitted to hospital medicine for management of care.     On assessment, patient endorses significant left hip pain along with abdominal pain likely from swelling.  Suspect patient imaging results which are all negative for any signs of fracture.  Patient likely from muscular contusion due to the fall.   Patient's symptoms improved with multimodal pain control and was seen by PT OT who recommended outpatient PT services.  Patient was discharged home and instructed to follow-up with her PCP         Therefore, she is discharged in good and stable condition to home with close  outpatient follow-up.    The patient recovered much more quickly than anticipated on admission.    Discharge Date  11/23/2023    FOLLOW UP ITEMS POST DISCHARGE  pcp    DISCHARGE DIAGNOSES  Principal Problem (Resolved):    Acute right hip pain (POA: Yes)  Active Problems:    Gastroesophageal reflux disease without esophagitis (POA: Yes)    Anxiety (POA: Yes)    Ground-level fall (POA: Yes)  Resolved Problems:    Hypoxia (POA: Yes)    Imbalance (POA: Yes)      FOLLOW UP  No future appointments.  No follow-up provider specified.    MEDICATIONS ON DISCHARGE     Medication List        START taking these medications        Instructions   oxyCODONE-acetaminophen 5-325 MG Tabs  Commonly known as: Percocet   Take 1 Tablet by mouth every 6 hours as needed for Severe Pain for up to 5 days.  Dose: 1 Tablet            CHANGE how you take these medications        Instructions   Acetaminophen Extra Strength 500 MG Tabs  What changed:   how much to take  additional instructions   Take 2 Tablets by mouth 2 times a day.  Dose: 1,000 mg     ketoconazole 2 % Crea  What changed:   how much to take  how to take this  when to take this  Commonly known as: Nizoral   Apply a small amount to affected area no more than 2 weeks.     triamcinolone acetonide 0.1 % Crea  What changed:   how much to take  how to take this  when to take this  Commonly known as: Kenalog   Use a small amount to the affected area twice a day no more than 10 days.            CONTINUE taking these medications        Instructions   busPIRone 10 MG Tabs tablet  Commonly known as: Buspar   TAKE 1 TABLET BY MOUTH 2 TIMES A DAY.  Dose: 10 mg     docusate sodium 100 MG Caps  Commonly known as: Colace   TAKE ONE CAPSULE BY MOUTH TWO TIMES A DAY  Dose: 100 mg     furosemide 20 MG Tabs  Commonly known as: Lasix   Take 20-40 mg by mouth 2 times a day. 40 mg in the morning, 20 mg at night  Dose: 20-40 mg     gabapentin 800 MG tablet  Commonly known as: Neurontin   Take 800 mg by  mouth 2 times a day. May take additional tablet in afternoon as needed.  Dose: 800 mg     hydrOXYzine HCl 25 MG Tabs  Commonly known as: Atarax   Take 25 mg by mouth see administration instructions. Every morning, may take up to three times daily as needed.  Dose: 25 mg     loratadine 10 MG Tabs  Commonly known as: Claritin   TAKE 1 TABLET BY MOUTH EVERY DAY.  Dose: 10 mg     methocarbamol 500 MG Tabs  Commonly known as: Robaxin   Take 1,000 mg by mouth 2 times a day. 1000 mg = 2 tablets  Dose: 1,000 mg     pantoprazole 40 MG Tbec  Commonly known as: Protonix   Take 1 Tablet by mouth every day.  Dose: 40 mg     PARoxetine 20 MG Tabs  Commonly known as: Paxil   Take 2 Tablets by mouth every day.  Dose: 40 mg     potassium chloride ER 10 MEQ tablet  Commonly known as: Klor-Con   Take 1 Tablet by mouth 2 times a day.  Dose: 10 mEq     sulfamethoxazole-trimethoprim 800-160 MG tablet  Commonly known as: Bactrim DS   Take 1 Tablet by mouth 2 times a day.  Dose: 1 Tablet     Ventolin  (90 Base) MCG/ACT Aers inhalation aerosol  Generic drug: albuterol   INHALE 2 PUFFS BY MOUTH EVERY 6 HOURS AS NEEDED FOR SHORTNESS OF BREATH.  Dose: 2 Puff              Allergies  No Known Allergies    DIET  No orders of the defined types were placed in this encounter.      ACTIVITY  As tolerated.  Weight bearing as tolerated    CONSULTATIONS  none    PROCEDURES  none    LABORATORY  Lab Results   Component Value Date    SODIUM 137 11/23/2023    POTASSIUM 4.5 11/23/2023    CHLORIDE 103 11/23/2023    CO2 26 11/23/2023    GLUCOSE 123 (H) 11/23/2023    BUN 12 11/23/2023    CREATININE 0.70 11/23/2023        Lab Results   Component Value Date    WBC 6.9 11/23/2023    HEMOGLOBIN 11.7 (L) 11/23/2023    HEMATOCRIT 36.5 (L) 11/23/2023    PLATELETCT 347 11/23/2023        Total time of the discharge process exceeds 32 minutes.

## 2023-11-28 DIAGNOSIS — R60.0 BILATERAL LOWER EXTREMITY EDEMA: ICD-10-CM

## 2023-11-28 DIAGNOSIS — I83.893 VARICOSE VEINS OF BOTH LEGS WITH EDEMA: ICD-10-CM

## 2023-11-28 DIAGNOSIS — F41.9 ANXIETY: ICD-10-CM

## 2023-11-28 RX ORDER — PAROXETINE 30 MG/1
30 TABLET, FILM COATED ORAL
Qty: 30 TABLET | Refills: 2 | OUTPATIENT
Start: 2023-11-28

## 2023-11-29 RX ORDER — POTASSIUM CHLORIDE 750 MG/1
10 TABLET, FILM COATED, EXTENDED RELEASE ORAL 2 TIMES DAILY
Qty: 180 TABLET | Refills: 2 | Status: SHIPPED | OUTPATIENT
Start: 2023-11-29

## 2023-11-29 NOTE — TELEPHONE ENCOUNTER
Received request via: Pharmacy    Was the patient seen in the last year in this department? Yes    Does the patient have an active prescription (recently filled or refills available) for medication(s) requested? No    Does the patient have residential Plus and need 100 day supply (blood pressure, diabetes and cholesterol meds only)? Medication is not for cholesterol, blood pressure or diabetes    Last OV: 11/13/23  Last labs: 11/23/23

## 2023-12-04 ENCOUNTER — TELEPHONE (OUTPATIENT)
Dept: HEALTH INFORMATION MANAGEMENT | Facility: OTHER | Age: 58
End: 2023-12-04
Payer: MEDICAID

## 2023-12-07 ENCOUNTER — TELEPHONE (OUTPATIENT)
Dept: HEALTH INFORMATION MANAGEMENT | Facility: OTHER | Age: 58
End: 2023-12-07
Payer: MEDICAID

## 2023-12-19 RX ORDER — IBUPROFEN 800 MG/1
800 TABLET ORAL EVERY 8 HOURS PRN
Qty: 60 TABLET | Refills: 3 | Status: SHIPPED | OUTPATIENT
Start: 2023-12-19 | End: 2024-02-14

## 2023-12-19 RX ORDER — METHOCARBAMOL 500 MG/1
TABLET, FILM COATED ORAL
Qty: 180 TABLET | Refills: 3 | Status: SHIPPED | OUTPATIENT
Start: 2023-12-19

## 2023-12-19 NOTE — TELEPHONE ENCOUNTER
Received request via: Patient    Was the patient seen in the last year in this department? Yes    Does the patient have an active prescription (recently filled or refills available) for medication(s) requested? No    Does the patient have [facility] Plus and need 100 day supply (blood pressure, diabetes and cholesterol meds only)? Medication is not for cholesterol, blood pressure or diabetes    Last OV: 11/13/23  Last labs: 11/23/23

## 2023-12-27 ENCOUNTER — APPOINTMENT (OUTPATIENT)
Dept: RADIOLOGY | Facility: MEDICAL CENTER | Age: 58
End: 2023-12-27
Attending: PHYSICIAN ASSISTANT
Payer: MEDICAID

## 2023-12-27 DIAGNOSIS — Z12.31 ENCOUNTER FOR SCREENING MAMMOGRAM FOR BREAST CANCER: ICD-10-CM

## 2023-12-27 PROCEDURE — 77063 BREAST TOMOSYNTHESIS BI: CPT

## 2024-01-23 RX ORDER — GABAPENTIN 800 MG/1
800 TABLET ORAL 3 TIMES DAILY
Qty: 90 TABLET | Refills: 0 | Status: SHIPPED | OUTPATIENT
Start: 2024-01-23

## 2024-01-23 RX ORDER — FUROSEMIDE 20 MG/1
20 TABLET ORAL 3 TIMES DAILY
Qty: 270 TABLET | Refills: 1 | Status: SHIPPED | OUTPATIENT
Start: 2024-01-23

## 2024-01-23 NOTE — TELEPHONE ENCOUNTER
Received request via: Pharmacy    Was the patient seen in the last year in this department? Yes    Does the patient have an active prescription (recently filled or refills available) for medication(s) requested? No    Pharmacy Name: [facility]    Does the patient have [facility] Plus and need 100 day supply (blood pressure, diabetes and cholesterol meds only)? Patient does not have [facility]    Last office visit- 11-13-23  Last labs-  11-23-23

## 2024-01-29 ENCOUNTER — TELEPHONE (OUTPATIENT)
Dept: MEDICAL GROUP | Facility: CLINIC | Age: 59
End: 2024-01-29
Payer: MEDICAID

## 2024-02-14 RX ORDER — IBUPROFEN 800 MG/1
800 TABLET ORAL EVERY 8 HOURS PRN
Qty: 60 TABLET | Refills: 3 | Status: SHIPPED | OUTPATIENT
Start: 2024-02-14

## 2024-02-14 NOTE — TELEPHONE ENCOUNTER
Was the patient seen in the last year in this department? Yes    Does patient have an active prescription for medications requested? Yes    Received Request Via: Pharmacy    Admission on 11/21/2023, Discharged on 11/23/2023   Component Date Value    Sodium 11/22/2023 138     Potassium 11/22/2023 4.4     Chloride 11/22/2023 104     Co2 11/22/2023 27     Glucose 11/22/2023 92     Bun 11/22/2023 17     Creatinine 11/22/2023 1.19     Calcium 11/22/2023 8.5     Anion Gap 11/22/2023 7.0     WBC 11/22/2023 8.8     RBC 11/22/2023 3.86 (L)     Hemoglobin 11/22/2023 11.6 (L)     Hematocrit 11/22/2023 34.9 (L)     MCV 11/22/2023 90.4     MCH 11/22/2023 30.1     MCHC 11/22/2023 33.2     RDW 11/22/2023 43.9     Platelet Count 11/22/2023 365     MPV 11/22/2023 8.7 (L)     Neutrophils-Polys 11/22/2023 38.40 (L)     Lymphocytes 11/22/2023 47.40 (H)     Monocytes 11/22/2023 10.50     Eosinophils 11/22/2023 2.20     Basophils 11/22/2023 1.30     Immature Granulocytes 11/22/2023 0.20     Nucleated RBC 11/22/2023 0.00     Neutrophils (Absolute) 11/22/2023 3.37     Lymphs (Absolute) 11/22/2023 4.15     Monos (Absolute) 11/22/2023 0.92 (H)     Eos (Absolute) 11/22/2023 0.19     Baso (Absolute) 11/22/2023 0.11     Immature Granulocytes (a* 11/22/2023 0.02     NRBC (Absolute) 11/22/2023 0.00     Vitamin B12 -True Cobala* 11/22/2023 550     GFR (CKD-EPI) 11/22/2023 53 (A)     Sodium 11/23/2023 137     Potassium 11/23/2023 4.5     Chloride 11/23/2023 103     Co2 11/23/2023 26     Glucose 11/23/2023 123 (H)     Bun 11/23/2023 12     Creatinine 11/23/2023 0.70     Calcium 11/23/2023 8.5     Anion Gap 11/23/2023 8.0     WBC 11/23/2023 6.9     RBC 11/23/2023 3.99 (L)     Hemoglobin 11/23/2023 11.7 (L)     Hematocrit 11/23/2023 36.5 (L)     MCV 11/23/2023 91.5     MCH 11/23/2023 29.3     MCHC 11/23/2023 32.1 (L)     RDW 11/23/2023 44.7     Platelet Count 11/23/2023 347     MPV 11/23/2023 8.6 (L)     GFR (CKD-EPI) 11/23/2023 99    Hospital  Outpatient Visit on 06/29/2023   Component Date Value    APTT 06/29/2023 26.1     PT 06/29/2023 13.7     INR 06/29/2023 1.06     Color 06/29/2023 Yellow     Character 06/29/2023 Clear     Specific Gravity 06/29/2023 1.018     Ph 06/29/2023 6.5     Glucose 06/29/2023 Negative     Ketones 06/29/2023 Negative     Protein 06/29/2023 Negative     Bilirubin 06/29/2023 Negative     Urobilinogen, Urine 06/29/2023 0.2     Nitrite 06/29/2023 Negative     Leukocyte Esterase 06/29/2023 Negative     Occult Blood 06/29/2023 Negative     Micro Urine Req 06/29/2023 see below     Sodium 06/29/2023 139     Potassium 06/29/2023 4.0     Chloride 06/29/2023 104     Co2 06/29/2023 26     Anion Gap 06/29/2023 9.0     Glucose 06/29/2023 98     Bun 06/29/2023 16     Creatinine 06/29/2023 0.94     Calcium 06/29/2023 9.4     AST(SGOT) 06/29/2023 21     ALT(SGPT) 06/29/2023 18     Alkaline Phosphatase 06/29/2023 84     Total Bilirubin 06/29/2023 0.4     Albumin 06/29/2023 4.3     Total Protein 06/29/2023 6.6     Globulin 06/29/2023 2.3     A-G Ratio 06/29/2023 1.9     WBC 06/29/2023 5.7     RBC 06/29/2023 4.64     Hemoglobin 06/29/2023 13.8     Hematocrit 06/29/2023 40.5     MCV 06/29/2023 87.3     MCH 06/29/2023 29.7     MCHC 06/29/2023 34.1     RDW 06/29/2023 41.1     Platelet Count 06/29/2023 295     MPV 06/29/2023 9.7     Neutrophils-Polys 06/29/2023 45.00     Lymphocytes 06/29/2023 37.90     Monocytes 06/29/2023 12.80     Eosinophils 06/29/2023 2.60     Basophils 06/29/2023 1.40     Immature Granulocytes 06/29/2023 0.30     Nucleated RBC 06/29/2023 0.00     Neutrophils (Absolute) 06/29/2023 2.57     Lymphs (Absolute) 06/29/2023 2.17     Monos (Absolute) 06/29/2023 0.73     Eos (Absolute) 06/29/2023 0.15     Baso (Absolute) 06/29/2023 0.08     Immature Granulocytes (a* 06/29/2023 0.02     NRBC (Absolute) 06/29/2023 0.00     Correct Calcium 06/29/2023 9.2     GFR (CKD-EPI) 06/29/2023 70    ]

## 2024-03-06 RX ORDER — DOCUSATE SODIUM 100 MG/1
100 CAPSULE, LIQUID FILLED ORAL 2 TIMES DAILY
Qty: 60 CAPSULE | Refills: 0 | Status: SHIPPED | OUTPATIENT
Start: 2024-03-06

## 2024-03-06 NOTE — TELEPHONE ENCOUNTER
Requested Prescriptions     Pending Prescriptions Disp Refills    docusate sodium (COLACE) 100 MG Cap [Pharmacy Med Name: docusate sodium 100 mg capsule] 60 Capsule 3     Sig: TAKE ONE CAPSULE BY MOUTH TWO TIMES A DAY      Last office visit: 11/13/23  Last lab:6/29/23

## 2024-03-07 ENCOUNTER — OFFICE VISIT (OUTPATIENT)
Dept: NEUROLOGY | Facility: MEDICAL CENTER | Age: 59
End: 2024-03-07
Attending: PSYCHIATRY & NEUROLOGY
Payer: MEDICAID

## 2024-03-07 VITALS
WEIGHT: 215.39 LBS | TEMPERATURE: 97.7 F | RESPIRATION RATE: 18 BRPM | SYSTOLIC BLOOD PRESSURE: 130 MMHG | DIASTOLIC BLOOD PRESSURE: 86 MMHG | BODY MASS INDEX: 31.9 KG/M2 | OXYGEN SATURATION: 92 % | HEIGHT: 69 IN | HEART RATE: 101 BPM

## 2024-03-07 DIAGNOSIS — M79.604 PAINFUL LEGS AND MOVING TOES: ICD-10-CM

## 2024-03-07 DIAGNOSIS — M79.605 PAINFUL LEGS AND MOVING TOES: ICD-10-CM

## 2024-03-07 PROCEDURE — 99204 OFFICE O/P NEW MOD 45 MIN: CPT | Performed by: PSYCHIATRY & NEUROLOGY

## 2024-03-07 PROCEDURE — 99212 OFFICE O/P EST SF 10 MIN: CPT | Performed by: PSYCHIATRY & NEUROLOGY

## 2024-03-07 RX ORDER — PREGABALIN 25 MG/1
25 CAPSULE ORAL 3 TIMES DAILY
Qty: 270 CAPSULE | Refills: 0 | Status: SHIPPED | OUTPATIENT
Start: 2024-03-07 | End: 2024-06-05

## 2024-03-07 ASSESSMENT — PATIENT HEALTH QUESTIONNAIRE - PHQ9: CLINICAL INTERPRETATION OF PHQ2 SCORE: 0

## 2024-03-07 ASSESSMENT — FIBROSIS 4 INDEX: FIB4 SCORE: 0.84

## 2024-03-07 NOTE — PROGRESS NOTES
Chief Complaint   Patient presents with    New Patient     Disease of spinal cord, unspecified       History of present illness:  [facility] with generalized allodynia that feels that someone is slicing the tips off her fingers but can affect her face and entire body as well. This started after her ACDF cervical fusion.   She cannot dry her hands due to pain from a paper towel. This occurs 2-3 times per day and lasts hours.   She has history of left deltoid weakness that resolved after her ACDF surgery.       Past medical history:   Past Medical History:   Diagnosis Date    Abnormal drug screen 12/08/2017    Adjustment disorder with anxious mood     Arrhythmia     history of    Arthritis     Bilateral leg edema     Bowel habit changes     constipation    Breath shortness 06/30/2023    with exertion    Dental disorder     upper and lower dentures    Dyslipidemia, goal LDL below 130     Emphysema of lung ([facility])     inhaler    GERD (gastroesophageal reflux disease)     Heart burn     Hemorrhagic disorder (HCC)     pt tends to bleed easy     Indigestion     Menopausal symptoms     Methamphetamine use (HCC) 12/08/2017    Shingles 03/21/2022    Urinary incontinence     occ night time incontinence       Past surgical history:   Past Surgical History:   Procedure Laterality Date    CERVICAL DISK AND FUSION ANTERIOR  7/25/2023    Procedure: C4-7 ANTERIOR CERVICAL DISCECTOMY AND FUSION;  Surgeon: Law Moya M.D.;  Location: SURGERY [facility];  Service: Neurosurgery    CERVICAL LAMINECTOMY POSTERIOR Left 4/25/2022    Procedure: LAMINECTOMY, SPINE, CERVICAL, POSTERIOR APPROACH - INFERIOR C2 TO SUPERIOR C5;  Surgeon: Law Moya M.D.;  Location: SURGERY [facility];  Service: Neurosurgery    CERVICAL FUSION POSTERIOR Left 4/25/2022    Procedure: FUSION, SPINE, CERVICAL, POSTERIOR APPROACH - C3-4;  Surgeon: Law Moya M.D.;  Location: SURGERY [facility];  Service: Neurosurgery     VAGINAL HYSTERECTOMY TOTAL  2021    Procedure: HYSTERECTOMY, TOTAL, VAGINAL;  Surgeon: Melissa Suazo M.D.;  Location: SURGERY SAME DAY [facility];  Service: Gynecology    ANTERIOR AND POSTERIOR REPAIR  2021    Procedure: COLPORRHAPHY, COMBINED ANTEROPOSTERIOR;  Surgeon: Melissa Suazo M.D.;  Location: SURGERY SAME DAY [facility];  Service: Gynecology    VAGINAL SUSPENSION  2021    Procedure: COLPOPEXY;  Surgeon: Melissa Suazo M.D.;  Location: SURGERY SAME DAY [facility];  Service: Gynecology    CYSTOSCOPY  2021    Procedure: CYSTOSCOPY;  Surgeon: Melissa Suazo M.D.;  Location: SURGERY SAME DAY [facility];  Service: Gynecology       Family history:   Family History   Problem Relation Age of Onset    Diabetes Mother         type 2, diet controlled    Heart Disease Mother         pacemaker    Cancer Maternal Grandmother         uterine    Genitourinary () Problems Sister         tumor, benign    Heart Disease Father         CHF    Non-contributory Father         emphysema    Arthritis Brother         hip replacement    Non-contributory Sister         encephalitis       Social history:   Social History     Socioeconomic History    Marital status:      Spouse name: Not on file    Number of children: Not on file    Years of education: Not on file    Highest education level: Not on file   Occupational History    Not on file   Tobacco Use    Smoking status: Former     Current packs/day: 0.00     Average packs/day: 0.5 packs/day for 25.0 years (12.5 ttl pk-yrs)     Types: Cigarettes     Start date: 1998     Quit date: 2023     Years since quittin.7    Smokeless tobacco: Never    Tobacco comments:     1/2 pack/ day    Vaping Use    Vaping Use: Former    Quit date: 2020    Substances: Nicotine   Substance and Sexual Activity    Alcohol use: Not Currently     Comment: rare social none since     Drug use: Not Currently     Types: Inhaled, Oral     Comment: Meth occ:  "at least 2 weeks as of 4/25/23 .  4/18/22- smoked marijuana for the pain.    Sexual activity: Yes     Partners: Male     Comment:    Other Topics Concern    Not on file   Social History Narrative    Not on file     Social Determinants of Health     Financial Resource Strain: Not on file   Food Insecurity: Not on file   Transportation Needs: Not on file   Physical Activity: Not on file   Stress: Not on file   Social Connections: Not on file   Intimate Partner Violence: Not on file   Housing Stability: Not on file       Current medications:   Current Outpatient Medications   Medication    pregabalin (LYRICA) 25 MG Cap    docusate sodium (COLACE) 100 MG Cap    ibuprofen (MOTRIN) 800 MG Tab    gabapentin (NEURONTIN) 800 MG tablet    furosemide (LASIX) 20 MG Tab    methocarbamol (ROBAXIN) 500 MG Tab    potassium chloride ER (KLOR-CON) 10 MEQ tablet    acetaminophen (TYLENOL) 500 MG Tab    hydrOXYzine HCl (ATARAX) 25 MG Tab    pantoprazole (PROTONIX) 40 MG Tablet Delayed Response    PARoxetine (PAXIL) 20 MG Tab    triamcinolone acetonide (KENALOG) 0.1 % Cream    ketoconazole (NIZORAL) 2 % Cream    loratadine (CLARITIN) 10 MG Tab    busPIRone (BUSPAR) 10 MG Tab tablet    VENTOLIN  (90 Base) MCG/ACT Aero Soln inhalation aerosol    sulfamethoxazole-trimethoprim (BACTRIM DS) 800-160 MG tablet     No current facility-administered medications for this visit.       Medication Allergy:  No Known Allergies    Physical examination:   Vitals:    03/07/24 1448   BP: 130/86   BP Location: Left arm   Patient Position: Sitting   BP Cuff Size: Adult long   Pulse: (!) 101   Resp: 18   Temp: 36.5 °C (97.7 °F)   TempSrc: Temporal   SpO2: 92%   Weight: 97.7 kg (215 lb 6.2 oz)   Height: 1.753 m (5' 9\")     Neurological Exam  Mental Status   Speech is normal.    Motor    She has near constant involuntary movements of the toes, with the toes flex and extend repeatedly.  This results in her having excruciating pain.  She is " writhing in pain during the visit.    With distraction, the abnormal movements of the toes persisted..    Reflexes                                            Right                      Left  Patellar                                2+                         2+  Achilles                                2+                         2+    Gait    Unsteady gait..      ASSESSMENT AND PLAN:  Problem List Items Addressed This Visit    None  Visit Diagnoses       Painful legs and moving toes        Relevant Medications    pregabalin (LYRICA) 25 MG Cap            1. Painful legs and moving toes  - pregabalin (LYRICA) 25 MG Cap; Take 1 Capsule by mouth 3 times a day for 90 days.  Dispense: 270 Capsule; Refill: 0    Patient reports that she has had excruciating pain of her extremities as well as involuntary movements of her toes.  She has a history of spinal surgery with fusion of her upper cervical vertebrae.  There was immediately noticed this of the left arm after the surgery, which resolved, however later she developed this worsening and now severe pain of her extremities.  Gabapentin is not effective.  There is involuntary toe movement where she has constant flexion and extension of her toes.  I have counseled the patient on the possible diagnosis of painful legs and moving toes.  I have prescribed pregabalin at a dose of 25 mg 3 times daily, with instruction to increase to 50 mg 3 times daily after 1 month if indicated.  If this is not effective I may prescribe her clonazepam given that this has been reported to be effective in literature.    FOLLOW-UP:   Return in about 2 months (around 5/7/2024).    Total time spent for the day for this patient unrelated to procedure time is: 45 minutes. I spent 30 minutes in face to face time and I spent 6 minutes pre-charting and 9 minutes in post-visit documentation.      Dr. Chilo Gilmore D.O.  [facility]

## 2024-03-07 NOTE — PATIENT INSTRUCTIONS
pregabalin (LYRICA) 25 MG Cap [[id number]]    Order Details  Dose: 25 mg Route: Oral Frequency: 3 TIMES DAILY   Dispense Quantity: 270 Capsule Refills: 0          Sig: Take 1 Capsule by mouth 3 times a day for 90 days.     Increase to 2 caps 3 times daily after 1 month if needed

## 2024-03-28 RX ORDER — METHOCARBAMOL 500 MG/1
TABLET, FILM COATED ORAL
Qty: 180 TABLET | Refills: 0 | Status: SHIPPED | OUTPATIENT
Start: 2024-03-28

## 2024-03-28 NOTE — TELEPHONE ENCOUNTER
Requested Prescriptions     Pending Prescriptions Disp Refills    methocarbamol (ROBAXIN) 500 MG Tab [Pharmacy Med Name: methocarbamol 500 mg tablet] 180 Tablet 3     Sig: TAKE 1 OR 2 TABLETS BY MOUTH TWO TIMES A DAY TO THREE TIMES A DAY      Last office visit: 11/13/23  Last lab: 6/29/23

## 2024-04-03 DIAGNOSIS — F41.9 ANXIETY: ICD-10-CM

## 2024-04-03 RX ORDER — BUSPIRONE HYDROCHLORIDE 10 MG/1
10 TABLET ORAL 2 TIMES DAILY
Qty: 60 TABLET | Refills: 2 | Status: SHIPPED | OUTPATIENT
Start: 2024-04-03

## 2024-04-03 RX ORDER — DOCUSATE SODIUM 100 MG/1
100 CAPSULE, LIQUID FILLED ORAL 2 TIMES DAILY
Qty: 60 CAPSULE | Refills: 2 | Status: SHIPPED | OUTPATIENT
Start: 2024-04-03

## 2024-04-03 NOTE — TELEPHONE ENCOUNTER
Requested Prescriptions     Pending Prescriptions Disp Refills    docusate sodium (COLACE) 100 MG Cap [Pharmacy Med Name: docusate sodium 100 mg capsule] 60 Capsule 0     Sig: TAKE ONE CAPSULE BY MOUTH TWO TIMES A DAY    busPIRone (BUSPAR) 10 MG Tab tablet [Pharmacy Med Name: buspirone 10 mg tablet] 60 Tablet 0     Sig: TAKE ONE TABLET BY MOUTH TWO TIMES A DAY      Last office visit: 11/13/23  Last lab: 6/29/23

## 2024-04-14 DIAGNOSIS — L29.9 ITCHING: ICD-10-CM

## 2024-04-15 DIAGNOSIS — L29.9 ITCHING: ICD-10-CM

## 2024-04-15 RX ORDER — LORATADINE 10 MG/1
10 TABLET ORAL
Qty: 30 TABLET | Refills: 5 | Status: SHIPPED | OUTPATIENT
Start: 2024-04-15

## 2024-04-15 RX ORDER — LORATADINE 10 MG/1
10 TABLET ORAL DAILY
Qty: 90 TABLET | Refills: 2 | Status: SHIPPED | OUTPATIENT
Start: 2024-04-15

## 2024-04-15 NOTE — TELEPHONE ENCOUNTER
Received request via: Patient    Was the patient seen in the last year in this department? Yes    Does the patient have an active prescription (recently filled or refills available) for medication(s) requested?  Yes    Pharmacy Name: [facility]    Does the patient have [facility] and need 100 day supply (blood pressure, diabetes and cholesterol meds only)? Patient does not have SCP

## 2024-04-15 NOTE — TELEPHONE ENCOUNTER
Received request via: Patient    Was the patient seen in the last year in this department? Yes    Does the patient have an active prescription (recently filled or refills available) for medication(s) requested?  Yes    Pharmacy Name: [facility]    Does the patient have [facility] and need 100 day supply (blood pressure, diabetes and cholesterol meds only)? Patient does not have SCP

## 2024-04-17 DIAGNOSIS — F17.200 TOBACCO DEPENDENCE: ICD-10-CM

## 2024-04-17 NOTE — TELEPHONE ENCOUNTER
Received request via: Patient    Was the patient seen in the last year in this department? Yes    Does the patient have an active prescription (recently filled or refills available) for medication(s) requested?  Yes    Pharmacy Name: [facility]    Does the patient have [facility] and need 100 day supply (blood pressure, diabetes and cholesterol meds only)? Patient does not have SCP

## 2024-04-18 RX ORDER — ALBUTEROL SULFATE 90 UG/1
2 AEROSOL, METERED RESPIRATORY (INHALATION) EVERY 6 HOURS PRN
Qty: 18 G | Refills: 4 | Status: SHIPPED | OUTPATIENT
Start: 2024-04-18

## 2024-05-07 RX ORDER — METHOCARBAMOL 500 MG/1
TABLET, FILM COATED ORAL
Qty: 180 TABLET | Refills: 0 | Status: SHIPPED | OUTPATIENT
Start: 2024-05-07

## 2024-05-07 NOTE — TELEPHONE ENCOUNTER
Received request via: Patient    Was the patient seen in the last year in this department? Yes    Does the patient have an active prescription (recently filled or refills available) for medication(s) requested?  Yes    Pharmacy Name: [facility]    Does the patient have [facility] and need 100 day supply (blood pressure, diabetes and cholesterol meds only)? Patient does not have SCP

## 2024-05-17 NOTE — TELEPHONE ENCOUNTER
Requested Prescriptions     Pending Prescriptions Disp Refills    ibuprofen (MOTRIN) 800 MG Tab [Pharmacy Med Name: ibuprofen 800 mg tablet] 60 Tablet 0     Sig: TAKE ONE TABLET BY MOUTH EVERY 8 HOURS AS NEEDED FOR MODERATE PAIN     Lov 11/13/2023  Labs 06/09/2023

## 2024-05-21 RX ORDER — IBUPROFEN 800 MG/1
800 TABLET ORAL EVERY 8 HOURS PRN
Qty: 60 TABLET | Refills: 1 | Status: SHIPPED | OUTPATIENT
Start: 2024-05-21

## 2024-05-24 ENCOUNTER — TELEPHONE (OUTPATIENT)
Dept: NEUROLOGY | Facility: MEDICAL CENTER | Age: 59
End: 2024-05-24
Payer: MEDICAID

## 2024-05-24 DIAGNOSIS — M79.605 PAINFUL LEGS AND MOVING TOES: ICD-10-CM

## 2024-05-24 DIAGNOSIS — M79.604 PAINFUL LEGS AND MOVING TOES: ICD-10-CM

## 2024-05-24 NOTE — TELEPHONE ENCOUNTER
Patient called and is in need of a refill for the lyrica since the increase from 1-2 pills a day they are out of the medication

## 2024-05-28 ENCOUNTER — TELEPHONE (OUTPATIENT)
Dept: NEUROLOGY | Facility: MEDICAL CENTER | Age: 59
End: 2024-05-28
Payer: MEDICAID

## 2024-05-28 RX ORDER — PREGABALIN 25 MG/1
25 CAPSULE ORAL 3 TIMES DAILY
Qty: 270 CAPSULE | Refills: 2 | Status: SHIPPED | OUTPATIENT
Start: 2024-05-28 | End: 2025-02-22

## 2024-05-28 NOTE — TELEPHONE ENCOUNTER
Attempted to contact patient at [phone] to discuss [facility] and services/benefits offered. No answer, unable to leave voicemail. Mailbox full.         Jayne Nance  Rx Coordinator   [phone]

## 2024-05-28 NOTE — TELEPHONE ENCOUNTER
Received request via: Patient    Was the patient seen in the last year in this department? Yes    Does the patient have an active prescription (recently filled or refills available) for medication(s) requested?  Yes    Pharmacy Name: [facility]    Does the patient have [facility] and need 100 day supply (blood pressure, diabetes and cholesterol meds only)? No

## 2024-05-28 NOTE — TELEPHONE ENCOUNTER
Received [employer] request via [employer]  for Lyrica 25 MG Caps. (Quantity:270, Day Supply:90) - Copay $0.00 (Brand required by plan - DAW9) - NO PA REQUIRED     Insurance: [place] Medicaid ([facility])  Member ID:  [id number]  BIN: [id number]  PCN: [id number]  [id number]     Ran Test claim via [email or web address] & medication Pays for a $0.00 copay. Will outreach to patient to offer specialty pharmacy services and or release to preferred pharmacy

## 2024-05-29 RX ORDER — GABAPENTIN 800 MG/1
800 TABLET ORAL 3 TIMES DAILY
Qty: 90 TABLET | Refills: 0 | Status: SHIPPED | OUTPATIENT
Start: 2024-05-29

## 2024-06-07 RX ORDER — METHOCARBAMOL 500 MG/1
TABLET, FILM COATED ORAL
Qty: 180 TABLET | Refills: 0 | Status: SHIPPED | OUTPATIENT
Start: 2024-06-07

## 2024-06-07 NOTE — TELEPHONE ENCOUNTER
Received request via: Patient    Was the patient seen in the last year in this department? Yes    Does the patient have an active prescription (recently filled or refills available) for medication(s) requested?  Yes    Pharmacy Name: [facility]    Does the patient have [facility] and need 100 day supply (blood pressure, diabetes and cholesterol meds only)? Patient does not have SCP

## 2024-06-20 ENCOUNTER — OFFICE VISIT (OUTPATIENT)
Dept: NEUROLOGY | Facility: MEDICAL CENTER | Age: 59
End: 2024-06-20
Attending: PSYCHIATRY & NEUROLOGY
Payer: MEDICAID

## 2024-06-20 VITALS
OXYGEN SATURATION: 96 % | HEIGHT: 69 IN | HEART RATE: 96 BPM | DIASTOLIC BLOOD PRESSURE: 72 MMHG | TEMPERATURE: 96.4 F | SYSTOLIC BLOOD PRESSURE: 118 MMHG | WEIGHT: 223.99 LBS | BODY MASS INDEX: 33.18 KG/M2

## 2024-06-20 DIAGNOSIS — M79.605 PAINFUL LEGS AND MOVING TOES: ICD-10-CM

## 2024-06-20 DIAGNOSIS — M79.604 PAINFUL LEGS AND MOVING TOES: ICD-10-CM

## 2024-06-20 DIAGNOSIS — G62.9 PERIPHERAL POLYNEUROPATHY: ICD-10-CM

## 2024-06-20 DIAGNOSIS — F40.10 SOCIAL ANXIETY DISORDER: ICD-10-CM

## 2024-06-20 PROCEDURE — 3074F SYST BP LT 130 MM HG: CPT | Performed by: PSYCHIATRY & NEUROLOGY

## 2024-06-20 PROCEDURE — 99212 OFFICE O/P EST SF 10 MIN: CPT | Performed by: PSYCHIATRY & NEUROLOGY

## 2024-06-20 PROCEDURE — 3078F DIAST BP <80 MM HG: CPT | Performed by: PSYCHIATRY & NEUROLOGY

## 2024-06-20 PROCEDURE — 99213 OFFICE O/P EST LOW 20 MIN: CPT | Performed by: PSYCHIATRY & NEUROLOGY

## 2024-06-20 RX ORDER — PREGABALIN 75 MG/1
75 CAPSULE ORAL 3 TIMES DAILY
Qty: 90 CAPSULE | Refills: 2 | Status: SHIPPED | OUTPATIENT
Start: 2024-06-20 | End: 2025-03-17

## 2024-06-20 ASSESSMENT — FIBROSIS 4 INDEX: FIB4 SCORE: 0.84

## 2024-06-20 NOTE — PATIENT INSTRUCTIONS
Go to the lab to have your blood drawn for diabetes     Increase the pregabalin to 75mg three times daily (a new prescription was written for you).     I have sent a referral to behavioral health for anxiety

## 2024-06-20 NOTE — PROGRESS NOTES
Chief Complaint   Patient presents with    Follow-Up     Painful legs and moving toes       History of present illness:  Bhavana Liu 58 y.o. female reports that she has had excruciating pain of her extremities as well as involuntary movements of her toes.  She has a history of spinal surgery with fusion of her upper cervical vertebrae.  There was immediately noticed this of the left arm after the surgery, which resolved, however later she developed this worsening and now severe pain of her extremities.  Gabapentin is not effective.  There is involuntary toe movement where she has constant flexion and extension of her toes.  I have counseled the patient on the possible diagnosis of painful legs and moving toes.  I have prescribed pregabalin at a dose of 25 mg 3 times daily, with instruction to increase to 50 mg 3 times daily after 1 month if indicated     She has improvement in her neuropathic pain with 50mg of pregabalin TID.     Past medical history:   Past Medical History:   Diagnosis Date    Abnormal drug screen 12/08/2017    Adjustment disorder with anxious mood     Arrhythmia     history of    Arthritis     Bilateral leg edema     Bowel habit changes     constipation    Breath shortness 06/30/2023    with exertion    Dental disorder     upper and lower dentures    Dyslipidemia, goal LDL below 130     Emphysema of lung (HCC)     inhaler    GERD (gastroesophageal reflux disease)     Heart burn     Hemorrhagic disorder (HCC)     pt tends to bleed easy     Indigestion     Menopausal symptoms     Methamphetamine use (HCC) 12/08/2017    Shingles 03/21/2022    Urinary incontinence     occ night time incontinence       Past surgical history:   Past Surgical History:   Procedure Laterality Date    CERVICAL DISK AND FUSION ANTERIOR  7/25/2023    Procedure: C4-7 ANTERIOR CERVICAL DISCECTOMY AND FUSION;  Surgeon: Law Moya M.D.;  Location: SURGERY [facility];  Service: Neurosurgery    CERVICAL  LAMINECTOMY POSTERIOR Left 4/25/2022    Procedure: LAMINECTOMY, SPINE, CERVICAL, POSTERIOR APPROACH - INFERIOR C2 TO SUPERIOR C5;  Surgeon: Law Moya M.D.;  Location: SURGERY [facility];  Service: Neurosurgery    CERVICAL FUSION POSTERIOR Left 4/25/2022    Procedure: FUSION, SPINE, CERVICAL, POSTERIOR APPROACH - C3-4;  Surgeon: Law Moya M.D.;  Location: SURGERY [facility];  Service: Neurosurgery    VAGINAL HYSTERECTOMY TOTAL  5/19/2021    Procedure: HYSTERECTOMY, TOTAL, VAGINAL;  Surgeon: Melissa Suazo M.D.;  Location: SURGERY SAME DAY [facility];  Service: Gynecology    ANTERIOR AND POSTERIOR REPAIR  5/19/2021    Procedure: COLPORRHAPHY, COMBINED ANTEROPOSTERIOR;  Surgeon: Melissa Suazo M.D.;  Location: SURGERY SAME DAY [facility];  Service: Gynecology    VAGINAL SUSPENSION  5/19/2021    Procedure: COLPOPEXY;  Surgeon: Melissa Suazo M.D.;  Location: SURGERY SAME DAY [facility];  Service: Gynecology    CYSTOSCOPY  5/19/2021    Procedure: CYSTOSCOPY;  Surgeon: Melissa Suazo M.D.;  Location: SURGERY SAME DAY [facility];  Service: Gynecology       Family history:   Family History   Problem Relation Age of Onset    Diabetes Mother         type 2, diet controlled    Heart Disease Mother         pacemaker    Cancer Maternal Grandmother         uterine    Genitourinary () Problems Sister         tumor, benign    Heart Disease Father         CHF    Non-contributory Father         emphysema    Arthritis Brother         hip replacement    Non-contributory Sister         encephalitis       Social history:   Social History     Socioeconomic History    Marital status:      Spouse name: Not on file    Number of children: Not on file    Years of education: Not on file    Highest education level: Not on file   Occupational History    Not on file   Tobacco Use    Smoking status: Former     Current packs/day: 0.00     Average packs/day: 0.5 packs/day for 25.0 years (12.5 ttl pk-yrs)     Types:  Cigarettes     Start date: 1998     Quit date: 2023     Years since quittin.0    Smokeless tobacco: Never    Tobacco comments:     1/2 pack/ day    Vaping Use    Vaping status: Former    Quit date: 2020    Substances: Nicotine   Substance and Sexual Activity    Alcohol use: Not Currently     Comment: rare social none since     Drug use: Not Currently     Types: Inhaled, Oral     Comment: Meth occ: at least 2 weeks as of 23 .  22- smoked marijuana for the pain.    Sexual activity: Yes     Partners: Male     Comment:    Other Topics Concern    Not on file   Social History Narrative    Not on file     Social Determinants of Health     Financial Resource Strain: Not on file   Food Insecurity: Not on file   Transportation Needs: Not on file   Physical Activity: Not on file   Stress: Not on file   Social Connections: Not on file   Intimate Partner Violence: Low Risk  (2022)    Received from [facility]    History of Abuse     History of Abuse: Denies   Housing Stability: Not on file       Current medications:   Current Outpatient Medications   Medication    pregabalin (LYRICA) 75 MG Cap    methocarbamol (ROBAXIN) 500 MG Tab    gabapentin (NEURONTIN) 800 MG tablet    ibuprofen (MOTRIN) 800 MG Tab    VENTOLIN  (90 Base) MCG/ACT Aero Soln inhalation aerosol    loratadine (CLARITIN) 10 MG Tab    loratadine (CLARITIN) 10 MG Tab    docusate sodium (COLACE) 100 MG Cap    busPIRone (BUSPAR) 10 MG Tab tablet    furosemide (LASIX) 20 MG Tab    potassium chloride ER (KLOR-CON) 10 MEQ tablet    acetaminophen (TYLENOL) 500 MG Tab    hydrOXYzine HCl (ATARAX) 25 MG Tab    pantoprazole (PROTONIX) 40 MG Tablet Delayed Response    sulfamethoxazole-trimethoprim (BACTRIM DS) 800-160 MG tablet    PARoxetine (PAXIL) 20 MG Tab    triamcinolone acetonide (KENALOG) 0.1 % Cream    ketoconazole (NIZORAL) 2 % Cream     No current facility-administered medications for this visit.  "      Medication Allergy:  No Known Allergies    Physical examination:   Vitals:    06/20/24 1628   BP: 118/72   BP Location: Left arm   Patient Position: Sitting   BP Cuff Size: Adult long   Pulse: 96   Temp: (!) 35.8 °C (96.4 °F)   SpO2: 96%   Weight: 102 kg (223 lb 15.8 oz)   Height: 1.753 m (5' 9\")       ASSESSMENT AND PLAN:  Problem List Items Addressed This Visit    None  Visit Diagnoses       Peripheral polyneuropathy        Relevant Medications    pregabalin (LYRICA) 75 MG Cap    Other Relevant Orders    HEMOGLOBIN A1C    Painful legs and moving toes        Relevant Medications    pregabalin (LYRICA) 75 MG Cap    Social anxiety disorder        Relevant Orders    Referral to Behavioral Health            1. Peripheral polyneuropathy  - HEMOGLOBIN A1C; Future    2. Painful legs and moving toes  - pregabalin (LYRICA) 75 MG Cap; Take 1 Capsule by mouth 3 times a day for 270 days.  Dispense: 90 Capsule; Refill: 2    3. Social anxiety disorder  - Referral to Behavioral Health    She has had improvement in her peripheral neuropathy with pregabalin however she is requesting a higher dose to better treat her nerve pain. I have written a Rx for Lyrica 75mg TID.     In addition, she suffers from severe anxiety, and is on multiple anti-anxiety medications. I have sent a referral to behavioral [facility] for management of this.     FOLLOW-UP:   Return in about 4 months (around 10/20/2024).    Total time spent for the day for this patient unrelated to procedure time is: 26 minutes. I spent 18 minutes in face to face time and I spent 4 minutes pre-charting and 4 minutes in post-visit documentation.      Dr. Chilo Gilmore D.O.  [facility]    "

## 2024-06-21 ENCOUNTER — TELEPHONE (OUTPATIENT)
Dept: PHARMACY | Facility: MEDICAL CENTER | Age: 59
End: 2024-06-21
Payer: MEDICAID

## 2024-06-21 NOTE — TELEPHONE ENCOUNTER
Attempted to contact patient at [phone] to discuss [facility] pharmacy and services/benefits offered. No answer, left voicemail. Initially tried the preferred mobile number and I was unable to leave VM.     Jayne Nance  Rx Coordinator   [phone]

## 2024-06-21 NOTE — TELEPHONE ENCOUNTER
pregabalin (LYRICA) 75 MG Cap    Received [employer] request via [employer]  for Lyrica. (Quantity:90, Day Supply:30)     Insurance: [employer]  Member ID:  [id number]  BIN: [id number]  PCN[place] [id number]  [id number]     Ran [place] claim via [place] & medication Pays for a $0 copay. Will outreach to patient to offer specialty pharmacy services and or release to preferred pharmacy        There are no Wet Read(s) to document.

## 2024-07-15 RX ORDER — METHOCARBAMOL 500 MG/1
TABLET, FILM COATED ORAL
Qty: 180 TABLET | Refills: 0 | Status: SHIPPED | OUTPATIENT
Start: 2024-07-15

## 2024-07-17 ENCOUNTER — OFFICE VISIT (OUTPATIENT)
Dept: MEDICAL GROUP | Facility: CLINIC | Age: 59
End: 2024-07-17
Payer: MEDICAID

## 2024-07-17 VITALS
DIASTOLIC BLOOD PRESSURE: 62 MMHG | WEIGHT: 225.31 LBS | OXYGEN SATURATION: 97 % | HEART RATE: 98 BPM | BODY MASS INDEX: 33.37 KG/M2 | TEMPERATURE: 97 F | SYSTOLIC BLOOD PRESSURE: 122 MMHG | RESPIRATION RATE: 18 BRPM | HEIGHT: 69 IN

## 2024-07-17 DIAGNOSIS — G95.9 CERVICAL MYELOPATHY (HCC): ICD-10-CM

## 2024-07-17 DIAGNOSIS — R60.0 BILATERAL LOWER EXTREMITY EDEMA: ICD-10-CM

## 2024-07-17 DIAGNOSIS — R20.2 PARESTHESIA OF BOTH FEET: ICD-10-CM

## 2024-07-17 DIAGNOSIS — Z12.11 SCREENING FOR COLORECTAL CANCER: ICD-10-CM

## 2024-07-17 DIAGNOSIS — M54.12 CERVICAL RADICULOPATHY: ICD-10-CM

## 2024-07-17 DIAGNOSIS — B00.9 HERPES SIMPLEX: ICD-10-CM

## 2024-07-17 DIAGNOSIS — L29.9 ITCHING: ICD-10-CM

## 2024-07-17 DIAGNOSIS — G62.9 NEUROPATHY: ICD-10-CM

## 2024-07-17 DIAGNOSIS — M48.00 CENTRAL STENOSIS OF SPINAL CANAL: ICD-10-CM

## 2024-07-17 DIAGNOSIS — I83.893 VARICOSE VEINS OF BOTH LEGS WITH EDEMA: ICD-10-CM

## 2024-07-17 DIAGNOSIS — G60.9 IDIOPATHIC PERIPHERAL NEUROPATHY: ICD-10-CM

## 2024-07-17 DIAGNOSIS — F41.9 ANXIETY: ICD-10-CM

## 2024-07-17 DIAGNOSIS — K21.9 GASTROESOPHAGEAL REFLUX DISEASE, UNSPECIFIED WHETHER ESOPHAGITIS PRESENT: ICD-10-CM

## 2024-07-17 DIAGNOSIS — M48.02 CERVICAL STENOSIS OF SPINAL CANAL: ICD-10-CM

## 2024-07-17 DIAGNOSIS — M40.12 OTHER SECONDARY KYPHOSIS, CERVICAL REGION: ICD-10-CM

## 2024-07-17 DIAGNOSIS — F43.22 ADJUSTMENT DISORDER WITH ANXIOUS MOOD: ICD-10-CM

## 2024-07-17 DIAGNOSIS — Z12.12 SCREENING FOR COLORECTAL CANCER: ICD-10-CM
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
## 2024-07-17 PROBLEM — L03.90 CELLULITIS: Status: RESOLVED | Noted: 2022-03-28 | Resolved: 2024-07-17

## 2024-07-17 PROCEDURE — 99214 OFFICE O/P EST MOD 30 MIN: CPT | Performed by: PHYSICIAN ASSISTANT

## 2024-07-17 PROCEDURE — 3078F DIAST BP <80 MM HG: CPT | Performed by: PHYSICIAN ASSISTANT

## 2024-07-17 PROCEDURE — 3074F SYST BP LT 130 MM HG: CPT | Performed by: PHYSICIAN ASSISTANT

## 2024-07-17 RX ORDER — LORATADINE 10 MG/1
10 TABLET ORAL
Qty: 90 TABLET | Refills: 2 | Status: SHIPPED | OUTPATIENT
Start: 2024-07-17

## 2024-07-17 RX ORDER — POTASSIUM CHLORIDE 750 MG/1
10 TABLET, FILM COATED, EXTENDED RELEASE ORAL 2 TIMES DAILY
Qty: 180 TABLET | Refills: 2 | Status: SHIPPED | OUTPATIENT
Start: 2024-07-17

## 2024-07-17 RX ORDER — IBUPROFEN 800 MG/1
800 TABLET ORAL EVERY 8 HOURS PRN
Qty: 90 TABLET | Refills: 1 | Status: SHIPPED | OUTPATIENT
Start: 2024-07-17

## 2024-07-17 RX ORDER — VALACYCLOVIR HYDROCHLORIDE 500 MG/1
500 TABLET, FILM COATED ORAL 2 TIMES DAILY
Qty: 180 TABLET | Refills: 1 | Status: SHIPPED | OUTPATIENT
Start: 2024-07-17

## 2024-07-17 RX ORDER — FUROSEMIDE 20 MG/1
20 TABLET ORAL 3 TIMES DAILY
Qty: 270 TABLET | Refills: 1 | Status: SHIPPED | OUTPATIENT
Start: 2024-07-17

## 2024-07-17 RX ORDER — HYDROXYZINE HYDROCHLORIDE 25 MG/1
25 TABLET, FILM COATED ORAL SEE ADMIN INSTRUCTIONS
Qty: 90 TABLET | Refills: 1 | Status: SHIPPED | OUTPATIENT
Start: 2024-07-17

## 2024-07-17 RX ORDER — BUSPIRONE HYDROCHLORIDE 10 MG/1
10 TABLET ORAL 2 TIMES DAILY
Qty: 180 TABLET | Refills: 2 | Status: SHIPPED | OUTPATIENT
Start: 2024-07-17

## 2024-07-17 RX ORDER — PAROXETINE HYDROCHLORIDE 20 MG/1
40 TABLET, FILM COATED ORAL DAILY
Qty: 180 TABLET | Refills: 2 | Status: SHIPPED | OUTPATIENT
Start: 2024-07-17

## 2024-07-17 RX ORDER — PANTOPRAZOLE SODIUM 40 MG/1
40 TABLET, DELAYED RELEASE ORAL DAILY
Qty: 90 TABLET | Refills: 2 | Status: SHIPPED | OUTPATIENT
Start: 2024-07-17

## 2024-07-17 ASSESSMENT — FIBROSIS 4 INDEX: FIB4 SCORE: 0.86

## 2024-07-23 ENCOUNTER — TELEPHONE (OUTPATIENT)
Dept: NEUROLOGY | Facility: MEDICAL CENTER | Age: 59
End: 2024-07-23
Payer: MEDICAID

## 2024-07-30 ENCOUNTER — PATIENT MESSAGE (OUTPATIENT)
Dept: NEUROLOGY | Facility: MEDICAL CENTER | Age: 59
End: 2024-07-30
Payer: MEDICAID

## 2024-08-05 NOTE — TELEPHONE ENCOUNTER
Received request via: Patient    Was the patient seen in the last year in this department? Yes    Does the patient have an active prescription (recently filled or refills available) for medication(s) requested?  Yes    Pharmacy Name: [facility]    Does the patient have [facility] and need 100 day supply (blood pressure, diabetes and cholesterol meds only)? Patient does not have SCP

## 2024-08-06 RX ORDER — METHOCARBAMOL 500 MG/1
TABLET, FILM COATED ORAL
Qty: 180 TABLET | Refills: 0 | Status: SHIPPED | OUTPATIENT
Start: 2024-08-06

## 2024-08-06 RX ORDER — DOCUSATE SODIUM 100 MG/1
100 CAPSULE, LIQUID FILLED ORAL 2 TIMES DAILY
Qty: 60 CAPSULE | Refills: 2 | Status: SHIPPED | OUTPATIENT
Start: 2024-08-06

## 2024-08-06 RX ORDER — GABAPENTIN 800 MG/1
800 TABLET ORAL 3 TIMES DAILY
Qty: 90 TABLET | Refills: 0 | OUTPATIENT
Start: 2024-08-06

## 2024-08-22 ENCOUNTER — HOSPITAL ENCOUNTER (OUTPATIENT)
Dept: LAB | Facility: MEDICAL CENTER | Age: 59
End: 2024-08-22
Attending: PSYCHIATRY & NEUROLOGY
Payer: MEDICAID

## 2024-08-22 DIAGNOSIS — G62.9 PERIPHERAL POLYNEUROPATHY: ICD-10-CM

## 2024-08-22 LAB
EST. AVERAGE GLUCOSE BLD GHB EST-MCNC: 114 MG/DL
HBA1C MFR BLD: 5.6 % (ref 4–5.6)

## 2024-08-22 PROCEDURE — 36415 COLL VENOUS BLD VENIPUNCTURE: CPT

## 2024-08-22 PROCEDURE — 83036 HEMOGLOBIN GLYCOSYLATED A1C: CPT

## 2024-09-11 DIAGNOSIS — F17.200 TOBACCO DEPENDENCE: ICD-10-CM

## 2024-09-11 RX ORDER — ALBUTEROL SULFATE 90 UG/1
2 AEROSOL, METERED RESPIRATORY (INHALATION) EVERY 6 HOURS PRN
Qty: 18 G | Refills: 4 | Status: SHIPPED | OUTPATIENT
Start: 2024-09-11

## 2024-09-11 NOTE — TELEPHONE ENCOUNTER
Requested Prescriptions     Pending Prescriptions Disp Refills    VENTOLIN  (90 Base) MCG/ACT Aero Soln inhalation aerosol [Pharmacy Med Name: Ventolin HFA 90 mcg/actuation aerosol inhaler] 18 g 4     Sig: INHALE 2 PUFFS BY MOUTH EVERY 6 HOURS AS NEEDED FOR SHORTNESS OF BREATH      Last office visit: 7/17/24  Last lab: 6/29/23

## 2024-09-13 ENCOUNTER — HOSPITAL ENCOUNTER (OUTPATIENT)
Facility: MEDICAL CENTER | Age: 59
End: 2024-09-13
Attending: NURSE PRACTITIONER
Payer: MEDICAID

## 2024-09-13 ENCOUNTER — OFFICE VISIT (OUTPATIENT)
Dept: URGENT CARE | Facility: PHYSICIAN GROUP | Age: 59
End: 2024-09-13
Payer: MEDICAID

## 2024-09-13 VITALS
OXYGEN SATURATION: 97 % | DIASTOLIC BLOOD PRESSURE: 76 MMHG | SYSTOLIC BLOOD PRESSURE: 114 MMHG | TEMPERATURE: 97.1 F | HEART RATE: 98 BPM | WEIGHT: 225 LBS | RESPIRATION RATE: 16 BRPM | BODY MASS INDEX: 33.23 KG/M2

## 2024-09-13 DIAGNOSIS — J34.89 NASAL LESION: ICD-10-CM

## 2024-09-13 DIAGNOSIS — U07.1 COVID-19 VIRUS INFECTION: ICD-10-CM

## 2024-09-13 DIAGNOSIS — R05.9 COUGH IN ADULT PATIENT: ICD-10-CM

## 2024-09-13 LAB
GRAM STN SPEC: NORMAL
SIGNIFICANT IND 70042: NORMAL
SITE SITE: NORMAL
SOURCE SOURCE: NORMAL

## 2024-09-13 PROCEDURE — 87070 CULTURE OTHR SPECIMN AEROBIC: CPT

## 2024-09-13 PROCEDURE — 99213 OFFICE O/P EST LOW 20 MIN: CPT | Performed by: NURSE PRACTITIONER

## 2024-09-13 PROCEDURE — 3074F SYST BP LT 130 MM HG: CPT | Performed by: NURSE PRACTITIONER

## 2024-09-13 PROCEDURE — 3078F DIAST BP <80 MM HG: CPT | Performed by: NURSE PRACTITIONER

## 2024-09-13 PROCEDURE — 87077 CULTURE AEROBIC IDENTIFY: CPT

## 2024-09-13 PROCEDURE — 87186 SC STD MICRODIL/AGAR DIL: CPT

## 2024-09-13 PROCEDURE — 87205 SMEAR GRAM STAIN: CPT

## 2024-09-13 RX ORDER — MUPIROCIN 20 MG/G
1 OINTMENT TOPICAL 2 TIMES DAILY
Qty: 22 G | Refills: 0 | Status: SHIPPED | OUTPATIENT
Start: 2024-09-13 | End: 2024-09-20

## 2024-09-13 RX ORDER — METHYLPREDNISOLONE 4 MG
TABLET, DOSE PACK ORAL
Qty: 21 TABLET | Refills: 0 | Status: SHIPPED | OUTPATIENT
Start: 2024-09-13

## 2024-09-13 RX ORDER — DEXTROMETHORPHAN HYDROBROMIDE AND PROMETHAZINE HYDROCHLORIDE 15; 6.25 MG/5ML; MG/5ML
5 SYRUP ORAL EVERY 4 HOURS PRN
Qty: 120 ML | Refills: 0 | Status: SHIPPED | OUTPATIENT
Start: 2024-09-13 | End: 2024-09-20

## 2024-09-13 ASSESSMENT — ENCOUNTER SYMPTOMS
SWOLLEN GLANDS: 1
NAUSEA: 1
SHORTNESS OF BREATH: 1
ABDOMINAL PAIN: 0
HEADACHES: 1
DIARRHEA: 1
RHINORRHEA: 1
SPUTUM PRODUCTION: 0
COUGH: 1
VOMITING: 0
SORE THROAT: 1

## 2024-09-13 ASSESSMENT — FIBROSIS 4 INDEX: FIB4 SCORE: 0.86

## 2024-09-13 NOTE — PROGRESS NOTES
Subjective:     Bhavana Liu is a 59 y.o. female who presents for URI (X8days )      URI   This is a new problem. The current episode started 1 to 4 weeks ago (Blessing is a pleasant 59 year old female who presents to  today with complaitns of dry cough, SOB, sore throat, laryngitis after tetng positive for COVID 8 days ago). Associated symptoms include congestion, coughing, diarrhea, ear pain, headaches, nausea, rhinorrhea, a sore throat and swollen glands. Pertinent negatives include no abdominal pain or vomiting. She has tried NSAIDs and acetaminophen for the symptoms.   Patient also notes bilateral sores/wounds to nears x 1 year.  She states that she was referred to follow-up with ENT however, no culture has been performed.  Her provider was concerned that she was suffering from an MRSA infection.  Patient has been applying Neosporin with no relief.  She states that these sores are very painful and she often has to pick at her scabs in order to be able to breathe.      Review of Systems   Constitutional:  Positive for malaise/fatigue.   HENT:  Positive for congestion, ear pain, rhinorrhea and sore throat.    Respiratory:  Positive for cough and shortness of breath. Negative for sputum production.    Gastrointestinal:  Positive for diarrhea and nausea. Negative for abdominal pain and vomiting.   Neurological:  Positive for headaches.       PMH:   Past Medical History:   Diagnosis Date    Abnormal drug screen 12/08/2017    Adjustment disorder with anxious mood     Arrhythmia     history of    Arthritis     Bilateral leg edema     Bowel habit changes     constipation    Breath shortness 06/30/2023    with exertion    Cellulitis 03/28/2022    Dental disorder     upper and lower dentures    Dyslipidemia, goal LDL below 130     Emphysema of lung (HCC)     inhaler    GERD (gastroesophageal reflux disease)     Ground-level fall 11/22/2023    Heart burn     Hemorrhagic disorder (HCC)     pt tends to bleed  easy     Indigestion     Menopausal symptoms     Methamphetamine use (HCC) 2017    Shingles 2022    Urinary incontinence     occ night time incontinence     ALLERGIES: No Known Allergies  SURGHX:   Past Surgical History:   Procedure Laterality Date    CERVICAL DISK AND FUSION ANTERIOR  2023    Procedure: C4-7 ANTERIOR CERVICAL DISCECTOMY AND FUSION;  Surgeon: Law Moya M.D.;  Location: SURGERY [facility];  Service: Neurosurgery    CERVICAL LAMINECTOMY POSTERIOR Left 2022    Procedure: LAMINECTOMY, SPINE, CERVICAL, POSTERIOR APPROACH - INFERIOR C2 TO SUPERIOR C5;  Surgeon: Law Moya M.D.;  Location: SURGERY [facility];  Service: Neurosurgery    CERVICAL FUSION POSTERIOR Left 2022    Procedure: FUSION, SPINE, CERVICAL, POSTERIOR APPROACH - C3-4;  Surgeon: Law Moya M.D.;  Location: SURGERY [facility];  Service: Neurosurgery    VAGINAL HYSTERECTOMY TOTAL  2021    Procedure: HYSTERECTOMY, TOTAL, VAGINAL;  Surgeon: Melissa Suazo M.D.;  Location: SURGERY SAME DAY [facility];  Service: Gynecology    ANTERIOR AND POSTERIOR REPAIR  2021    Procedure: COLPORRHAPHY, COMBINED ANTEROPOSTERIOR;  Surgeon: Melissa Suazo M.D.;  Location: SURGERY SAME DAY [facility];  Service: Gynecology    VAGINAL SUSPENSION  2021    Procedure: COLPOPEXY;  Surgeon: Melissa Suazo M.D.;  Location: SURGERY SAME DAY [facility];  Service: Gynecology    CYSTOSCOPY  2021    Procedure: CYSTOSCOPY;  Surgeon: Melissa Suazo M.D.;  Location: SURGERY SAME DAY [facility];  Service: Gynecology     SOCHX:   Social History     Socioeconomic History    Marital status:    Tobacco Use    Smoking status: Former     Current packs/day: 0.00     Average packs/day: 0.5 packs/day for 25.0 years (12.5 ttl pk-yrs)     Types: Cigarettes     Start date: 1998     Quit date: 2023     Years since quittin.2    Smokeless tobacco: Never    Tobacco comments:     1/2 pack/ day    Vaping Use     Vaping status: Former    Quit date: 6/2/2020    Substances: Nicotine   Substance and Sexual Activity    Alcohol use: Not Currently     Comment: rare social none since 2008    Drug use: Not Currently     Types: Inhaled, Oral     Comment: Meth occ: at least 2 weeks as of 4/25/23 .  4/18/22- smoked marijuana for the pain.    Sexual activity: Yes     Partners: Male     Comment:      Social Determinants of Health     Intimate Partner Violence: Low Risk  (11/18/2022)    Received from [facility]    History of Abuse     History of Abuse: Denies     FH:   Family History   Problem Relation Age of Onset    Diabetes Mother         type 2, diet controlled    Heart Disease Mother         pacemaker    Cancer Maternal Grandmother         uterine    Genitourinary () Problems Sister         tumor, benign    Heart Disease Father         CHF    Non-contributory Father         emphysema    Arthritis Brother         hip replacement    Non-contributory Sister         encephalitis         Objective:   /76   Pulse 98   Temp 36.2 °C (97.1 °F) (Temporal)   Resp 16   Wt 102 kg (225 lb)   LMP 10/28/2012   SpO2 97%   BMI 33.23 kg/m²     Physical Exam  Vitals and nursing note reviewed.   Constitutional:       General: She is not in acute distress.     Appearance: Normal appearance. She is normal weight. She is ill-appearing. She is not toxic-appearing.   HENT:      Head: Normocephalic.      Right Ear: External ear normal.      Left Ear: External ear normal.      Nose: Congestion present. No rhinorrhea.      Mouth/Throat:      Mouth: Mucous membranes are moist.      Pharynx: Posterior oropharyngeal erythema present. No oropharyngeal exudate.   Eyes:      General:         Right eye: No discharge.         Left eye: No discharge.      Pupils: Pupils are equal, round, and reactive to light.   Cardiovascular:      Rate and Rhythm: Normal rate and regular rhythm.      Pulses: Normal pulses.      Heart sounds:  Normal heart sounds.   Pulmonary:      Effort: Pulmonary effort is normal. No respiratory distress.      Breath sounds: No stridor. No wheezing, rhonchi or rales.   Chest:      Chest wall: No tenderness.   Abdominal:      General: Abdomen is flat.   Musculoskeletal:         General: Normal range of motion.      Cervical back: Normal range of motion and neck supple. No tenderness.   Lymphadenopathy:      Cervical: No cervical adenopathy.   Skin:     General: Skin is dry.   Neurological:      General: No focal deficit present.      Mental Status: She is alert and oriented to person, place, and time. Mental status is at baseline.   Psychiatric:         Mood and Affect: Mood normal.         Behavior: Behavior normal.         Thought Content: Thought content normal.         Judgment: Judgment normal.       Assessment/Plan:   Assessment      1. COVID-19 virus infection  methylPREDNISolone (MEDROL DOSEPAK) 4 MG Tablet Therapy Pack    promethazine-dextromethorphan (PROMETHAZINE-DM) 6.25-15 MG/5ML syrup      2. Cough in adult patient  methylPREDNISolone (MEDROL DOSEPAK) 4 MG Tablet Therapy Pack    promethazine-dextromethorphan (PROMETHAZINE-DM) 6.25-15 MG/5ML syrup      3. Nasal lesion  mupirocin (BACTROBAN) 2 % Ointment    CULTURE WOUND W/ GRAM STAIN        We discussed supportive measures including humidifier, warm salt water gargles, over-the-counter Cepacol throat lozenges, rest  and increased fluids. Pt was encouraged to seek treatment back in the ER or urgent care for worsening symptoms,  fever greater than 100.5, wheezes or shortness of breath.  Wound culture was sent to lab for evaluation of infection and she was prescribed mupirocin ointment to apply 3 times daily inside nares.

## 2024-09-15 LAB
BACTERIA WND AEROBE CULT: ABNORMAL
BACTERIA WND AEROBE CULT: ABNORMAL
GRAM STN SPEC: ABNORMAL
SIGNIFICANT IND 70042: ABNORMAL
SITE SITE: ABNORMAL
SOURCE SOURCE: ABNORMAL

## 2024-09-18 ENCOUNTER — APPOINTMENT (OUTPATIENT)
Dept: RADIOLOGY | Facility: IMAGING CENTER | Age: 59
End: 2024-09-18
Attending: FAMILY MEDICINE
Payer: MEDICAID

## 2024-09-18 ENCOUNTER — OFFICE VISIT (OUTPATIENT)
Dept: URGENT CARE | Facility: PHYSICIAN GROUP | Age: 59
End: 2024-09-18
Payer: MEDICAID

## 2024-09-18 ENCOUNTER — TELEPHONE (OUTPATIENT)
Dept: URGENT CARE | Facility: CLINIC | Age: 59
End: 2024-09-18
Payer: MEDICAID

## 2024-09-18 ENCOUNTER — TELEPHONE (OUTPATIENT)
Dept: URGENT CARE | Facility: PHYSICIAN GROUP | Age: 59
End: 2024-09-18

## 2024-09-18 VITALS
DIASTOLIC BLOOD PRESSURE: 74 MMHG | OXYGEN SATURATION: 98 % | HEIGHT: 69 IN | BODY MASS INDEX: 33.33 KG/M2 | RESPIRATION RATE: 16 BRPM | HEART RATE: 79 BPM | SYSTOLIC BLOOD PRESSURE: 122 MMHG | TEMPERATURE: 98 F | WEIGHT: 225 LBS

## 2024-09-18 DIAGNOSIS — R05.1 ACUTE COUGH: ICD-10-CM

## 2024-09-18 LAB — S PYO DNA SPEC NAA+PROBE: NOT DETECTED

## 2024-09-18 PROCEDURE — 3074F SYST BP LT 130 MM HG: CPT | Performed by: FAMILY MEDICINE

## 2024-09-18 PROCEDURE — 3078F DIAST BP <80 MM HG: CPT | Performed by: FAMILY MEDICINE

## 2024-09-18 PROCEDURE — 71046 X-RAY EXAM CHEST 2 VIEWS: CPT | Mod: TC,FY | Performed by: FAMILY MEDICINE

## 2024-09-18 PROCEDURE — 99213 OFFICE O/P EST LOW 20 MIN: CPT | Performed by: FAMILY MEDICINE

## 2024-09-18 PROCEDURE — 87651 STREP A DNA AMP PROBE: CPT | Performed by: FAMILY MEDICINE

## 2024-09-18 ASSESSMENT — FIBROSIS 4 INDEX: FIB4 SCORE: 0.86

## 2024-09-19 RX ORDER — METHOCARBAMOL 500 MG/1
TABLET, FILM COATED ORAL
Qty: 180 TABLET | Refills: 0 | Status: SHIPPED | OUTPATIENT
Start: 2024-09-19

## 2024-09-19 NOTE — PROGRESS NOTES
Chief Complaint   Patient presents with    Follow-Up     Was seen , been sick since the start of the month. Not getting better. Pt states she worried about her lungs since she's not coughing anything up         Dry cough x 12 d - not improving.       + sore throat      + COVID  on .         No fever    CC:  cough             Denies fever. Pertinent negatives include no   headaches, nausea, vomiting, diarrhea, sweats, weight loss or wheezing. Nothing aggravates the symptoms.          Past Medical History:   Diagnosis Date    Ground-level fall 2023    Breath shortness 2023    with exertion    Cellulitis 2022    Shingles 2022    Abnormal drug screen 2017    Methamphetamine use (HCC) 2017    Adjustment disorder with anxious mood     Arrhythmia     history of    Arthritis     Bilateral leg edema     Bowel habit changes     constipation    Dental disorder     upper and lower dentures    Dyslipidemia, goal LDL below 130     Emphysema of lung (HCC)     inhaler    GERD (gastroesophageal reflux disease)     Heart burn     Hemorrhagic disorder ([facility])     pt tends to bleed easy     Indigestion     Menopausal symptoms     Urinary incontinence     occ night time incontinence         Social History     Tobacco Use    Smoking status: Former     Current packs/day: 0.00     Average packs/day: 0.5 packs/day for 25.0 years (12.5 ttl pk-yrs)     Types: Cigarettes     Start date: 1998     Quit date: 2023     Years since quittin.2    Smokeless tobacco: Never    Tobacco comments:     1/2 pack/ day    Vaping Use    Vaping status: Former    Quit date: 2020    Substances: Nicotine   Substance Use Topics    Alcohol use: Not Currently     Comment: rare social none since     Drug use: Not Currently     Types: Inhaled, Oral     Comment: Meth occ: at least 2 weeks as of 23 .  22- smoked marijuana for the pain.         Current Outpatient Medications on File Prior to Visit    Medication Sig Dispense Refill    methocarbamol (ROBAXIN) 500 MG Tab TAKE 1 OR 2 TABLETS BY MOUTH TWO TIMES A DAY TO THREE TIMES A  Tablet 0    methylPREDNISolone (MEDROL DOSEPAK) 4 MG Tablet Therapy Pack Follow schedule on package instructions. 21 Tablet 0    VENTOLIN  (90 Base) MCG/ACT Aero Soln inhalation aerosol INHALE 2 PUFFS BY MOUTH EVERY 6 HOURS AS NEEDED FOR SHORTNESS OF BREATH 18 g 4    docusate sodium (COLACE) 100 MG Cap TAKE ONE CAPSULE BY MOUTH TWO TIMES A DAY 60 Capsule 2    valACYclovir (VALTREX) 500 MG Tab Take 1 Tablet by mouth 2 times a day. 180 Tablet 1    pantoprazole (PROTONIX) 40 MG Tablet Delayed Response Take 1 Tablet by mouth every day. 90 Tablet 2    ibuprofen (MOTRIN) 800 MG Tab Take 1 Tablet by mouth every 8 hours as needed for Moderate Pain. 90 Tablet 1    PARoxetine (PAXIL) 20 MG Tab Take 2 Tablets by mouth every day. 180 Tablet 2    hydrOXYzine HCl (ATARAX) 25 MG Tab Take 1 Tablet by mouth see administration instructions. Every morning, may take up to three times daily as needed. 90 Tablet 1    furosemide (LASIX) 20 MG Tab Take 1 Tablet by mouth 3 times a day. 270 Tablet 1    loratadine (CLARITIN) 10 MG Tab Take 1 Tablet by mouth every day. 90 Tablet 2    busPIRone (BUSPAR) 10 MG Tab tablet Take 1 Tablet by mouth 2 times a day. 180 Tablet 2    potassium chloride ER (KLOR-CON) 10 MEQ tablet Take 1 Tablet by mouth 2 times a day. 180 Tablet 2    pregabalin (LYRICA) 75 MG Cap Take 1 Capsule by mouth 3 times a day for 270 days. 90 Capsule 2    triamcinolone acetonide (KENALOG) 0.1 % Cream Use a small amount to the affected area twice a day no more than 10 days. (Patient taking differently: Apply 1 Application topically 2 times a day. Use a small amount to the affected area twice a day no more than 10 days.) 45 g 0    ketoconazole (NIZORAL) 2 % Cream Apply a small amount to affected area no more than 2 weeks. (Patient not taking: Reported on 9/18/2024) 60 g 2     No current  "facility-administered medications on file prior to visit.                    Review of Systems   Constitutional: Negative for fever and weight loss.   HENT: negative for otalgia  Cardiovascular - denies chest pain or dyspnea  Respiratory: Positive for cough.  .  Negative for wheezing.    Neurological: Negative for headaches.   GI - denies nausea, vomiting or diarrhea  Neuro - denies numbness or tingling.            Objective:     /74   Pulse 79   Temp 36.7 °C (98 °F) (Temporal)   Resp 16   Ht 1.753 m (5' 9\")   Wt 102 kg (225 lb)   SpO2 98%     Physical Exam   Constitutional: patient is oriented to person, place, and time. Patient appears well-developed and well-nourished. No distress.   HENT:   Head: Normocephalic and atraumatic.   Right Ear: External ear normal.   Left Ear: External ear normal.   Nose: Mucosal edema  present. Right sinus exhibits no maxillary sinus tenderness. Left sinus exhibits no maxillary sinus tenderness.   Mouth/Throat: Mucous membranes are normal. No oral lesions.  No posterior pharyngeal erythema.  No oropharyngeal exudate or posterior oropharyngeal edema.   Eyes: Conjunctivae and EOM are normal. Pupils are equal, round, and reactive to light. Right eye exhibits no discharge. Left eye exhibits no discharge. No scleral icterus.   Neck: Normal range of motion. Neck supple. No tracheal deviation present.   Cardiovascular: Normal rate, regular rhythm and normal heart sounds.  Exam reveals no friction rub.    Pulmonary/Chest: Effort normal. No respiratory distress. Patient has no wheezes or rhonchi. Patient has no rales.    Musculoskeletal:  exhibits no edema.   Lymphadenopathy:     Patient has no cervical adenopathy.      Neurological: patient is alert and oriented to person, place, and time.   Skin: Skin is warm and dry. No rash noted. No erythema.   Psychiatric: patient  has a normal mood and affect.  behavior is normal.   Nursing note and vitals reviewed.       "   Details    Reading Physician Reading Date Result Priority   Say Do M.D.  [phone] 9/18/2024      Narrative & Impression     9/18/2024 5:40 PM     HISTORY/REASON FOR EXAM:  Covid , cough        TECHNIQUE/EXAM DESCRIPTION AND NUMBER OF VIEWS:  Two views of the chest.     COMPARISON:  6/29/2023        FINDINGS:  HEART: Not enlarged.  LUNGS: No areas of air space disease are demonstrated.  PLEURA: No effusion or pneumothorax.        IMPRESSION:     No evidence of acute cardiopulmonary disease           Exam Ended: 09/18/24  5:49 PM Last Resulted: 09/18/24  5:52 PM                 Assessment/Plan:       1. Acute cough   Rapid strep negative.         Chest x-ray was personally interpreted and reviewed. No acute cardiopulmonary findings. No pulmonary infiltrates or densities. Cardiac silhouette is normal. No hemidiaphragm elevation. No bony abnormalities.        Otc robatussin prn     Follow up in one week if no improvement

## 2024-09-19 NOTE — TELEPHONE ENCOUNTER
Received request via: Patient    Was the patient seen in the last year in this department? Yes    Does the patient have an active prescription (recently filled or refills available) for medication(s) requested?  Yes    Pharmacy Name: [facility]    Does the patient have [facility] Plus and need 100-day supply? (This applies to ALL medications) Patient does not have SCP

## 2024-09-24 DIAGNOSIS — J34.89 NASAL SORE: ICD-10-CM

## 2024-09-25 DIAGNOSIS — M79.605 PAINFUL LEGS AND MOVING TOES: ICD-10-CM

## 2024-09-25 DIAGNOSIS — M79.604 PAINFUL LEGS AND MOVING TOES: ICD-10-CM

## 2024-09-25 NOTE — TELEPHONE ENCOUNTER
Received request via: Pharmacy    Medication Name/Dosage   pregabalin (LYRICA) 75 MG Cap     When was medication last prescribed 06/20/2024    How many refills were previously provided 2    How many Refills does he patient have left from last prescription 0    Was the patient seen in the last year in this department? Yes   Date of last office visit 06/20/2024     Per last Neurology Office Visit, when was the date of next follow up visit set for?                          4 months  Date of office visit follow up request 10/20/2024     Does the patient have an upcoming appointment? No   If yes, when N/A             If no, schedule appointment N/A.      Does the patient have longterm Plus and need 100 day supply (blood pressure, diabetes and cholesterol meds only)? Patient does not have SCP

## 2024-09-26 RX ORDER — PREGABALIN 75 MG/1
75 CAPSULE ORAL 3 TIMES DAILY
Qty: 90 CAPSULE | Refills: 2 | Status: SHIPPED | OUTPATIENT
Start: 2024-09-26 | End: 2024-12-25

## 2024-10-23 DIAGNOSIS — F43.22 ADJUSTMENT DISORDER WITH ANXIOUS MOOD: ICD-10-CM

## 2024-10-23 DIAGNOSIS — F41.9 ANXIETY: ICD-10-CM

## 2024-10-24 RX ORDER — METHOCARBAMOL 500 MG/1
TABLET, FILM COATED ORAL
Qty: 180 TABLET | Refills: 0 | Status: SHIPPED | OUTPATIENT
Start: 2024-10-24

## 2024-10-24 RX ORDER — PAROXETINE 20 MG/1
40 TABLET, FILM COATED ORAL DAILY
Qty: 180 TABLET | Refills: 1 | Status: SHIPPED | OUTPATIENT
Start: 2024-10-24

## 2024-10-24 RX ORDER — BUSPIRONE HYDROCHLORIDE 10 MG/1
10 TABLET ORAL 2 TIMES DAILY
Qty: 180 TABLET | Refills: 1 | Status: SHIPPED | OUTPATIENT
Start: 2024-10-24

## 2024-11-12 RX ORDER — IBUPROFEN 800 MG/1
800 TABLET, FILM COATED ORAL EVERY 8 HOURS PRN
Qty: 90 TABLET | Refills: 1 | Status: SHIPPED | OUTPATIENT
Start: 2024-11-12

## 2024-11-12 NOTE — TELEPHONE ENCOUNTER
Received request via: Patient    Was the patient seen in the last year in this department? Yes    Does the patient have an active prescription (recently filled or refills available) for medication(s) requested?  Yes    Pharmacy Name: [facility]    Does the patient have [facility] and need 100-day supply? (This applies to ALL medications) Patient does not have SCP

## 2024-11-20 RX ORDER — METHOCARBAMOL 500 MG/1
TABLET, FILM COATED ORAL
Qty: 180 TABLET | Refills: 0 | Status: SHIPPED | OUTPATIENT
Start: 2024-11-20

## 2024-11-20 NOTE — TELEPHONE ENCOUNTER
Requested Prescriptions     Pending Prescriptions Disp Refills    methocarbamol (ROBAXIN) 500 MG Tab [Pharmacy Med Name: methocarbamol 500 mg tablet] 180 Tablet 0     Sig: TAKE 1 OR 2 TABLETS BY MOUTH TWO TIMES A DAY TO THREE TIMES A DAY     Last office visit: 7/17/24  Last lab: 6/29/23

## 2024-12-13 RX ORDER — DOCUSATE SODIUM 100 MG/1
100 CAPSULE, LIQUID FILLED ORAL 2 TIMES DAILY
Qty: 60 CAPSULE | Refills: 2 | Status: SHIPPED | OUTPATIENT
Start: 2024-12-13

## 2024-12-13 NOTE — TELEPHONE ENCOUNTER
Received request via: Patient    Was the patient seen in the last year in this department? Yes    Does the patient have an active prescription (recently filled or refills available) for medication(s) requested?  Yes    Pharmacy Name: [facility]    Does the patient have [facility] and need 100-day supply? (This applies to ALL medications) Patient does not have SCP

## 2025-01-01 DIAGNOSIS — M79.605 PAINFUL LEGS AND MOVING TOES: ICD-10-CM

## 2025-01-01 DIAGNOSIS — M79.604 PAINFUL LEGS AND MOVING TOES: ICD-10-CM

## 2025-01-02 RX ORDER — PREGABALIN 75 MG/1
75 CAPSULE ORAL 3 TIMES DAILY
Qty: 90 CAPSULE | Refills: 2 | Status: SHIPPED | OUTPATIENT
Start: 2025-01-02 | End: 2025-01-07 | Stop reason: SDUPTHER

## 2025-01-02 NOTE — TELEPHONE ENCOUNTER
Received request via: Pharmacy    Medication Name/Dosage pregabalin (LYRICA) 75 MG Cap     When was medication last prescribed 09/26/2024    How many refills were previously provided 2    How many Refills does he patient have left from last prescription 0    Was the patient seen in the last year in this department? Yes   Date of last office visit 6/20/2024     Per last Neurology Office Visit, when was the date of next follow up visit set for?                          4 months  Date of office visit follow up request 10/20/2024     Does the patient have an upcoming appointment? yes   If yes, when 01/07/2025             If no, schedule appointment scheduled    Does the patient have [facility] Plus and need 100 day supply (blood pressure, diabetes and cholesterol meds only)? Patient does not have SCP

## 2025-01-07 ENCOUNTER — OFFICE VISIT (OUTPATIENT)
Dept: NEUROLOGY | Facility: MEDICAL CENTER | Age: 60
End: 2025-01-07
Attending: PSYCHIATRY & NEUROLOGY
Payer: MEDICAID

## 2025-01-07 VITALS
RESPIRATION RATE: 12 BRPM | SYSTOLIC BLOOD PRESSURE: 116 MMHG | TEMPERATURE: 98.1 F | BODY MASS INDEX: 32.92 KG/M2 | DIASTOLIC BLOOD PRESSURE: 72 MMHG | OXYGEN SATURATION: 97 % | HEART RATE: 92 BPM | WEIGHT: 229.94 LBS | HEIGHT: 70 IN

## 2025-01-07 DIAGNOSIS — M79.605 PAINFUL LEGS AND MOVING TOES: ICD-10-CM

## 2025-01-07 DIAGNOSIS — G60.9 IDIOPATHIC PERIPHERAL NEUROPATHY: ICD-10-CM

## 2025-01-07 DIAGNOSIS — M79.604 PAINFUL LEGS AND MOVING TOES: ICD-10-CM

## 2025-01-07 PROCEDURE — 3078F DIAST BP <80 MM HG: CPT | Performed by: PSYCHIATRY & NEUROLOGY

## 2025-01-07 PROCEDURE — 3074F SYST BP LT 130 MM HG: CPT | Performed by: PSYCHIATRY & NEUROLOGY

## 2025-01-07 PROCEDURE — 99213 OFFICE O/P EST LOW 20 MIN: CPT | Performed by: PSYCHIATRY & NEUROLOGY

## 2025-01-07 PROCEDURE — 99212 OFFICE O/P EST SF 10 MIN: CPT | Performed by: PSYCHIATRY & NEUROLOGY

## 2025-01-07 RX ORDER — PREGABALIN 75 MG/1
75 CAPSULE ORAL 3 TIMES DAILY
Qty: 270 CAPSULE | Refills: 2 | Status: SHIPPED | OUTPATIENT
Start: 2025-01-07 | End: 2025-10-04

## 2025-01-07 ASSESSMENT — FIBROSIS 4 INDEX: FIB4 SCORE: 0.86

## 2025-01-07 ASSESSMENT — PATIENT HEALTH QUESTIONNAIRE - PHQ9
5. POOR APPETITE OR OVEREATING: 1 - SEVERAL DAYS
CLINICAL INTERPRETATION OF PHQ2 SCORE: 4
SUM OF ALL RESPONSES TO PHQ QUESTIONS 1-9: 16

## 2025-01-07 NOTE — PROGRESS NOTES
Chief Complaint   Patient presents with    Follow-Up     Peripheral polyneuropathy       History of present illness:  Bhavana Liu 58 y.o. female reports that she has had excruciating pain of her extremities as well as involuntary movements of her toes the last 4-5 years.  She has a history of spinal surgery with fusion of her upper cervical vertebrae.  There was immediately noticed this of the left arm after the surgery, which resolved, however later she developed this worsening and now severe pain of her extremities.  Gabapentin is not effective.  There is involuntary toe movement where she has constant flexion and extension of her toes.  I have counseled the patient on the possible diagnosis of painful legs and moving toes.  I have prescribed pregabalin at a dose of 25 mg 3 times daily, with instruction to increase to 50 mg 3 times daily after 1 month if indicated   She has improvement in her neuropathic pain with 50mg of pregabalin TID.     Past medical history:   Past Medical History:   Diagnosis Date    Abnormal drug screen 12/08/2017    Adjustment disorder with anxious mood     Arrhythmia     history of    Arthritis     Bilateral leg edema     Bowel habit changes     constipation    Breath shortness 06/30/2023    with exertion    Cellulitis 03/28/2022    Dental disorder     upper and lower dentures    Dyslipidemia, goal LDL below 130     Emphysema of lung (HCC)     inhaler    GERD (gastroesophageal reflux disease)     Ground-level fall 11/22/2023    Heart burn     Hemorrhagic disorder (HCC)     pt tends to bleed easy     Indigestion     Menopausal symptoms     Methamphetamine use (HCC) 12/08/2017    Shingles 03/21/2022    Urinary incontinence     occ night time incontinence       Past surgical history:   Past Surgical History:   Procedure Laterality Date    CERVICAL DISK AND FUSION ANTERIOR  7/25/2023    Procedure: C4-7 ANTERIOR CERVICAL DISCECTOMY AND FUSION;  Surgeon: Law Moya,  M.D.;  Location: SURGERY [facility];  Service: Neurosurgery    CERVICAL LAMINECTOMY POSTERIOR Left 4/25/2022    Procedure: LAMINECTOMY, SPINE, CERVICAL, POSTERIOR APPROACH - INFERIOR C2 TO SUPERIOR C5;  Surgeon: Law Moya M.D.;  Location: SURGERY [facility];  Service: Neurosurgery    CERVICAL FUSION POSTERIOR Left 4/25/2022    Procedure: FUSION, SPINE, CERVICAL, POSTERIOR APPROACH - C3-4;  Surgeon: Law Moya M.D.;  Location: SURGERY [facility];  Service: Neurosurgery    VAGINAL HYSTERECTOMY TOTAL  5/19/2021    Procedure: HYSTERECTOMY, TOTAL, VAGINAL;  Surgeon: Melissa Suazo M.D.;  Location: SURGERY SAME DAY [facility];  Service: Gynecology    ANTERIOR AND POSTERIOR REPAIR  5/19/2021    Procedure: COLPORRHAPHY, COMBINED ANTEROPOSTERIOR;  Surgeon: Melissa Suazo M.D.;  Location: SURGERY SAME DAY [facility];  Service: Gynecology    VAGINAL SUSPENSION  5/19/2021    Procedure: COLPOPEXY;  Surgeon: Melissa Suazo M.D.;  Location: SURGERY SAME DAY [facility];  Service: Gynecology    CYSTOSCOPY  5/19/2021    Procedure: CYSTOSCOPY;  Surgeon: Melissa Suazo M.D.;  Location: SURGERY SAME DAY [facility];  Service: Gynecology       Family history:   Family History   Problem Relation Age of Onset    Diabetes Mother         type 2, diet controlled    Heart Disease Mother         pacemaker    Cancer Maternal Grandmother         uterine    Genitourinary () Problems Sister         tumor, benign    Heart Disease Father         CHF    Non-contributory Father         emphysema    Arthritis Brother         hip replacement    Non-contributory Sister         encephalitis       Social history:   Social History     Socioeconomic History    Marital status:      Spouse name: Not on file    Number of children: Not on file    Years of education: Not on file    Highest education level: Not on file   Occupational History    Not on file   Tobacco Use    Smoking status: Former     Current packs/day: 0.00     Average  packs/day: 0.5 packs/day for 25.0 years (12.5 ttl pk-yrs)     Types: Cigarettes     Start date: 1998     Quit date: 2023     Years since quittin.5    Smokeless tobacco: Never    Tobacco comments:     1/2 pack/ day    Vaping Use    Vaping status: Former    Quit date: 2020    Substances: Nicotine   Substance and Sexual Activity    Alcohol use: Yes     Comment: rare social none since     Drug use: Not Currently     Types: Inhaled, Oral     Comment: Meth occ: at least 2 weeks as of 23 .  21- smoked marijuana for the pain.    Sexual activity: Yes     Partners: Male     Comment:    Other Topics Concern    Not on file   Social History Narrative    Not on file     Social Drivers of Health     Financial Resource Strain: Not on file   Food Insecurity: Not on file   Transportation Needs: Not on file   Physical Activity: Not on file   Stress: Not on file   Social Connections: Not on file   Intimate Partner Violence: Low Risk  (2022)    [facility]    History of Abuse     History of Abuse: Denies   Housing Stability: Not on file       Current medications:   Current Outpatient Medications   Medication    pregabalin (LYRICA) 75 MG Cap    docusate sodium (COLACE) 100 MG Cap    methocarbamol (ROBAXIN) 500 MG Tab    ibuprofen (MOTRIN) 800 MG Tab    busPIRone (BUSPAR) 10 MG Tab tablet    PARoxetine (PAXIL) 20 MG Tab    VENTOLIN  (90 Base) MCG/ACT Aero Soln inhalation aerosol    valACYclovir (VALTREX) 500 MG Tab    pantoprazole (PROTONIX) 40 MG Tablet Delayed Response    hydrOXYzine HCl (ATARAX) 25 MG Tab    furosemide (LASIX) 20 MG Tab    loratadine (CLARITIN) 10 MG Tab    potassium chloride ER (KLOR-CON) 10 MEQ tablet    triamcinolone acetonide (KENALOG) 0.1 % Cream    ketoconazole (NIZORAL) 2 % Cream    methylPREDNISolone (MEDROL DOSEPAK) 4 MG Tablet Therapy Pack     No current facility-administered medications for this visit.       Medication  "Allergy:  No Known Allergies    Physical examination:   Vitals:    01/07/25 1526   BP: 116/72   BP Location: Left arm   Patient Position: Sitting   BP Cuff Size: Adult   Pulse: 92   Resp: 12   Temp: 36.7 °C (98.1 °F)   TempSrc: Temporal   SpO2: 97%   Weight: 104 kg (229 lb 15 oz)   Height: 1.765 m (5' 9.5\")         ASSESSMENT AND PLAN:  Problem List Items Addressed This Visit       Idiopathic peripheral neuropathy         1. Idiopathic peripheral neuropathy    She has had improvement of her nerve pain with lyrica 75mg TID but continues to have numbness. I have counseled her that numbness is not likely to improve with neuropathic pain meds.   I have placed a refill for her lyrica today. Follow-up with PCP, return to neurology PRN.     FOLLOW-UP:   No follow-ups on file.    Total time spent for the day for this patient unrelated to procedure time is: 20 minutes. I spent 14 minutes in face to face time and I spent 3 minutes pre-charting and 3 minutes in post-visit documentation.      Dr. Chilo Gilmore D.O.  [facility] Neurology  Movement Disorders Specialist      "

## 2025-02-12 RX ORDER — IBUPROFEN 800 MG/1
800 TABLET, FILM COATED ORAL EVERY 8 HOURS PRN
Qty: 90 TABLET | Refills: 1 | Status: SHIPPED | OUTPATIENT
Start: 2025-02-12

## 2025-02-12 NOTE — TELEPHONE ENCOUNTER
Received request via: Patient    Was the patient seen in the last year in this department? Yes    Does the patient have an active prescription (recently filled or refills available) for medication(s) requested?  yes    Pharmacy Name: [facility]    Does the patient have [facility] and need 100-day supply? (This applies to ALL medications) Patient does not have SCP

## 2025-02-19 DIAGNOSIS — R60.0 BILATERAL LOWER EXTREMITY EDEMA: ICD-10-CM

## 2025-02-27 DIAGNOSIS — K21.9 GASTROESOPHAGEAL REFLUX DISEASE, UNSPECIFIED WHETHER ESOPHAGITIS PRESENT: ICD-10-CM

## 2025-02-27 DIAGNOSIS — R60.0 BILATERAL LOWER EXTREMITY EDEMA: ICD-10-CM

## 2025-02-27 DIAGNOSIS — L29.9 ITCHING: ICD-10-CM

## 2025-02-27 DIAGNOSIS — F43.22 ADJUSTMENT DISORDER WITH ANXIOUS MOOD: ICD-10-CM

## 2025-02-27 DIAGNOSIS — I83.893 VARICOSE VEINS OF BOTH LEGS WITH EDEMA: ICD-10-CM

## 2025-02-28 RX ORDER — FUROSEMIDE 20 MG/1
20 TABLET ORAL 3 TIMES DAILY
Qty: 270 TABLET | Refills: 0 | Status: SHIPPED | OUTPATIENT
Start: 2025-02-28

## 2025-02-28 NOTE — TELEPHONE ENCOUNTER
Received request via: Pharmacy    Was the patient seen in the last year in this department? Yes    Does the patient have an active prescription (recently filled or refills available) for medication(s) requested?       Pharmacy Name: [facility]    Does the patient have group home Plus and need 100-day supply? (This applies to ALL medications) Patient does not have SCP      Last seen  7/17/24  Last labs  6/29/23

## 2025-02-28 NOTE — TELEPHONE ENCOUNTER
-- DO NOT REPLY / DO NOT REPLY ALL --  -- Message is from the [facility]--    Provider paged via [employer] Business Plan Documentation - The below message was copied and pasted from a [employer] page:      [phone] ACC URGENT PATIENT CALLER NAME: AJ RE: AJ WANG PATIENT 1990 PATIENT OB PROVIDER: DR. FLORA NOYOLA WEEKS PREGNANT: 36 PATIENT IS HAVING HOT FLASHES AND IS SHAKY. HOWARD*      Received request via: Pharmacy    Was the patient seen in the last year in this department? Yes    Does the patient have an active prescription (recently filled or refills available) for medication(s) requested?       Pharmacy Name: [facility]    Does the patient have [facility] and need 100-day supply? (This applies to ALL medications) Patient does not have SCP    Last seen 7/17/24  Last labs 11/23/23

## 2025-03-03 RX ORDER — PAROXETINE 20 MG/1
40 TABLET, FILM COATED ORAL DAILY
Qty: 180 TABLET | Refills: 0 | Status: SHIPPED | OUTPATIENT
Start: 2025-03-03

## 2025-03-03 RX ORDER — POTASSIUM CHLORIDE 750 MG/1
10 TABLET, EXTENDED RELEASE ORAL 2 TIMES DAILY
Qty: 180 TABLET | Refills: 0 | Status: SHIPPED | OUTPATIENT
Start: 2025-03-03

## 2025-03-03 RX ORDER — PANTOPRAZOLE SODIUM 40 MG/1
40 TABLET, DELAYED RELEASE ORAL DAILY
Qty: 90 TABLET | Refills: 0 | Status: SHIPPED | OUTPATIENT
Start: 2025-03-03

## 2025-03-03 RX ORDER — LORATADINE 10 MG/1
10 TABLET ORAL
Qty: 90 TABLET | Refills: 0 | Status: SHIPPED | OUTPATIENT
Start: 2025-03-03

## 2025-03-06 DIAGNOSIS — F17.200 TOBACCO DEPENDENCE: ICD-10-CM

## 2025-03-06 RX ORDER — ALBUTEROL SULFATE 90 UG/1
2 AEROSOL, METERED RESPIRATORY (INHALATION) EVERY 6 HOURS PRN
Qty: 18 G | Refills: 1 | Status: SHIPPED | OUTPATIENT
Start: 2025-03-06

## 2025-03-06 NOTE — TELEPHONE ENCOUNTER
Received request via: Patient    Was the patient seen in the last year in this department? Yes    Does the patient have an active prescription (recently filled or refills available) for medication(s) requested?  yes    Pharmacy Name: [facility]    Does the patient have [facility] and need 100-day supply? (This applies to ALL medications) Patient does not have SCP    Last Office Visit: 7/17/24  Last Labs: 8/22/24

## 2025-03-10 NOTE — TELEPHONE ENCOUNTER
Received request via: Patient    Was the patient seen in the last year in this department? Yes    Does the patient have an active prescription (recently filled or refills available) for medication(s) requested?  Yes    Pharmacy Name: [facility]    Does the patient have [facility] and need 100-day supply? (This applies to ALL medications) Patient does not have SCP    Last Office Visit: 7/17/24  Last Labs: 11/23/23

## 2025-03-11 RX ORDER — DOCUSATE SODIUM 100 MG/1
100 CAPSULE, LIQUID FILLED ORAL 2 TIMES DAILY
Qty: 60 CAPSULE | Refills: 2 | Status: SHIPPED | OUTPATIENT
Start: 2025-03-11

## 2025-04-01 RX ORDER — METHOCARBAMOL 500 MG/1
TABLET, FILM COATED ORAL
Qty: 60 TABLET | Refills: 0 | Status: SHIPPED | OUTPATIENT
Start: 2025-04-01

## 2025-04-17 ENCOUNTER — APPOINTMENT (OUTPATIENT)
Dept: RADIOLOGY | Facility: IMAGING CENTER | Age: 60
End: 2025-04-17
Attending: PHYSICIAN ASSISTANT
Payer: MEDICAID

## 2025-04-17 ENCOUNTER — RESULTS FOLLOW-UP (OUTPATIENT)
Dept: MEDICAL GROUP | Facility: CLINIC | Age: 60
End: 2025-04-17

## 2025-04-17 ENCOUNTER — OFFICE VISIT (OUTPATIENT)
Dept: MEDICAL GROUP | Facility: CLINIC | Age: 60
End: 2025-04-17
Payer: MEDICAID

## 2025-04-17 ENCOUNTER — NON-PROVIDER VISIT (OUTPATIENT)
Dept: URGENT CARE | Facility: CLINIC | Age: 60
End: 2025-04-17
Payer: MEDICAID

## 2025-04-17 VITALS
BODY MASS INDEX: 33.55 KG/M2 | HEART RATE: 94 BPM | TEMPERATURE: 97.2 F | SYSTOLIC BLOOD PRESSURE: 122 MMHG | OXYGEN SATURATION: 97 % | HEIGHT: 70 IN | DIASTOLIC BLOOD PRESSURE: 80 MMHG | WEIGHT: 234.35 LBS | RESPIRATION RATE: 16 BRPM

## 2025-04-17 DIAGNOSIS — G95.9 CERVICAL MYELOPATHY (HCC): ICD-10-CM

## 2025-04-17 DIAGNOSIS — I83.893 VARICOSE VEINS OF BOTH LEGS WITH EDEMA: ICD-10-CM

## 2025-04-17 DIAGNOSIS — M48.02 FORAMINAL STENOSIS OF CERVICAL REGION: ICD-10-CM

## 2025-04-17 DIAGNOSIS — E66.9 OBESITY (BMI 30-39.9): ICD-10-CM

## 2025-04-17 DIAGNOSIS — G62.9 NEUROPATHY: ICD-10-CM

## 2025-04-17 DIAGNOSIS — R00.2 PALPITATIONS: ICD-10-CM

## 2025-04-17 DIAGNOSIS — B00.9 HERPES SIMPLEX: ICD-10-CM

## 2025-04-17 DIAGNOSIS — K21.9 GASTROESOPHAGEAL REFLUX DISEASE, UNSPECIFIED WHETHER ESOPHAGITIS PRESENT: ICD-10-CM

## 2025-04-17 DIAGNOSIS — F41.9 ANXIETY: ICD-10-CM

## 2025-04-17 DIAGNOSIS — S00.12XA BLACK EYE OF LEFT SIDE, INITIAL ENCOUNTER: ICD-10-CM
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
## 2025-04-17 DIAGNOSIS — M54.12 CERVICAL RADICULOPATHY: ICD-10-CM

## 2025-04-17 DIAGNOSIS — E78.5 DYSLIPIDEMIA, GOAL LDL BELOW 130: ICD-10-CM

## 2025-04-17 DIAGNOSIS — Z13.21 ENCOUNTER FOR VITAMIN DEFICIENCY SCREENING: ICD-10-CM

## 2025-04-17 DIAGNOSIS — Z12.31 ENCOUNTER FOR SCREENING MAMMOGRAM FOR BREAST CANCER: ICD-10-CM

## 2025-04-17 DIAGNOSIS — R60.0 BILATERAL LOWER EXTREMITY EDEMA: ICD-10-CM

## 2025-04-17 DIAGNOSIS — F17.200 TOBACCO DEPENDENCE: ICD-10-CM

## 2025-04-17 PROCEDURE — 72040 X-RAY EXAM NECK SPINE 2-3 VW: CPT | Mod: TC | Performed by: RADIOLOGY

## 2025-04-17 PROCEDURE — 3079F DIAST BP 80-89 MM HG: CPT | Performed by: PHYSICIAN ASSISTANT

## 2025-04-17 PROCEDURE — 3074F SYST BP LT 130 MM HG: CPT | Performed by: PHYSICIAN ASSISTANT

## 2025-04-17 PROCEDURE — 99214 OFFICE O/P EST MOD 30 MIN: CPT | Performed by: PHYSICIAN ASSISTANT

## 2025-04-17 RX ORDER — BUSPIRONE HYDROCHLORIDE 10 MG/1
10 TABLET ORAL 2 TIMES DAILY
Qty: 180 TABLET | Refills: 2 | Status: SHIPPED | OUTPATIENT
Start: 2025-04-17

## 2025-04-17 RX ORDER — VALACYCLOVIR HYDROCHLORIDE 500 MG/1
500 TABLET, FILM COATED ORAL 2 TIMES DAILY
Qty: 180 TABLET | Refills: 2 | Status: SHIPPED | OUTPATIENT
Start: 2025-04-17

## 2025-04-17 RX ORDER — FUROSEMIDE 20 MG/1
20 TABLET ORAL 3 TIMES DAILY
Qty: 270 TABLET | Refills: 2 | Status: SHIPPED | OUTPATIENT
Start: 2025-04-17

## 2025-04-17 RX ORDER — METHOCARBAMOL 500 MG/1
1000 TABLET, FILM COATED ORAL 3 TIMES DAILY
Qty: 540 TABLET | Refills: 1 | Status: SHIPPED | OUTPATIENT
Start: 2025-04-17

## 2025-04-17 RX ORDER — ALBUTEROL SULFATE 90 UG/1
2 AEROSOL, METERED RESPIRATORY (INHALATION) EVERY 6 HOURS PRN
Qty: 18 G | Refills: 3 | Status: SHIPPED | OUTPATIENT
Start: 2025-04-17

## 2025-04-17 RX ORDER — PANTOPRAZOLE SODIUM 40 MG/1
40 TABLET, DELAYED RELEASE ORAL DAILY
Qty: 90 TABLET | Refills: 2 | Status: SHIPPED | OUTPATIENT
Start: 2025-04-17

## 2025-04-17 RX ORDER — POTASSIUM CHLORIDE 750 MG/1
10 TABLET, EXTENDED RELEASE ORAL 2 TIMES DAILY
Qty: 180 TABLET | Refills: 0 | Status: SHIPPED | OUTPATIENT
Start: 2025-04-17

## 2025-04-17 ASSESSMENT — FIBROSIS 4 INDEX: FIB4 SCORE: 0.86

## 2025-04-17 NOTE — PROGRESS NOTES
cc:  medication refills    Subjective:     Bhavana Liu is a 59 y.o. female presenting for medication refills.        History of Present Illness  The patient is a 59-year-old female who presents to the office today for medication refills.    She reports a history of frequent falls, with the most recent incident occurring approximately 2 weeks ago when she tripped over her left toe on her driveway. During this fall, she sustained an injury to her eye, which has resulted in persistent pain and swelling, although she notes some improvement. She also experienced a significant headache upon waking each day following the fall. She has attempted to self-examine her eye for foreign objects but found none.    Additionally, she reports a sensation of a foreign body in her throat since the fall, which she fears may be related to a previous neck surgery. She has been experiencing voice loss since her second bout of COVID-19 in 10/2024, which was evaluated by an ENT specialist who found no abnormalities. She has been maintaining neck mobility to prevent stiffness.    She has a history of cervical spine surgery involving fusion of C4, C5, C6, and C7 with a titanium cage, as well as fusion of C3 and C4. She recalls a previous fall 4 months post-surgery, which resulted in a wrist fracture that was not immediately apparent due to the absence of bruising or swelling.    She also reports a history of high heart rates, typically around 103 to 105 beats per minute, and blood pooling in her legs due to impaired venous return. She has been experiencing shortness of breath, for which she uses an inhaler more frequently since her COVID-19 infection. She does not report any chest pain or palpitations. She has a history of tachycardia, for which she was previously on verapamil, but she has not had any episodes for several years. She has had EKGs prior to her surgeries and checks her pulse at the pharmacy.    She takes  methocarbamol 2 to 3 times a day, Lyrica, and 800 mg ibuprofen. She is not currently taking gabapentin and Lyrica together. She takes hydroxyzine once a day in the morning unless she is going to be with her  out and about for a long period of time, in which case she will take one extra with her just in case. She takes ibuprofen 3 times a day along with 3 extra strength Tylenol. She takes furosemide 2 in the morning and 1 at night, and potassium in the morning.    She has not had her kidney function checked since 2023. She recently had an A1c done, which was 0.1 above what it was supposed to be. She does not want to lose a limb because she is a diabetic and does not know it.    She has dry skin and tries not to take a shower every day because she has eczema and itches.    PAST SURGICAL HISTORY:  - Cervical spine fusion of C4, C5, C6, and C7 with titanium cage  - Cervical spine fusion of C3 and C4  - Wrist fracture repair       Review of systems:  See above.   Denies any symptoms unless previously indicated.        Current Outpatient Medications:     busPIRone (BUSPAR) 10 MG Tab tablet, Take 1 Tablet by mouth 2 times a day., Disp: 180 Tablet, Rfl: 2    furosemide (LASIX) 20 MG Tab, Take 1 Tablet by mouth 3 times a day., Disp: 270 Tablet, Rfl: 2    methocarbamol (ROBAXIN) 500 MG Tab, Take 2 Tablets by mouth 3 times a day., Disp: 540 Tablet, Rfl: 1    pantoprazole (PROTONIX) 40 MG Tablet Delayed Response, Take 1 Tablet by mouth every day., Disp: 90 Tablet, Rfl: 2    potassium chloride ER (KLOR-CON) 10 MEQ tablet, Take 1 Tablet by mouth 2 times a day., Disp: 180 Tablet, Rfl: 0    valACYclovir (VALTREX) 500 MG Tab, Take 1 Tablet by mouth 2 times a day., Disp: 180 Tablet, Rfl: 2    VENTOLIN  (90 Base) MCG/ACT Aero Soln inhalation aerosol, Inhale 2 Puffs every 6 hours as needed for Shortness of Breath., Disp: 18 g, Rfl: 3    methocarbamol (ROBAXIN) 500 MG Tab, TAKE 1 OR 2 TABLETS BY MOUTH TWO TIMES A DAY TO  "THREE TIMES A DAY (Patient taking differently: Take 500 mg by mouth.), Disp: 60 Tablet, Rfl: 0    docusate sodium (COLACE) 100 MG Cap, TAKE ONE CAPSULE BY MOUTH TWO TIMES A DAY, Disp: 60 Capsule, Rfl: 2    loratadine (CLARITIN) 10 MG Tab, TAKE ONE Tablet BY MOUTH EVERY DAY, Disp: 90 Tablet, Rfl: 0    PARoxetine (PAXIL) 20 MG Tab, TAKE TWO TABLETS BY MOUTH ONCE DAILY, Disp: 180 Tablet, Rfl: 0    ibuprofen (MOTRIN) 800 MG Tab, TAKE 1 Tablet BY MOUTH EVERY 8 HOURS AS NEEDED FOR moderate pain, Disp: 90 Tablet, Rfl: 1    pregabalin (LYRICA) 75 MG Cap, Take 1 Capsule by mouth 3 times a day for 270 days., Disp: 270 Capsule, Rfl: 2    hydrOXYzine HCl (ATARAX) 25 MG Tab, Take 1 Tablet by mouth see administration instructions. Every morning, may take up to three times daily as needed., Disp: 90 Tablet, Rfl: 1    triamcinolone acetonide (KENALOG) 0.1 % Cream, Use a small amount to the affected area twice a day no more than 10 days. (Patient taking differently: Apply 1 Application topically 2 times a day. Use a small amount to the affected area twice a day no more than 10 days.), Disp: 45 g, Rfl: 0    ketoconazole (NIZORAL) 2 % Cream, Apply a small amount to affected area no more than 2 weeks., Disp: 60 g, Rfl: 2    Allergies, past medical history, past surgical history, family history, social history reviewed and updated    Objective:     Vitals: /80 (BP Location: Left arm, Patient Position: Sitting, BP Cuff Size: Large adult)   Pulse 94   Temp 36.2 °C (97.2 °F) (Temporal)   Resp 16   Ht 1.765 m (5' 9.5\")   Wt 106 kg (234 lb 5.6 oz)   LMP 10/28/2012   SpO2 97%   BMI 34.11 kg/m²   General: Alert, pleasant, NAD  EYES:   PERRL, EOMI, no icterus or pallor.  Contusion around left eye.  Conjunctivae and lids normal.   HENT:  Normocephalic.  External ears normal.  Decreased range of motion neck  Respiratory: Normal respiratory effort.  .  Abdomen: obese  Skin: Warm, dry, no rashes.  Musculoskeletal: Gait is normal.  " Moves all extremities well.    Neurological: No tremors, sensation grossly intact, CN2-12 intact.  Psych:  Affect/mood is normal, judgement is good, memory is intact, grooming is appropriate.    Physical Exam  Eyes: Normal range of motion of extraocular muscles, left eye swollen  Cardiovascular: Pulse normal  Extremities: Hematoma noted on the arm       Results  Labs   - A1c: 2023, 5.6    Diagnostic Testing   - [id number], Normal sinus rhythm, right atrial enlargement       Assessment/Plan:     Blessing was seen today for referral needed and fall.    Diagnoses and all orders for this visit:    Fall, initial encounter    Cervical radiculopathy  -     DX-CERVICAL SPINE-2 OR 3 VIEWS; Future  -     US-SOFT TISSUES OF HEAD - NECK; Future  -     methocarbamol (ROBAXIN) 500 MG Tab; Take 2 Tablets by mouth 3 times a day.    Cervical myelopathy (HCC)  -     DX-CERVICAL SPINE-2 OR 3 VIEWS; Future  -     US-SOFT TISSUES OF HEAD - NECK; Future  -     methocarbamol (ROBAXIN) 500 MG Tab; Take 2 Tablets by mouth 3 times a day.    Foraminal stenosis of cervical region  -     DX-CERVICAL SPINE-2 OR 3 VIEWS; Future  -     US-SOFT TISSUES OF HEAD - NECK; Future    Neuropathy  -     DX-CERVICAL SPINE-2 OR 3 VIEWS; Future  -     US-SOFT TISSUES OF HEAD - NECK; Future  -     methocarbamol (ROBAXIN) 500 MG Tab; Take 2 Tablets by mouth 3 times a day.    Anxiety  -     busPIRone (BUSPAR) 10 MG Tab tablet; Take 1 Tablet by mouth 2 times a day.    Bilateral lower extremity edema  -     furosemide (LASIX) 20 MG Tab; Take 1 Tablet by mouth 3 times a day.  -     potassium chloride ER (KLOR-CON) 10 MEQ tablet; Take 1 Tablet by mouth 2 times a day.    Gastroesophageal reflux disease, unspecified whether esophagitis present  -     pantoprazole (PROTONIX) 40 MG Tablet Delayed Response; Take 1 Tablet by mouth every day.    Varicose veins of both legs with edema  -     potassium chloride ER (KLOR-CON) 10 MEQ tablet; Take 1 Tablet by mouth 2  times a day.    Herpes simplex  -     valACYclovir (VALTREX) 500 MG Tab; Take 1 Tablet by mouth 2 times a day.    Tobacco dependence  -     VENTOLIN  (90 Base) MCG/ACT Aero Soln inhalation aerosol; Inhale 2 Puffs every 6 hours as needed for Shortness of Breath.    Dyslipidemia, goal LDL below 130  -     Lipid Profile; Future  -     Comp Metabolic Panel; Future  -     TSH WITH REFLEX TO FT4; Future    Obesity (BMI 30-39.9)  -     Lipid Profile; Future  -     Comp Metabolic Panel; Future  -     HEMOGLOBIN A1C; Future    Palpitations  -     EKG - Clinic Performed    Encounter for screening mammogram for breast cancer  -     MA-SCREENING MAMMO BILAT W/TOMOSYNTHESIS W/CAD; Future    Encounter for vitamin deficiency screening  -     VITAMIN D,25 HYDROXY (DEFICIENCY); Future    Black eye of left side, initial encounter        Assessment & Plan  1. Fall.  She has been experiencing an abnormal sensation in her neck and throat since her fall. Despite multiple discussions emphasizing the importance of visiting the ER due to the abnormal sensation post-fall and her history of neck surgeries, she has declined. A stat soft tissue ultrasound and a cervical spine x-ray will be obtained, and she will be notified of the results upon receipt.    2. Cervical radiculopathy/cervical myelopathy/foraminal stenosis of the cervical region/neuropathy.  Her previous surgical history further exacerbates concerns regarding her abnormal sensation. X-rays have been ordered for further evaluation. Once again, a visit to the ER was strongly recommended.    3. Anxiety/bilateral edema/esophageal reflux/varicose veins/herpes simplex/tobacco dependence.  Laboratory tests have been ordered for further evaluation as she is overdue. Her medications have been refilled.    4. Dyslipidemia/obesity.  Laboratory tests have been ordered for further evaluation.    5. Palpitations.  She reports experiencing abnormal heart rates but does not report any chest  pain. She admits to shortness of breath, which is not a new symptom and has been an ongoing issue. An EKG will be obtained at this time.  She will check her pulse at home and bring in readings.  Follow up in 4-6 weeks.      6. Health maintenance.  A mammogram has been ordered.    7. Vitamin deficiency screen.  Lab tests have been ordered.    8.  Black eye left side  Extraocular muscles are intact.  Patient feels symptoms are improving.  Any worsening symptoms recommend ER follow-up.  May also need further testing if symptoms worsen such as a CT.      Follow-up  The patient will follow up in 4 to 6 weeks.    Return in about 4 weeks (around 5/15/2025), or if symptoms worsen or fail to improve, for need referral to cardiology.  need ekg.    Please note that this dictation was created using voice recognition software. I have made every reasonable attempt to correct obvious errors, but expect that there are errors of grammar and possible content that I did not discover before finalizing note.

## 2025-05-05 ENCOUNTER — APPOINTMENT (OUTPATIENT)
Dept: RADIOLOGY | Facility: MEDICAL CENTER | Age: 60
End: 2025-05-05
Attending: PHYSICIAN ASSISTANT

## 2025-05-05 DIAGNOSIS — G95.9 CERVICAL MYELOPATHY (HCC): ICD-10-CM

## 2025-05-05 DIAGNOSIS — M48.02 FORAMINAL STENOSIS OF CERVICAL REGION: ICD-10-CM

## 2025-05-05 DIAGNOSIS — M54.12 CERVICAL RADICULOPATHY: ICD-10-CM

## 2025-05-05 DIAGNOSIS — Z12.31 ENCOUNTER FOR SCREENING MAMMOGRAM FOR BREAST CANCER: ICD-10-CM

## 2025-05-05 DIAGNOSIS — G62.9 NEUROPATHY: ICD-10-CM

## 2025-05-05 PROCEDURE — 76536 US EXAM OF HEAD AND NECK: CPT

## 2025-05-05 PROCEDURE — 77067 SCR MAMMO BI INCL CAD: CPT

## 2025-05-15 DIAGNOSIS — R92.8 ABNORMAL MAMMOGRAM: Primary | ICD-10-CM

## 2025-05-19 ENCOUNTER — RESULTS FOLLOW-UP (OUTPATIENT)
Dept: MEDICAL GROUP | Facility: CLINIC | Age: 60
End: 2025-05-19

## 2025-06-09 ENCOUNTER — HOSPITAL ENCOUNTER (OUTPATIENT)
Facility: MEDICAL CENTER | Age: 60
End: 2025-06-09
Attending: PHYSICIAN ASSISTANT

## 2025-06-09 ENCOUNTER — RESULTS FOLLOW-UP (OUTPATIENT)
Dept: MEDICAL GROUP | Facility: CLINIC | Age: 60
End: 2025-06-09

## 2025-06-09 DIAGNOSIS — R92.8 ABNORMAL MAMMOGRAM: ICD-10-CM

## 2025-06-09 PROCEDURE — 76642 ULTRASOUND BREAST LIMITED: CPT | Mod: RT

## 2025-06-09 PROCEDURE — G0279 TOMOSYNTHESIS, MAMMO: HCPCS

## 2025-07-14 NOTE — TELEPHONE ENCOUNTER
Requested Prescriptions     Pending Prescriptions Disp Refills    ibuprofen (MOTRIN) 800 MG Tab [Pharmacy Med Name: ibuprofen 800 mg tablet] 90 Tablet 1     Sig: TAKE 1 Tablet BY MOUTH EVERY 8 HOURS AS NEEDED FOR moderate pain     Last office visit: 4/17/25  Last lab: 6/29/23

## 2025-07-15 DIAGNOSIS — B35.3 TINEA PEDIS OF BOTH FEET: ICD-10-CM

## 2025-07-15 RX ORDER — IBUPROFEN 800 MG/1
800 TABLET, FILM COATED ORAL EVERY 8 HOURS PRN
Qty: 90 TABLET | Refills: 1 | Status: SHIPPED | OUTPATIENT
Start: 2025-07-15

## 2025-07-15 RX ORDER — KETOCONAZOLE 20 MG/G
CREAM TOPICAL
Qty: 60 G | Refills: 0 | Status: SHIPPED | OUTPATIENT
Start: 2025-07-15

## 2025-07-16 NOTE — TELEPHONE ENCOUNTER
Requested Prescriptions     Pending Prescriptions Disp Refills    ketoconazole (NIZORAL) 2 % Cream [Pharmacy Med Name: ketoconazole 2 % topical cream] 60 g 2     Sig: APPLY A SMALL AMOUNT TO AFFECTED AREA TOPICALLY TWO TIMES A DAY FOR NO MORE THAN 2 WEEKS     Last office visit: 4/17/25  Last lab: 6/29/23

## 2025-08-07 DIAGNOSIS — F43.22 ADJUSTMENT DISORDER WITH ANXIOUS MOOD: ICD-10-CM

## 2025-08-07 RX ORDER — PAROXETINE 20 MG/1
40 TABLET, FILM COATED ORAL DAILY
Qty: 180 TABLET | Refills: 1 | Status: SHIPPED | OUTPATIENT
Start: 2025-08-07

## 2025-08-11 DIAGNOSIS — L29.9 ITCHING: ICD-10-CM

## 2025-08-13 RX ORDER — DOCUSATE SODIUM 100 MG/1
100 CAPSULE, LIQUID FILLED ORAL 2 TIMES DAILY
Qty: 60 CAPSULE | Refills: 2 | Status: SHIPPED | OUTPATIENT
Start: 2025-08-13

## 2025-08-13 RX ORDER — LORATADINE 10 MG/1
10 TABLET ORAL
Qty: 90 TABLET | Refills: 2 | Status: SHIPPED | OUTPATIENT
Start: 2025-08-13

## (undated) DEVICE — SODIUM CHL IRRIGATION 0.9% 1000ML (12EA/CA)

## (undated) DEVICE — COVER LIGHT HANDLE ALC PLUS DISP (18EA/BX)

## (undated) DEVICE — SENSOR SPO2 NEO LNCS ADHESIVE (20/BX) SEE USER NOTES

## (undated) DEVICE — ADHESIVE MASTISOL - (48/BX)

## (undated) DEVICE — GLOVE BIOGEL INDICATOR SZ 7SURGICAL PF LTX - (50/BX 4BX/CA)

## (undated) DEVICE — TUBE CONNECTING SUCTION - CLEAR PLASTIC STERILE 72 IN (50EA/CA)

## (undated) DEVICE — SYRINGE NON SAFETY 10 CC 20 GA X 1-1/2 IN (100/BX 4BX/CA)

## (undated) DEVICE — TUBING CLEARLINK DUO-VENT - C-FLO (48EA/CA)

## (undated) DEVICE — SPONGE GAUZESTER 4 X 4 4PLY - (128PK/CA)

## (undated) DEVICE — NEEDLE SPINAL NON-SAFETY 18 GA X 3 IN (25EA/BX)

## (undated) DEVICE — SET EXTENSION WITH 2 PORTS (48EA/CA) ***PART #2C8610 IS A SUBSTITUTE*****

## (undated) DEVICE — KIT SURGIFLO W/OUT THROMBIN - (6EA/CA)

## (undated) DEVICE — SUCTION INSTRUMENT YANKAUER BULBOUS TIP W/O VENT (50EA/CA)

## (undated) DEVICE — TRAY FOLEY CATHETER STATLOCK 16FR SURESTEP  (10EA/CA)

## (undated) DEVICE — DRAPE LAPAROTOMY T SHEET - (12EA/CA)

## (undated) DEVICE — SCREW DISTRACTION 14MM YELLOW - STERILE (10EA/BX) (5TX4=20)

## (undated) DEVICE — CHLORAPREP 26 ML APPLICATOR - ORANGE TINT(25/CA)

## (undated) DEVICE — BOVIE BLADE SHORT - (150EA/CT)

## (undated) DEVICE — KIT EVACUATER 3 SPRING PVC LF 1/8 DRAIN SIZE (10EA/CA)"

## (undated) DEVICE — BONE PRESS SPINAL EDITION HENSLER (10EA/CA)

## (undated) DEVICE — SET LEADWIRE 5 LEAD BEDSIDE DISPOSABLE ECG (1SET OF 5/EA)

## (undated) DEVICE — CLIP SM INTNL HRZN TI ESCP LGT - (24EA/PK 25PK/BX)

## (undated) DEVICE — GOWN WARMING STANDARD FLEX - (30/CA)

## (undated) DEVICE — SPONGE PEANUT - (5/PK 50PK/CA)

## (undated) DEVICE — INTRAOP NEURO IN OR 1:1 PER 15 MIN

## (undated) DEVICE — NEURO PACK

## (undated) DEVICE — SUTURE 1 VICRYL PLUS CT-1 - 18 INCH (12/BX)

## (undated) DEVICE — PAD BABY LAP 4X18 W/O - RINGS PREWASHED 5/PK 40PK/CS

## (undated) DEVICE — CANISTER SUCTION 3000ML MECHANICAL FILTER AUTO SHUTOFF MEDI-VAC NONSTERILE LF DISP  (40EA/CA)

## (undated) DEVICE — SLEEVE, VASO, THIGH, MED

## (undated) DEVICE — ELECTRODE DUAL RETURN W/ CORD - (50/PK)

## (undated) DEVICE — SUTURE 0 VICRYL PLUS CT-1 - 8 X 18 INCH (12/BX)

## (undated) DEVICE — BOVIE BLADE COATED &INSULATED - 25/PK

## (undated) DEVICE — STERI STRIP COMPOUND BENZOIN - TINCTURE 0.6ML WITH APPLICATOR (40EA/BX)

## (undated) DEVICE — PIN HEAD MAYFIELD DISP. (3EA/PK 12PK/BX)

## (undated) DEVICE — CANISTER SUCTION RIGID RED 1500CC (40EA/CA)

## (undated) DEVICE — GLOVE BIOGEL PI INDICATOR SZ 7.5 SURGICAL PF LF -(50/BX 4BX/CA)

## (undated) DEVICE — DRAPE LARGE 3 QUARTER - (20/CA)

## (undated) DEVICE — GLOVE BIOGEL PI ORTHO SZ 6 SURGICAL PF LF (40PR/BX)

## (undated) DEVICE — BONE MILL BM210

## (undated) DEVICE — BIT DRILL MATCH HEAD MIDAS REX 15-A 2.2MM X 15CM

## (undated) DEVICE — LACTATED RINGERS INJ 1000 ML - (14EA/CA 60CA/PF)

## (undated) DEVICE — GLOVE BIOGEL PI INDICATOR SZ 7.0 SURGICAL PF LF - (50/BX 4BX/CA)

## (undated) DEVICE — PROTECTOR ULNA NERVE - (36PR/CA)

## (undated) DEVICE — Device

## (undated) DEVICE — BLADE CLIPPER FITS 2501 CLIPPER (BLUE)  (20EA/CA)

## (undated) DEVICE — GLOVE SZ 6.5 BIOGEL PI MICRO - PF LF (50PR/BX)

## (undated) DEVICE — BLADE SURGICAL CLIPPER - (50EA/CA)

## (undated) DEVICE — SUTURE 3-0 VICRYL PLUS RB-1 - 8 X 18 INCH (12/BX)

## (undated) DEVICE — TUBING C&T SET FLYING LEADS DRAIN TUBING (10EA/BX)

## (undated) DEVICE — BIT DRILL 2.4MM INFINITY

## (undated) DEVICE — GLOVE SZ 7 BIOGEL PI MICRO - PF LF (50PR/BX 4BX/CA)

## (undated) DEVICE — MIDAS LUBRICATOR DIFFUSER PACK (4EA/CA)

## (undated) DEVICE — LACTATED RINGERS INJ. 500 ML - (24EA/CA)

## (undated) DEVICE — GOWN SURGEONS X-LARGE - DISP. (30/CA)

## (undated) DEVICE — RESTRAINTS LIMB DISP. - (12/BX 4BX/CA)

## (undated) DEVICE — TOWEL STOP TIMEOUT SAFETY FLAG (40EA/CA)

## (undated) DEVICE — MASK ANESTHESIA ADULT  - (100/CA)

## (undated) DEVICE — CLOSURE WOUND 1/4 X 4 (STERI - STRIP) (50/BX 4BX/CA)

## (undated) DEVICE — DRAPE STRLE REG TOWEL 18X24 - (10/BX 4BX/CA)"

## (undated) DEVICE — DRAPE MICROSCOPE ARMATEC 120IN X 46IN (10EA/CA)

## (undated) DEVICE — DRAPE 36X28IN RAD CARM BND BG - (25/CA) O

## (undated) DEVICE — PACK NEURO - (2EA/CA)

## (undated) DEVICE — SPONGE RADIOPAQUE CTN X-LG - STERILE (50PK/CA) MADE TO ORDER ITEM AND HAS A 4-6 WEEK LEAD TIME

## (undated) DEVICE — ELECTRODE 850 FOAM ADHESIVE - HYDROGEL RADIOTRNSPRNT (50/PK)

## (undated) DEVICE — TOOL DISSECT MATCH HEAD

## (undated) DEVICE — TUBE E-T HI-LO CUFF 6.5MM (10EA/BX)

## (undated) DEVICE — KIT ANESTHESIA W/CIRCUIT & 3/LT BAG W/FILTER (20EA/CA)

## (undated) DEVICE — SUTURE 2-0 PDS II CT-2 - (36/BX)

## (undated) DEVICE — PACKING VAG 2 X-RAY (24EA/CS)

## (undated) DEVICE — GLOVE BIOGEL PI INDICATOR SZ 6.0 SURGICAL PF LF -(200PR/CA)

## (undated) DEVICE — GLOVE BIOGEL INDICATOR SZ 6.5 SURGICAL PF LTX - (50PR/BX 4BX/CA)

## (undated) DEVICE — WATER IRRIGATION STERILE 1000ML (12EA/CA)

## (undated) DEVICE — KIT  I.V. START (100EA/CA)

## (undated) DEVICE — GLOVE BIOGEL PI ORTHO SZ 7 PF LF (40PR/BX)

## (undated) DEVICE — TUBE E-T HI-LO CUFF 7.0MM (10EA/PK)

## (undated) DEVICE — GLOVE BIOGEL SZ 6.5 SURGICAL PF LTX (50PR/BX 4BX/CA)

## (undated) DEVICE — PATTIES SURG NEURO X-RAY 1/2X1/2 (10EA/PK 20PK/CS)

## (undated) DEVICE — SET IRRIGATION CYSTOSCOPY TUBE L80 IN (20EA/CA)

## (undated) DEVICE — TOOL MR8 14CM MATCH HD SYM-TRI 3MM DIAMETER (1/EA)

## (undated) DEVICE — SUTURE 0 COATED VICRYL 6-18IN - (12PK/BX)

## (undated) DEVICE — CATHETER IV 20 GA X 1-1/4 ---SURG.& SDS ONLY--- (50EA/BX)

## (undated) DEVICE — GLOVE BIOGEL PI ORTHO SZ 7.5 PF LF (40PR/BX)

## (undated) DEVICE — GLOVE, BIOGEL ECLIPSE, SZ 7.0, PF LTX (50/BX)

## (undated) DEVICE — TRAY SRGPRP PVP IOD WT PRP - (20/CA)

## (undated) DEVICE — HEAD HOLDER JUNIOR/ADULT

## (undated) DEVICE — SUTURE 4-0 VICRYL PLUS TF - 8 X 18 INCH (12/BX)

## (undated) DEVICE — DRAPE VAGINAL BIB W/ POUCH (10EA/CA)

## (undated) DEVICE — SPONGE XRAY 8X4 STERL. 12PL - (10EA/TY 80TY/CA)

## (undated) DEVICE — SLEEVE VASO CALF MED - (10PR/CA)

## (undated) DEVICE — SUTURE 3-0 VICRYL PLUS CT-1 - 36 INCH (36/BX)

## (undated) DEVICE — NEPTUNE 4 PORT MANIFOLD - (20/PK)

## (undated) DEVICE — SUTURE GENERAL

## (undated) DEVICE — PAD SANITARY 11IN MAXI IND WRAPPED  (12EA/PK 24PK/CA)

## (undated) DEVICE — COVER MAYO STAND X-LG - (22EA/CA)

## (undated) DEVICE — SENSOR OXIMETER ADULT SPO2 RD SET (20EA/BX)

## (undated) DEVICE — CLEANER ELECTRO-SURGICAL TIP - (25/BX 4BX/CA)